# Patient Record
Sex: FEMALE | Race: WHITE | NOT HISPANIC OR LATINO | Employment: FULL TIME | URBAN - METROPOLITAN AREA
[De-identification: names, ages, dates, MRNs, and addresses within clinical notes are randomized per-mention and may not be internally consistent; named-entity substitution may affect disease eponyms.]

---

## 2017-01-23 ENCOUNTER — ALLSCRIPTS OFFICE VISIT (OUTPATIENT)
Dept: OTHER | Facility: OTHER | Age: 62
End: 2017-01-23

## 2017-02-17 ENCOUNTER — ALLSCRIPTS OFFICE VISIT (OUTPATIENT)
Dept: OTHER | Facility: OTHER | Age: 62
End: 2017-02-17

## 2017-03-15 ENCOUNTER — ANESTHESIA EVENT (OUTPATIENT)
Dept: GASTROENTEROLOGY | Facility: MEDICAL CENTER | Age: 62
End: 2017-03-15
Payer: COMMERCIAL

## 2017-03-15 RX ORDER — CHOLECALCIFEROL (VITAMIN D3) 25 MCG
CAPSULE ORAL
Status: ON HOLD | COMMUNITY
End: 2017-04-27

## 2017-03-15 RX ORDER — DOCUSATE SODIUM 100 MG/1
100 CAPSULE, LIQUID FILLED ORAL 2 TIMES DAILY
COMMUNITY
End: 2017-04-25

## 2017-03-15 RX ORDER — FLUCONAZOLE 40 MG/ML
POWDER, FOR SUSPENSION ORAL DAILY
COMMUNITY
End: 2017-04-25

## 2017-03-15 RX ORDER — PREGABALIN 75 MG/1
75 CAPSULE ORAL 2 TIMES DAILY
COMMUNITY
End: 2017-04-25

## 2017-03-15 RX ORDER — POLYETHYLENE GLYCOL 3350 17 G/17G
17 POWDER, FOR SOLUTION ORAL DAILY
COMMUNITY
End: 2018-09-21 | Stop reason: SDUPTHER

## 2017-03-15 RX ORDER — VENLAFAXINE HYDROCHLORIDE 150 MG/1
150 CAPSULE, EXTENDED RELEASE ORAL DAILY
COMMUNITY
End: 2018-03-05 | Stop reason: SDUPTHER

## 2017-03-16 ENCOUNTER — GENERIC CONVERSION - ENCOUNTER (OUTPATIENT)
Dept: GASTROENTEROLOGY | Facility: MEDICAL CENTER | Age: 62
End: 2017-03-16

## 2017-03-16 ENCOUNTER — ANESTHESIA (OUTPATIENT)
Dept: GASTROENTEROLOGY | Facility: MEDICAL CENTER | Age: 62
End: 2017-03-16
Payer: COMMERCIAL

## 2017-03-16 ENCOUNTER — HOSPITAL ENCOUNTER (OUTPATIENT)
Facility: MEDICAL CENTER | Age: 62
Setting detail: OUTPATIENT SURGERY
Discharge: HOME | End: 2017-03-16
Attending: INTERNAL MEDICINE | Admitting: INTERNAL MEDICINE
Payer: COMMERCIAL

## 2017-03-16 VITALS
OXYGEN SATURATION: 95 % | SYSTOLIC BLOOD PRESSURE: 137 MMHG | HEART RATE: 62 BPM | RESPIRATION RATE: 16 BRPM | DIASTOLIC BLOOD PRESSURE: 72 MMHG | TEMPERATURE: 97.2 F | HEIGHT: 63 IN | BODY MASS INDEX: 25.16 KG/M2 | WEIGHT: 142 LBS

## 2017-03-16 DIAGNOSIS — Z12.11 ENCOUNTER FOR SCREENING FOR MALIGNANT NEOPLASM OF COLON: ICD-10-CM

## 2017-03-16 PROCEDURE — 88305 TISSUE EXAM BY PATHOLOGIST: CPT | Performed by: INTERNAL MEDICINE

## 2017-03-16 RX ORDER — PROPOFOL 10 MG/ML
INJECTION, EMULSION INTRAVENOUS AS NEEDED
Status: DISCONTINUED | OUTPATIENT
Start: 2017-03-16 | End: 2017-03-16 | Stop reason: SURG

## 2017-03-16 RX ORDER — MULTIVIT-MIN/IRON FUM/FOLIC AC 7.5 MG-4
1 TABLET ORAL DAILY
COMMUNITY

## 2017-03-16 RX ORDER — SODIUM CHLORIDE 9 MG/ML
125 INJECTION, SOLUTION INTRAVENOUS CONTINUOUS
Status: DISCONTINUED | OUTPATIENT
Start: 2017-03-16 | End: 2017-03-16 | Stop reason: HOSPADM

## 2017-03-16 RX ADMIN — SODIUM CHLORIDE 125 ML/HR: 0.9 INJECTION, SOLUTION INTRAVENOUS at 14:26

## 2017-03-16 RX ADMIN — PROPOFOL 120 MG: 10 INJECTION, EMULSION INTRAVENOUS at 15:24

## 2017-03-16 RX ADMIN — PROPOFOL 50 MG: 10 INJECTION, EMULSION INTRAVENOUS at 15:31

## 2017-03-16 RX ADMIN — PROPOFOL 50 MG: 10 INJECTION, EMULSION INTRAVENOUS at 15:28

## 2017-03-16 RX ADMIN — PROPOFOL 30 MG: 10 INJECTION, EMULSION INTRAVENOUS at 15:27

## 2017-03-16 RX ADMIN — PROPOFOL 50 MG: 10 INJECTION, EMULSION INTRAVENOUS at 15:37

## 2017-03-22 ENCOUNTER — GENERIC CONVERSION - ENCOUNTER (OUTPATIENT)
Dept: OTHER | Facility: OTHER | Age: 62
End: 2017-03-22

## 2017-04-05 ENCOUNTER — GENERIC CONVERSION - ENCOUNTER (OUTPATIENT)
Dept: OTHER | Facility: OTHER | Age: 62
End: 2017-04-05

## 2017-04-06 ENCOUNTER — GENERIC CONVERSION - ENCOUNTER (OUTPATIENT)
Dept: OTHER | Facility: OTHER | Age: 62
End: 2017-04-06

## 2017-04-25 ENCOUNTER — APPOINTMENT (EMERGENCY)
Dept: RADIOLOGY | Facility: HOSPITAL | Age: 62
DRG: 669 | End: 2017-04-25
Payer: COMMERCIAL

## 2017-04-25 ENCOUNTER — HOSPITAL ENCOUNTER (INPATIENT)
Facility: HOSPITAL | Age: 62
LOS: 1 days | Discharge: HOME | DRG: 669 | End: 2017-04-27
Attending: EMERGENCY MEDICINE | Admitting: HOSPITALIST
Payer: COMMERCIAL

## 2017-04-25 DIAGNOSIS — R10.9 ABDOMINAL PAIN: Primary | ICD-10-CM

## 2017-04-25 DIAGNOSIS — N13.2 HYDRONEPHROSIS WITH URETERAL CALCULUS: ICD-10-CM

## 2017-04-25 DIAGNOSIS — N20.1 LEFT URETERAL CALCULUS: ICD-10-CM

## 2017-04-25 DIAGNOSIS — N17.9 AKI (ACUTE KIDNEY INJURY) (HCC): ICD-10-CM

## 2017-04-25 DIAGNOSIS — R52 INTRACTABLE PAIN: ICD-10-CM

## 2017-04-25 DIAGNOSIS — R11.2 NAUSEA AND VOMITING: ICD-10-CM

## 2017-04-25 DIAGNOSIS — N20.0 KIDNEY STONE: ICD-10-CM

## 2017-04-25 PROBLEM — I10 HYPERTENSION: Status: ACTIVE | Noted: 2017-04-25

## 2017-04-25 PROBLEM — F32.A DEPRESSION: Status: ACTIVE | Noted: 2017-04-25

## 2017-04-25 LAB
ALBUMIN SERPL BCP-MCNC: 3.6 G/DL (ref 3.5–5)
ALP SERPL-CCNC: 84 U/L (ref 46–116)
ALT SERPL W P-5'-P-CCNC: 25 U/L (ref 12–78)
ANION GAP BLD CALC-SCNC: 16 MMOL/L (ref 4–13)
ANION GAP SERPL CALCULATED.3IONS-SCNC: 9 MMOL/L (ref 4–13)
AST SERPL W P-5'-P-CCNC: 28 U/L (ref 5–45)
ATRIAL RATE: 64 BPM
BACTERIA UR QL AUTO: ABNORMAL /HPF
BASOPHILS # BLD AUTO: 0.02 THOUSANDS/ΜL (ref 0–0.1)
BASOPHILS NFR BLD AUTO: 0 % (ref 0–1)
BILIRUB SERPL-MCNC: 0.83 MG/DL (ref 0.2–1)
BILIRUB UR QL STRIP: ABNORMAL
BUN BLD-MCNC: 20 MG/DL (ref 5–25)
BUN SERPL-MCNC: 17 MG/DL (ref 5–25)
CA-I BLD-SCNC: 1.05 MMOL/L (ref 1.12–1.32)
CALCIUM SERPL-MCNC: 9.2 MG/DL (ref 8.3–10.1)
CHLORIDE BLD-SCNC: 105 MMOL/L (ref 100–108)
CHLORIDE SERPL-SCNC: 104 MMOL/L (ref 100–108)
CLARITY UR: CLEAR
CO2 SERPL-SCNC: 27 MMOL/L (ref 21–32)
COLOR UR: YELLOW
COLOR, POC: NORMAL
CREAT BLD-MCNC: 1.4 MG/DL (ref 0.6–1.3)
CREAT SERPL-MCNC: 1.44 MG/DL (ref 0.6–1.3)
EOSINOPHIL # BLD AUTO: 0.03 THOUSAND/ΜL (ref 0–0.61)
EOSINOPHIL NFR BLD AUTO: 0 % (ref 0–6)
ERYTHROCYTE [DISTWIDTH] IN BLOOD BY AUTOMATED COUNT: 13.3 % (ref 11.6–15.1)
GFR SERPL CREATININE-BSD FRML MDRD: 37 ML/MIN/1.73SQ M
GFR SERPL CREATININE-BSD FRML MDRD: 38.2 ML/MIN/1.73SQ M
GLUCOSE SERPL-MCNC: 94 MG/DL (ref 65–140)
GLUCOSE SERPL-MCNC: 99 MG/DL (ref 65–140)
GLUCOSE UR STRIP-MCNC: NEGATIVE MG/DL
HCT VFR BLD AUTO: 46.5 % (ref 34.8–46.1)
HCT VFR BLD CALC: 43 % (ref 34.8–46.1)
HGB BLD-MCNC: 15.5 G/DL (ref 11.5–15.4)
HGB BLDA-MCNC: 14.6 G/DL (ref 11.5–15.4)
HGB UR QL STRIP.AUTO: ABNORMAL
HYALINE CASTS #/AREA URNS LPF: ABNORMAL /LPF
KETONES UR STRIP-MCNC: ABNORMAL MG/DL
LEUKOCYTE ESTERASE UR QL STRIP: NEGATIVE
LIPASE SERPL-CCNC: 117 U/L (ref 73–393)
LYMPHOCYTES # BLD AUTO: 0.95 THOUSANDS/ΜL (ref 0.6–4.47)
LYMPHOCYTES NFR BLD AUTO: 10 % (ref 14–44)
MCH RBC QN AUTO: 30 PG (ref 26.8–34.3)
MCHC RBC AUTO-ENTMCNC: 33.3 G/DL (ref 31.4–37.4)
MCV RBC AUTO: 90 FL (ref 82–98)
MONOCYTES # BLD AUTO: 0.9 THOUSAND/ΜL (ref 0.17–1.22)
MONOCYTES NFR BLD AUTO: 9 % (ref 4–12)
NEUTROPHILS # BLD AUTO: 7.7 THOUSANDS/ΜL (ref 1.85–7.62)
NEUTS SEG NFR BLD AUTO: 81 % (ref 43–75)
NITRITE UR QL STRIP: NEGATIVE
NON-SQ EPI CELLS URNS QL MICRO: ABNORMAL /HPF
NRBC BLD AUTO-RTO: 0 /100 WBCS
P AXIS: 59 DEGREES
PCO2 BLD: 26 MMOL/L (ref 21–32)
PH UR STRIP.AUTO: 6.5 [PH] (ref 4.5–8)
PLATELET # BLD AUTO: 221 THOUSANDS/UL (ref 149–390)
PMV BLD AUTO: 11.5 FL (ref 8.9–12.7)
POTASSIUM BLD-SCNC: 4.2 MMOL/L (ref 3.5–5.3)
POTASSIUM SERPL-SCNC: 4.1 MMOL/L (ref 3.5–5.3)
PR INTERVAL: 116 MS
PROT SERPL-MCNC: 7.5 G/DL (ref 6.4–8.2)
PROT UR STRIP-MCNC: ABNORMAL MG/DL
QRS AXIS: 48 DEGREES
QRSD INTERVAL: 68 MS
QT INTERVAL: 396 MS
QTC INTERVAL: 408 MS
RBC # BLD AUTO: 5.16 MILLION/UL (ref 3.81–5.12)
RBC #/AREA URNS AUTO: ABNORMAL /HPF
SODIUM BLD-SCNC: 142 MMOL/L (ref 136–145)
SODIUM SERPL-SCNC: 140 MMOL/L (ref 136–145)
SP GR UR STRIP.AUTO: 1.02 (ref 1–1.03)
SPECIMEN SOURCE: ABNORMAL
T WAVE AXIS: 55 DEGREES
UROBILINOGEN UR QL STRIP.AUTO: 1 E.U./DL
VENTRICULAR RATE: 64 BPM
WBC # BLD AUTO: 9.62 THOUSAND/UL (ref 4.31–10.16)
WBC #/AREA URNS AUTO: ABNORMAL /HPF

## 2017-04-25 PROCEDURE — 96374 THER/PROPH/DIAG INJ IV PUSH: CPT

## 2017-04-25 PROCEDURE — 99285 EMERGENCY DEPT VISIT HI MDM: CPT

## 2017-04-25 PROCEDURE — 36415 COLL VENOUS BLD VENIPUNCTURE: CPT

## 2017-04-25 PROCEDURE — 81002 URINALYSIS NONAUTO W/O SCOPE: CPT | Performed by: EMERGENCY MEDICINE

## 2017-04-25 PROCEDURE — 85014 HEMATOCRIT: CPT

## 2017-04-25 PROCEDURE — 87086 URINE CULTURE/COLONY COUNT: CPT

## 2017-04-25 PROCEDURE — 80047 BASIC METABLC PNL IONIZED CA: CPT

## 2017-04-25 PROCEDURE — 93005 ELECTROCARDIOGRAM TRACING: CPT

## 2017-04-25 PROCEDURE — 96375 TX/PRO/DX INJ NEW DRUG ADDON: CPT

## 2017-04-25 PROCEDURE — 96376 TX/PRO/DX INJ SAME DRUG ADON: CPT

## 2017-04-25 PROCEDURE — 96361 HYDRATE IV INFUSION ADD-ON: CPT

## 2017-04-25 PROCEDURE — 85025 COMPLETE CBC W/AUTO DIFF WBC: CPT

## 2017-04-25 PROCEDURE — 83690 ASSAY OF LIPASE: CPT

## 2017-04-25 PROCEDURE — 80053 COMPREHEN METABOLIC PANEL: CPT

## 2017-04-25 PROCEDURE — 74177 CT ABD & PELVIS W/CONTRAST: CPT

## 2017-04-25 PROCEDURE — 81001 URINALYSIS AUTO W/SCOPE: CPT

## 2017-04-25 RX ORDER — IBUPROFEN 600 MG/1
600 TABLET ORAL EVERY 8 HOURS PRN
Qty: 21 TABLET | Refills: 0 | Status: SHIPPED | OUTPATIENT
Start: 2017-04-25 | End: 2017-04-27 | Stop reason: HOSPADM

## 2017-04-25 RX ORDER — TAMSULOSIN HYDROCHLORIDE 0.4 MG/1
0.4 CAPSULE ORAL
Status: DISCONTINUED | OUTPATIENT
Start: 2017-04-26 | End: 2017-04-27 | Stop reason: HOSPADM

## 2017-04-25 RX ORDER — TAMSULOSIN HYDROCHLORIDE 0.4 MG/1
0.4 CAPSULE ORAL
Qty: 7 CAPSULE | Refills: 0 | Status: SHIPPED | OUTPATIENT
Start: 2017-04-25 | End: 2019-01-22 | Stop reason: ALTCHOICE

## 2017-04-25 RX ORDER — ONDANSETRON 2 MG/ML
4 INJECTION INTRAMUSCULAR; INTRAVENOUS EVERY 6 HOURS PRN
Status: DISCONTINUED | OUTPATIENT
Start: 2017-04-25 | End: 2017-04-27 | Stop reason: HOSPADM

## 2017-04-25 RX ORDER — ACETAMINOPHEN 325 MG/1
975 TABLET ORAL ONCE
Status: COMPLETED | OUTPATIENT
Start: 2017-04-25 | End: 2017-04-25

## 2017-04-25 RX ORDER — HEPARIN SODIUM 5000 [USP'U]/ML
5000 INJECTION, SOLUTION INTRAVENOUS; SUBCUTANEOUS EVERY 8 HOURS SCHEDULED
Status: DISCONTINUED | OUTPATIENT
Start: 2017-04-25 | End: 2017-04-27 | Stop reason: HOSPADM

## 2017-04-25 RX ORDER — OXYCODONE HYDROCHLORIDE AND ACETAMINOPHEN 5; 325 MG/1; MG/1
1 TABLET ORAL EVERY 8 HOURS PRN
Qty: 12 TABLET | Refills: 0 | Status: SHIPPED | OUTPATIENT
Start: 2017-04-25 | End: 2017-04-27 | Stop reason: HOSPADM

## 2017-04-25 RX ORDER — ONDANSETRON 2 MG/ML
4 INJECTION INTRAMUSCULAR; INTRAVENOUS ONCE
Status: COMPLETED | OUTPATIENT
Start: 2017-04-25 | End: 2017-04-25

## 2017-04-25 RX ORDER — KETOROLAC TROMETHAMINE 30 MG/ML
15 INJECTION, SOLUTION INTRAMUSCULAR; INTRAVENOUS ONCE
Status: COMPLETED | OUTPATIENT
Start: 2017-04-25 | End: 2017-04-25

## 2017-04-25 RX ORDER — ACETAMINOPHEN 325 MG/1
650 TABLET ORAL EVERY 6 HOURS PRN
Status: DISCONTINUED | OUTPATIENT
Start: 2017-04-25 | End: 2017-04-25 | Stop reason: SDUPTHER

## 2017-04-25 RX ORDER — OXYCODONE HYDROCHLORIDE 5 MG/1
5 TABLET ORAL EVERY 4 HOURS PRN
Status: DISCONTINUED | OUTPATIENT
Start: 2017-04-25 | End: 2017-04-27 | Stop reason: HOSPADM

## 2017-04-25 RX ORDER — VENLAFAXINE HYDROCHLORIDE 150 MG/1
150 CAPSULE, EXTENDED RELEASE ORAL EVERY EVENING
Status: DISCONTINUED | OUTPATIENT
Start: 2017-04-25 | End: 2017-04-27 | Stop reason: HOSPADM

## 2017-04-25 RX ORDER — ACETAMINOPHEN 325 MG/1
650 TABLET ORAL EVERY 6 HOURS PRN
Status: DISCONTINUED | OUTPATIENT
Start: 2017-04-25 | End: 2017-04-26

## 2017-04-25 RX ORDER — TAMSULOSIN HYDROCHLORIDE 0.4 MG/1
0.4 CAPSULE ORAL ONCE
Status: COMPLETED | OUTPATIENT
Start: 2017-04-25 | End: 2017-04-25

## 2017-04-25 RX ORDER — MORPHINE SULFATE 2 MG/ML
2 INJECTION, SOLUTION INTRAMUSCULAR; INTRAVENOUS EVERY 4 HOURS PRN
Status: DISCONTINUED | OUTPATIENT
Start: 2017-04-25 | End: 2017-04-27 | Stop reason: HOSPADM

## 2017-04-25 RX ORDER — ONDANSETRON 4 MG/1
4 TABLET, FILM COATED ORAL EVERY 8 HOURS PRN
Qty: 12 TABLET | Refills: 0 | Status: SHIPPED | OUTPATIENT
Start: 2017-04-25 | End: 2017-04-27 | Stop reason: HOSPADM

## 2017-04-25 RX ORDER — IBUPROFEN 200 MG
200 TABLET ORAL EVERY 6 HOURS PRN
COMMUNITY
End: 2017-04-27 | Stop reason: HOSPADM

## 2017-04-25 RX ORDER — OXYCODONE HYDROCHLORIDE 5 MG/1
5 TABLET ORAL ONCE
Status: COMPLETED | OUTPATIENT
Start: 2017-04-25 | End: 2017-04-25

## 2017-04-25 RX ORDER — SODIUM CHLORIDE 9 MG/ML
100 INJECTION, SOLUTION INTRAVENOUS CONTINUOUS
Status: DISCONTINUED | OUTPATIENT
Start: 2017-04-25 | End: 2017-04-27 | Stop reason: HOSPADM

## 2017-04-25 RX ADMIN — SODIUM CHLORIDE 1000 ML: 0.9 INJECTION, SOLUTION INTRAVENOUS at 12:36

## 2017-04-25 RX ADMIN — IODIXANOL 75 ML: 320 INJECTION, SOLUTION INTRAVASCULAR at 15:01

## 2017-04-25 RX ADMIN — ONDANSETRON 4 MG: 2 INJECTION INTRAMUSCULAR; INTRAVENOUS at 15:27

## 2017-04-25 RX ADMIN — SODIUM CHLORIDE 100 ML/HR: 0.9 INJECTION, SOLUTION INTRAVENOUS at 21:50

## 2017-04-25 RX ADMIN — ONDANSETRON 4 MG: 2 INJECTION INTRAMUSCULAR; INTRAVENOUS at 18:32

## 2017-04-25 RX ADMIN — TAMSULOSIN HYDROCHLORIDE 0.4 MG: 0.4 CAPSULE ORAL at 19:41

## 2017-04-25 RX ADMIN — ACETAMINOPHEN 975 MG: 325 TABLET, FILM COATED ORAL at 18:25

## 2017-04-25 RX ADMIN — ONDANSETRON 4 MG: 2 INJECTION INTRAMUSCULAR; INTRAVENOUS at 12:36

## 2017-04-25 RX ADMIN — OXYCODONE HYDROCHLORIDE 5 MG: 5 TABLET ORAL at 18:25

## 2017-04-25 RX ADMIN — HYDROMORPHONE HYDROCHLORIDE 0.2 MG: 1 INJECTION, SOLUTION INTRAMUSCULAR; INTRAVENOUS; SUBCUTANEOUS at 19:26

## 2017-04-25 RX ADMIN — HYDROMORPHONE HYDROCHLORIDE 0.5 MG: 1 INJECTION, SOLUTION INTRAMUSCULAR; INTRAVENOUS; SUBCUTANEOUS at 15:27

## 2017-04-25 RX ADMIN — SODIUM CHLORIDE 1000 ML: 0.9 INJECTION, SOLUTION INTRAVENOUS at 16:41

## 2017-04-25 RX ADMIN — KETOROLAC TROMETHAMINE 15 MG: 30 INJECTION, SOLUTION INTRAMUSCULAR at 13:08

## 2017-04-25 RX ADMIN — HEPARIN SODIUM 5000 UNITS: 5000 INJECTION, SOLUTION INTRAVENOUS; SUBCUTANEOUS at 22:30

## 2017-04-25 RX ADMIN — VENLAFAXINE HYDROCHLORIDE 150 MG: 150 CAPSULE, EXTENDED RELEASE ORAL at 22:30

## 2017-04-26 ENCOUNTER — ANESTHESIA (INPATIENT)
Dept: PERIOP | Facility: HOSPITAL | Age: 62
DRG: 669 | End: 2017-04-26
Payer: COMMERCIAL

## 2017-04-26 ENCOUNTER — APPOINTMENT (INPATIENT)
Dept: RADIOLOGY | Facility: HOSPITAL | Age: 62
DRG: 669 | End: 2017-04-26
Payer: COMMERCIAL

## 2017-04-26 ENCOUNTER — ANESTHESIA EVENT (INPATIENT)
Dept: PERIOP | Facility: HOSPITAL | Age: 62
DRG: 669 | End: 2017-04-26
Payer: COMMERCIAL

## 2017-04-26 PROBLEM — K52.9 ACUTE GASTROENTERITIS: Status: ACTIVE | Noted: 2017-04-26

## 2017-04-26 LAB
ANION GAP SERPL CALCULATED.3IONS-SCNC: 7 MMOL/L (ref 4–13)
BUN SERPL-MCNC: 16 MG/DL (ref 5–25)
CALCIUM SERPL-MCNC: 8.2 MG/DL (ref 8.3–10.1)
CHLORIDE SERPL-SCNC: 110 MMOL/L (ref 100–108)
CO2 SERPL-SCNC: 26 MMOL/L (ref 21–32)
CREAT SERPL-MCNC: 1.35 MG/DL (ref 0.6–1.3)
ERYTHROCYTE [DISTWIDTH] IN BLOOD BY AUTOMATED COUNT: 13.7 % (ref 11.6–15.1)
GFR SERPL CREATININE-BSD FRML MDRD: 39.9 ML/MIN/1.73SQ M
GLUCOSE SERPL-MCNC: 76 MG/DL (ref 65–140)
HCT VFR BLD AUTO: 41.7 % (ref 34.8–46.1)
HGB BLD-MCNC: 13.2 G/DL (ref 11.5–15.4)
MCH RBC QN AUTO: 29.3 PG (ref 26.8–34.3)
MCHC RBC AUTO-ENTMCNC: 31.7 G/DL (ref 31.4–37.4)
MCV RBC AUTO: 93 FL (ref 82–98)
PLATELET # BLD AUTO: 193 THOUSANDS/UL (ref 149–390)
PMV BLD AUTO: 12.1 FL (ref 8.9–12.7)
POTASSIUM SERPL-SCNC: 3.9 MMOL/L (ref 3.5–5.3)
RBC # BLD AUTO: 4.51 MILLION/UL (ref 3.81–5.12)
SODIUM SERPL-SCNC: 143 MMOL/L (ref 136–145)
WBC # BLD AUTO: 7.9 THOUSAND/UL (ref 4.31–10.16)

## 2017-04-26 PROCEDURE — C2617 STENT, NON-COR, TEM W/O DEL: HCPCS | Performed by: UROLOGY

## 2017-04-26 PROCEDURE — BT1FYZZ FLUOROSCOPY OF LEFT KIDNEY, URETER AND BLADDER USING OTHER CONTRAST: ICD-10-PCS | Performed by: UROLOGY

## 2017-04-26 PROCEDURE — 85027 COMPLETE CBC AUTOMATED: CPT | Performed by: PHYSICIAN ASSISTANT

## 2017-04-26 PROCEDURE — 82360 CALCULUS ASSAY QUANT: CPT | Performed by: UROLOGY

## 2017-04-26 PROCEDURE — 0TC78ZZ EXTIRPATION OF MATTER FROM LEFT URETER, VIA NATURAL OR ARTIFICIAL OPENING ENDOSCOPIC: ICD-10-PCS | Performed by: UROLOGY

## 2017-04-26 PROCEDURE — 80048 BASIC METABOLIC PNL TOTAL CA: CPT | Performed by: PHYSICIAN ASSISTANT

## 2017-04-26 PROCEDURE — 0T778DZ DILATION OF LEFT URETER WITH INTRALUMINAL DEVICE, VIA NATURAL OR ARTIFICIAL OPENING ENDOSCOPIC: ICD-10-PCS | Performed by: UROLOGY

## 2017-04-26 PROCEDURE — C1769 GUIDE WIRE: HCPCS | Performed by: UROLOGY

## 2017-04-26 PROCEDURE — 74420 UROGRAPHY RTRGR +-KUB: CPT

## 2017-04-26 DEVICE — STENT URETERAL 6FR 24CML INLAY OPTIMA: Type: IMPLANTABLE DEVICE | Site: URETER | Status: FUNCTIONAL

## 2017-04-26 RX ORDER — FENTANYL CITRATE 50 UG/ML
INJECTION, SOLUTION INTRAMUSCULAR; INTRAVENOUS AS NEEDED
Status: DISCONTINUED | OUTPATIENT
Start: 2017-04-26 | End: 2017-04-26 | Stop reason: SURG

## 2017-04-26 RX ORDER — SODIUM CHLORIDE, SODIUM LACTATE, POTASSIUM CHLORIDE, CALCIUM CHLORIDE 600; 310; 30; 20 MG/100ML; MG/100ML; MG/100ML; MG/100ML
50 INJECTION, SOLUTION INTRAVENOUS CONTINUOUS
Status: DISCONTINUED | OUTPATIENT
Start: 2017-04-26 | End: 2017-04-27 | Stop reason: HOSPADM

## 2017-04-26 RX ORDER — CIPROFLOXACIN 500 MG/1
500 TABLET, FILM COATED ORAL EVERY 12 HOURS SCHEDULED
Status: DISCONTINUED | OUTPATIENT
Start: 2017-04-26 | End: 2017-04-27 | Stop reason: HOSPADM

## 2017-04-26 RX ORDER — IBUPROFEN 200 MG
200 TABLET ORAL EVERY 6 HOURS PRN
Status: DISCONTINUED | OUTPATIENT
Start: 2017-04-26 | End: 2017-04-27 | Stop reason: HOSPADM

## 2017-04-26 RX ORDER — LIDOCAINE HYDROCHLORIDE 10 MG/ML
INJECTION, SOLUTION INFILTRATION; PERINEURAL AS NEEDED
Status: DISCONTINUED | OUTPATIENT
Start: 2017-04-26 | End: 2017-04-26 | Stop reason: SURG

## 2017-04-26 RX ORDER — PROPOFOL 10 MG/ML
INJECTION, EMULSION INTRAVENOUS AS NEEDED
Status: DISCONTINUED | OUTPATIENT
Start: 2017-04-26 | End: 2017-04-26 | Stop reason: SURG

## 2017-04-26 RX ORDER — HYDROCODONE BITARTRATE AND ACETAMINOPHEN 5; 325 MG/1; MG/1
1 TABLET ORAL EVERY 6 HOURS PRN
Status: DISCONTINUED | OUTPATIENT
Start: 2017-04-26 | End: 2017-04-27 | Stop reason: HOSPADM

## 2017-04-26 RX ORDER — ACETAMINOPHEN 325 MG/1
650 TABLET ORAL EVERY 6 HOURS PRN
Status: DISCONTINUED | OUTPATIENT
Start: 2017-04-26 | End: 2017-04-27 | Stop reason: HOSPADM

## 2017-04-26 RX ORDER — MAGNESIUM HYDROXIDE 1200 MG/15ML
LIQUID ORAL AS NEEDED
Status: DISCONTINUED | OUTPATIENT
Start: 2017-04-26 | End: 2017-04-26 | Stop reason: HOSPADM

## 2017-04-26 RX ORDER — FENTANYL CITRATE/PF 50 MCG/ML
25 SYRINGE (ML) INJECTION
Status: DISCONTINUED | OUTPATIENT
Start: 2017-04-26 | End: 2017-04-26 | Stop reason: HOSPADM

## 2017-04-26 RX ORDER — ONDANSETRON 2 MG/ML
4 INJECTION INTRAMUSCULAR; INTRAVENOUS EVERY 4 HOURS PRN
Status: DISCONTINUED | OUTPATIENT
Start: 2017-04-26 | End: 2017-04-26 | Stop reason: HOSPADM

## 2017-04-26 RX ORDER — ONDANSETRON 2 MG/ML
INJECTION INTRAMUSCULAR; INTRAVENOUS AS NEEDED
Status: DISCONTINUED | OUTPATIENT
Start: 2017-04-26 | End: 2017-04-26 | Stop reason: SURG

## 2017-04-26 RX ADMIN — HEPARIN SODIUM 5000 UNITS: 5000 INJECTION, SOLUTION INTRAVENOUS; SUBCUTANEOUS at 05:35

## 2017-04-26 RX ADMIN — SODIUM CHLORIDE 100 ML/HR: 0.9 INJECTION, SOLUTION INTRAVENOUS at 08:30

## 2017-04-26 RX ADMIN — IBUPROFEN 200 MG: 200 TABLET, FILM COATED ORAL at 19:51

## 2017-04-26 RX ADMIN — TAMSULOSIN HYDROCHLORIDE 0.4 MG: 0.4 CAPSULE ORAL at 19:19

## 2017-04-26 RX ADMIN — VENLAFAXINE HYDROCHLORIDE 150 MG: 150 CAPSULE, EXTENDED RELEASE ORAL at 19:18

## 2017-04-26 RX ADMIN — CEFAZOLIN SODIUM 1000 MG: 1 SOLUTION INTRAVENOUS at 17:03

## 2017-04-26 RX ADMIN — LIDOCAINE HYDROCHLORIDE 40 MG: 10 INJECTION, SOLUTION INFILTRATION; PERINEURAL at 17:07

## 2017-04-26 RX ADMIN — DEXAMETHASONE SODIUM PHOSPHATE 10 MG: 10 INJECTION INTRAMUSCULAR; INTRAVENOUS at 17:18

## 2017-04-26 RX ADMIN — CIPROFLOXACIN 500 MG: 500 TABLET, FILM COATED ORAL at 21:15

## 2017-04-26 RX ADMIN — FENTANYL CITRATE 50 MCG: 50 INJECTION, SOLUTION INTRAMUSCULAR; INTRAVENOUS at 17:06

## 2017-04-26 RX ADMIN — FENTANYL CITRATE 50 MCG: 50 INJECTION, SOLUTION INTRAMUSCULAR; INTRAVENOUS at 17:20

## 2017-04-26 RX ADMIN — PROPOFOL 100 MG: 10 INJECTION, EMULSION INTRAVENOUS at 17:07

## 2017-04-26 RX ADMIN — ONDANSETRON 4 MG: 2 INJECTION INTRAMUSCULAR; INTRAVENOUS at 17:18

## 2017-04-26 RX ADMIN — HEPARIN SODIUM 5000 UNITS: 5000 INJECTION, SOLUTION INTRAVENOUS; SUBCUTANEOUS at 15:47

## 2017-04-26 RX ADMIN — SODIUM CHLORIDE, SODIUM LACTATE, POTASSIUM CHLORIDE, AND CALCIUM CHLORIDE 50 ML/HR: .6; .31; .03; .02 INJECTION, SOLUTION INTRAVENOUS at 19:19

## 2017-04-26 RX ADMIN — HEPARIN SODIUM 5000 UNITS: 5000 INJECTION, SOLUTION INTRAVENOUS; SUBCUTANEOUS at 21:15

## 2017-04-27 VITALS
HEIGHT: 63 IN | HEART RATE: 82 BPM | OXYGEN SATURATION: 93 % | RESPIRATION RATE: 18 BRPM | DIASTOLIC BLOOD PRESSURE: 64 MMHG | WEIGHT: 148.59 LBS | SYSTOLIC BLOOD PRESSURE: 105 MMHG | TEMPERATURE: 98.5 F | BODY MASS INDEX: 26.33 KG/M2

## 2017-04-27 LAB
ANION GAP SERPL CALCULATED.3IONS-SCNC: 5 MMOL/L (ref 4–13)
BACTERIA UR CULT: NORMAL
BUN SERPL-MCNC: 14 MG/DL (ref 5–25)
CALCIUM SERPL-MCNC: 8.4 MG/DL (ref 8.3–10.1)
CHLORIDE SERPL-SCNC: 107 MMOL/L (ref 100–108)
CO2 SERPL-SCNC: 28 MMOL/L (ref 21–32)
CREAT SERPL-MCNC: 0.76 MG/DL (ref 0.6–1.3)
GFR SERPL CREATININE-BSD FRML MDRD: >60 ML/MIN/1.73SQ M
GLUCOSE SERPL-MCNC: 124 MG/DL (ref 65–140)
PLATELET # BLD AUTO: 158 THOUSANDS/UL (ref 149–390)
PMV BLD AUTO: 11.8 FL (ref 8.9–12.7)
POTASSIUM SERPL-SCNC: 3.4 MMOL/L (ref 3.5–5.3)
SODIUM SERPL-SCNC: 140 MMOL/L (ref 136–145)

## 2017-04-27 PROCEDURE — 80048 BASIC METABOLIC PNL TOTAL CA: CPT | Performed by: INTERNAL MEDICINE

## 2017-04-27 PROCEDURE — 85049 AUTOMATED PLATELET COUNT: CPT | Performed by: PHYSICIAN ASSISTANT

## 2017-04-27 RX ORDER — CIPROFLOXACIN 500 MG/1
500 TABLET, FILM COATED ORAL EVERY 12 HOURS SCHEDULED
Qty: 10 TABLET | Refills: 0 | Status: SHIPPED | OUTPATIENT
Start: 2017-04-27 | End: 2017-05-02

## 2017-04-27 RX ORDER — CHOLECALCIFEROL (VITAMIN D3) 25 MCG
1000 CAPSULE ORAL DAILY
Qty: 30000 CAPSULE | Refills: 0
Start: 2017-04-27 | End: 2017-10-03

## 2017-04-27 RX ORDER — CIPROFLOXACIN 500 MG/1
500 TABLET, FILM COATED ORAL EVERY 12 HOURS SCHEDULED
Qty: 10 TABLET | Refills: 0 | Status: SHIPPED | OUTPATIENT
Start: 2017-04-27 | End: 2017-04-27

## 2017-04-27 RX ORDER — TAMSULOSIN HYDROCHLORIDE 0.4 MG/1
0.4 CAPSULE ORAL
Qty: 30 CAPSULE | Refills: 0 | Status: SHIPPED | OUTPATIENT
Start: 2017-04-27 | End: 2017-10-03

## 2017-04-27 RX ADMIN — HEPARIN SODIUM 5000 UNITS: 5000 INJECTION, SOLUTION INTRAVENOUS; SUBCUTANEOUS at 05:41

## 2017-04-27 RX ADMIN — CIPROFLOXACIN 500 MG: 500 TABLET, FILM COATED ORAL at 09:34

## 2017-05-01 ENCOUNTER — ALLSCRIPTS OFFICE VISIT (OUTPATIENT)
Dept: OTHER | Facility: OTHER | Age: 62
End: 2017-05-01

## 2017-05-01 DIAGNOSIS — Z13.220 ENCOUNTER FOR SCREENING FOR LIPOID DISORDERS: ICD-10-CM

## 2017-05-01 DIAGNOSIS — Z12.31 ENCOUNTER FOR SCREENING MAMMOGRAM FOR MALIGNANT NEOPLASM OF BREAST: ICD-10-CM

## 2017-05-01 DIAGNOSIS — E55.9 VITAMIN D DEFICIENCY: ICD-10-CM

## 2017-05-04 LAB
CA PHOS MFR STONE: 5 %
CALCIUM OXALATE DIHYDRATE MFR STONE IR: 20 %
COLOR STONE: NORMAL
COM MFR STONE: 75 %
COMMENT-STONE3: NORMAL
COMPOSITION: NORMAL
LABORATORY COMMENT REPORT: NORMAL
NIDUS STONE QL: NORMAL
PHOTO: NORMAL
SIZE STONE: NORMAL MM
STONE ANALYSIS-IMP: NORMAL
SURFACE CRYSTALS: NORMAL
WT STONE: 24 MG

## 2017-07-20 ENCOUNTER — GENERIC CONVERSION - ENCOUNTER (OUTPATIENT)
Dept: OTHER | Facility: OTHER | Age: 62
End: 2017-07-20

## 2017-08-04 ENCOUNTER — GENERIC CONVERSION - ENCOUNTER (OUTPATIENT)
Dept: OTHER | Facility: OTHER | Age: 62
End: 2017-08-04

## 2017-09-06 ENCOUNTER — ALLSCRIPTS OFFICE VISIT (OUTPATIENT)
Dept: OTHER | Facility: OTHER | Age: 62
End: 2017-09-06

## 2017-09-29 ENCOUNTER — APPOINTMENT (OUTPATIENT)
Dept: LAB | Age: 62
End: 2017-09-29
Payer: COMMERCIAL

## 2017-09-29 ENCOUNTER — TRANSCRIBE ORDERS (OUTPATIENT)
Dept: ADMINISTRATIVE | Age: 62
End: 2017-09-29

## 2017-09-29 ENCOUNTER — GENERIC CONVERSION - ENCOUNTER (OUTPATIENT)
Dept: OTHER | Facility: OTHER | Age: 62
End: 2017-09-29

## 2017-09-29 DIAGNOSIS — E55.9 VITAMIN D DEFICIENCY: ICD-10-CM

## 2017-09-29 DIAGNOSIS — Z13.220 ENCOUNTER FOR SCREENING FOR LIPOID DISORDERS: ICD-10-CM

## 2017-09-29 LAB
25(OH)D3 SERPL-MCNC: 36.8 NG/ML (ref 30–100)
CHOLEST SERPL-MCNC: 180 MG/DL (ref 50–200)
HDLC SERPL-MCNC: 59 MG/DL (ref 40–60)
LDLC SERPL CALC-MCNC: 103 MG/DL (ref 0–100)
TRIGL SERPL-MCNC: 92 MG/DL

## 2017-09-29 PROCEDURE — 80061 LIPID PANEL: CPT

## 2017-09-29 PROCEDURE — 82306 VITAMIN D 25 HYDROXY: CPT

## 2017-09-29 PROCEDURE — 36415 COLL VENOUS BLD VENIPUNCTURE: CPT

## 2017-10-01 DIAGNOSIS — N20.0 CALCULUS OF KIDNEY: ICD-10-CM

## 2017-10-03 ENCOUNTER — HOSPITAL ENCOUNTER (EMERGENCY)
Facility: HOSPITAL | Age: 62
Discharge: HOME | End: 2017-10-03
Attending: EMERGENCY MEDICINE | Admitting: EMERGENCY MEDICINE
Payer: COMMERCIAL

## 2017-10-03 ENCOUNTER — GENERIC CONVERSION - ENCOUNTER (OUTPATIENT)
Dept: OTHER | Facility: OTHER | Age: 62
End: 2017-10-03

## 2017-10-03 ENCOUNTER — APPOINTMENT (EMERGENCY)
Dept: RADIOLOGY | Facility: HOSPITAL | Age: 62
End: 2017-10-03
Payer: COMMERCIAL

## 2017-10-03 VITALS
BODY MASS INDEX: 26.32 KG/M2 | WEIGHT: 148.59 LBS | TEMPERATURE: 98.3 F | HEART RATE: 54 BPM | SYSTOLIC BLOOD PRESSURE: 105 MMHG | RESPIRATION RATE: 18 BRPM | OXYGEN SATURATION: 99 % | DIASTOLIC BLOOD PRESSURE: 60 MMHG

## 2017-10-03 DIAGNOSIS — R07.9 CHEST PAIN, UNSPECIFIED TYPE: Primary | ICD-10-CM

## 2017-10-03 LAB
ALBUMIN SERPL BCP-MCNC: 3.6 G/DL (ref 3.5–5)
ALP SERPL-CCNC: 82 U/L (ref 46–116)
ALT SERPL W P-5'-P-CCNC: 19 U/L (ref 12–78)
ANION GAP SERPL CALCULATED.3IONS-SCNC: 5 MMOL/L (ref 4–13)
AST SERPL W P-5'-P-CCNC: 18 U/L (ref 5–45)
ATRIAL RATE: 64 BPM
BASOPHILS # BLD AUTO: 0.04 THOUSANDS/ΜL (ref 0–0.1)
BASOPHILS NFR BLD AUTO: 1 % (ref 0–1)
BILIRUB SERPL-MCNC: 0.38 MG/DL (ref 0.2–1)
BUN SERPL-MCNC: 16 MG/DL (ref 5–25)
CALCIUM SERPL-MCNC: 8.9 MG/DL (ref 8.3–10.1)
CHLORIDE SERPL-SCNC: 105 MMOL/L (ref 100–108)
CO2 SERPL-SCNC: 29 MMOL/L (ref 21–32)
CREAT SERPL-MCNC: 0.91 MG/DL (ref 0.6–1.3)
EOSINOPHIL # BLD AUTO: 0.15 THOUSAND/ΜL (ref 0–0.61)
EOSINOPHIL NFR BLD AUTO: 3 % (ref 0–6)
ERYTHROCYTE [DISTWIDTH] IN BLOOD BY AUTOMATED COUNT: 13.7 % (ref 11.6–15.1)
GFR SERPL CREATININE-BSD FRML MDRD: 68 ML/MIN/1.73SQ M
GLUCOSE SERPL-MCNC: 107 MG/DL (ref 65–140)
HCT VFR BLD AUTO: 43 % (ref 34.8–46.1)
HGB BLD-MCNC: 14.6 G/DL (ref 11.5–15.4)
LIPASE SERPL-CCNC: 160 U/L (ref 73–393)
LYMPHOCYTES # BLD AUTO: 1.65 THOUSANDS/ΜL (ref 0.6–4.47)
LYMPHOCYTES NFR BLD AUTO: 31 % (ref 14–44)
MCH RBC QN AUTO: 30.2 PG (ref 26.8–34.3)
MCHC RBC AUTO-ENTMCNC: 34 G/DL (ref 31.4–37.4)
MCV RBC AUTO: 89 FL (ref 82–98)
MONOCYTES # BLD AUTO: 0.43 THOUSAND/ΜL (ref 0.17–1.22)
MONOCYTES NFR BLD AUTO: 8 % (ref 4–12)
NEUTROPHILS # BLD AUTO: 3.05 THOUSANDS/ΜL (ref 1.85–7.62)
NEUTS SEG NFR BLD AUTO: 57 % (ref 43–75)
NRBC BLD AUTO-RTO: 0 /100 WBCS
P AXIS: 63 DEGREES
PLATELET # BLD AUTO: 186 THOUSANDS/UL (ref 149–390)
PMV BLD AUTO: 12.3 FL (ref 8.9–12.7)
POTASSIUM SERPL-SCNC: 3.8 MMOL/L (ref 3.5–5.3)
PR INTERVAL: 116 MS
PROT SERPL-MCNC: 7.3 G/DL (ref 6.4–8.2)
QRS AXIS: 63 DEGREES
QRSD INTERVAL: 66 MS
QT INTERVAL: 394 MS
QTC INTERVAL: 406 MS
RBC # BLD AUTO: 4.84 MILLION/UL (ref 3.81–5.12)
SODIUM SERPL-SCNC: 139 MMOL/L (ref 136–145)
T WAVE AXIS: 61 DEGREES
TROPONIN I SERPL-MCNC: <0.02 NG/ML
TROPONIN I SERPL-MCNC: <0.02 NG/ML
VENTRICULAR RATE: 64 BPM
WBC # BLD AUTO: 5.32 THOUSAND/UL (ref 4.31–10.16)

## 2017-10-03 PROCEDURE — 84484 ASSAY OF TROPONIN QUANT: CPT | Performed by: EMERGENCY MEDICINE

## 2017-10-03 PROCEDURE — 99285 EMERGENCY DEPT VISIT HI MDM: CPT

## 2017-10-03 PROCEDURE — 71020 HB CHEST X-RAY 2VW FRONTAL&LATL: CPT

## 2017-10-03 PROCEDURE — 93005 ELECTROCARDIOGRAM TRACING: CPT | Performed by: EMERGENCY MEDICINE

## 2017-10-03 PROCEDURE — 36415 COLL VENOUS BLD VENIPUNCTURE: CPT | Performed by: EMERGENCY MEDICINE

## 2017-10-03 PROCEDURE — 83690 ASSAY OF LIPASE: CPT | Performed by: EMERGENCY MEDICINE

## 2017-10-03 PROCEDURE — 85025 COMPLETE CBC W/AUTO DIFF WBC: CPT | Performed by: EMERGENCY MEDICINE

## 2017-10-03 PROCEDURE — 80053 COMPREHEN METABOLIC PANEL: CPT | Performed by: EMERGENCY MEDICINE

## 2017-10-03 RX ORDER — PREGABALIN 100 MG/1
50 CAPSULE ORAL 2 TIMES DAILY
COMMUNITY
End: 2018-05-16 | Stop reason: SDUPTHER

## 2017-10-03 RX ORDER — VENLAFAXINE HYDROCHLORIDE 150 MG/1
CAPSULE, EXTENDED RELEASE ORAL
COMMUNITY
Start: 2011-03-01 | End: 2018-03-06 | Stop reason: SDUPTHER

## 2017-10-03 NOTE — ED ATTENDING ATTESTATION
Ana Paula Eli MD, saw and evaluated the patient  I have discussed the patient with the resident/non-physician practitioner and agree with the resident's/non-physician practitioner's findings, Plan of Care, and MDM as documented in the resident's/non-physician practitioner's note, except where noted  All available labs and Radiology studies were reviewed  At this point I agree with the current assessment done in the Emergency Department    I have conducted an independent evaluation of this patient a history and physical is as follows:  C/o CP  8 39 am  Got stressed after getting a parking ticket   Lasted about 10 mins and resolved completely   Not exertional  No recurrence  No sob no n/v/d/f/c   Took asa at work   No h/o known CAD     No cardiac risk factors   EXAM:   Const:   well appearing   NAD     HEENT:  NCAT    sclera anicteric conjunctiva pink   throat clear, MMM    Neck:   supple  no meningismus  no jvd   no bruits  no  midline tenderness   Lungs:   clear  CW non-tender   No creiptation  Heart:   RRR no m/g/r  Normal pulses  Abd:   soft nt nd pos bs   Ext:    normal nontender  No edema  Neruo:   CN 2 -12 intact  motor intact 5/5 sensory intact cerebellar intact       Gait normal    IMPRESSION: chest pain  PLAN: cardiac labs EKG      Critical Care Time  CritCare Time

## 2017-10-03 NOTE — ED NOTES
Called [facility] to inform them that patient was in ER and may be a little late for her appointment        Eduin Maldonado RN  10/03/17 4840

## 2017-10-03 NOTE — ED PROVIDER NOTES
History  Chief Complaint   Patient presents with    Chest Pain     Pt started with left sided chest pain around 0945 this AM that was a pressure     63 yo F presenting for evaluation of CP  Patient reports that around 8:45 a m  she had acute onset chest pain  She reports that she saw that she had a parking ticket on her car and soon after this is when she had onset of symptoms  She described having left-sided chest pain, did not radiate, lasted for 10-12 minutes and resolved spontaneously  Patient reports having similar pain in the past, however never lasted this long and is typically only for a few seconds  She reports that she normally gets the pain when feeling anxious, however it has occurred randomly as well  She reports that it occurs a few times a month, has not had this pain earlier this week  Patient reports that 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  She does report that she felt some tightness in her chest and instead of reading to the class, had been read on their own  She reports no symptoms at this time  She went to the nurse's office and got ASA and was told to come to the ED  Patient denies any recent illness, fevers, chills, URI symptoms, SOB, abdominal pain, nausea, vomiting, changes in stool, rash, leg swelling or pain, diaphoresis  Patient has no known CAD  She reports having previous cardiac workups with 2 stress tests, last was 10+ years ago  No history of DVT/PE or risk factors for such  History of anxiety and depression  Denies tobacco or drug use  Social EtOH  A/P:  59-year-old female with CP, will get EKG to rule out STEMI/arrhythmia/ischemic changes, troponin to evaluate for ischemia, CBC to rule out anemia, BMP to assess renal function/lytes, CXR to rule out PTX/effusion/CHF, delta troponin          Prior to Admission Medications   Prescriptions Last Dose Informant Patient Reported? Taking?    Multiple Vitamins-Minerals (MULTIVITAMIN WITH MINERALS) tablet   Yes Yes   Sig: Take 1 tablet by mouth daily   polyethylene glycol (MIRALAX) 17 g packet   Yes No   Sig: Take 17 g by mouth daily   pregabalin (LYRICA) 100 mg capsule   Yes Yes   Sig: Take 50 mg by mouth 2 (two) times a day   tamsulosin (FLOMAX) 0 4 mg   No Yes   Sig: Take 1 capsule by mouth daily with dinner   Patient taking differently: Take 0 4 mg by mouth as needed     venlafaxine (EFFEXOR-XR) 150 mg 24 hr capsule   Yes No   Sig: Take 150 mg by mouth daily   venlafaxine (EFFEXOR-XR) 150 mg 24 hr capsule   Yes Yes   Sig: Take by mouth      Facility-Administered Medications: None       Past Medical History:   Diagnosis Date    Arthralgia     Atypical chest pain     Fatigue     Fibromyalgia     Fibromyalgia     Restless leg syndrome     Vitamin D deficiency        Past Surgical History:   Procedure Laterality Date    KNEE SURGERY      UT COLONOSCOPY FLX DX W/COLLJ SPEC WHEN PFRMD N/A 3/16/2017    Procedure: COLONOSCOPY;  Surgeon: Davy Bey MD;  Location: [facility]; Service: Gastroenterology    UT CYSTO/URETERO W/LITHOTRIPSY &INDWELL STENT INSRT Left 4/26/2017    Procedure: CYSTOSCOPY URETEROSCOPY; RETROGRADE PYELOGRAM; STONE EXTRACTION; INSERTION STENT URETERAL;  Surgeon: Viola Scott MD;  Location: [facility];  Service: Urology       History reviewed  No pertinent family history  I have reviewed and agree with the history as documented  Social History   Substance Use Topics    Smoking status: Never Smoker    Smokeless tobacco: Never Used    Alcohol use No        Review of Systems   Constitutional: Negative for chills, fever and unexpected weight change  HENT: Negative for ear pain, rhinorrhea and sore throat  Respiratory: Negative for cough and shortness of breath  Cardiovascular: Positive for chest pain  Negative for leg swelling     Gastrointestinal: Negative for abdominal pain, constipation, diarrhea, nausea and vomiting  Genitourinary: Negative for dysuria, frequency, hematuria and urgency  Musculoskeletal: Negative for back pain, myalgias and neck pain  Skin: Negative for color change and rash  Allergic/Immunologic: Negative for environmental allergies and immunocompromised state  Neurological: Negative for dizziness, weakness, light-headedness, numbness and headaches  All other systems reviewed and are negative  Physical Exam  ED Triage Vitals   Temperature Pulse Respirations Blood Pressure SpO2   10/03/17 1239 10/03/17 1235 10/03/17 1235 10/03/17 1235 10/03/17 1235   98 3 °F (36 8 °C) 78 18 132/66 97 %      Temp Source Heart Rate Source Patient Position - Orthostatic VS BP Location FiO2 (%)   10/03/17 1239 10/03/17 1235 10/03/17 1235 10/03/17 1235 --   Oral Monitor Lying Right arm       Pain Score       10/03/17 1235       No Pain           Physical Exam   Constitutional: She is oriented to person, place, and time  She appears well-developed and well-nourished  HENT:   Head: Normocephalic and atraumatic  Mouth/Throat: Oropharynx is clear and moist    Eyes: Conjunctivae and EOM are normal    Neck: Normal range of motion  Neck supple  Cardiovascular: Normal rate, regular rhythm, normal heart sounds and intact distal pulses  Pulmonary/Chest: Effort normal and breath sounds normal  No respiratory distress  She exhibits no tenderness  Abdominal: Soft  She exhibits no distension  There is no tenderness  Musculoskeletal: She exhibits no edema or deformity  No calf swelling/ttp  No peripheral edema   Neurological: She is alert and oriented to person, place, and time  She exhibits normal muscle tone  Coordination normal    Skin: Skin is warm and dry  No rash noted  Psychiatric: She has a normal mood and affect  Her behavior is normal    Nursing note and vitals reviewed        ED Medications  Medications - No data to display    Diagnostic Studies  Labs Reviewed   CBC AND DIFFERENTIAL - Normal Result Value Ref Range Status    WBC 5 32  4 31 - 10 16 Thousand/uL Final    RBC 4 84  3 81 - 5 12 Million/uL Final    Hemoglobin 14 6  11 5 - 15 4 g/dL Final    Hematocrit 43 0  34 8 - 46 1 % Final    MCV 89  82 - 98 fL Final    MCH 30 2  26 8 - 34 3 pg Final    MCHC 34 0  31 4 - 37 4 g/dL Final    RDW 13 7  11 6 - 15 1 % Final    MPV 12 3  8 9 - 12 7 fL Final    Platelets 909  593 - 390 Thousands/uL Final    nRBC 0  /100 WBCs Final    Neutrophils Relative 57  43 - 75 % Final    Lymphocytes Relative 31  14 - 44 % Final    Monocytes Relative 8  4 - 12 % Final    Eosinophils Relative 3  0 - 6 % Final    Basophils Relative 1  0 - 1 % Final    Neutrophils Absolute 3 05  1 85 - 7 62 Thousands/µL Final    Lymphocytes Absolute 1 65  0 60 - 4 47 Thousands/µL Final    Monocytes Absolute 0 43  0 17 - 1 22 Thousand/µL Final    Eosinophils Absolute 0 15  0 00 - 0 61 Thousand/µL Final    Basophils Absolute 0 04  0 00 - 0 10 Thousands/µL Final   LIPASE - Normal    Lipase 160  73 - 393 u/L Final   TROPONIN I - Normal    Troponin I <0 02  <=0 04 ng/mL Final    Narrative:     [employer] Chemistry analyzer 99% cutoff is > 0 04 ng/mL in network labs    o cTnI 99% cutoff is useful only when applied to patients in the clinical setting of myocardial ischemia  o cTnI 99% cutoff should be interpreted in the context of clinical history, ECG findings and possibly cardiac imaging to establish correct diagnosis  o cTnI 99% cutoff may be suggestive but clearly not indicative of a coronary event without the clinical setting of myocardial ischemia     TROPONIN I - Normal    Troponin I <0 02  <=0 04 ng/mL Final    Narrative:     [employer] Chemistry analyzer 99% cutoff is > 0 04 ng/mL in network labs    o cTnI 99% cutoff is useful only when applied to patients in the clinical setting of myocardial ischemia  o cTnI 99% cutoff should be interpreted in the context of clinical history, ECG findings and possibly cardiac imaging to establish correct diagnosis  o cTnI 99% cutoff may be suggestive but clearly not indicative of a coronary event without the clinical setting of myocardial ischemia  COMPREHENSIVE METABOLIC PANEL    Sodium 880  136 - 145 mmol/L Final    Potassium 3 8  3 5 - 5 3 mmol/L Final    Chloride 105  100 - 108 mmol/L Final    CO2 29  21 - 32 mmol/L Final    Anion Gap 5  4 - 13 mmol/L Final    BUN 16  5 - 25 mg/dL Final    Creatinine 0 91  0 60 - 1 30 mg/dL Final    Comment: Standardized to IDMS reference method    Glucose 107  65 - 140 mg/dL Final    Comment:   If the patient is fasting, the ADA then defines impaired fasting glucose as > 100 mg/dL and diabetes as > or equal to 123 mg/dL  Specimen collection should occur prior to Sulfasalazine administration due to the potential for falsely depressed results  Specimen collection should occur prior to Sulfapyridine administration due to the potential for falsely elevated results  Calcium 8 9  8 3 - 10 1 mg/dL Final    AST 18  5 - 45 U/L Final    Comment:   Specimen collection should occur prior to Sulfasalazine administration due to the potential for falsely depressed results  ALT 19  12 - 78 U/L Final    Comment:   Specimen collection should occur prior to Sulfasalazine and/or Sulfapyridine administration due to the potential for falsely depressed results  Alkaline Phosphatase 82  46 - 116 U/L Final    Total Protein 7 3  6 4 - 8 2 g/dL Final    Albumin 3 6  3 5 - 5 0 g/dL Final    Total Bilirubin 0 38  0 20 - 1 00 mg/dL Final    eGFR 68  ml/min/1 73sq m Final    Narrative:     National Kidney Disease Education Program recommendations are as follows:  GFR calculation is accurate only with a steady state creatinine  Chronic Kidney disease less than 60 ml/min/1 73 sq  meters  Kidney failure less than 15 ml/min/1 73 sq  meters  XR chest 2 views   ED Interpretation   Interpreted by myself: no acute abn      Final Result      No acute cardiopulmonary findings            Workstation performed: [id number]             Procedures  Procedures      Phone Consults  ED Phone Contact    ED Course  ED Course as of Oct 03 1644   Tue Oct 03, 2017   [address] @ 3:45 Troponin I: <0 02   1424 Discussed results with pt, she is willing to stay for delta troponin          HEART Risk Score    Flowsheet Row Most Recent Value   History  0 Filed at: 10/03/2017 1254   ECG  0 Filed at: 10/03/2017 1254   Age  1 Filed at: 10/03/2017 1254   Risk Factors  0 Filed at: 10/03/2017 1254   Troponin  0 Filed at: 10/03/2017 1254   Heart Score Risk Calculator   History  0 Filed at: 10/03/2017 1254   ECG  0 Filed at: 10/03/2017 1254   Age  1 Filed at: 10/03/2017 1254   Risk Factors  0 Filed at: 10/03/2017 1254   Troponin  0 Filed at: 10/03/2017 1254   HEART Score  1 Filed at: 10/03/2017 1254   HEART Score  1 Filed at: 10/03/2017 1254                            MDM  Number of Diagnoses or Management Options  Chest pain, unspecified type:   Diagnosis management comments: 63 yo F with transient CP, pain was not present in ED and did not recur  Unremarkable ED workup including delta troponin  Pt was given rx to get outpt stress test performed  Instructed to f/u with PCP and strict return precautions discussed       Amount and/or Complexity of Data Reviewed  Clinical lab tests: ordered and reviewed  Tests in the radiology section of CPT®: ordered and reviewed  Tests in the medicine section of CPT®: ordered and reviewed      CritCare Time    Disposition  Final diagnoses:   Chest pain, unspecified type     ED Disposition     ED Disposition Condition Comment    Discharge  Ruben Ruiz discharge to home/self care      Condition at discharge: Stable        Follow-up Information     Follow up With Specialties Details Why Lilia Reyes MD Internal Medicine   [address] [place] [address],[address]  [place] [address]  [phone]          Return to ED if you have any new or worsening symptoms        Patient's Medications Discharge Prescriptions    No medications on file       Outpatient Discharge Orders  Stress test only, exercise   Standing Status: Future  Standing Exp  Date: 10/03/21         ED Provider  Attending physically available and evaluated Aminta Saunders I managed the patient along with the ED Attending      Electronically Signed by       Natividad Muñiz DO  Resident  10/03/17 9667

## 2017-10-03 NOTE — DISCHARGE INSTRUCTIONS
Chest Pain   WHAT YOU NEED TO KNOW:   Chest pain can be caused by a range of conditions, from not serious to life-threatening  Chest pain can be a symptom of a digestive problem, such as acid reflux or a stomach ulcer  An anxiety attack or a strong emotion, such as anger, can also cause chest pain  Infection, inflammation, or a fracture in the bones or cartilage in your chest can cause pain or discomfort  Sometimes chest pain or pressure is caused by poor blood flow to your heart (angina)  Chest pain may also be caused by life-threatening conditions such as a heart attack or blood clot in your lungs  DISCHARGE INSTRUCTIONS:   Call 911 if:   · You have any of the following signs of a heart attack:      ¨ Squeezing, pressure, or pain in your chest that lasts longer than 5 minutes or returns    ¨ Discomfort or pain in your back, neck, jaw, stomach, or arm     ¨ Trouble breathing    ¨ Nausea or vomiting    ¨ Lightheadedness or a sudden cold sweat, especially with chest pain or trouble breathing    Return to the emergency department if:   · You have chest discomfort that gets worse, even with medicine  · You cough or vomit blood  · Your bowel movements are black or bloody  · You cannot stop vomiting, or it hurts to swallow  Contact your healthcare provider if:   · You have questions or concerns about your condition or care  Medicines:   · Medicines  may be given to treat the cause of your chest pain  Examples include pain medicine, anxiety medicine, or medicines to increase blood flow to your heart  · Do not take certain medicines without asking your healthcare provider first   These include NSAIDs, herbal or vitamin supplements, or hormones (estrogen or progestin)  · Take your medicine as directed  Contact your healthcare provider if you think your medicine is not helping or if you have side effects  Tell him or her if you are allergic to any medicine   Keep a list of the medicines, vitamins, and herbs you take  Include the amounts, and when and why you take them  Bring the list or the pill bottles to follow-up visits  Carry your medicine list with you in case of an emergency  Follow up with your healthcare provider within 72 hours, or as directed: You may need to return for more tests to find the cause of your chest pain  You may be referred to a specialist, such as a cardiologist or gastroenterologist  Write down your questions so you remember to ask them during your visits  Healthy living tips: The following are general healthy guidelines  If your chest pain is caused by a heart problem, your healthcare provider will give you specific guidelines to follow  · Do not smoke  Nicotine and other chemicals in cigarettes and cigars can cause lung and heart damage  Ask your healthcare provider for information if you currently smoke and need help to quit  E-cigarettes or smokeless tobacco still contain nicotine  Talk to your healthcare provider before you use these products  · Eat a variety of healthy, low-fat foods  Healthy foods include fruits, vegetables, whole-grain breads, low-fat dairy products, beans, lean meats, and fish  Ask for more information about a heart healthy diet  · Ask about activity  Your healthcare provider will tell you which activities to limit or avoid  Ask when you can drive, return to work, and have sex  Ask about the best exercise plan for you  · Maintain a healthy weight  Ask your healthcare provider how much you should weigh  Ask him or her to help you create a weight loss plan if you are overweight  · Get the flu and pneumonia vaccines  All adults should get the influenza (flu) vaccine  Get it every year as soon as it becomes available  The pneumococcal vaccine is given to adults aged 72 years or older  The vaccine is given every 5 years to prevent pneumococcal disease, such as pneumonia    © 2017 [employer] Information is for End User's use only and may not be sold, redistributed or otherwise used for commercial purposes  All illustrations and images included in CareNotes® are the copyrighted property of [employer]  or [employer]  The above information is an  only  It is not intended as medical advice for individual conditions or treatments  Talk to your doctor, nurse or pharmacist before following any medical regimen to see if it is safe and effective for you

## 2017-10-11 ENCOUNTER — TRANSCRIBE ORDERS (OUTPATIENT)
Dept: ADMINISTRATIVE | Facility: HOSPITAL | Age: 62
End: 2017-10-11

## 2017-10-11 DIAGNOSIS — K59.9 FUNCTIONAL DISORDER OF INTESTINE: ICD-10-CM

## 2017-10-11 DIAGNOSIS — K59.00 CONSTIPATION, UNSPECIFIED CONSTIPATION TYPE: Primary | ICD-10-CM

## 2017-10-20 ENCOUNTER — HOSPITAL ENCOUNTER (OUTPATIENT)
Dept: GASTROENTEROLOGY | Facility: HOSPITAL | Age: 62
Discharge: HOME | End: 2017-10-20
Attending: INTERNAL MEDICINE
Payer: COMMERCIAL

## 2017-10-20 VITALS
WEIGHT: 155 LBS | BODY MASS INDEX: 27.46 KG/M2 | DIASTOLIC BLOOD PRESSURE: 67 MMHG | SYSTOLIC BLOOD PRESSURE: 115 MMHG | HEART RATE: 64 BPM | RESPIRATION RATE: 18 BRPM | TEMPERATURE: 98.3 F | OXYGEN SATURATION: 96 % | HEIGHT: 63 IN

## 2017-10-20 PROCEDURE — 91122 HB ANAL PRESSURE RECORD: CPT

## 2017-10-31 ENCOUNTER — ALLSCRIPTS OFFICE VISIT (OUTPATIENT)
Dept: OTHER | Facility: OTHER | Age: 62
End: 2017-10-31

## 2017-11-01 NOTE — PROGRESS NOTES
Assessment  1  Acute sinusitis (461 9) (J01 90)   2  Fibromyalgia (729 1) (M79 7)    Plan  Acute sinusitis    · Benzonatate 100 MG Oral Capsule; TAKE 1 CAPSULE EVERY 8 HOURS AS  NEEDED   · Fluticasone Propionate 50 MCG/ACT Nasal Suspension; USE 2 SPRAYS IN  EACH NOSTRIL EVERY DAY   · Follow Up if Not Better Evaluation and Treatment  Follow-up  Status: Complete  Done:  31Oct2017   · Apply warm moist compresses to the affected area 3 times a day for 5 minutes ;  Status:Complete;   Done: 35DMI0326   · Drink at least 6 glasses of water or juice a day ; Status:Complete;   Done: 52Aoq4052   · How to use a nasal spray ; Status:Complete;   Done: 96KYK7209   · Irrigate your nose twice a day ; Status:Complete;   Done: 02IGG0703   · Taking a hot steamy shower may help your condition ; Status:Complete;   Done:  47BSN7588  Acute upper respiratory infection    · (1) RAPID INFLUENZA SCREEN with reflex to PCR; Status:Canceled; Discussion/Summary    1  medication for coughsee handoutif symptoms not continuing to improve by Friday, call office  The patient was counseled regarding instructions for management,-- patient and family education,-- impressions,-- risks and benefits of treatment options  Possible side effects of new medications were reviewed with the patient/guardian today  The treatment plan was reviewed with the patient/guardian   The patient/guardian understands and agrees with the treatment plan      Provider Comments  Provider Comments:   suspect sinusitis, sx more than 50% improved  symptoms and continue to monitor for now, if sx not improving further by Friday, advised to call my office for abx rx such as augmentin or doxycycline x10d  as scheduled      Chief Complaint  pt complains of cough, sinus congestion, post nasal drip, low energy, fatigue, sore throat, No fevers, No body aches  x 1 5 weeks      History of Present Illness  HPI: 57 y/o F here with c/o as above  started more than 1 5 weeks ago with sore throat followed by feeling unwell, body aches, fever up to 99  6Fdeveloped productive cough of yellow/green mucus and postnasal drip  SOB, taking mucinex with temporary relief and missed work 1 day last weekshe has been feeling more than 50% improved since last week  discussed risk/benefit of abx therapy  taking lyrica for fibromyalgia with adequate relief, feels mild tiredness with morning dose but this wears off in couple of hours  other complaints   Hospital Based Practices Required Assessment:    Readiness To Learn: Receptive  Barriers To Learning: none  Education Completed: disease/condition,-- medications,-- further treatment/follow-up-- and-- treatment/procedure   Teaching Method: verbal-- and-- written   Person Taught: patient   Evaluation Of Learning: verbalized/demonstrated understanding      Review of Systems    Constitutional: fever-- and-- chills--    The patient presents with complaints of feeling tired  Symptoms are improving  ENT: sore throat-- and-- nasal discharge  Cardiovascular: no chest pain  The patient presents with complaints of cough, described as productive  Gastrointestinal: no diarrhea  Musculoskeletal: arthralgias-- and-- myalgias  Integumentary: no rashes  Neurological: no confusion  ROS reviewed  Active Problems  1  Abnormal blood chemistry (790 6) (R79 9)   2  Acute upper respiratory infection (465 9) (J06 9)   3  Allergic rhinitis (477 9) (J30 9)   4  Arthralgia (719 40) (M25 50)   5  BMI 27 0-27 9,adult (V85 23) (Z68 27)   6  Cervical cancer screening (V76 2) (Z12 4)   7  Chronic pain of right knee (589 72,320 96) (M25 561,G89 29)   8  Colonic inertia (564 89) (K59 9)   9  Constipation (564 00) (K59 00)   10  Depression with anxiety (300 4) (F41 8)   11  Encounter for screening mammogram for malignant neoplasm of breast (V76 12)    (Z12 31)   12  Fatigue (780 79) (R53 83)   13  Fibromyalgia (729 1) (M79 7)   14  Hallux valgus (735 0) (M20 10)   15   History of ureter stent (V45 89) (Z96 0)   16  Hospital discharge follow-up (V67 59) (Z09)   17  Knee pain, bilateral (719 46) (M25 561,M25 562)   18  Need for prophylactic vaccination and inoculation against influenza (V04 81) (Z23)   19  Need for vaccination for DTP (V06 1) (Z23)   20  Neoplasm of uncertain behavior of skin (238 2) (D48 5)   21  Nephrolithiasis (592 0) (N20 0)   22  Osteoporosis screening (V82 81) (Z13 820)   23  Other screening mammogram (V76 12) (Z12 31)   24  Primary osteoarthritis of right knee (715 16) (M17 11)   25  Restless legs syndrome (333 94) (G25 81)   26  S/P right knee arthroscopy (V45 89) (Z98 890)   27  Screen for colon cancer (V76 51) (Z12 11)   28  Screening for hyperlipidemia (V77 91) (Z13 220)   29  Special screening for malignant neoplasm of colon (V76 51) (Z12 11)   30  Tear of meniscus of right knee, subsequent encounter (V58 89,836 2) (S83 206D)   31  Temporomandibular dysfunction syndrome (524 60) (M26 609)   32  Urinary frequency (788 41) (R35 0)   33  Vitamin D deficiency (268 9) (E55 9)   34  Well woman exam with routine gynecological exam (V72 31) (Z01 419)    Past Medical History  Active Problems And Past Medical History Reviewed: The active problems and past medical history were reviewed and updated today  Social History   · Denied: History of Drug use   · Has no children   · Marital History - Single   · Never A Smoker   · Not currently sexually active   · Occupation:   ·  at [employer]   · Rarely consumes alcohol (V49 89) (Z78 9)  The social history was reviewed and updated today  Current Meds   1  Lactulose 10 GM/15ML Oral Solution; TAKE 15 ML ONCE DAILY AS DIRECTED FOR   CONSTIPATION; Therapy: 20GAO8714 to (Isa Amaya)  Requested for: 08HUC2689; Last   Rx:79Moz1268 Ordered   2  Lyrica 100 MG Oral Capsule; TAKE 1 CAPSULE TWICE DAILY;    Therapy: 97PAA7210 to (Evaluate:04Jan2018)  Requested for: 02JEL4100; Last WT:19DZJ7547 Ordered   3  Venlafaxine HCl  MG Oral Capsule Extended Release 24 Hour; take 1 capsule by   mouth daily; Therapy: 73ZOR6522 to (Evaluate:03Mar2018)  Requested for: 26MAT6684; Last   Rx:04Oct2017 Ordered   4  Vitamin D3 1000 UNIT Oral Capsule; take 1 capsule daily; Therapy: 10SAX7968 to (Last Rx:52Efl5618) Ordered    The medication list was reviewed and updated today  Allergies  1  Ciprofloxacin HCl TABS    Vitals   Recorded: 58VLF8869 05:28PM   Temperature 97 3 F   Heart Rate 82   Respiration 18   Systolic 718   Diastolic 78   Height 5 ft 2 5 in   Weight 160 lb    BMI Calculated 28 8   BSA Calculated 1 75   O2 Saturation 98     Physical Exam    Constitutional   General appearance: Abnormal  -- WD/WN with congestion, runny nose during appt but in no acute distress  Head and Face   Palpation of the face and sinuses: Abnormal   Examination of the Sinuses: right maxillary tenderness,-- left maxillary tenderness,-- right frontal tenderness-- and-- left frontal tenderness  Eyes   Pupils and irises: Equal, round, reactive to light  Ears, Nose, Mouth, and Throat   Otoscopic examination: Tympanic membranes translucent with normal light reflex  Canals patent without erythema  Nasal mucosa, septum, and turbinates: Abnormal   The bilateral nasal mucosa was boggy  Oropharynx: Abnormal   The posterior pharynx was erythematous, but-- did not have an exudate  Pulmonary   Respiratory effort: No increased work of breathing or signs of respiratory distress  Auscultation of lungs: Clear to auscultation  Cardiovascular   Auscultation of heart: Normal rate and rhythm, normal S1 and S2, no murmurs  Examination of extremities for edema and/or varicosities: Normal     Abdomen   Abdomen: Non-tender, no masses  Lymphatic   Palpation of lymph nodes in neck: No lymphadenopathy      Psychiatric   Judgment and insight: Normal     Mood and affect: Normal        Future Appointments    Date/Time Provider Specialty Site   11/14/2017 04:30 PM KRISTINA Ernst   Orthopedic Surgery [facility]   03/09/2018 10:00 AM Donita Reyes DO Internal Medicine [facility] INTERNAL MED     Signatures   Electronically signed by : Chitra Jacobson DO; Oct 31 2017  5:56PM EST                       (Author)

## 2017-11-14 ENCOUNTER — ALLSCRIPTS OFFICE VISIT (OUTPATIENT)
Dept: OTHER | Facility: OTHER | Age: 62
End: 2017-11-14

## 2017-11-15 NOTE — PROGRESS NOTES
Assessment    1  Chronic pain of right knee (946 68,631 01) (M25 561,G89 29)   2  Pes anserinus bursitis of right knee (726 61) (M70 51)   3  Primary osteoarthritis of right knee (715 16) ()  59-year-old female who was 2 pain generators within her knee  She has a varus knee and medial compartment arthritis, and addition she has past bursitis  Is the role of knee replacement surgery with this patient, and I have come to the realization that I don't think she offers enough time during her mid semester break to allow for adequate recovery  Instead, I have recommended this patient postpone elective right total knee replacement until the end of the spring semester in May 2018  Promote reduce pain and improve function during this timeframe, I have recommended injection  I recommend an injection within the pes bursa, as well as in the right knee joint  With her permission, follow sterile prep and drape, both of these injections are introduced  I would welcome the opportunity see this patient back in [facility] 3 months time, I would consider a viable candidate for repeat injections until elective right total knee replacement can be performed   Plan  Pes anserinus bursitis of right knee    · Betamethasone Sod Phos & Acet 6 (3-3) MG/ML Injection Suspension  Primary osteoarthritis of right knee    · Betamethasone Sod Phos & Acet 6 (3-3) MG/ML Injection Suspension    History of Present Illness  HPI: 7 weeks following injection of an arthritic right knee with corticosteroid, this patient describes only 6 weeks of moderate relief  She has a number of questions about scheduling elective right total knee replacement including how long it will take for her to recover her she reports rather predictable pain in the right knee joint, is made worse weightbearing, worsens with activities  In addition she describes ill-defined pain distal to the medial aspect of her right knee        Review of Systems   Constitutional: No fever, no chills, feels well, no tiredness, no recent weight gain or loss  Eyes: No complaints of eyesight problems, no red eyes  ENT: no loss of hearing, no nosebleeds, no sore throat  Cardiovascular: No complaints of chest pain, no palpitations, no leg claudication or lower extremity edema  Respiratory: no compliants of shortness of breath, no wheezing, no cough  Gastrointestinal: no complaints of abdominal pain, no constipation, no nausea or diarrhea, no vomiting, no bloody stools  Genitourinary: no complaints of dysuria, no incontinence  Musculoskeletal: as noted in HPI  Integumentary: no complaints of skin rash or lesion, no itching or dry skin, no skin wounds  Neurological: no complaints of headache, no confusion, no numbness or tingling, no dizziness  Endocrine: No complaints of muscle weakness, no feelings of weakness, no frequent urination, no excessive thirst   Psychiatric: No suicidal thoughts, no anxiety, no feelings of depression  Active Problems    1  Abnormal blood chemistry (790 6) (R79 9)   2  Acute sinusitis (461 9) (J01 90)   3  Acute upper respiratory infection (465 9) (J06 9)   4  Allergic rhinitis (477 9) (J30 9)   5  Arthralgia (719 40) (M25 50)   6  BMI 27 0-27 9,adult (V85 23) (Z68 27)   7  Cervical cancer screening (V76 2) (Z12 4)   8  Chronic pain of right knee (268 05,703 96) (M25 561,G89 29)   9  Colonic inertia (564 89) (K59 9)   10  Constipation (564 00) (K59 00)   11  Depression with anxiety (300 4) (F41 8)   12  Encounter for screening mammogram for malignant neoplasm of breast (V76 12)  (Z12 31)   13  Fatigue (780 79) (R53 83)   14  Fibromyalgia (729 1) (M79 7)   15  Hallux valgus (735 0) (M20 10)   16  History of ureter stent (V45 89) (Z96 0)   17  Hospital discharge follow-up (V67 59) (Z09)   18  Knee pain, bilateral (719 46) (M25 561,M25 562)   19  Need for prophylactic vaccination and inoculation against influenza (V04 81) (Z23)   20   Need for vaccination for DTP (V06 1) (Z23)   21  Neoplasm of uncertain behavior of skin (238 2) (D48 5)   22  Nephrolithiasis (592 0) (N20 0)   23  Osteoporosis screening (V82 81) (Z13 820)   24  Other screening mammogram (V76 12) (Z12 31)   25  Pes anserinus bursitis of right knee (726 61) (M70 51)   26  Primary osteoarthritis of right knee (715 16) (M17 11)   27  Restless legs syndrome (333 94) (G25 81)   28  S/P right knee arthroscopy (V45 89) (Z98 890)   29  Screen for colon cancer (V76 51) (Z12 11)   30  Screening for hyperlipidemia (V77 91) (Z13 220)   31  Special screening for malignant neoplasm of colon (V76 51) (Z12 11)   32  Tear of meniscus of right knee, subsequent encounter (V58 89,836 2) (S83 206D)   33  Temporomandibular dysfunction syndrome (524 60) (M26 609)   34  Urinary frequency (788 41) (R35 0)   35  Vitamin D deficiency (268 9) (E55 9)   36  Well woman exam with routine gynecological exam (V72 31) (Z01 419)    Past Medical History   · History of Atypical chest pain (786 59) (R07 89)   · History of Cervical rib (756 2) (Q76 5)   · History of Colonoscopy (Fiberoptic)   · History of Difficulty With Balance   · History of Finger pain (729 5) (M79 646)   · History of abdominal pain (V13 89) (C26 415)   · History of constipation (V12 79) (Z87 19)   · History of fatigue (V13 89) (Z87 898)   · History of sebaceous cyst (V13 3) (Z87 2)   · History of Hydronephrosis with renal and ureteral calculus obstruction (591) (N13 2)   · History of Joint Pain In Both Knees   · History of Lacunar Stroke (433 30)   · History of Leukopenia (288 50) (D72 819)   · History of Memory Lapses Or Loss (780 93)   · Need for prophylactic vaccination and inoculation against influenza (V04 81) (Z23)   · Need for vaccination for DTP (V06 1) (Z23)   · Restless legs syndrome (333 94) (G25 81)   · History of Sebaceous cyst (706 2) (L72 3)   · History of Skin tag (701 9) (L91 8)    The active problems and past medical history were reviewed and updated today  Surgical History   · History of Knee Surgery    The surgical history was reviewed and updated today  Family History  Mother    · Family history of Osteoporosis (V17 81)  Father    · Family history of Congestive Heart Failure   · Family history of Coronary Artery Disease (V17 49)  Sister    · Family history of fibromyalgia (V17 89) (Z82 69)  Family History    · Family history of Congestive Heart Failure   · Family history of Coronary Artery Disease (V17 49)   · Family history of Osteoporosis (V17 81)    Social History     · Denied: History of Drug use   · Has no children   · Marital History - Single   · Never A Smoker   · Not currently sexually active   · Occupation:   ·  at [employer]   · Rarely consumes alcohol (V49 89) (Z78 9)    Current Meds   1  Benzonatate 100 MG Oral Capsule; TAKE 1 CAPSULE EVERY 8 HOURS AS NEEDED; Therapy: 78HVY4232 to (Evaluate:10Nov2017)  Requested for: 31Oct2017; Last Rx:31Oct2017 Ordered   2  Fluticasone Propionate 50 MCG/ACT Nasal Suspension; USE 2 SPRAYS IN EACH NOSTRIL EVERY DAY; Therapy: 90HOQ9338 to (Evaluate:29Jan2018)  Requested for: 31Oct2017; Last Rx:31Oct2017 Ordered   3  Lactulose 10 GM/15ML Oral Solution; TAKE 15 ML ONCE DAILY AS DIRECTED FOR CONSTIPATION; Therapy: 64OCS3138 to (Alondra Dick)  Requested for: 49KTI8433; Last Rx:06Sep2017 Ordered   4  Lyrica 100 MG Oral Capsule; TAKE 1 CAPSULE TWICE DAILY; Therapy: 53Irn3168 to (Evaluate:04Jan2018)  Requested for: 22FOP2986; Last Rx:77Lnb2097 Ordered   5  Venlafaxine HCl  MG Oral Capsule Extended Release 24 Hour; take 1 capsule by mouth daily; Therapy: 62ZTB2600 to (Evaluate:03Mar2018)  Requested for: 53CYX3536; Last Rx:04Oct2017 Ordered   6  Vitamin D3 1000 UNIT Oral Capsule; take 1 capsule daily; Therapy: 42Gpq2236 to (Last Rx:31Low3980) Ordered    Allergies  1   Ciprofloxacin HCl TABS    Vitals  Signs     Heart Rate: 60  Systolic: 436  Diastolic: 99  Height: 5 ft 2 5 in  Weight: 156 lb   BMI Calculated: 28 08  BSA Calculated: 1 73    Physical Exam  Gait pattern is with modern antalgia  Breathing is not labored  Her thigh is devoid of atrophy  Right knee is in varus  There healed arthroscopic portals  She has bony enlargement tenderness medially at the level joint  She is also tenderness palpation of the proximal medial tibia  This occurs in a knee that stable to valgus stress, and has no ecchymotic standing or erythema along its medial aspect area Compartments are soft and supple  Her toes are warm, sensate, and mobile  Procedure  With her permission, follow sterile prep and drape, the right knee joint injection into medial portal with 2 cc of Marcaine, 2 cc lidocaine, 2 cc of Celestone  There were no complications  With her permission, follow sterile prep and drape, right pes bursa was injected with 2 cc of Marcaine, 2 cc lidocaine, 2 cc of Celestone  There were no complications and a bandage was applied      Future Appointments    Date/Time Provider Specialty Site   02/13/2018 04:30 PM KRISTINA Cheek   Orthopedic Surgery [facility] [place]   03/09/2018 10:00 AM Nirali Oliveira DO Internal Medicine [facility] INTERNAL MED       Signatures   Electronically signed by : KRISTINA Deleon ; Nov 14 2017  5:23PM EST                       (Author)

## 2017-12-18 ENCOUNTER — GENERIC CONVERSION - ENCOUNTER (OUTPATIENT)
Dept: OTHER | Facility: OTHER | Age: 62
End: 2017-12-18

## 2018-01-11 NOTE — MISCELLANEOUS
Assessment    1  Hospital discharge follow-up (V67 59) (Z09)   2  History of Hydronephrosis with renal and ureteral calculus obstruction (591) (N13 2)   3  History of ureter stent (V45 89) (Z96 0)   4  Vitamin D deficiency (268 9) (E55 9)   5  Nephrolithiasis (592 0) (N20 0)   6  Screening for hyperlipidemia (V77 91) (Z13 220)   7  BMI 27 0-27 9,adult (V85 23) (Z68 27)    Plan  BMI 27 0-27 9,adult, Screening for hyperlipidemia    · (1) LIPID PANEL FASTING W DIRECT LDL REFLEX; Status:Active; Requested  for:01May2017;    Perform:[facility]; WNM:[id number]; Ordered; For:BMI 27 0-27 9,adult, Screening for hyperlipidemia; Ordered By:Ap Pérez; Constipation    · Polyethylene Glycol 3350 Oral Packet; MIX 1 PACKET IN 8 OUNCES OF LIQUID  AND DRINK TWICE DAILY   Rx By: Frieda De La Cruz; Dispense: 1 Days ; #:1 Packet; Refill: 0; For: Constipation; ESE = N; Record  Encounter for screening mammogram for malignant neoplasm of breast    · * MAMMO SCREENING BILATERAL W CAD; Status:Hold For - Scheduling; Requested  for:01May2017;    Perform:[facility]; KJZ:[id number]; Ordered;  For:Encounter for screening mammogram for malignant neoplasm of breast; Ordered By:Ap Pérez; History of ureter stent, PMH: Hydronephrosis with renal and ureteral calculus obstruction    · Cephalexin 500 MG Oral Capsule; TAKE 1 CAPSULE EVERY 8 HOURS FOR 5  DAYS   Rx By: Frieda De La Cruz; Dispense: 5 Days ; #:15 Capsule; Refill: 0; For: History of ureter stent, PMH: Hydronephrosis with renal and ureteral calculus obstruction; ESE = N; Verified Transmission to [address] [place] [address]; Last Updated By: System, Cubiclebret; 5/1/2017 4:43:48 PM  Urinary frequency    · Ciprofloxacin HCl - 500 MG Oral Tablet   Dispense: 0 Days ; #: Sufficient Tablet; Refill: 0; For: Urinary frequency; ESE = N; Record; Last Updated By: Frieda De La Cruz; 5/1/2017 4:39:42 PM  Vitamin D deficiency    · (1) VITAMIN D 25-HYDROXY; Status:Active;  Requested for:69Qqy8100; Perform:[facility]; JDY:[id number]; Ordered; For:Vitamin D deficiency; Ordered By:Ap Pérez;    Discussion/Summary  Discussion Summary:   1  drink plenty of fluids  2  see handout  3  return in 3 months or sooner if questions  4  stop cipro  5  take cephalexin every 8 hours for 5 more days  Counseling Documentation With Imm: The patient was counseled regarding instructions for management, patient and family education, impressions, risks and benefits of treatment options  Medication SE Review and Pt Understands Tx: Possible side effects of new medications were reviewed with the patient/guardian today  The treatment plan was reviewed with the patient/guardian  The patient/guardian understands and agrees with the treatment plan      History of Present Illness  TCM Communication [facility]: The patient is being contacted for follow-up after hospitalization and 5/1/17 @4pm  She was hospitalized at [place]  The date of admission: 4/25/17, date of discharge: 4/27/17  Diagnosis: Hydronephrosis with Urethral calculus  She was discharged to home  Medications reviewed and updated today  She scheduled a follow up appointment  Follow-up appointments with other specialists: Urology  Urinary frequency and urgency Counseling was provided to the patient     Communication performed and completed by AE 4/28/17   HPI: 63 y/o F here for hospital discharge f/u/JEANA appt    as above - reviewed meds with aptient  admitted to hospital for obstructive kdiney stone with associated pain and dehdydration  treated with stone removal with stent placement by urologist    given cipro abx to take post stone extraction proceudre & developed hallucinations while taking this  also feeling occ dizzy but not orthostatic and no vertigo or associated sx  stopped rx for 1 day then told to re-start today when had ureteral stent removed at urology appt today    drinking plenty of fluids  mood doing okay on venlaxaine, no SE or complaints  taking lyrica with some relief of fibromyalgia    due for mammogram and BW(lipids,vit D)    denies any other complaints      Review of Systems  Complete-Female:   Constitutional: no fever  Eyes: no eyesight problems  ENT: no sore throat  Cardiovascular: no chest pain  Respiratory: no shortness of breath  Gastrointestinal: no abdominal pain  Genitourinary: flank pain after stent removal today but improved now  Musculoskeletal: no arthralgias  Integumentary: no rashes  The patient presents with complaints of intermittent episodes of dizziness  Psychiatric: no depression  no feelings of weakness   Hematologic/Lymphatic: no tendency for easy bleeding and no tendency for easy bruising  ROS Reviewed:   ROS reviewed  Active Problems    1  Abnormal blood chemistry (790 6) (R79 9)   2  Acute upper respiratory infection (465 9) (J06 9)   3  Allergic rhinitis (477 9) (J30 9)   4  Arthralgia (719 40) (M25 50)   5  Cervical cancer screening (V76 2) (Z12 4)   6  Chronic pain of right knee (490 05,285 72) (M25 561,G89 29)   7  Colonic inertia (564 89) (K59 9)   8  Constipation (564 00) (K59 00)   9  Depression with anxiety (300 4) (F41 8)   10  Encounter for screening mammogram for malignant neoplasm of breast (V76 12)    (Z12 31)   11  Fatigue (780 79) (R53 83)   12  Fibromyalgia (729 1) (M79 7)   13  Hallux valgus (735 0) (M20 10)   14  Knee pain, bilateral (719 46) (M25 561,M25 562)   15  Need for prophylactic vaccination and inoculation against influenza (V04 81) (Z23)   16  Need for vaccination for DTP (V06 1) (Z23)   17  Neoplasm of uncertain behavior of skin (238 2) (D48 5)   18  Osteoporosis screening (V82 81) (Z13 820)   19  Other screening mammogram (V76 12) (Z12 31)   20  Restless legs syndrome (333 94) (G25 81)   21  S/P right knee arthroscopy (V45 89) (Z98 890)   22  Screen for colon cancer (V76 51) (Z12 11)   23  Screening for hyperlipidemia (V77 91) (Z13 220)   24   Special screening for malignant neoplasm of colon (V76 51) (Z12 11)   25  Tear of meniscus of right knee, unspecified meniscus, unspecified tear type, unspecified    whether old or current tear, initial encounter (836 2) (S83 206A)   26  Temporomandibular dysfunction syndrome (524 60) (M26 609)   27  Vitamin D deficiency (268 9) (E55 9)   28  Well woman exam with routine gynecological exam (V72 31) (Z01 419)    Past Medical History    1  History of Atypical chest pain (786 59) (R07 89)   2  History of Cervical rib (756 2) (Q76 5)   3  History of Colonoscopy (Fiberoptic)   4  History of Difficulty With Balance   5  History of Finger pain (729 5) (M79 646)   6  History of abdominal pain (V13 89) (Z87 898)   7  History of acute sinusitis (V12 69) (Z87 09)   8  History of constipation (V12 79) (Z87 19)   9  History of fatigue (V13 89) (Z87 898)   10  History of sebaceous cyst (V13 3) (Z87 2)   11  History of Hydronephrosis with renal and ureteral calculus obstruction (591) (N13 2)   12  History of Joint Pain In Both Knees   13  History of Lacunar Stroke (433 30)   14  History of Leukopenia (288 50) (D72 819)   15  History of Memory Lapses Or Loss (780 93)   16  Need for prophylactic vaccination and inoculation against influenza (V04 81) (Z23)   17  Need for vaccination for DTP (V06 1) (Z23)   18  Restless legs syndrome (333 94) (G25 81)   19  History of Sebaceous cyst (706 2) (L72 3)   20  History of Skin tag (701 9) (L91 8)    Surgical History    1  History of Knee Surgery    Family History  Mother    1  Family history of Osteoporosis (V17 81)  Father    2  Family history of Congestive Heart Failure   3  Family history of Coronary Artery Disease (V17 49)  Sister    4  Family history of fibromyalgia (V17 89) (Z82 69)  Family History    5  Family history of Congestive Heart Failure   6  Family history of Coronary Artery Disease (V17 49)   7   Family history of Osteoporosis (V17 81)    Social History    · Alcohol Use (History)   · Marital History - Single   · Never A Smoker   · Occupation:   · Rarely consumes alcohol (V49 89) (Z78 9)  Social History Reviewed: The social history was reviewed and updated today  Current Meds   1  Ciprofloxacin HCl - 500 MG Oral Tablet; TAKE 1 TABLET EVERY 12 HOURS DAILY; Therapy: (Recorded:28Apr2017) to Recorded   2  Fluticasone Propionate 50 MCG/ACT Nasal Suspension; USE 1 TO 2 SPRAYS IN EACH   NOSTRIL ONCE DAILY; Therapy: 81UQO6470 to (Last Rx:09Nov2015)  Requested for: 97FNE1986 Ordered   3  Lyrica 75 MG Oral Capsule; TAKE 1 CAPSULE TWICE DAILY; Therapy: 65Xvo1945 to (Evaluate:27Jun2017); Last Rx:28Apr2017 Ordered   4  Venlafaxine HCl  MG Oral Capsule Extended Release 24 Hour; take 1 capsule by   mouth daily; Therapy: 09BXV7220 to (Nidhi Bhandari)  Requested for: 05Apr2017; Last   Rx:05Apr2017 Ordered   5  Vitamin D3 1000 UNIT Oral Capsule; take 1 capsule daily; Therapy: 85BLE7597 to (Last Rx:45Lfp9727) Ordered  Medication List Reviewed: The medication list was reviewed and updated today  Allergies    1  Ciprofloxacin HCl TABS    Vitals  Signs   Recorded: 95QVE7451 04:42PM   Heart Rate: 64  Systolic: 471  Diastolic: 78  Heart Rate: 66  Systolic: 579  Diastolic: 78  Heart Rate: 66  Systolic: 647  Diastolic: 78  Recorded: 61NKS7585 04:07PM   Temperature: 97 9 F  Heart Rate: 74  Respiration: 18  Systolic: 540  Diastolic: 80  Height: 5 ft 2 5 in  Weight: 150 lb   BMI Calculated: 27  BSA Calculated: 1 7  O2 Saturation: 97    Physical Exam    Constitutional   General appearance: No acute distress, well appearing and well nourished  Eyes   Pupils and irises: Equal, round, reactive to light  Ears, Nose, Mouth, and Throat   Oropharynx: Normal with no erythema, edema, exudate or lesions  Pulmonary   Respiratory effort: No increased work of breathing or signs of respiratory distress  Auscultation of lungs: Clear to auscultation      Cardiovascular   Auscultation of heart: Normal rate and rhythm, normal S1 and S2, no murmurs  Examination of extremities for edema and/or varicosities: Normal     Abdomen   Abdomen: Non-tender, no masses  Psychiatric   Judgment and insight: Normal     Mood and affect: Normal        Results/Data  COLONOSCOPY [id number] 05:31PM Raymondo Line     Test Name Result Flag Reference   Colonoscopy [id number]       (1) TISSUE EXAM [id number] 03:30PM Raymondo Line     Test Name Result Flag Reference   LAB AP CASE REPORT (Report)     Surgical Pathology Report             Case: [id number]                   Authorizing Provider: Linda Fields MD      Collected:      03/16/2017 1530        Ordering Location:   [address] [place] [address]    Received:      03/17/2017 [facility]                            Pathologist:      Robert Thomas MD                                Specimens:  A) - Polyp, Colorectal, Transverse colon polyp                             B) - Polyp, Colorectal, Sigmoid   LAB AP FINAL DIAGNOSIS (Report)     A  Polyp, Colorectal, Transverse colon polyp:  -Hyperplastic polyp   -No evidence of adenomatous change or malignancy  B  Polyp, Colorectal, Sigmoid:  -Hyperplastic polyp   -No evidence of adenomatous change or malignancy  Electronically signed by Robert Thomas MD on 3/20/2017 at 10:59 AM   LAB AP NOTE      Interpretation performed at [address] [place] [address]    [facility] (Report)     These tests were developed and their performance characteristics   determined by [facility] or [facility]  They may not be cleared or approved by the [place]  Food and   Drug Administration  The FDA has determined that such clearance or   approval is not necessary  These tests are used for clinical purposes  They should not be regarded as investigational or for research   This   laboratory has been approved by CLIA 88, designated as a high-complexity   laboratory and is qualified to perform these tests  LAB AP GROSS DESCRIPTION (Report)     A  The specimen is received in formalin, labeled with the patient's name   and hospital number, and is designated transverse colon polyp  The   specimen consists of one tan-pink soft tissue fragment measuring 0 5 cm in   greatest dimension  Entirely submitted  One cassette  B  The specimen is received in formalin, labeled with the patient's name   and hospital number, and is designated sigmoid polyp  The specimen   consists of one tan-pink soft tissue fragment measuring 0 4 cm in greatest   dimension  Entirely submitted  One cassette  Note: The estimated total formalin fixation time based upon information   provided by the submitting clinician and the standard processing schedule   is 22 25 hours  MAS     Provider Comments  Provider Comments:   pt prefers not to take cipro and with ongoing sx - i called and spoke to on-call urology/Bebe Love who advised bactrim or keflex are 2 alternate options for post stent removal abx tx    f/u 3 mos with me or prn      Future Appointments    Date/Time Provider Specialty Site   08/01/2017 10:00 AM Genny Mcneill DO Internal Medicine [address] [place] [address]   11/15/2017 02:00 PM KRISTINA Horan   Urology [facility]     Signatures   Electronically signed by : Beatriz Coleman DO; May  1 2017  6:09PM EST                       (Author)

## 2018-01-11 NOTE — RESULT NOTES
Message   Quality of the preparation was good and 2 hyperplastic polyps  Based on this repeat colonoscopy should be performed in 10 years  Verified Results  (1) TISSUE EXAM [id number] 03:30PM Rebeca Villafuerte     Test Name Result Flag Reference   LAB AP CASE REPORT (Report)     Surgical Pathology Report             Case: [id number]                   Authorizing Provider: Trever Ron MD      Collected:      03/16/2017 1530        Ordering Location:   [facility]    Received:      03/17/2017 [facility]                            Pathologist:      Valeria Greer MD                                Specimens:  A) - Polyp, Colorectal, Transverse colon polyp                             B) - Polyp, Colorectal, Sigmoid   LAB AP FINAL DIAGNOSIS (Report)     A  Polyp, Colorectal, Transverse colon polyp:  -Hyperplastic polyp   -No evidence of adenomatous change or malignancy  B  Polyp, Colorectal, Sigmoid:  -Hyperplastic polyp   -No evidence of adenomatous change or malignancy  Electronically signed by Valeria Greer MD on 3/20/2017 at 10:59 AM   LAB AP NOTE      Interpretation performed at [address] [place] [address]   LAB [address] [place] [address] (Report)     These tests were developed and their performance characteristics   determined by [facility] or [facility]  They may not be cleared or approved by the [place] S  Food and   Drug Administration  The FDA has determined that such clearance or   approval is not necessary  These tests are used for clinical purposes  They should not be regarded as investigational or for research  This   laboratory has been approved by [place] 88, designated as a high-complexity   laboratory and is qualified to perform these tests  LAB AP GROSS DESCRIPTION (Report)     A   The specimen is received in formalin, labeled with the patient's name   and hospital number, and is designated transverse colon polyp  The   specimen consists of one tan-pink soft tissue fragment measuring 0 5 cm in   greatest dimension  Entirely submitted  One cassette  B  The specimen is received in formalin, labeled with the patient's name   and hospital number, and is designated sigmoid polyp  The specimen   consists of one tan-pink soft tissue fragment measuring 0 4 cm in greatest   dimension  Entirely submitted  One cassette  Note: The estimated total formalin fixation time based upon information   provided by the submitting clinician and the standard processing schedule   is 22 25 hours    MAS

## 2018-01-12 VITALS
HEIGHT: 63 IN | HEART RATE: 60 BPM | DIASTOLIC BLOOD PRESSURE: 99 MMHG | SYSTOLIC BLOOD PRESSURE: 149 MMHG | WEIGHT: 156 LBS | BODY MASS INDEX: 27.64 KG/M2

## 2018-01-12 NOTE — MISCELLANEOUS
Ms Jae Palumbo  YOB: 1955    Date: 8/4/2017    Dear Ms  Kathy España missed another appointment on 8/1/2017 with Dr Kelby Salvador  We understand that many situations arise that occasionally prevent patients from keeping scheduled appointments  It is the policy of [facility] that  patients notify us 24 hours in advance if unable to keep a scheduled appointment  Missed appointments jeopardize strong physician-patient relationships  The appointment you missed could have easily been made available to another patient if you had  contacted us to cancel  We like to accommodate all of our patients, but when patients miss an appointment it prevents us from being able to help everyone  In the future, we request at least 24 hours notice of cancellation so we can make  your appointment available to someone else in need    Sincerely,  The Physicians and Staff of [facility]            Electronically signed by:Vesna Webb   Aug  4 2017 10:38AM EST Author

## 2018-01-12 NOTE — MISCELLANEOUS
Provider Comments  Provider Comments:   1/23/17-PT  MISSED APPT  TODAY/CW      Signatures   Electronically signed by : Mata Rodriguez DO; Jan 23 2017  4:16PM EST                       (Author)

## 2018-01-13 VITALS
DIASTOLIC BLOOD PRESSURE: 78 MMHG | SYSTOLIC BLOOD PRESSURE: 110 MMHG | TEMPERATURE: 97.3 F | WEIGHT: 160 LBS | HEART RATE: 82 BPM | BODY MASS INDEX: 28.35 KG/M2 | RESPIRATION RATE: 18 BRPM | HEIGHT: 63 IN | OXYGEN SATURATION: 98 %

## 2018-01-13 VITALS
SYSTOLIC BLOOD PRESSURE: 126 MMHG | HEART RATE: 80 BPM | TEMPERATURE: 99 F | DIASTOLIC BLOOD PRESSURE: 86 MMHG | HEIGHT: 63 IN | RESPIRATION RATE: 16 BRPM | BODY MASS INDEX: 27.64 KG/M2 | WEIGHT: 156 LBS | OXYGEN SATURATION: 97 %

## 2018-01-13 VITALS
SYSTOLIC BLOOD PRESSURE: 139 MMHG | HEIGHT: 63 IN | TEMPERATURE: 97.8 F | OXYGEN SATURATION: 98 % | HEART RATE: 83 BPM | RESPIRATION RATE: 18 BRPM | WEIGHT: 149 LBS | BODY MASS INDEX: 26.4 KG/M2 | DIASTOLIC BLOOD PRESSURE: 86 MMHG

## 2018-01-13 NOTE — MISCELLANEOUS
Provider Comments  Provider Comments:   9/12/16-PT  MISSED APPT  TODAY  CW      Signatures   Electronically signed by : Princess Kumar MD; Sep 12 2016  3:39PM EST                       (Author)

## 2018-01-14 VITALS
TEMPERATURE: 97.9 F | HEIGHT: 63 IN | SYSTOLIC BLOOD PRESSURE: 116 MMHG | BODY MASS INDEX: 26.58 KG/M2 | DIASTOLIC BLOOD PRESSURE: 78 MMHG | RESPIRATION RATE: 18 BRPM | HEART RATE: 64 BPM | OXYGEN SATURATION: 97 % | WEIGHT: 150 LBS

## 2018-01-14 NOTE — MISCELLANEOUS
Provider Comments  Provider Comments:   12/21/16-PT  MISSED APPT  TODAY  CW      Signatures   Electronically signed by : Coco Nam DO; Dec 22 2016  4:12PM EST                       (Author)

## 2018-01-15 NOTE — RESULT NOTES
Verified Results  (1) LIPID PANEL FASTING W DIRECT LDL REFLEX [id number] 09:17AM Sohan Dense Order Number: [id number]     Test Name Result Flag Reference   CHOLESTEROL 180 mg/dL     LDL CHOLESTEROL CALCULATED 103 mg/dL H 0-100   Triglyceride:        Normal <150 mg/dl   Borderline High 150-199 mg/dl   High 200-499 mg/dl   Very High >499 mg/dl      Cholesterol:       Desirable <200 mg/dl    Borderline High 200-239 mg/dl    High >239 mg/dl      HDL Cholesterol:       High>59 mg/dL    Low <41 mg/dL      HDL Cholesterol:       High>59 mg/dL    Low <41 mg/dL      This screening LDL is a calculated result  It does not have the accuracy of the Direct Measured LDL in the monitoring of patients with hyperlipidemia and/or statin therapy  Direct Measure LDL (JBD139) must be ordered separately in these patients  TRIGLYCERIDES 92 mg/dL  <=150   Specimen collection should occur prior to N-Acetylcysteine or Metamizole administration due to the potential for falsely depressed results  HDL,DIRECT 59 mg/dL  40-60   Specimen collection should occur prior to Metamizole administration due to the potential for falsley depressed results  (1) VITAMIN D 25-HYDROXY 31Hzq9242 09:17AM Keila Reddy    Order Number: [id number]     Test Name Result Flag Reference   VIT D 25-HYDROX 36 8 ng/mL  30 0-100 0   This assay is a certified procedure of the CDC Vitamin D Standardization Certification Program (VDSCP)     Deficiency <20ng/ml   Insufficiency 20-30ng/ml   Sufficient  ng/ml     *Patients undergoing fluorescein dye angiography may retain small amounts of fluorescein in the body for 48-72 hours post procedure  Samples containing fluorescein can produce falsely elevated Vitamin D values  If the patient had this procedure, a specimen should be resubmitted post fluorescein clearance

## 2018-01-15 NOTE — MISCELLANEOUS
Message   Recorded as Task   Date: 04/04/2017 01:47 PM, Created By: Maddison Wilson   Task Name: Follow Up   Assigned To: Ap Pérez   Regarding Patient: Bismark Patrick, Status: In Progress   Comment:    Sharona Pinto - 04 Apr 2017 1:47 PM     TASK CREATED  Caller: Self; Other; [phone] (Home); [phone] (Work)  PT  DOESN'T WANT TO SEE DR OWEN MONIQUE ANYMORE  SHE WANTS US TO REFILL VENLAFAXINE FOR HER  Bobbi Kelly - 04 Apr 2017 2:49 PM     TASK REASSIGNED: Previously Assigned To Gabe Garcia - 04 Apr 2017 2:49 PM     TASK REASSIGNED: Previously Assigned To Jesse Frazier - 04 Apr 2017 3:04 PM     TASK REPLIED TO: Previously Assigned To Kanchan [facility] Rick will need to be seen/have an appt before we can start prescribing this medication for her    we may refer her back to Dr Nolan Jim if there is a need to see [address] [place] [address] in the future    thanks   Lydia Lobo - 04 Apr 2017 4:57 PM     TASK IN PROGRESS   Lydia Lobo - 04 Apr 2017 4:58 PM     TASK EDITED  lmom to cb   Sharona Pinto - 05 Apr 2017 11:53 AM     TASK EDITED  SPOKE W/ PT  SHE SAID SHE IS GOING TO GO TO THE ER FOR AN EMERGENCY SUPPLY OF MED  SAID SHE IS GOING TO START HAVING SYMPTOMS SOON AND WILL NEED MED  REFUSED TO SCHEDULE AN APPT  SAID SHE HAS BEEN TAKING THIS MED FOR YEARS  Ap Pérez - 05 Apr 2017 1:12 PM     TASK REASSIGNED: Previously Assigned To Vesna Yeh - 05 Apr 2017 1:16 PM     TASK EDITED  pt notified  would like rx for next 30 days sent to [facility]  Will call back later today or tomorrow to schedule appt  Plan  Depression with anxiety    · Venlafaxine HCl  MG Oral Capsule Extended Release 24 Hour; take 1  capsule by mouth daily    Signatures   Electronically signed by : Lizabeth Najjar, DO;  Apr 5 2017  2:13PM EST                       (Author)

## 2018-01-15 NOTE — MISCELLANEOUS
Message  GI Reminder Recall [facility]:   Date: 07/20/2017   Dear Noam Cummins:     Review of our records shows you are due for the following: Follow Up Visit  Please call the following office to schedule your appointment:   [facility], [address] [place] [address], [address] [place] [address] [phone]  We look forward to hearing from you!      Sincerely,     [facility]      Signatures   Electronically signed by : Cassandra Alcaraz, ; Jul 20 2017  3:08PM EST                       (Author)

## 2018-01-15 NOTE — PROGRESS NOTES
Chief Complaint  Stent with string removal S/P L URS/ stone extraction 4/26/17      Active Problems    1  Abnormal blood chemistry (790 6) (R79 9)   2  Acute upper respiratory infection (465 9) (J06 9)   3  Allergic rhinitis (477 9) (J30 9)   4  Arthralgia (719 40) (M25 50)   5  Cervical cancer screening (V76 2) (Z12 4)   6  Chronic pain of right knee (249 60,082 48) (M25 561,G89 29)   7  Colonic inertia (564 89) (K59 9)   8  Constipation (564 00) (K59 00)   9  Depression with anxiety (300 4) (F41 8)   10  Encounter for screening mammogram for malignant neoplasm of breast (V76 12)    (Z12 31)   11  Fatigue (780 79) (R53 83)   12  Fibromyalgia (729 1) (M79 7)   13  Hallux valgus (735 0) (M20 10)   14  Knee pain, bilateral (719 46) (M25 561,M25 562)   15  Need for prophylactic vaccination and inoculation against influenza (V04 81) (Z23)   16  Need for vaccination for DTP (V06 1) (Z23)   17  Neoplasm of uncertain behavior of skin (238 2) (D48 5)   18  Nephrolithiasis (592 0) (N20 0)   19  Osteoporosis screening (V82 81) (Z13 820)   20  Other screening mammogram (V76 12) (Z12 31)   21  Restless legs syndrome (333 94) (G25 81)   22  S/P right knee arthroscopy (V45 89) (Z98 890)   23  Screen for colon cancer (V76 51) (Z12 11)   24  Screening for hyperlipidemia (V77 91) (Z13 220)   25  Special screening for malignant neoplasm of colon (V76 51) (Z12 11)   26  Tear of meniscus of right knee, unspecified meniscus, unspecified tear type, unspecified    whether old or current tear, initial encounter (836 2) (S83 206A)   27  Temporomandibular dysfunction syndrome (524 60) (M26 609)   28  Urinary frequency (788 41) (R35 0)   29  Vitamin D deficiency (268 9) (E55 9)   30  Well woman exam with routine gynecological exam (V72 31) (Z01 419)    Current Meds   1  Ciprofloxacin HCl - 500 MG Oral Tablet; TAKE 1 TABLET EVERY 12 HOURS DAILY; Therapy: (Recorded:28Apr2017) to Recorded   2   Dulcolax Stool Softener 100 MG Oral Capsule; TAKE 1 CAPSULE every other day as   needed; Therapy: 73JGN5919 to (Evaluate:19Mar2017); Last Rx:[id number] Ordered   3  Fluticasone Propionate 50 MCG/ACT Nasal Suspension; USE 1 TO 2 SPRAYS IN EACH   NOSTRIL ONCE DAILY; Therapy: 69ITE7365 to (Last Rx:09Nov2015)  Requested for: 79VNY1543 Ordered   4  Lyrica 75 MG Oral Capsule; TAKE 1 CAPSULE TWICE DAILY; Therapy: 76Cwa5093 to (Evaluate:27Jun2017); Last Rx:28Apr2017 Ordered   5  Polyethylene Glycol 3350 Oral Packet (MiraLax); MIX 1 PACKET IN 8 OUNCES OF   LIQUID AND DRINK TWICE DAILY; Therapy: 48LMS2250 to (Evaluate:19Mar2017); Last Rx:17Feb2017 Ordered   6  Suprep Bowel Prep Oral Solution; USE AS DIRECTED; Therapy: 81DJC7620 to (Last Rx:17Feb2017)  Requested for: 14BEN1507 Ordered   7  Tamsulosin HCl - 0 4 MG Oral Capsule; take 1 capsule daily; Therapy: (Recorded:28Apr2017) to Recorded   8  Venlafaxine HCl  MG Oral Capsule Extended Release 24 Hour; take 1 capsule by   mouth daily; Therapy: 95KOH1727 to (Zygmunt Parlor)  Requested for: 05Apr2017; Last   Rx:05Apr2017 Ordered   9  Vitamin D3 1000 UNIT Oral Capsule; take 1 capsule daily; Therapy: 84Quu4070 to (Last Rx:63Jyr3509) Ordered    Allergies    1  No Known Drug Allergies    Vitals  Signs    Heart Rate: 72  Systolic: 211  Diastolic: 80  Height: 5 ft 2 5 in  Weight: 150 lb   BMI Calculated: 27 00  BSA Calculated: 1 70    Procedure    Procedure: Stent Removal   Stent with string removed intact and without difficulty  Post stent instructions reviewed with patient  Assessment    1  Nephrolithiasis (592 0) (N20 0)    Plan  Nephrolithiasis    · * XR ABDOMEN 1 VIEW KUB; Status:Active - Retrospective By Protocol Authorization; Requested for:01Oct2017;    Perform:[facility]; [phone]; Last Updated Terry Case; 5/1/2017 2:30:46 PM;Ordered;  For:Nephrolithiasis;  Ordered By:Stalin Cole;   · Follow-up visit in 6 months Evaluation and Treatment  Follow-up  Status: Hold For -  Scheduling,Retrospective By Protocol Authorization  Requested for: [id number]   Ordered; For: Nephrolithiasis;  Ordered Marya Lopez Performed:  Due: 64UWL5443    Future Appointments    Date/Time Provider Specialty Site   05/01/2017 04:00 PM Berry Cota DO Internal Medicine [facility]   05/10/2017 02:00 PM Berry Cota DO Internal Medicine [facility]     Signatures   Electronically signed by : Kavin Call, ; May  1 2017  2:54PM EST                       (Author)    Electronically signed by : KRISTINA Cao ; May  1 2017  8:11PM EST

## 2018-01-22 VITALS
HEART RATE: 73 BPM | DIASTOLIC BLOOD PRESSURE: 83 MMHG | WEIGHT: 154 LBS | BODY MASS INDEX: 27.29 KG/M2 | SYSTOLIC BLOOD PRESSURE: 121 MMHG | HEIGHT: 63 IN

## 2018-01-22 VITALS
SYSTOLIC BLOOD PRESSURE: 140 MMHG | WEIGHT: 150 LBS | DIASTOLIC BLOOD PRESSURE: 80 MMHG | HEART RATE: 72 BPM | BODY MASS INDEX: 26.58 KG/M2 | HEIGHT: 63 IN

## 2018-01-23 NOTE — MISCELLANEOUS
Message   Recorded as Task   Date: 12/18/2017 11:46 AM, Created By: Austin Schuler   Task Name: Med Renewal Request   Assigned To: Ap Pérez   Regarding Patient: Danyell Gregory, Status: Active   Comment:    Austin Schuler - 18 Dec 2017 11:46 AM     TASK CREATED  Caller: Self; Renew Medication; [phone] (Home); [phone] (Work)  PT  STILL HAS A COUGH-HAS HAD AROUND 2 MONTHS  CAN YOU REFILL BENZONATATE   SEND TO [facility]  Acute sinusitis    · Benzonatate 100 MG Oral Capsule; TAKE 1 CAPSULE EVERY 8 HOURS AS  NEEDED    Signatures   Electronically signed by Marilia Schuler DO; Dec 18 2017 12:28PM EST                       (Author)

## 2018-02-12 RX ORDER — LACTULOSE 10 G/15ML
SOLUTION ORAL
COMMUNITY
Start: 2017-09-06 | End: 2018-11-01 | Stop reason: SDUPTHER

## 2018-02-12 RX ORDER — GABAPENTIN 100 MG/1
100 CAPSULE ORAL
COMMUNITY
End: 2018-10-11

## 2018-02-12 RX ORDER — LACTULOSE 10 G/15ML
SOLUTION ORAL; RECTAL
COMMUNITY
Start: 2017-12-17 | End: 2018-03-19 | Stop reason: SDUPTHER

## 2018-02-12 RX ORDER — BENZONATATE 100 MG/1
CAPSULE ORAL
Refills: 1 | COMMUNITY
Start: 2017-12-18 | End: 2018-04-24 | Stop reason: SDUPTHER

## 2018-02-12 RX ORDER — FLUTICASONE PROPIONATE 50 MCG
2 SPRAY, SUSPENSION (ML) NASAL DAILY
COMMUNITY
Start: 2012-02-01 | End: 2018-03-21 | Stop reason: SDUPTHER

## 2018-02-12 RX ORDER — BUPROPION HYDROCHLORIDE 150 MG/1
150 TABLET ORAL
COMMUNITY
End: 2019-01-22 | Stop reason: ALTCHOICE

## 2018-02-12 RX ORDER — BIOTIN 1 MG
1 TABLET ORAL DAILY
COMMUNITY
Start: 2015-08-03 | End: 2021-07-14

## 2018-02-13 ENCOUNTER — OFFICE VISIT (OUTPATIENT)
Dept: OBGYN CLINIC | Facility: HOSPITAL | Age: 63
End: 2018-02-13
Payer: COMMERCIAL

## 2018-02-13 VITALS
HEIGHT: 63 IN | SYSTOLIC BLOOD PRESSURE: 124 MMHG | HEART RATE: 96 BPM | BODY MASS INDEX: 27.82 KG/M2 | WEIGHT: 157 LBS | DIASTOLIC BLOOD PRESSURE: 83 MMHG

## 2018-02-13 DIAGNOSIS — M17.11 PRIMARY OSTEOARTHRITIS OF RIGHT KNEE: ICD-10-CM

## 2018-02-13 DIAGNOSIS — M70.50 PES ANSERINE BURSITIS: ICD-10-CM

## 2018-02-13 DIAGNOSIS — M25.561 RIGHT KNEE PAIN, UNSPECIFIED CHRONICITY: Primary | ICD-10-CM

## 2018-02-13 PROCEDURE — 99213 OFFICE O/P EST LOW 20 MIN: CPT | Performed by: ORTHOPAEDIC SURGERY

## 2018-02-13 PROCEDURE — 20610 DRAIN/INJ JOINT/BURSA W/O US: CPT | Performed by: ORTHOPAEDIC SURGERY

## 2018-02-13 RX ORDER — BUPIVACAINE HYDROCHLORIDE 2.5 MG/ML
2 INJECTION, SOLUTION INFILTRATION; PERINEURAL
Status: COMPLETED | OUTPATIENT
Start: 2018-02-13 | End: 2018-02-13

## 2018-02-13 RX ORDER — LIDOCAINE HYDROCHLORIDE 10 MG/ML
2 INJECTION, SOLUTION INFILTRATION; PERINEURAL
Status: COMPLETED | OUTPATIENT
Start: 2018-02-13 | End: 2018-02-13

## 2018-02-13 RX ORDER — BETAMETHASONE SODIUM PHOSPHATE AND BETAMETHASONE ACETATE 3; 3 MG/ML; MG/ML
12 INJECTION, SUSPENSION INTRA-ARTICULAR; INTRALESIONAL; INTRAMUSCULAR; SOFT TISSUE
Status: COMPLETED | OUTPATIENT
Start: 2018-02-13 | End: 2018-02-13

## 2018-02-13 RX ADMIN — BUPIVACAINE HYDROCHLORIDE 2 ML: 2.5 INJECTION, SOLUTION INFILTRATION; PERINEURAL at 17:51

## 2018-02-13 RX ADMIN — BETAMETHASONE SODIUM PHOSPHATE AND BETAMETHASONE ACETATE 12 MG: 3; 3 INJECTION, SUSPENSION INTRA-ARTICULAR; INTRALESIONAL; INTRAMUSCULAR; SOFT TISSUE at 17:52

## 2018-02-13 RX ADMIN — BUPIVACAINE HYDROCHLORIDE 2 ML: 2.5 INJECTION, SOLUTION INFILTRATION; PERINEURAL at 17:52

## 2018-02-13 RX ADMIN — BETAMETHASONE SODIUM PHOSPHATE AND BETAMETHASONE ACETATE 12 MG: 3; 3 INJECTION, SUSPENSION INTRA-ARTICULAR; INTRALESIONAL; INTRAMUSCULAR; SOFT TISSUE at 17:51

## 2018-02-13 RX ADMIN — LIDOCAINE HYDROCHLORIDE 2 ML: 10 INJECTION, SOLUTION INFILTRATION; PERINEURAL at 17:51

## 2018-02-13 RX ADMIN — LIDOCAINE HYDROCHLORIDE 2 ML: 10 INJECTION, SOLUTION INFILTRATION; PERINEURAL at 17:52

## 2018-02-13 NOTE — PROGRESS NOTES
62 y o female   Right knee osteoarthritis and pes  Anserine bursitis of right knee  She reports  She received sustained relief from her previous injections into her right knee as well as the pes anserine bursa  She states that her symptoms of pain,  worst in the right anteromedial,  Returned this past weekend as she was walking greater distances and she normally would  She denies any new injury, numbness or tingling  To her right lower extremity  Review of Systems  Review of systems negative unless otherwise specified in HPI    Past Medical History  Past Medical History:   Diagnosis Date    Arthralgia     Atypical chest pain     Fatigue     Fibromyalgia     Fibromyalgia     Restless leg syndrome     Vitamin D deficiency        Past Surgical History  Past Surgical History:   Procedure Laterality Date    KNEE SURGERY      OK COLONOSCOPY FLX DX W/COLLJ SPEC WHEN PFRMD N/A 3/16/2017    Procedure: COLONOSCOPY;  Surgeon: Leopold Ewings, MD;  Location: [facility];   Service: Gastroenterology    OK CYSTO/URETERO W/LITHOTRIPSY &INDWELL STENT INSRT Left 4/26/2017    Procedure: CYSTOSCOPY URETEROSCOPY; RETROGRADE PYELOGRAM; STONE EXTRACTION; INSERTION STENT URETERAL;  Surgeon: Shazia Johns MD;  Location: [facility];  Service: Urology       Current Medications  Current Outpatient Prescriptions on File Prior to Visit   Medication Sig Dispense Refill    Multiple Vitamins-Minerals (MULTIVITAMIN WITH MINERALS) tablet Take 1 tablet by mouth daily      pregabalin (LYRICA) 100 mg capsule Take 50 mg by mouth 2 (two) times a day      venlafaxine (EFFEXOR-XR) 150 mg 24 hr capsule Take 150 mg by mouth daily      polyethylene glycol (MIRALAX) 17 g packet Take 17 g by mouth daily      tamsulosin (FLOMAX) 0 4 mg Take 1 capsule by mouth daily with dinner (Patient taking differently: Take 0 4 mg by mouth as needed  ) 7 capsule 0    venlafaxine (EFFEXOR-XR) 150 mg 24 hr capsule Take by mouth       No current facility-administered medications on file prior to visit  Recent Labs [facility])    0  Lab Value Date/Time   HCT 43 0 10/03/2017 1245   HCT 44 0 06/29/2015 0917   HGB 14 6 10/03/2017 1245   HGB 14 0 06/29/2015 0917   WBC 5 32 10/03/2017 1245   WBC 4 08 (L) 06/29/2015 0917   ESR 6 06/29/2015 0917   CRP 0 2 06/29/2015 0917   GLUCOSE 107 10/03/2017 1245   GLUCOSE 94 04/25/2017 1832   GLUCOSE 87 06/29/2015 0917         Physical exam  · General: Awake, Alert, Oriented  · Eyes: Pupils equal, round and reactive to light  · Lungs: No audible wheezing  · Abdomen: no visible guarding  right Knee exam  ·  right knee:  Full range of motion  Flexion-extension  No instability or laxity present  No appreciable effusion present  Tenderness to palpation over medial and lateral joint lines as well as over the region of the pes anserine bursa        Imaging    None today    Procedure  Large joint arthrocentesis  Date/Time: 2/13/2018 5:51 PM  Consent given by: patient  Timeout: Immediately prior to procedure a time out was called to verify the correct patient, procedure, equipment, support staff and site/side marked as required   Supporting Documentation  Indications: pain   Procedure Details  Location: knee - R knee  Needle size: 22 G  Ultrasound guidance: no  Approach: anterolateral  Medications administered: 2 mL lidocaine 1 %; 2 mL bupivacaine 0 25 %; 12 mg betamethasone acetate-betamethasone sodium phosphate 6 (3-3) mg/mL    Patient tolerance: patient tolerated the procedure well with no immediate complications  Dressing:  Sterile dressing applied  Large joint arthrocentesis  Date/Time: 2/13/2018 5:52 PM  Consent given by: patient  Timeout: Immediately prior to procedure a time out was called to verify the correct patient, procedure, equipment, support staff and site/side marked as required   Supporting Documentation  Indications: pain   Procedure Details  Location: knee - R knee  Needle size: 22 G  Ultrasound guidance: no  Approach: anteromedial ( pes anserine bursa)  Medications administered: 2 mL lidocaine 1 %; 2 mL bupivacaine 0 25 %; 12 mg betamethasone acetate-betamethasone sodium phosphate 6 (3-3) mg/mL    Patient tolerance: patient tolerated the procedure well with no immediate complications  Dressing:  Sterile dressing applied        Assessment/Plan:   58 y  o female  With right knee osteoarthritis and right pes anserine bursitis  She received received the injections today in her right knee and over her pes anserine bursa, which she tolerated well  She expressed her concern that  With walking distances her knee pain will become significant, so she   Will be provided temporary permit to park in handicapped spaces  She still intends on having right total knee arthroplasty at the end of the spring semester, likely in May  She will follow up in 6 weeks to discuss this and for re-evaluation  The assessment plan was formulated by the attending physician

## 2018-02-19 DIAGNOSIS — K59.00 CONSTIPATION, UNSPECIFIED CONSTIPATION TYPE: Primary | ICD-10-CM

## 2018-02-19 RX ORDER — LACTULOSE 10 G/15ML
10 SOLUTION ORAL DAILY PRN
Qty: 473 ML | Refills: 2 | Status: SHIPPED | OUTPATIENT
Start: 2018-02-19 | End: 2018-03-19 | Stop reason: SDUPTHER

## 2018-02-19 RX ORDER — LACTULOSE 10 G/15ML
SOLUTION ORAL; RECTAL
Refills: 0 | Status: CANCELLED | OUTPATIENT
Start: 2018-02-19

## 2018-03-05 DIAGNOSIS — F33.0 MILD EPISODE OF RECURRENT MAJOR DEPRESSIVE DISORDER (HCC): Primary | ICD-10-CM

## 2018-03-06 RX ORDER — VENLAFAXINE HYDROCHLORIDE 150 MG/1
150 CAPSULE, EXTENDED RELEASE ORAL DAILY
Qty: 30 CAPSULE | Refills: 0 | Status: SHIPPED | OUTPATIENT
Start: 2018-03-06 | End: 2018-03-19

## 2018-03-19 ENCOUNTER — OFFICE VISIT (OUTPATIENT)
Dept: INTERNAL MEDICINE CLINIC | Facility: CLINIC | Age: 63
End: 2018-03-19
Payer: COMMERCIAL

## 2018-03-19 ENCOUNTER — HOSPITAL ENCOUNTER (OUTPATIENT)
Dept: RADIOLOGY | Facility: HOSPITAL | Age: 63
Discharge: HOME | End: 2018-03-19
Payer: COMMERCIAL

## 2018-03-19 VITALS
HEIGHT: 63 IN | BODY MASS INDEX: 28.14 KG/M2 | DIASTOLIC BLOOD PRESSURE: 80 MMHG | OXYGEN SATURATION: 98 % | RESPIRATION RATE: 16 BRPM | WEIGHT: 158.8 LBS | SYSTOLIC BLOOD PRESSURE: 132 MMHG | TEMPERATURE: 98.1 F | HEART RATE: 80 BPM

## 2018-03-19 DIAGNOSIS — R05.3 COUGH, PERSISTENT: ICD-10-CM

## 2018-03-19 DIAGNOSIS — M79.7 FIBROMYALGIA: ICD-10-CM

## 2018-03-19 DIAGNOSIS — K21.9 GASTROESOPHAGEAL REFLUX DISEASE, ESOPHAGITIS PRESENCE NOT SPECIFIED: ICD-10-CM

## 2018-03-19 DIAGNOSIS — R07.89 INTERMITTENT LEFT-SIDED CHEST PAIN: Primary | ICD-10-CM

## 2018-03-19 DIAGNOSIS — F41.8 DEPRESSION WITH ANXIETY: ICD-10-CM

## 2018-03-19 DIAGNOSIS — R07.89 INTERMITTENT LEFT-SIDED CHEST PAIN: ICD-10-CM

## 2018-03-19 PROBLEM — N20.0 NEPHROLITHIASIS: Status: ACTIVE | Noted: 2017-05-01

## 2018-03-19 PROBLEM — K59.9 COLONIC INERTIA: Status: ACTIVE | Noted: 2017-02-17

## 2018-03-19 PROBLEM — M17.11 PRIMARY OSTEOARTHRITIS OF RIGHT KNEE: Status: ACTIVE | Noted: 2017-10-03

## 2018-03-19 PROBLEM — M70.51 PES ANSERINUS BURSITIS OF RIGHT KNEE: Status: ACTIVE | Noted: 2017-11-14

## 2018-03-19 PROCEDURE — 71046 X-RAY EXAM CHEST 2 VIEWS: CPT

## 2018-03-19 PROCEDURE — 93010 ELECTROCARDIOGRAM REPORT: CPT | Performed by: INTERNAL MEDICINE

## 2018-03-19 PROCEDURE — 99214 OFFICE O/P EST MOD 30 MIN: CPT | Performed by: INTERNAL MEDICINE

## 2018-03-19 RX ORDER — PANTOPRAZOLE SODIUM 40 MG/1
40 TABLET, DELAYED RELEASE ORAL DAILY
Qty: 30 TABLET | Refills: 1 | Status: SHIPPED | OUTPATIENT
Start: 2018-03-19 | End: 2018-05-18 | Stop reason: SDUPTHER

## 2018-03-19 RX ORDER — VENLAFAXINE HYDROCHLORIDE 37.5 MG/1
112.5 CAPSULE, EXTENDED RELEASE ORAL
Qty: 90 CAPSULE | Refills: 2 | Status: SHIPPED | OUTPATIENT
Start: 2018-03-19 | End: 2018-04-09 | Stop reason: SDUPTHER

## 2018-03-19 NOTE — PROGRESS NOTES
Assessment/Plan:     Diagnoses and all orders for this visit:    Intermittent left-sided chest pain  Comments:  EKG NSR with mild changes in I/AvL, advised on importance of completing EKG stress test asap & strict precuations given  Orders:  -     POCT ECG  -     XR chest pa & lateral; Future    Cough, persistent  Comments:  check CXR, use flonase every day and add daily PPI  if not improved, t/c Pulm eval +/- PFTs  Orders:  -     pantoprazole (PROTONIX) 40 mg tablet; Take 1 tablet (40 mg total) by mouth daily  -     XR chest pa & lateral; Future    Depression with anxiety  Comments:  discussed tx options for drowsiness (from SNRI vs lyrica?), pt declines to lower lyrica dose  advised to lower SNRI and take at bedtime  Orders:  -     venlafaxine (EFFEXOR-XR) 37 5 mg 24 hr capsule; Take 3 capsules (112 5 mg total) by mouth daily at bedtime    Fibromyalgia  Comments:  continue lyrica as above, pt declines to lower dose for now  d/w pt side effects of med  Orders:  -     venlafaxine (EFFEXOR-XR) 37 5 mg 24 hr capsule; Take 3 capsules (112 5 mg total) by mouth daily at bedtime    Gastroesophageal reflux disease, esophagitis presence not specified  Comments:  ongoing sx, start PPI & GI re-eval & f/u in 4 weeks  Orders:  -     pantoprazole (PROTONIX) 40 mg tablet; Take 1 tablet (40 mg total) by mouth daily      if sx not improved, will need CT chest    Subjective:      Patient ID: Ketty Stewart is a 58 y o  female  HPI    Here with multiple concerns/complaints  Taking lyrica BID and effexor & has been feeling better from mood and fibromyalgia type sx, but has been feeling daytime fatigue and weight gain since lyrica dose increase  Feels drowsy in morning and after taking lyrica, but has been albe to work  Takes effexor in AM as well  Karime Justine did not complete stress test as advised from ER visit 10/2017   has been having intermittent left sided chest pain without associated sx    Symptoms not associated with exertion either  Requests EKG and CXR today  We discussed importance of completing recommended test as already ordered  No SOB/MOREIRA/fever but feels SOB when leaning forward with relief when standing again  Also having cont'd cough for months despite using flonase intermittently  Cough is 'wet' but no phelgm production, sore throat, wheezing  Having postnasal drip and acid reflux recently, not taking anti-reflux medication  No abd pain or other complaints      Past Medical History:   Diagnosis Date    Arthralgia     Atypical chest pain     Fatigue     Fibromyalgia     Fibromyalgia     Restless leg syndrome     Vitamin D deficiency      Vitals:    03/19/18 1453   BP: 132/80   Pulse: 80   Resp: 16   Temp: 98 1 °F (36 7 °C)   SpO2: 98%   Weight: 72 kg (158 lb 12 8 oz)   Height: 5' 3" (1 6 m)       Current Outpatient Prescriptions:     Cholecalciferol (VITAMIN D3) 1000 units CAPS, Take 1 capsule by mouth daily, Disp: , Rfl:     fluticasone (FLONASE) 50 mcg/act nasal spray, 2 sprays into each nostril daily, Disp: , Rfl:     lactulose (CHRONULAC) 10 g/15 mL solution, Take by mouth, Disp: , Rfl:     Multiple Vitamins-Minerals (MULTIVITAMIN WITH MINERALS) tablet, Take 1 tablet by mouth daily, Disp: , Rfl:     pregabalin (LYRICA) 100 mg capsule, Take 50 mg by mouth 2 (two) times a day, Disp: , Rfl:     benzonatate (TESSALON PERLES) 100 mg capsule, TK 1 C PO Q 8 H PRN, Disp: , Rfl: 1    buPROPion (WELLBUTRIN XL) 150 mg 24 hr tablet, Take 150 mg by mouth, Disp: , Rfl:     gabapentin (NEURONTIN) 100 mg capsule, Take 100 mg by mouth, Disp: , Rfl:     pantoprazole (PROTONIX) 40 mg tablet, Take 1 tablet (40 mg total) by mouth daily, Disp: 30 tablet, Rfl: 1    polyethylene glycol (MIRALAX) 17 g packet, Take 17 g by mouth daily, Disp: , Rfl:     tamsulosin (FLOMAX) 0 4 mg, Take 1 capsule by mouth daily with dinner (Patient taking differently: Take 0 4 mg by mouth as needed  ), Disp: 7 capsule, Rfl: 0   venlafaxine (EFFEXOR-XR) 37 5 mg 24 hr capsule, Take 3 capsules (112 5 mg total) by mouth daily at bedtime, Disp: 90 capsule, Rfl: 2  Allergies   Allergen Reactions    Chocolate GI Intolerance    Ciprofloxacin Hallucinations    Dramamine [Dimenhydrinate]     Nuts GI Intolerance    Oat GI Intolerance         Review of Systems   Constitutional: Positive for unexpected weight change (since starting lyrica)  Negative for fever  HENT: Positive for postnasal drip  Negative for congestion  Eyes: Negative for visual disturbance  Respiratory: Positive for cough  Negative for shortness of breath  Cardiovascular: Positive for chest pain  Gastrointestinal: Negative for abdominal pain  +heartburn   Genitourinary: Negative for difficulty urinating  Musculoskeletal: Positive for arthralgias (but improved with lyrica) and myalgias (but improved with lyrica)  Neurological: Negative for headaches  Psychiatric/Behavioral: Negative for dysphoric mood  The patient is nervous/anxious  Objective:      /80   Pulse 80   Temp 98 1 °F (36 7 °C)   Resp 16   Ht 5' 3" (1 6 m)   Wt 72 kg (158 lb 12 8 oz)   SpO2 98%   BMI 28 13 kg/m²          Physical Exam   Constitutional: She is oriented to person, place, and time  She appears well-developed and well-nourished  HENT:   Head: Normocephalic and atraumatic  Eyes: Conjunctivae are normal  Pupils are equal, round, and reactive to light  Cardiovascular: Normal rate, regular rhythm and normal heart sounds  No murmur heard  Pulmonary/Chest: Effort normal and breath sounds normal  She has no wheezes  She has no rales  Abdominal: Soft  Bowel sounds are normal  There is no tenderness  Musculoskeletal: She exhibits no edema  Neurological: She is alert and oriented to person, place, and time  Skin: Skin is warm and dry  Psychiatric: Her behavior is normal  Her mood appears anxious  Vitals reviewed          EKG: sinus bradycardia, Q waves in I/AvL(?) which is change from prior EKG dated 10/3/2017, but otherwise no acute ST-T segment changes

## 2018-03-19 NOTE — PATIENT INSTRUCTIONS
1  Heart stress test, please complete ASAP  2  Reduce venlafaxine to 112 5mg & take at bedtime  3  If stress test results look fine, will need CAT scan of chest  4  If symptoms worsen, go to emergency room  5  Start pantoprazole once a day  6  GI evaluation, Dr Gely Philip  7  See information below  8  flonase every day(discussed at time of appt)    Gastroesophageal Reflux Disease   AMBULATORY CARE:   Gastroesophageal reflux  reflux occurs when acid and food in the stomach back up into the esophagus  Gastroesophageal reflux disease (GERD) is reflux that occurs more than twice a week for a few weeks  It usually causes heartburn and other symptoms  GERD can cause other health problems over time if it is not treated  Common symptoms include:  Heartburn is the most common symptom of GERD  You may feel burning pain in your chest or below the breast bone  This usually occurs after meals and spreads to your neck, jaw, or shoulder  The pain gets better when you change positions  You may also have any of the following:  · Bitter or acid taste in your mouth    · Dry cough    · Trouble swallowing or pain with swallowing    · Hoarseness or sore throat    · Frequent burping or hiccups    · Feeling of fullness soon after you start eating  Seek care immediately if:  · You feel full and cannot burp or vomit  · You have severe chest pain and sudden trouble breathing  · Your bowel movements are black, bloody, or tarry-looking  · Your vomit looks like coffee grounds or has blood in it  Contact your healthcare provider if:   · You vomit large amounts, or you vomit often  · You have trouble breathing after you vomit  · You have trouble swallowing, or pain with swallowing  · You are losing weight without trying  · Your symptoms get worse or do not improve with treatment  · You have questions or concerns about your condition or care    Treatment for GERD:  Your healthcare provider may prescribe medicine to decrease stomach acid  He may also prescribe medicine that help your esophagus and stomach move food and liquid to your intestines  Surgery may be done if other treatments do not work  You may need surgery to wrap the upper part of the stomach around the esophageal sphincter  This will strengthen the sphincter and prevent reflux  Manage GERD:   · Do not have foods or drinks that may increase heartburn  These include chocolate, peppermint, fried or fatty foods, drinks that contain caffeine, or carbonated drinks (soda)  Other foods include spicy foods, onions, tomatoes, and tomato-based foods  Do not have foods or drinks that can irritate your esophagus, such as citrus fruits, juices, and alcohol  · Do not eat large meals  When you eat a lot of food at one time, your stomach needs more acid to digest it  Eat 6 small meals each day instead of 3 large ones, and eat slowly  Do not eat meals 2 to 3 hours before bedtime  · Elevate the head of your bed  Place 6-inch blocks under the head of your bed frame  You may also use more than one pillow under your head and shoulders while you sleep  · Maintain a healthy weight  If you are overweight, weight loss may help relieve symptoms of GERD  · Do not smoke  Smoking weakens the lower esophageal sphincter and increases the risk of GERD  Ask your healthcare provider for information if you currently smoke and need help to quit  E-cigarettes or smokeless tobacco still contain nicotine  Talk to your healthcare provider before you use these products  · Do not wear clothing that is tight around your waist   Tight clothing can put pressure on your stomach and cause or worsen GERD symptoms  Follow up with your healthcare provider as directed:  Write down your questions so you remember to ask them during your visits    © 2017 [employer] Information is for End User's use only and may not be sold, redistributed or otherwise used for commercial purposes  All illustrations and images included in CareNotes® are the copyrighted property of [employer]  or [address]  The above information is an  only  It is not intended as medical advice for individual conditions or treatments  Talk to your doctor, nurse or pharmacist before following any medical regimen to see if it is safe and effective for you

## 2018-03-20 ENCOUNTER — TELEPHONE (OUTPATIENT)
Dept: INTERNAL MEDICINE CLINIC | Facility: CLINIC | Age: 63
End: 2018-03-20

## 2018-03-20 DIAGNOSIS — J30.9 ALLERGIC RHINITIS, UNSPECIFIED CHRONICITY, UNSPECIFIED SEASONALITY, UNSPECIFIED TRIGGER: Primary | ICD-10-CM

## 2018-03-20 NOTE — TELEPHONE ENCOUNTER
----- Message from June DO Rossi sent at 3/19/2018  6:19 PM EDT -----  CXR results are back  There is mild curvature to spine in upper back but lungs are clear    pls continue with current tx plan

## 2018-03-21 RX ORDER — FLUTICASONE PROPIONATE 50 MCG
2 SPRAY, SUSPENSION (ML) NASAL DAILY
Qty: 16 G | Refills: 3 | Status: SHIPPED | OUTPATIENT
Start: 2018-03-21 | End: 2018-04-24 | Stop reason: SDUPTHER

## 2018-03-26 ENCOUNTER — HOSPITAL ENCOUNTER (OUTPATIENT)
Dept: NON INVASIVE DIAGNOSTICS | Facility: CLINIC | Age: 63
Discharge: HOME | End: 2018-03-26
Payer: COMMERCIAL

## 2018-03-26 DIAGNOSIS — R07.9 CHEST PAIN, UNSPECIFIED TYPE: ICD-10-CM

## 2018-03-26 LAB
MAX DIASTOLIC BP: 90 MMHG
MAX HEART RATE: 141 BPM
MAX PREDICTED HEART RATE: 158 BPM
MAX. SYSTOLIC BP: 150 MMHG
PROTOCOL NAME: NORMAL
REASON FOR TERMINATION: NORMAL
TARGET HR FORMULA: NORMAL
TEST INDICATION: NORMAL
TIME IN EXERCISE PHASE: NORMAL

## 2018-03-26 PROCEDURE — 93017 CV STRESS TEST TRACING ONLY: CPT

## 2018-03-26 PROCEDURE — 93016 CV STRESS TEST SUPVJ ONLY: CPT | Performed by: INTERNAL MEDICINE

## 2018-03-26 PROCEDURE — 93018 CV STRESS TEST I&R ONLY: CPT | Performed by: INTERNAL MEDICINE

## 2018-03-27 ENCOUNTER — OFFICE VISIT (OUTPATIENT)
Dept: OBGYN CLINIC | Facility: HOSPITAL | Age: 63
End: 2018-03-27
Payer: COMMERCIAL

## 2018-03-27 VITALS
SYSTOLIC BLOOD PRESSURE: 98 MMHG | HEIGHT: 63 IN | WEIGHT: 158.73 LBS | DIASTOLIC BLOOD PRESSURE: 64 MMHG | HEART RATE: 69 BPM | BODY MASS INDEX: 28.12 KG/M2

## 2018-03-27 DIAGNOSIS — M17.10 ARTHRITIS OF KNEE: Primary | ICD-10-CM

## 2018-03-27 DIAGNOSIS — M25.561 CHRONIC PAIN OF RIGHT KNEE: ICD-10-CM

## 2018-03-27 DIAGNOSIS — G89.29 CHRONIC PAIN OF RIGHT KNEE: ICD-10-CM

## 2018-03-27 PROCEDURE — 99214 OFFICE O/P EST MOD 30 MIN: CPT | Performed by: ORTHOPAEDIC SURGERY

## 2018-03-27 RX ORDER — FOLIC ACID 1 MG/1
1 TABLET ORAL DAILY
Status: CANCELLED | OUTPATIENT
Start: 2018-03-28 | End: 2018-04-27

## 2018-03-27 RX ORDER — FERROUS SULFATE TAB EC 324 MG (65 MG FE EQUIVALENT) 324 (65 FE) MG
324 TABLET DELAYED RESPONSE ORAL
Qty: 60 TABLET | Refills: 0 | Status: SHIPPED | OUTPATIENT
Start: 2018-03-27 | End: 2019-04-24 | Stop reason: ALTCHOICE

## 2018-03-27 RX ORDER — GABAPENTIN 300 MG/1
300 CAPSULE ORAL ONCE
Status: CANCELLED | OUTPATIENT
Start: 2018-03-27 | End: 2018-03-27

## 2018-03-27 RX ORDER — ACETAMINOPHEN 325 MG/1
975 TABLET ORAL ONCE
Status: CANCELLED | OUTPATIENT
Start: 2018-03-27 | End: 2018-03-27

## 2018-03-27 RX ORDER — SODIUM CHLORIDE, SODIUM LACTATE, POTASSIUM CHLORIDE, CALCIUM CHLORIDE 600; 310; 30; 20 MG/100ML; MG/100ML; MG/100ML; MG/100ML
125 INJECTION, SOLUTION INTRAVENOUS CONTINUOUS
Status: CANCELLED | OUTPATIENT
Start: 2018-03-27

## 2018-03-27 NOTE — PROGRESS NOTES
62 y o female care of right knee pain right knee arthritic changes  This is a follow-up visit for this pleasant adult female  At a previous visit she underwent injections to both knees  Her discomfort and limitations from the right knee are such that she wishes to schedule elective right total knee arthroplasty  Surgery was discussed previously with her but she is motivated to consent and schedule surgery for May of this year  Review of Systems  Review of systems negative unless otherwise specified in HPI    Past Medical History  Past Medical History:   Diagnosis Date    Arthralgia     Atypical chest pain     Fatigue     Fibromyalgia     Fibromyalgia     Restless leg syndrome     Vitamin D deficiency        Past Surgical History  Past Surgical History:   Procedure Laterality Date    KNEE SURGERY      NH COLONOSCOPY FLX DX W/COLLJ SPEC WHEN PFRMD N/A 3/16/2017    Procedure: COLONOSCOPY;  Surgeon: Ruben Leigh MD;  Location: [facility];   Service: Gastroenterology    NH CYSTO/URETERO W/LITHOTRIPSY &INDWELL STENT INSRT Left 4/26/2017    Procedure: CYSTOSCOPY URETEROSCOPY; RETROGRADE PYELOGRAM; STONE EXTRACTION; INSERTION STENT URETERAL;  Surgeon: Mina Lock MD;  Location: [facility];  Service: Urology       Current Medications  Current Outpatient Prescriptions on File Prior to Visit   Medication Sig Dispense Refill    benzonatate (TESSALON PERLES) 100 mg capsule TK 1 C PO Q 8 H PRN  1    buPROPion (WELLBUTRIN XL) 150 mg 24 hr tablet Take 150 mg by mouth      Cholecalciferol (VITAMIN D3) 1000 units CAPS Take 1 capsule by mouth daily      fluticasone (FLONASE) 50 mcg/act nasal spray 2 sprays into each nostril daily 16 g 3    gabapentin (NEURONTIN) 100 mg capsule Take 100 mg by mouth      lactulose (CHRONULAC) 10 g/15 mL solution Take by mouth      Multiple Vitamins-Minerals (MULTIVITAMIN WITH MINERALS) tablet Take 1 tablet by mouth daily      pantoprazole (PROTONIX) 40 mg tablet Take 1 tablet (40 mg total) by mouth daily 30 tablet 1    polyethylene glycol (MIRALAX) 17 g packet Take 17 g by mouth daily      pregabalin (LYRICA) 100 mg capsule Take 50 mg by mouth 2 (two) times a day      tamsulosin (FLOMAX) 0 4 mg Take 1 capsule by mouth daily with dinner (Patient taking differently: Take 0 4 mg by mouth as needed  ) 7 capsule 0    venlafaxine (EFFEXOR-XR) 37 5 mg 24 hr capsule Take 3 capsules (112 5 mg total) by mouth daily at bedtime 90 capsule 2     No current facility-administered medications on file prior to visit  Recent Labs [facility])    0  Lab Value Date/Time   HCT 43 0 10/03/2017 1245   HCT 44 0 06/29/2015 0917   HGB 14 6 10/03/2017 1245   HGB 14 0 06/29/2015 0917   WBC 5 32 10/03/2017 1245   WBC 4 08 (L) 06/29/2015 0917   ESR 6 06/29/2015 0917   CRP 0 2 06/29/2015 0917   GLUCOSE 107 10/03/2017 1245   GLUCOSE 94 04/25/2017 1832   GLUCOSE 87 06/29/2015 0917         Physical exam  · General: Awake, Alert, Oriented  · Eyes: Pupils equal, round and reactive to light  · Heart: regular rate and rhythm  · Lungs: No audible wheezing  · Abdomen: soft  Musculoskeletal    The right knee exhibits anatomic deformity, and pain  Her alignment offers suspicion of varus angulation  She has predominantly medial greater than lateral knee pain  She has the absence of calf pain  Imaging  Outside images of her knees dated September 2016, show evidence of medial compartment narrowing subchondral changes of the proximal tibia tibial plateau of both knees  Procedure  N/A    Assessment/Plan:   58 y  o female with chronic right knee pain and arthritis right knee  She is offered an accepts operative intervention in the form of right total knee replacement arthroplasty  The entire perioperative experience was discussed in detail by the attending surgeon    She will next be seen in the office as a postoperative patient

## 2018-04-09 DIAGNOSIS — M79.7 FIBROMYALGIA: ICD-10-CM

## 2018-04-09 DIAGNOSIS — F41.8 DEPRESSION WITH ANXIETY: ICD-10-CM

## 2018-04-10 RX ORDER — VENLAFAXINE HYDROCHLORIDE 37.5 MG/1
112.5 CAPSULE, EXTENDED RELEASE ORAL
Qty: 90 CAPSULE | Refills: 2 | Status: SHIPPED | OUTPATIENT
Start: 2018-04-10 | End: 2018-04-12 | Stop reason: SDUPTHER

## 2018-04-12 DIAGNOSIS — M79.7 FIBROMYALGIA: ICD-10-CM

## 2018-04-12 DIAGNOSIS — F41.8 DEPRESSION WITH ANXIETY: ICD-10-CM

## 2018-04-12 RX ORDER — VENLAFAXINE HYDROCHLORIDE 37.5 MG/1
112.5 CAPSULE, EXTENDED RELEASE ORAL
Qty: 90 CAPSULE | Refills: 3 | Status: SHIPPED | OUTPATIENT
Start: 2018-04-12 | End: 2018-04-19 | Stop reason: SDUPTHER

## 2018-04-17 ENCOUNTER — OFFICE VISIT (OUTPATIENT)
Dept: INTERNAL MEDICINE CLINIC | Facility: CLINIC | Age: 63
End: 2018-04-17
Payer: COMMERCIAL

## 2018-04-17 VITALS
DIASTOLIC BLOOD PRESSURE: 78 MMHG | TEMPERATURE: 98.4 F | SYSTOLIC BLOOD PRESSURE: 130 MMHG | RESPIRATION RATE: 18 BRPM | OXYGEN SATURATION: 96 % | HEART RATE: 65 BPM | WEIGHT: 158.8 LBS | HEIGHT: 63 IN | BODY MASS INDEX: 28.14 KG/M2

## 2018-04-17 DIAGNOSIS — K59.00 CONSTIPATION, UNSPECIFIED CONSTIPATION TYPE: ICD-10-CM

## 2018-04-17 DIAGNOSIS — Z23 NEED FOR SHINGLES VACCINE: Primary | ICD-10-CM

## 2018-04-17 DIAGNOSIS — F41.8 DEPRESSION WITH ANXIETY: ICD-10-CM

## 2018-04-17 PROCEDURE — 99213 OFFICE O/P EST LOW 20 MIN: CPT | Performed by: INTERNAL MEDICINE

## 2018-04-17 NOTE — PROGRESS NOTES
Assessment/Plan:       Diagnoses and all orders for this visit:    Need for shingles vaccine  -     Zoster Vac Recomb Adjuvanted 50 MCG SUSR; Inject 1 Dose into the shoulder, thigh, or buttocks once for 1 dose    Constipation, unspecified constipation type  Comments:  taking lactulose daily, with 2-3 sometimes loose BMs per day, reduce lactulose to once every 2 days or prn   last colonoscopy in last 1-2 yrs    Depression with anxiety  Comments:  taking effexor with some relief, no side effects  Continue rx for now          Subjective:      Patient ID: Arlette He is a 58 y o  female  HPI    Here for follow up  Saw ortho and scheduled for knee surgery in 6-7 weeks  Taking effexor at lower dose and continues with lyrica BID which helps fibromyalgia sx but causes mild drowsiness & Malen Gerry does not want to lower dose  Taking PPI and nasal spray and still having occ congestion  CXR completed showed clear lungs  C/o occ unsteady gait, mostly in morning when getting ready for the day   feels like she is walking into things but no injuries or falls  Had a recent fall at home when her chair slipped on floor beneath her and she hit her left side on ground  No injuries and no significant pain now from this  We reviewed heart stress test results during appt today(I never rec'd results from test after it was completed )  Never had shingles vaccine in past, we discussed shingrix and pt would like to have  No other complaints      Past Medical History:   Diagnosis Date    Arthralgia     Atypical chest pain     Balance disorder     Cervical rib     last assessed 9/13/12    Fatigue     Fibromyalgia     Fibromyalgia     Hydronephrosis with renal and ureteral calculus obstruction     last assessed 5/1/17    [facility]unar stroke ([facility] [address] )     Leukopenia     last assessed 5/19/16    Memory loss     Restless leg syndrome     last assessed 11/8/13    Sebaceous cyst     last assessed 2/13/13    Skin tag     last assessed 2/13/13    Vitamin D deficiency      Vitals:    04/17/18 1606   BP: 130/78   Pulse: 65   Resp: 18   Temp: 98 4 °F (36 9 °C)   SpO2: 96%   Weight: 72 kg (158 lb 12 8 oz)   Height: 5' 3" (1 6 m)     Body mass index is 28 13 kg/m²      Current Outpatient Prescriptions:     ascorbic acid (VITAMIN C) 500 MG TBCR, Take 1 tablet (500 mg total) by mouth 2 (two) times a day for 60 doses, Disp: 60 each, Rfl: 0    benzonatate (TESSALON PERLES) 100 mg capsule, TK 1 C PO Q 8 H PRN, Disp: , Rfl: 1    buPROPion (WELLBUTRIN XL) 150 mg 24 hr tablet, Take 150 mg by mouth, Disp: , Rfl:     Cholecalciferol (VITAMIN D3) 1000 units CAPS, Take 1 capsule by mouth daily, Disp: , Rfl:     ferrous sulfate 324 (65 Fe) mg, Take 1 tablet (324 mg total) by mouth 2 (two) times a day before meals Take 1 pill BID, PRE-OP with Vit C, may be constipatory, Disp: 60 tablet, Rfl: 0    fluticasone (FLONASE) 50 mcg/act nasal spray, 2 sprays into each nostril daily, Disp: 16 g, Rfl: 3    gabapentin (NEURONTIN) 100 mg capsule, Take 100 mg by mouth, Disp: , Rfl:     lactulose (CHRONULAC) 10 g/15 mL solution, Take by mouth, Disp: , Rfl:     Multiple Vitamins-Minerals (MULTIVITAMIN WITH MINERALS) tablet, Take 1 tablet by mouth daily, Disp: , Rfl:     pantoprazole (PROTONIX) 40 mg tablet, Take 1 tablet (40 mg total) by mouth daily, Disp: 30 tablet, Rfl: 1    polyethylene glycol (MIRALAX) 17 g packet, Take 17 g by mouth daily, Disp: , Rfl:     pregabalin (LYRICA) 100 mg capsule, Take 50 mg by mouth 2 (two) times a day, Disp: , Rfl:     tamsulosin (FLOMAX) 0 4 mg, Take 1 capsule by mouth daily with dinner (Patient taking differently: Take 0 4 mg by mouth as needed  ), Disp: 7 capsule, Rfl: 0    venlafaxine (EFFEXOR-XR) 37 5 mg 24 hr capsule, Take 3 capsules (112 5 mg total) by mouth daily at bedtime, Disp: 90 capsule, Rfl: 3    Zoster Vac Recomb Adjuvanted 50 MCG SUSR, Inject 1 Dose into the shoulder, thigh, or buttocks once for 1 dose, Disp: 1 each, Rfl: 1  Allergies   Allergen Reactions    Chocolate GI Intolerance    Ciprofloxacin Hallucinations    Dramamine [Dimenhydrinate]     Nuts GI Intolerance    Oat GI Intolerance         Review of Systems   Constitutional: Negative for fever  HENT: Negative for congestion  Eyes: Negative for visual disturbance  Respiratory: Negative for shortness of breath  Cardiovascular: Negative for chest pain  Gastrointestinal: Negative for abdominal pain  Genitourinary: Negative for difficulty urinating  Musculoskeletal: Positive for myalgias (improved with lyrica)  Neurological: Positive for dizziness  Psychiatric/Behavioral: Negative for dysphoric mood  Objective:      /78   Pulse 65   Temp 98 4 °F (36 9 °C)   Resp 18   Ht 5' 3" (1 6 m)   Wt 72 kg (158 lb 12 8 oz)   SpO2 96%   BMI 28 13 kg/m²          Physical Exam   Constitutional: She is oriented to person, place, and time  She appears well-developed and well-nourished  HENT:   Head: Normocephalic and atraumatic  Eyes: Conjunctivae are normal  Pupils are equal, round, and reactive to light  Cardiovascular: Normal rate, regular rhythm and normal heart sounds  No murmur heard  Pulmonary/Chest: Effort normal and breath sounds normal  She has no wheezes  She has no rales  Abdominal: Soft  Bowel sounds are normal  There is no tenderness  Musculoskeletal: She exhibits no edema  Neurological: She is alert and oriented to person, place, and time  Psychiatric: She has a normal mood and affect  Her behavior is normal    Vitals reviewed

## 2018-04-19 DIAGNOSIS — M79.7 FIBROMYALGIA: ICD-10-CM

## 2018-04-19 DIAGNOSIS — F41.8 DEPRESSION WITH ANXIETY: ICD-10-CM

## 2018-04-19 RX ORDER — VENLAFAXINE HYDROCHLORIDE 37.5 MG/1
112.5 CAPSULE, EXTENDED RELEASE ORAL
Qty: 90 CAPSULE | Refills: 3 | Status: SHIPPED | OUTPATIENT
Start: 2018-04-19 | End: 2018-09-11 | Stop reason: SDUPTHER

## 2018-04-20 ENCOUNTER — TELEPHONE (OUTPATIENT)
Dept: INTERNAL MEDICINE CLINIC | Facility: CLINIC | Age: 63
End: 2018-04-20

## 2018-04-20 NOTE — TELEPHONE ENCOUNTER
Can continue flonase    Add throat lozenges and nasal saline 3-4x per day & take allegra for allergy sx every day    If not better by next week, can come see me

## 2018-04-23 NOTE — TELEPHONE ENCOUNTER
If she is feeling better, continue with current tx plan    It may take some more days for symptoms to resolve, if symptoms worsen or do not continue to improve, can come back and see me

## 2018-04-23 NOTE — TELEPHONE ENCOUNTER
Pt states she is feeling better from last week and more energy  Pt states she still has a small cough and thick mucus (light green)  Would like to know if she will need an abx for the mucus  Please advise

## 2018-04-24 ENCOUNTER — OFFICE VISIT (OUTPATIENT)
Dept: INTERNAL MEDICINE CLINIC | Facility: CLINIC | Age: 63
End: 2018-04-24
Payer: COMMERCIAL

## 2018-04-24 ENCOUNTER — TELEPHONE (OUTPATIENT)
Dept: INTERNAL MEDICINE CLINIC | Facility: CLINIC | Age: 63
End: 2018-04-24

## 2018-04-24 VITALS
HEIGHT: 63 IN | DIASTOLIC BLOOD PRESSURE: 72 MMHG | WEIGHT: 159.4 LBS | HEART RATE: 91 BPM | TEMPERATURE: 99.6 F | OXYGEN SATURATION: 96 % | SYSTOLIC BLOOD PRESSURE: 132 MMHG | RESPIRATION RATE: 18 BRPM | BODY MASS INDEX: 28.24 KG/M2

## 2018-04-24 DIAGNOSIS — R50.9 FEVER AND CHILLS: Primary | ICD-10-CM

## 2018-04-24 DIAGNOSIS — J00 ACUTE RHINITIS: ICD-10-CM

## 2018-04-24 PROCEDURE — 99213 OFFICE O/P EST LOW 20 MIN: CPT | Performed by: INTERNAL MEDICINE

## 2018-04-24 PROCEDURE — 87631 RESP VIRUS 3-5 TARGETS: CPT | Performed by: INTERNAL MEDICINE

## 2018-04-24 RX ORDER — BENZONATATE 100 MG/1
100 CAPSULE ORAL 3 TIMES DAILY PRN
Qty: 20 CAPSULE | Refills: 0 | Status: SHIPPED | OUTPATIENT
Start: 2018-04-24 | End: 2019-01-11 | Stop reason: SDUPTHER

## 2018-04-24 RX ORDER — FLUTICASONE PROPIONATE 50 MCG
SPRAY, SUSPENSION (ML) NASAL
Qty: 16 G | Refills: 1 | Status: SHIPPED | OUTPATIENT
Start: 2018-04-24 | End: 2018-04-24 | Stop reason: SDUPTHER

## 2018-04-24 RX ORDER — FLUTICASONE PROPIONATE 50 MCG
SPRAY, SUSPENSION (ML) NASAL
Qty: 48 G | Refills: 0 | Status: SHIPPED | OUTPATIENT
Start: 2018-04-24 | End: 2019-08-12 | Stop reason: SDUPTHER

## 2018-04-24 NOTE — LETTER
April 24, 2018     Patient: Alondra Irizarry   YOB: 1955   Date of Visit: 4/24/2018       To Whom it May Concern: Maritza Freeman is under my professional care  She was seen in my office on 4/24/2018  Kindly excuse her from work on 4/25/18  She may return to work on 4/26/18  If you have any questions or concerns, please don't hesitate to call           Sincerely,          Josafat Neumann MD        CC: No Recipients

## 2018-04-24 NOTE — PROGRESS NOTES
Assessment/Plan:    No problem-specific Assessment & Plan notes found for this encounter  Diagnoses and all orders for this visit:    Fever and chills  Comments:  Monitor fevers, may take Tylenol as needed  Keep hydrated  Orders:  -     Influenza A/B and RSV by PCR (Indicated for patients > 2 mo of age)    Acute rhinitis  Comments:  Start steroid nasal spray, may continue with saline spray as needed  Given cough medication to use as needed  Orders:  -     benzonatate (TESSALON PERLES) 100 mg capsule; Take 1 capsule (100 mg total) by mouth 3 (three) times a day as needed for cough  -     fluticasone (FLONASE) 50 mcg/act nasal spray; Use 1-2 sprays each nostril once a day          Subjective:      Patient ID: Sloane Cifuentes is a 58 y o  female  Ms Jessica Mills complains of cold symptoms since 5 days ago  She reports started with a sore throat, and sinus congestion  She Has low-grade fevers and frequent chills  She slept over the weekend, felt better 2 days ago  She has went back to work this morning but started feeling very achy, especially in her arms and legs  She continues to have a postnasal drip, frequent coughing episodes that is nonproductive  She denies any nausea or vomiting, no diarrhea  She reports sore throat has resolved  She has been using a saline spray which helps, also started taking Allegra daily  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  URI    This is a new problem  The current episode started in the past 7 days  The problem has been unchanged  There has been no fever  The fever has been present for 3 to 4 days  Associated symptoms include congestion, coughing and joint pain  Pertinent negatives include no abdominal pain, diarrhea, dysuria, ear pain, headaches, nausea, sinus pain, sore throat, vomiting or wheezing  She has tried antihistamine and increased fluids for the symptoms  The treatment provided mild relief         The following portions of the patient's history were reviewed and updated as appropriate: allergies, current medications, past medical history, past social history and problem list     Review of Systems   Constitutional: Positive for chills, fatigue and fever  HENT: Positive for congestion  Negative for ear pain, sinus pain and sore throat  Respiratory: Positive for cough  Negative for wheezing  Gastrointestinal: Negative for abdominal pain, diarrhea, nausea and vomiting  Genitourinary: Negative for dysuria  Musculoskeletal: Positive for arthralgias, joint pain and myalgias  Neurological: Negative for dizziness and headaches  Objective:      /72   Pulse 91   Temp 99 6 °F (37 6 °C)   Resp 18   Ht 5' 3" (1 6 m)   Wt 72 3 kg (159 lb 6 4 oz)   SpO2 96%   BMI 28 24 kg/m²          Physical Exam   Constitutional: She appears well-developed and well-nourished  HENT:   Head: Normocephalic and atraumatic  Right Ear: Tympanic membrane, external ear and ear canal normal    Left Ear: Tympanic membrane, external ear and ear canal normal    Nose: Mucosal edema and rhinorrhea present  Mouth/Throat: Mucous membranes are normal  No posterior oropharyngeal erythema  Eyes: Conjunctivae are normal  Pupils are equal, round, and reactive to light  Neck: Neck supple  Cardiovascular: Normal rate, regular rhythm and normal heart sounds  Pulmonary/Chest: Effort normal and breath sounds normal  She has no wheezes  She has no rales  Abdominal: Normal appearance and bowel sounds are normal    Lymphadenopathy:     She has no cervical adenopathy  Neurological: She is alert  Skin: Skin is warm  No rash noted  Nursing note and vitals reviewed

## 2018-04-24 NOTE — TELEPHONE ENCOUNTER
Pt feeling worse than yesterday  Now has body aches  Congestion, headache/neck ache, cough  Both doctor schedules are full  Pt wanted to come in today  Please advise  Call pt     Please disregard above, Omer Quintero will see her

## 2018-04-25 ENCOUNTER — TELEPHONE (OUTPATIENT)
Dept: INTERNAL MEDICINE CLINIC | Facility: CLINIC | Age: 63
End: 2018-04-25

## 2018-04-25 LAB
FLUAV AG SPEC QL: NORMAL
FLUBV AG SPEC QL: NORMAL
RSV B RNA SPEC QL NAA+PROBE: NORMAL

## 2018-04-25 NOTE — TELEPHONE ENCOUNTER
----- Message from Mary Jane Mcnair MD sent at 4/25/2018  7:48 AM EDT -----  Flu test was negative  Please use steroid nasal spray daily

## 2018-04-30 ENCOUNTER — OFFICE VISIT (OUTPATIENT)
Dept: INTERNAL MEDICINE CLINIC | Facility: CLINIC | Age: 63
End: 2018-04-30
Payer: COMMERCIAL

## 2018-04-30 ENCOUNTER — TELEPHONE (OUTPATIENT)
Dept: INTERNAL MEDICINE CLINIC | Facility: CLINIC | Age: 63
End: 2018-04-30

## 2018-04-30 VITALS
SYSTOLIC BLOOD PRESSURE: 118 MMHG | HEIGHT: 63 IN | OXYGEN SATURATION: 97 % | BODY MASS INDEX: 27.29 KG/M2 | DIASTOLIC BLOOD PRESSURE: 70 MMHG | WEIGHT: 154 LBS | RESPIRATION RATE: 18 BRPM | HEART RATE: 72 BPM | TEMPERATURE: 98.6 F

## 2018-04-30 DIAGNOSIS — J06.9 UPPER RESPIRATORY TRACT INFECTION, UNSPECIFIED TYPE: Primary | ICD-10-CM

## 2018-04-30 PROBLEM — K52.9 ACUTE GASTROENTERITIS: Status: RESOLVED | Noted: 2017-04-26 | Resolved: 2018-04-30

## 2018-04-30 PROCEDURE — 99213 OFFICE O/P EST LOW 20 MIN: CPT | Performed by: INTERNAL MEDICINE

## 2018-04-30 RX ORDER — GUAIFENESIN AND CODEINE PHOSPHATE 100; 10 MG/5ML; MG/5ML
5 SOLUTION ORAL 2 TIMES DAILY PRN
Qty: 120 ML | Refills: 0 | Status: SHIPPED | OUTPATIENT
Start: 2018-04-30 | End: 2019-01-22 | Stop reason: ALTCHOICE

## 2018-04-30 RX ORDER — DOXYCYCLINE HYCLATE 100 MG/1
100 CAPSULE ORAL EVERY 12 HOURS SCHEDULED
Qty: 14 CAPSULE | Refills: 0 | Status: SHIPPED | OUTPATIENT
Start: 2018-04-30 | End: 2018-05-07

## 2018-04-30 NOTE — TELEPHONE ENCOUNTER
Any wheezing or chest tightness?     Can give antibiotics for the cough    Can still be seen later this afternoon if she wants to

## 2018-04-30 NOTE — PROGRESS NOTES
Assessment/Plan:    No problem-specific Assessment & Plan notes found for this encounter  Diagnoses and all orders for this visit:    Upper respiratory tract infection, unspecified type  Comments:  Start antibiotic, given cough syrup to take as needed  PDMP reviewed  Continue steroid nasal spray and daily antihistamine  Orders:  -     guaifenesin-codeine (GUAIFENESIN AC) 100-10 MG/5ML liquid; Take 5 mL by mouth 2 (two) times a day as needed for cough  -     doxycycline hyclate (VIBRAMYCIN) 100 mg capsule; Take 1 capsule (100 mg total) by mouth every 12 (twelve) hours for 7 days      If no improvement of symptoms after antibiotic course, consider asthmatic bronchitis and start steroid inhaler  She has knee surgery scheduled next month  Subjective:      Patient ID: Myron Gallo is a 58 y o  female  Ms Blanca Blankenship continues to complains of coughing episodes  Cough is usually nonproductive, would feel tired after episodes of coughing during the day  Denies any symptoms at night, cough does not disrupt sleep  Denies any shortness of breath, wheezing or chest pain  She is using her steroid nasal inhaler and started on a daily antihistamine  She reports sinus congestion is better, less postnasal drip  She tried the [employer] which did not significantly help with the cough  Denies any GI symptoms, fever or chills  The following portions of the patient's history were reviewed and updated as appropriate: allergies, current medications, past medical history and problem list     Review of Systems   Constitutional: Negative for chills, fatigue and fever  HENT: Positive for congestion  Negative for nosebleeds, postnasal drip, sinus pain and sinus pressure  Respiratory: Positive for cough  Negative for chest tightness, shortness of breath and wheezing  Cardiovascular: Negative for chest pain  Neurological: Negative for dizziness and headaches           Objective:      /70   Pulse 72 Temp 98 6 °F (37 °C)   Resp 18   Ht 5' 3" (1 6 m)   Wt 69 9 kg (154 lb)   SpO2 97%   BMI 27 28 kg/m²          Physical Exam   Constitutional: She appears well-developed and well-nourished  HENT:   Head: Normocephalic and atraumatic  Left Ear: External ear normal    Nose: Rhinorrhea present  Mouth/Throat: Mucous membranes are normal    Eyes: Conjunctivae are normal  Pupils are equal, round, and reactive to light  Neck: Neck supple  Cardiovascular: Normal rate, regular rhythm and normal heart sounds  Pulmonary/Chest: Effort normal and breath sounds normal  She has no wheezes  She has no rales  Abdominal: Normal appearance and bowel sounds are normal    Lymphadenopathy:     She has no cervical adenopathy  Neurological: She is alert  Skin: Skin is warm  No rash noted  Nursing note and vitals reviewed  Medications reviewed with patient

## 2018-04-30 NOTE — TELEPHONE ENCOUNTER
Pt states she is still having a bad cough since last visit  Pt states it is a sporadic cough w/yellowish-greenish mucus  Slight sinus pressure  Pt states she has been using nasal spray  No fevers  Pt would like to know if she should be seen or if she should be prescribed something  Please advise

## 2018-05-14 ENCOUNTER — TELEPHONE (OUTPATIENT)
Dept: PREADMISSION TESTING | Facility: HOSPITAL | Age: 63
End: 2018-05-14

## 2018-05-16 DIAGNOSIS — M79.7 FIBROMYALGIA: Primary | ICD-10-CM

## 2018-05-16 RX ORDER — PREGABALIN 100 MG/1
100 CAPSULE ORAL 2 TIMES DAILY
Qty: 60 CAPSULE | Refills: 3 | Status: SHIPPED | OUTPATIENT
Start: 2018-05-16 | End: 2018-09-10 | Stop reason: SDUPTHER

## 2018-05-17 ENCOUNTER — TELEPHONE (OUTPATIENT)
Dept: OBGYN CLINIC | Facility: HOSPITAL | Age: 63
End: 2018-05-17

## 2018-05-17 NOTE — TELEPHONE ENCOUNTER
Caller: patient  Callback# [phone]  Dr Patrick Regalado        Received voice message 05/17 patient is requesting to reschedule surgery due to her caregiver not going to be available as of  05/31 please advise thanks

## 2018-05-18 DIAGNOSIS — R05.3 COUGH, PERSISTENT: ICD-10-CM

## 2018-05-18 DIAGNOSIS — K21.9 GASTROESOPHAGEAL REFLUX DISEASE, ESOPHAGITIS PRESENCE NOT SPECIFIED: ICD-10-CM

## 2018-05-18 RX ORDER — PANTOPRAZOLE SODIUM 40 MG/1
40 TABLET, DELAYED RELEASE ORAL DAILY
Qty: 30 TABLET | Refills: 5 | Status: SHIPPED | OUTPATIENT
Start: 2018-05-18 | End: 2018-12-12 | Stop reason: SDUPTHER

## 2018-05-23 NOTE — TELEPHONE ENCOUNTER
Tried multiple times to contact the patient but the patient's voicemail box is full & and I am unable to leave a message   I will keep attempting to contact the patient or wait for a callback

## 2018-09-10 DIAGNOSIS — M79.7 FIBROMYALGIA: ICD-10-CM

## 2018-09-11 DIAGNOSIS — M79.7 FIBROMYALGIA: ICD-10-CM

## 2018-09-11 DIAGNOSIS — F41.8 DEPRESSION WITH ANXIETY: ICD-10-CM

## 2018-09-11 RX ORDER — VENLAFAXINE HYDROCHLORIDE 37.5 MG/1
112.5 CAPSULE, EXTENDED RELEASE ORAL
Qty: 90 CAPSULE | Refills: 0 | Status: SHIPPED | OUTPATIENT
Start: 2018-09-11 | End: 2018-10-11 | Stop reason: SDUPTHER

## 2018-09-21 ENCOUNTER — OFFICE VISIT (OUTPATIENT)
Dept: INTERNAL MEDICINE CLINIC | Facility: CLINIC | Age: 63
End: 2018-09-21
Payer: COMMERCIAL

## 2018-09-21 VITALS
OXYGEN SATURATION: 98 % | BODY MASS INDEX: 27.64 KG/M2 | TEMPERATURE: 98.3 F | WEIGHT: 156 LBS | DIASTOLIC BLOOD PRESSURE: 80 MMHG | SYSTOLIC BLOOD PRESSURE: 124 MMHG | HEIGHT: 63 IN | RESPIRATION RATE: 18 BRPM | HEART RATE: 60 BPM

## 2018-09-21 DIAGNOSIS — M17.11 OSTEOARTHRITIS OF RIGHT KNEE, UNSPECIFIED OSTEOARTHRITIS TYPE: ICD-10-CM

## 2018-09-21 DIAGNOSIS — Z23 FLU VACCINE NEED: ICD-10-CM

## 2018-09-21 DIAGNOSIS — K59.00 CONSTIPATION, UNSPECIFIED CONSTIPATION TYPE: Primary | ICD-10-CM

## 2018-09-21 PROCEDURE — 99213 OFFICE O/P EST LOW 20 MIN: CPT | Performed by: INTERNAL MEDICINE

## 2018-09-21 PROCEDURE — 90682 RIV4 VACC RECOMBINANT DNA IM: CPT

## 2018-09-21 PROCEDURE — 1036F TOBACCO NON-USER: CPT | Performed by: INTERNAL MEDICINE

## 2018-09-21 PROCEDURE — 3008F BODY MASS INDEX DOCD: CPT | Performed by: INTERNAL MEDICINE

## 2018-09-21 PROCEDURE — 90471 IMMUNIZATION ADMIN: CPT

## 2018-09-21 RX ORDER — LACTULOSE 10 G/15ML
SOLUTION ORAL; RECTAL
Refills: 2 | COMMUNITY
Start: 2018-09-10 | End: 2019-01-22 | Stop reason: SDUPTHER

## 2018-09-21 RX ORDER — POLYETHYLENE GLYCOL 3350 17 G/17G
17 POWDER, FOR SOLUTION ORAL DAILY PRN
Qty: 72 EACH | Refills: 1 | Status: SHIPPED | OUTPATIENT
Start: 2018-09-21 | End: 2019-04-24 | Stop reason: ALTCHOICE

## 2018-09-21 NOTE — PROGRESS NOTES
Assessment/Plan:     Diagnoses and all orders for this visit:    Constipation, unspecified constipation type  Comments:  ongoing, chronic, managed with bowel regimen  advised to see GI again for add'l therapy/recommendations, ref provided  Orders:  -     polyethylene glycol (MIRALAX) 17 g packet; Take 17 g by mouth daily as needed (constipation)  -     Ambulatory referral to Gastroenterology; Future    Flu vaccine need  Comments:  risk/benefit of vaccine discussed and pt elevated to recieve -> administered  Orders:  -     influenza vaccine, [id number], quadrivalent, recombinant, PF, 0 5 mL, for patients 18 yr+ (FLUBLOK)    Osteoarthritis of right knee, unspecified osteoarthritis type  Comments:  handicapped parking placard form completed  f/u orthopedics    Other orders  -     GENERLAC 10 GM/15ML; TK 15ML PO D PRF CONSTIPATION          Subjective:      Patient ID: Ally Falcon is a 58 y o  female  HPI    Here for follow up, reviewed meds with patient  Having persistent constipation despite using generlac, docusate  Took dulcolax which helped but caused urgency at work and a large BM   miralax worked better in past, had colonoscopy last year with [facility]  Has had constipation for years, believes it gets worse with stress  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, 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  Requests flu vaccine today  Hx of right knee OA, was planning on TKR this past summer but didn't get it done  Needs handicapped parking placard renewed for knee OA as walking aggravates pain  Denies any other complaints      Past Medical History:   Diagnosis Date    Arthralgia     Atypical chest pain     Balance disorder     Cervical rib     last assessed 9/13/12    Fatigue     Fibromyalgia     Fibromyalgia     Hydronephrosis with renal and ureteral calculus obstruction     last assessed 5/1/17    Lacunar stroke ([facility] [address] )     Leukopenia     last assessed 5/19/16    Memory loss     Restless leg syndrome last assessed 11/8/13    Sebaceous cyst     last assessed 2/13/13    Skin tag     last assessed 2/13/13    Vitamin D deficiency      Vitals:    09/21/18 1554   BP: 124/80   Pulse: 60   Resp: 18   Temp: 98 3 °F (36 8 °C)   TempSrc: Oral   SpO2: 98%   Weight: 70 8 kg (156 lb)   Height: 5' 3" (1 6 m)     Body mass index is 27 63 kg/m²      Current Outpatient Prescriptions:     benzonatate (TESSALON PERLES) 100 mg capsule, Take 1 capsule (100 mg total) by mouth 3 (three) times a day as needed for cough, Disp: 20 capsule, Rfl: 0    buPROPion (WELLBUTRIN XL) 150 mg 24 hr tablet, Take 150 mg by mouth, Disp: , Rfl:     Cholecalciferol (VITAMIN D3) 1000 units CAPS, Take 1 capsule by mouth daily, Disp: , Rfl:     ferrous sulfate 324 (65 Fe) mg, Take 1 tablet (324 mg total) by mouth 2 (two) times a day before meals Take 1 pill BID, PRE-OP with Vit C, may be constipatory, Disp: 60 tablet, Rfl: 0    fluticasone (FLONASE) 50 mcg/act nasal spray, SHAKE LIQUID AND USE 1 TO 2 SPRAYS IN EACH NOSTRIL EVERY DAY, Disp: 48 g, Rfl: 0    gabapentin (NEURONTIN) 100 mg capsule, Take 100 mg by mouth, Disp: , Rfl:     guaifenesin-codeine (GUAIFENESIN AC) 100-10 MG/5ML liquid, Take 5 mL by mouth 2 (two) times a day as needed for cough, Disp: 120 mL, Rfl: 0    lactulose (CHRONULAC) 10 g/15 mL solution, Take by mouth, Disp: , Rfl:     LYRICA 100 MG capsule, TAKE 1 CAPSULE(100 MG) BY MOUTH TWICE DAILY, Disp: 60 capsule, Rfl: 0    Multiple Vitamins-Minerals (MULTIVITAMIN WITH MINERALS) tablet, Take 1 tablet by mouth daily, Disp: , Rfl:     pantoprazole (PROTONIX) 40 mg tablet, Take 1 tablet (40 mg total) by mouth daily, Disp: 30 tablet, Rfl: 5    polyethylene glycol (MIRALAX) 17 g packet, Take 17 g by mouth daily as needed (constipation), Disp: 72 each, Rfl: 1    tamsulosin (FLOMAX) 0 4 mg, Take 1 capsule by mouth daily with dinner (Patient taking differently: Take 0 4 mg by mouth as needed  ), Disp: 7 capsule, Rfl: 0   venlafaxine (EFFEXOR-XR) 37 5 mg 24 hr capsule, Take 3 capsules (112 5 mg total) by mouth daily at bedtime, Disp: 90 capsule, Rfl: 0    ascorbic acid (VITAMIN C) 500 MG TBCR, Take 1 tablet (500 mg total) by mouth 2 (two) times a day for 60 doses, Disp: 60 each, Rfl: 0    GENERLAC 10 GM/15ML, TK 15ML PO D PRF CONSTIPATION, Disp: , Rfl: 2  Allergies   Allergen Reactions    Chocolate GI Intolerance    Ciprofloxacin Hallucinations    Dramamine [Dimenhydrinate]     Nuts GI Intolerance    Oat GI Intolerance         Review of Systems   Constitutional: Negative for fever  Eyes: Negative for visual disturbance  Respiratory: Negative for shortness of breath  Cardiovascular: Negative for chest pain  Gastrointestinal: Positive for constipation  Genitourinary: Negative for dysuria  Musculoskeletal: Positive for arthralgias  Skin: Negative for rash  Neurological: Negative for headaches  Psychiatric/Behavioral: Negative for dysphoric mood  Objective:      /80   Pulse 60   Temp 98 3 °F (36 8 °C) (Oral)   Resp 18   Ht 5' 3" (1 6 m)   Wt 70 8 kg (156 lb)   SpO2 98%   BMI 27 63 kg/m²          Physical Exam   Constitutional: She appears well-developed and well-nourished  HENT:   Head: Normocephalic and atraumatic  Mouth/Throat: Oropharynx is clear and moist    Eyes: Conjunctivae are normal  Pupils are equal, round, and reactive to light  Cardiovascular: Normal rate, regular rhythm and normal heart sounds  No murmur heard  Pulmonary/Chest: Effort normal and breath sounds normal  She has no wheezes  She has no rales  Abdominal: Soft  Bowel sounds are normal    Musculoskeletal: She exhibits no edema  Neurological: She is alert  Psychiatric: She has a normal mood and affect  Her behavior is normal    Vitals reviewed

## 2018-09-21 NOTE — PATIENT INSTRUCTIONS
1  Flu vaccine  2  Try miralax instead of generlac for bowels  3  Consider rectal suppository if needed  4   Docusate sodium is generic stool softener  5  GI specialist appointment for constipation

## 2018-10-11 DIAGNOSIS — F41.8 DEPRESSION WITH ANXIETY: ICD-10-CM

## 2018-10-11 DIAGNOSIS — M79.7 FIBROMYALGIA: ICD-10-CM

## 2018-10-11 RX ORDER — VENLAFAXINE HYDROCHLORIDE 37.5 MG/1
CAPSULE, EXTENDED RELEASE ORAL
Qty: 90 CAPSULE | Refills: 2 | Status: SHIPPED | OUTPATIENT
Start: 2018-10-11 | End: 2019-01-08 | Stop reason: SDUPTHER

## 2018-11-01 DIAGNOSIS — K59.00 CONSTIPATION, UNSPECIFIED CONSTIPATION TYPE: Primary | ICD-10-CM

## 2018-11-01 RX ORDER — LACTULOSE 10 G/15ML
SOLUTION ORAL; RECTAL
Refills: 0 | Status: CANCELLED | OUTPATIENT
Start: 2018-11-01

## 2018-11-01 RX ORDER — LACTULOSE 10 G/15ML
10 SOLUTION ORAL DAILY PRN
Qty: 240 ML | Refills: 2 | Status: SHIPPED | OUTPATIENT
Start: 2018-11-01 | End: 2019-01-22 | Stop reason: SDUPTHER

## 2018-12-12 DIAGNOSIS — R05.3 COUGH, PERSISTENT: ICD-10-CM

## 2018-12-12 DIAGNOSIS — K21.9 GASTROESOPHAGEAL REFLUX DISEASE, ESOPHAGITIS PRESENCE NOT SPECIFIED: ICD-10-CM

## 2018-12-12 RX ORDER — PANTOPRAZOLE SODIUM 40 MG/1
TABLET, DELAYED RELEASE ORAL
Qty: 30 TABLET | Refills: 1 | Status: SHIPPED | OUTPATIENT
Start: 2018-12-12 | End: 2019-02-21 | Stop reason: SDUPTHER

## 2018-12-13 DIAGNOSIS — M79.7 FIBROMYALGIA: ICD-10-CM

## 2018-12-13 RX ORDER — PREGABALIN 100 MG/1
100 CAPSULE ORAL 2 TIMES DAILY
Qty: 60 CAPSULE | Refills: 1 | Status: SHIPPED | OUTPATIENT
Start: 2018-12-13 | End: 2019-02-11 | Stop reason: SDUPTHER

## 2019-01-02 DIAGNOSIS — J30.89 ALLERGIC RHINITIS DUE TO OTHER ALLERGIC TRIGGER, UNSPECIFIED SEASONALITY: Primary | ICD-10-CM

## 2019-01-02 DIAGNOSIS — J00 ACUTE RHINITIS: ICD-10-CM

## 2019-01-02 DIAGNOSIS — J06.9 UPPER RESPIRATORY TRACT INFECTION, UNSPECIFIED TYPE: ICD-10-CM

## 2019-01-02 NOTE — TELEPHONE ENCOUNTER
Called for a refill on cough syrup  Ask since when did she have the cough  Any phlegm? Fever? Shortness of breath or wheezing?

## 2019-01-03 RX ORDER — GUAIFENESIN AND CODEINE PHOSPHATE 100; 10 MG/5ML; MG/5ML
5 SOLUTION ORAL 2 TIMES DAILY PRN
Qty: 120 ML | Refills: 0 | OUTPATIENT
Start: 2019-01-03

## 2019-01-08 DIAGNOSIS — F41.8 DEPRESSION WITH ANXIETY: ICD-10-CM

## 2019-01-08 DIAGNOSIS — M79.7 FIBROMYALGIA: ICD-10-CM

## 2019-01-09 RX ORDER — VENLAFAXINE HYDROCHLORIDE 37.5 MG/1
37.5 CAPSULE, EXTENDED RELEASE ORAL DAILY
Qty: 90 CAPSULE | Refills: 0 | Status: SHIPPED | OUTPATIENT
Start: 2019-01-09 | End: 2019-02-11 | Stop reason: SDUPTHER

## 2019-01-11 ENCOUNTER — TELEPHONE (OUTPATIENT)
Dept: INTERNAL MEDICINE CLINIC | Facility: CLINIC | Age: 64
End: 2019-01-11

## 2019-01-11 DIAGNOSIS — J06.9 UPPER RESPIRATORY TRACT INFECTION, UNSPECIFIED TYPE: ICD-10-CM

## 2019-01-11 RX ORDER — GUAIFENESIN AND CODEINE PHOSPHATE 100; 10 MG/5ML; MG/5ML
5 SOLUTION ORAL 2 TIMES DAILY PRN
Qty: 120 ML | Refills: 0 | Status: CANCELLED | OUTPATIENT
Start: 2019-01-11

## 2019-01-11 RX ORDER — BENZONATATE 100 MG/1
100 CAPSULE ORAL 3 TIMES DAILY PRN
Qty: 20 CAPSULE | Refills: 0 | Status: SHIPPED | OUTPATIENT
Start: 2019-01-11 | End: 2019-01-22 | Stop reason: ALTCHOICE

## 2019-01-11 NOTE — TELEPHONE ENCOUNTER
Use saline nasal spray or Flonase once a day    Can give cough medication instead of cough syrup with codeine

## 2019-01-11 NOTE — TELEPHONE ENCOUNTER
Patient called back and requesting the refill on the cough syrup, states she still has a lingering cold from December, with a sporadic cough, No SOB, No fevers , no wheezing and  She states no phlegm as well  Had cold on 12/28 for 3 days more so just nasal congestion and sneezing and now the cough started  around a week ago

## 2019-01-22 ENCOUNTER — OFFICE VISIT (OUTPATIENT)
Dept: INTERNAL MEDICINE CLINIC | Facility: CLINIC | Age: 64
End: 2019-01-22
Payer: COMMERCIAL

## 2019-01-22 VITALS
RESPIRATION RATE: 18 BRPM | HEART RATE: 58 BPM | SYSTOLIC BLOOD PRESSURE: 122 MMHG | WEIGHT: 161 LBS | OXYGEN SATURATION: 98 % | DIASTOLIC BLOOD PRESSURE: 76 MMHG | BODY MASS INDEX: 28.53 KG/M2 | HEIGHT: 63 IN | TEMPERATURE: 97.9 F

## 2019-01-22 DIAGNOSIS — Z78.0 POSTMENOPAUSAL: ICD-10-CM

## 2019-01-22 DIAGNOSIS — K21.9 GASTROESOPHAGEAL REFLUX DISEASE, ESOPHAGITIS PRESENCE NOT SPECIFIED: ICD-10-CM

## 2019-01-22 DIAGNOSIS — D64.89 ANEMIA DUE TO OTHER CAUSE, NOT CLASSIFIED: ICD-10-CM

## 2019-01-22 DIAGNOSIS — M25.50 ARTHRALGIA, UNSPECIFIED JOINT: ICD-10-CM

## 2019-01-22 DIAGNOSIS — Z12.31 ENCOUNTER FOR SCREENING MAMMOGRAM FOR BREAST CANCER: ICD-10-CM

## 2019-01-22 DIAGNOSIS — M79.7 FIBROMYALGIA: ICD-10-CM

## 2019-01-22 DIAGNOSIS — E78.49 OTHER HYPERLIPIDEMIA: ICD-10-CM

## 2019-01-22 DIAGNOSIS — G89.29 CHRONIC PAIN OF RIGHT KNEE: ICD-10-CM

## 2019-01-22 DIAGNOSIS — E55.9 VITAMIN D DEFICIENCY: ICD-10-CM

## 2019-01-22 DIAGNOSIS — R53.83 OTHER FATIGUE: Primary | ICD-10-CM

## 2019-01-22 DIAGNOSIS — F41.8 DEPRESSION WITH ANXIETY: ICD-10-CM

## 2019-01-22 DIAGNOSIS — Z11.59 NEED FOR HEPATITIS C SCREENING TEST: ICD-10-CM

## 2019-01-22 DIAGNOSIS — Z71.9 HEALTH COUNSELING: ICD-10-CM

## 2019-01-22 DIAGNOSIS — K59.00 CONSTIPATION, UNSPECIFIED CONSTIPATION TYPE: ICD-10-CM

## 2019-01-22 DIAGNOSIS — I10 ESSENTIAL HYPERTENSION: ICD-10-CM

## 2019-01-22 DIAGNOSIS — M25.561 CHRONIC PAIN OF RIGHT KNEE: ICD-10-CM

## 2019-01-22 PROBLEM — N17.9 AKI (ACUTE KIDNEY INJURY) (HCC): Status: RESOLVED | Noted: 2017-04-25 | Resolved: 2019-01-22

## 2019-01-22 PROBLEM — D64.9 ANEMIA: Status: ACTIVE | Noted: 2019-01-22

## 2019-01-22 PROCEDURE — 3074F SYST BP LT 130 MM HG: CPT | Performed by: INTERNAL MEDICINE

## 2019-01-22 PROCEDURE — 1036F TOBACCO NON-USER: CPT | Performed by: INTERNAL MEDICINE

## 2019-01-22 PROCEDURE — 99214 OFFICE O/P EST MOD 30 MIN: CPT | Performed by: INTERNAL MEDICINE

## 2019-01-22 PROCEDURE — 3078F DIAST BP <80 MM HG: CPT | Performed by: INTERNAL MEDICINE

## 2019-01-22 PROCEDURE — 3008F BODY MASS INDEX DOCD: CPT | Performed by: INTERNAL MEDICINE

## 2019-01-22 RX ORDER — LACTULOSE 10 G/15ML
SOLUTION ORAL; RECTAL
Qty: 946 ML | Refills: 0 | Status: SHIPPED | OUTPATIENT
Start: 2019-01-22 | End: 2019-05-14 | Stop reason: SDUPTHER

## 2019-01-22 NOTE — PATIENT INSTRUCTIONS
Core Strengthening Exercises   AMBULATORY CARE:   What you need to know about core strengthening exercises: Your core includes the muscles of your lower back, hip, pelvis, and abdomen  Core strengthening exercises help heal and strengthen these muscles  This helps prevent another injury, and keeps your pelvis, spine, and hips in the correct position  Contact your healthcare provider if:   · You have sharp or worsening pain during exercise or at rest     · You have questions or concerns about your shoulder exercises  Safety tips:  Talk to your healthcare provider before you start an exercise program  A physical therapist can teach you how to do core strengthening exercises safely  · Do the exercises on a mat or firm surface  A firm surface will support your spine and avoid low back pain  Do not do these exercises on a bed  · Move slowly and smoothly  Avoid fast or jerky motions  · Stop if you feel pain  Core exercises should not feel painful  Stop if you feel pain  · Breathe normally during core exercises  Do not hold your breath  This may cause an increase in blood pressure and prevent muscle strengthening  Your healthcare provider will tell you when to inhale and exhale during the exercise  · Begin all of your exercises with abdominal bracing  Abdominal bracing helps warm up your core muscles  You can also practice abdominal bracing throughout the day while you are sitting or standing  Lie on your back with your knees bent and feet flat on the floor  Place your arms in a relaxed position beside your body  Tighten your abdominal muscles  Pull your belly button in and up toward your spine  Hold for 5 seconds  Relax your muscles  Repeat 10 times  Core strengthening exercises: Your healthcare provider will tell you how often to do these exercises  The provider will also tell you how many repetitions of each exercise you should do  Hold each exercise for 5 seconds or as directed   As you get stronger, increase your hold to 10 to 15 seconds  You can do some of these exercises on a stability ball, or with a weight  Ask your healthcare provider how to use a stability ball or weight for these exercises:  · Bent leg side bridge:  Lie on one side with your legs, hips, and shoulders in a straight line  Prop yourself up onto your forearm so your elbow is directly below your shoulder  Bend your knees to 90°  Lift your hips off the floor  Balance yourself on your forearm and the side of your knee  Hold this position  Repeat on the other side  · Straight leg side bridge:  Lie on one side with your legs, hips, and shoulders in a straight line  Prop yourself up onto your forearm so your elbow is directly below your shoulder  Your top leg can rest in front of your bottom leg for support  Lift your hips off the floor  Balance yourself on your forearm and the outside of your flexed foot  Do not let your ankle bend sideways  Hold this position  Repeat on the other side  · Superman:  Lie on your stomach  Extend your arms forward on the floor  Tighten your abdominal muscles and lift your right hand and left leg off the floor  Hold this position  Slowly return to the starting position  Tighten your abdominal muscles and lift your left hand and right leg off the floor  Hold this position  Slowly return to the starting position  · Curl up:  Lie on your back with your knees bent and feet flat on the floor  Place your hands, palms down, underneath your lower back  Next, with your elbows on the floor, lift your shoulders and chest 2 to 3 inches off the floor  Keep your head in line with your shoulders  Hold this position  Slowly return to the starting position  · Straight leg raises:  Lie on your back with one leg straight  Bend the other knee and place your foot flat on the floor  Tighten your abdominal muscles   Keep your leg straight and slowly lift it straight up 6 to 12 inches off the floor  Hold this position  Lower your leg slowly  Do as many repetitions as directed on this side  Repeat with the other leg  · Plank:  Lie on your stomach  Bend your elbows and place your forearms flat on the floor  Lift your chest, stomach, and knees off the floor  Make sure your elbows are below your shoulders  Your body should be in a straight line  Do not let your hips or lower back sink to the ground  Squeeze your abdominal muscles together and hold for 15 seconds  To make this exercise harder, hold for 30 seconds or lift 1 leg at a time  · Bicycles:  Lie on your back  Bend both knees and bring them toward your chest  Your calves should be parallel to the floor  Place the palms of your hands on the back of your head  Straighten your right leg and keep it lifted 2 inches off the floor  Raise your head and shoulders off the floor and twist towards your left  Keep your head and shoulders lifted  Bend your right knee while you straighten your left leg  Keep your left leg 2 inches off the floor  Twist your head and chest towards the left leg  Continue to straighten 1 leg at a time and twist        Follow up with your healthcare provider as directed:  Write down your questions so you remember to ask them during your visits  © 2017 [employer] Information is for End User's use only and may not be sold, redistributed or otherwise used for commercial purposes  All illustrations and images included in CareNotes® are the copyrighted property of [employer]  or [employer]  The above information is an  only  It is not intended as medical advice for individual conditions or treatments  Talk to your doctor, nurse or pharmacist before following any medical regimen to see if it is safe and effective for you

## 2019-01-22 NOTE — PROGRESS NOTES
Assessment/Plan:    Fatigue  Suspect viral infection, screen for EBV, CMV  Keep hydrated, symptomatic treatment  Depression with anxiety  On venlafaxine and Lyrica  Stable  Fibromyalgia  Symptoms slightly worse recently, on Lyrica  Primary osteoarthritis of right knee  She is planning for surgery later this year  Constipation  Stable, takes lactulose daily  Other hyperlipidemia  Lipids due  GERD (gastroesophageal reflux disease)  On daily PPI  Anemia  Recheck iron studies, stopped taking supplements  Diagnoses and all orders for this visit:    Other fatigue  -     CMV IgG/IgM Antibodies  -     EBV acute panel  -     Mononucleosis screen  -     CBC and differential  -     TSH, 3rd generation with Free T4 reflex    Arthralgia, unspecified joint    Essential hypertension  -     CBC and differential  -     Comprehensive metabolic panel  -     Lipid panel    Chronic pain of right knee    Fibromyalgia    Depression with anxiety    Vitamin D deficiency  -     Vitamin D 25 hydroxy    Anemia due to other cause, not classified  -     CBC and differential  -     Ferritin  -     Folate  -     Iron  -     TIBC  -     Vitamin B12    Other hyperlipidemia  -     CBC and differential  -     Comprehensive metabolic panel  -     Lipid panel    Need for hepatitis C screening test  -     Hepatitis C antibody; Future    Constipation, unspecified constipation type  -     GENERLAC 10 GM/15ML; Take 1 tbsp PO daily    Encounter for screening mammogram for breast cancer  -     Mammo screening bilateral w cad; Future    Postmenopausal  -     DXA bone density spine hip and pelvis; Future    Gastroesophageal reflux disease, esophagitis presence not specified    Health counseling  Comments:  Mammogram and bone density due  Colonoscopy updated  Follow up in 3 months or as needed  Subjective:      Patient ID: Magno Narvaez is a 61 y o  female  Ms Barcenas Vaniay complains of fatigue and joint pains    She reports that she started to get sick after Danielito  She had some sinus congestion, occasional cough, with muscle aches and pains  She did not have any fevers  She took over-the-counter medication and felt better  However, symptoms recurred a few days later  Since then, she reports symptoms have been intermittent, mostly complaining of fatigue and diffuse joint pains  She denies any nausea, vomiting or abdominal discomfort  She has been taking over-the-counter medication for her symptoms  She was given cough medication which seemed to help temporarily  She is unsure if her symptoms are related to her fibromyalgia  She reports feeling more tired recently, feels she could take a nap by mid afternoon  She is back to work in the college  She had planned to undergo right knee surgery, this was postponed since she moved  She is planning to do this later this year  She has been taking lactulose daily, this helps with the constipation  The following portions of the patient's history were reviewed and updated as appropriate: allergies, current medications, past medical history, past social history and problem list     Review of Systems   Constitutional: Positive for fatigue  Negative for activity change, appetite change, fever and unexpected weight change  HENT: Positive for congestion and rhinorrhea  Respiratory: Positive for cough  Negative for shortness of breath and wheezing  Cardiovascular: Negative for chest pain and palpitations  Gastrointestinal: Negative for abdominal pain, constipation and nausea  Musculoskeletal: Positive for arthralgias and gait problem  Psychiatric/Behavioral: Positive for sleep disturbance  Negative for confusion and dysphoric mood  The patient is not hyperactive            Objective:      /76   Pulse 58   Temp 97 9 °F (36 6 °C)   Resp 18   Ht 5' 3" (1 6 m)   Wt 73 kg (161 lb)   SpO2 98%   BMI 28 52 kg/m²          Physical Exam   Constitutional: She is oriented to person, place, and time  She appears well-developed and well-nourished  HENT:   Head: Normocephalic and atraumatic  Nose: Nose normal  No mucosal edema or rhinorrhea  Mouth/Throat: Oropharynx is clear and moist and mucous membranes are normal    Eyes: Pupils are equal, round, and reactive to light  Conjunctivae are normal    Neck: Neck supple  Cardiovascular: Normal rate, regular rhythm and normal heart sounds  No edema  Pulmonary/Chest: Effort normal and breath sounds normal  She has no wheezes  She has no rales  Abdominal: Soft  Bowel sounds are normal    Neurological: She is alert and oriented to person, place, and time  Skin: Skin is warm  No rash noted  Psychiatric: She has a normal mood and affect  Her behavior is normal  Her mood appears not anxious  She does not exhibit a depressed mood  Nursing note and vitals reviewed  Lab &/or imaging results reviewed with patient

## 2019-01-23 ENCOUNTER — TRANSCRIBE ORDERS (OUTPATIENT)
Dept: LAB | Facility: CLINIC | Age: 64
End: 2019-01-23

## 2019-01-23 ENCOUNTER — APPOINTMENT (OUTPATIENT)
Dept: LAB | Facility: CLINIC | Age: 64
End: 2019-01-23
Payer: COMMERCIAL

## 2019-01-23 DIAGNOSIS — Z11.59 NEED FOR HEPATITIS C SCREENING TEST: ICD-10-CM

## 2019-01-23 LAB
25(OH)D3 SERPL-MCNC: 41.3 NG/ML (ref 30–100)
ALBUMIN SERPL BCP-MCNC: 3.7 G/DL (ref 3.5–5)
ALP SERPL-CCNC: 68 U/L (ref 46–116)
ALT SERPL W P-5'-P-CCNC: 33 U/L (ref 12–78)
ANION GAP SERPL CALCULATED.3IONS-SCNC: 8 MMOL/L (ref 4–13)
AST SERPL W P-5'-P-CCNC: 23 U/L (ref 5–45)
BASOPHILS # BLD AUTO: 0.04 THOUSANDS/ΜL (ref 0–0.1)
BASOPHILS NFR BLD AUTO: 1 % (ref 0–1)
BILIRUB SERPL-MCNC: 0.5 MG/DL (ref 0.2–1)
BUN SERPL-MCNC: 17 MG/DL (ref 5–25)
CALCIUM SERPL-MCNC: 9 MG/DL (ref 8.3–10.1)
CHLORIDE SERPL-SCNC: 106 MMOL/L (ref 100–108)
CHOLEST SERPL-MCNC: 181 MG/DL (ref 50–200)
CO2 SERPL-SCNC: 28 MMOL/L (ref 21–32)
CREAT SERPL-MCNC: 0.85 MG/DL (ref 0.6–1.3)
EOSINOPHIL # BLD AUTO: 0.15 THOUSAND/ΜL (ref 0–0.61)
EOSINOPHIL NFR BLD AUTO: 4 % (ref 0–6)
ERYTHROCYTE [DISTWIDTH] IN BLOOD BY AUTOMATED COUNT: 13.7 % (ref 11.6–15.1)
FERRITIN SERPL-MCNC: 22 NG/ML (ref 8–388)
FOLATE SERPL-MCNC: >20 NG/ML (ref 3.1–17.5)
GFR SERPL CREATININE-BSD FRML MDRD: 73 ML/MIN/1.73SQ M
GLUCOSE P FAST SERPL-MCNC: 76 MG/DL (ref 65–99)
HCT VFR BLD AUTO: 44.2 % (ref 34.8–46.1)
HCV AB SER QL: NORMAL
HDLC SERPL-MCNC: 52 MG/DL (ref 40–60)
HGB BLD-MCNC: 14.3 G/DL (ref 11.5–15.4)
IMM GRANULOCYTES # BLD AUTO: 0 THOUSAND/UL (ref 0–0.2)
IMM GRANULOCYTES NFR BLD AUTO: 0 % (ref 0–2)
IRON SERPL-MCNC: 102 UG/DL (ref 50–170)
LDLC SERPL CALC-MCNC: 110 MG/DL (ref 0–100)
LYMPHOCYTES # BLD AUTO: 1.16 THOUSANDS/ΜL (ref 0.6–4.47)
LYMPHOCYTES NFR BLD AUTO: 33 % (ref 14–44)
MCH RBC QN AUTO: 29.1 PG (ref 26.8–34.3)
MCHC RBC AUTO-ENTMCNC: 32.4 G/DL (ref 31.4–37.4)
MCV RBC AUTO: 90 FL (ref 82–98)
MONOCYTES # BLD AUTO: 0.29 THOUSAND/ΜL (ref 0.17–1.22)
MONOCYTES NFR BLD AUTO: 8 % (ref 4–12)
NEUTROPHILS # BLD AUTO: 1.88 THOUSANDS/ΜL (ref 1.85–7.62)
NEUTS SEG NFR BLD AUTO: 54 % (ref 43–75)
NONHDLC SERPL-MCNC: 129 MG/DL
NRBC BLD AUTO-RTO: 0 /100 WBCS
PLATELET # BLD AUTO: 183 THOUSANDS/UL (ref 149–390)
PMV BLD AUTO: 12.1 FL (ref 8.9–12.7)
POTASSIUM SERPL-SCNC: 4 MMOL/L (ref 3.5–5.3)
PROT SERPL-MCNC: 7.4 G/DL (ref 6.4–8.2)
RBC # BLD AUTO: 4.92 MILLION/UL (ref 3.81–5.12)
SODIUM SERPL-SCNC: 142 MMOL/L (ref 136–145)
TIBC SERPL-MCNC: 312 UG/DL (ref 250–450)
TRIGL SERPL-MCNC: 96 MG/DL
TSH SERPL DL<=0.05 MIU/L-ACNC: 1.65 UIU/ML (ref 0.36–3.74)
VIT B12 SERPL-MCNC: 791 PG/ML (ref 100–900)
WBC # BLD AUTO: 3.52 THOUSAND/UL (ref 4.31–10.16)

## 2019-01-23 PROCEDURE — 86803 HEPATITIS C AB TEST: CPT

## 2019-01-23 PROCEDURE — 84443 ASSAY THYROID STIM HORMONE: CPT | Performed by: INTERNAL MEDICINE

## 2019-01-23 PROCEDURE — 80053 COMPREHEN METABOLIC PANEL: CPT | Performed by: INTERNAL MEDICINE

## 2019-01-23 PROCEDURE — 80061 LIPID PANEL: CPT | Performed by: INTERNAL MEDICINE

## 2019-01-23 PROCEDURE — 83550 IRON BINDING TEST: CPT | Performed by: INTERNAL MEDICINE

## 2019-01-23 PROCEDURE — 86665 EPSTEIN-BARR CAPSID VCA: CPT | Performed by: INTERNAL MEDICINE

## 2019-01-23 PROCEDURE — 85025 COMPLETE CBC W/AUTO DIFF WBC: CPT | Performed by: INTERNAL MEDICINE

## 2019-01-23 PROCEDURE — 82306 VITAMIN D 25 HYDROXY: CPT | Performed by: INTERNAL MEDICINE

## 2019-01-23 PROCEDURE — 86645 CMV ANTIBODY IGM: CPT | Performed by: INTERNAL MEDICINE

## 2019-01-23 PROCEDURE — 83540 ASSAY OF IRON: CPT | Performed by: INTERNAL MEDICINE

## 2019-01-23 PROCEDURE — 82607 VITAMIN B-12: CPT | Performed by: INTERNAL MEDICINE

## 2019-01-23 PROCEDURE — 36415 COLL VENOUS BLD VENIPUNCTURE: CPT | Performed by: INTERNAL MEDICINE

## 2019-01-23 PROCEDURE — 82728 ASSAY OF FERRITIN: CPT | Performed by: INTERNAL MEDICINE

## 2019-01-23 PROCEDURE — 86644 CMV ANTIBODY: CPT | Performed by: INTERNAL MEDICINE

## 2019-01-23 PROCEDURE — 86308 HETEROPHILE ANTIBODY SCREEN: CPT | Performed by: INTERNAL MEDICINE

## 2019-01-23 PROCEDURE — 82746 ASSAY OF FOLIC ACID SERUM: CPT | Performed by: INTERNAL MEDICINE

## 2019-01-23 PROCEDURE — 86664 EPSTEIN-BARR NUCLEAR ANTIGEN: CPT | Performed by: INTERNAL MEDICINE

## 2019-01-23 PROCEDURE — 86663 EPSTEIN-BARR ANTIBODY: CPT | Performed by: INTERNAL MEDICINE

## 2019-01-24 ENCOUNTER — TELEPHONE (OUTPATIENT)
Dept: INTERNAL MEDICINE CLINIC | Facility: CLINIC | Age: 64
End: 2019-01-24

## 2019-01-24 LAB
CMV IGG SERPL IA-ACNC: <0.6 U/ML (ref 0–0.59)
CMV IGM SERPL IA-ACNC: <30 AU/ML (ref 0–29.9)
EBV EA IGG SER-ACNC: 103 U/ML (ref 0–8.9)
EBV NA IGG SER IA-ACNC: <18 U/ML (ref 0–17.9)
EBV PATRN SPEC IB-IMP: ABNORMAL
EBV VCA IGG SER IA-ACNC: >600 U/ML (ref 0–17.9)
EBV VCA IGM SER IA-ACNC: <36 U/ML (ref 0–35.9)
HETEROPH AB SER QL: NEGATIVE

## 2019-01-24 NOTE — TELEPHONE ENCOUNTER
Lab results:  Monotest and CMV is negative but EBV (Emiel barr virus) is positive, it showed that you were exposed to it, currently not active  This may explain your fatigue and other symptoms  No treatment, just lots of rest and fluids  You may take Tylenol as needed      Iron, vitamin levels all normal   Cholesterol a bit high, rest of labs normal

## 2019-02-11 DIAGNOSIS — M79.7 FIBROMYALGIA: ICD-10-CM

## 2019-02-11 DIAGNOSIS — F41.8 DEPRESSION WITH ANXIETY: ICD-10-CM

## 2019-02-11 RX ORDER — VENLAFAXINE HYDROCHLORIDE 37.5 MG/1
CAPSULE, EXTENDED RELEASE ORAL
Qty: 90 CAPSULE | Refills: 1 | Status: SHIPPED | OUTPATIENT
Start: 2019-02-11 | End: 2019-02-13 | Stop reason: SDUPTHER

## 2019-02-13 ENCOUNTER — TELEPHONE (OUTPATIENT)
Dept: INTERNAL MEDICINE CLINIC | Facility: CLINIC | Age: 64
End: 2019-02-13

## 2019-02-13 DIAGNOSIS — M79.7 FIBROMYALGIA: ICD-10-CM

## 2019-02-13 DIAGNOSIS — F41.8 DEPRESSION WITH ANXIETY: ICD-10-CM

## 2019-02-13 DIAGNOSIS — M79.643 PAIN OF HAND, UNSPECIFIED LATERALITY: Primary | ICD-10-CM

## 2019-02-13 RX ORDER — VENLAFAXINE HYDROCHLORIDE 37.5 MG/1
112 CAPSULE, EXTENDED RELEASE ORAL DAILY
Qty: 270 CAPSULE | Refills: 1 | Status: SHIPPED | OUTPATIENT
Start: 2019-02-13 | End: 2019-05-14 | Stop reason: SDUPTHER

## 2019-02-13 NOTE — TELEPHONE ENCOUNTER
Patient also mentioned she would like a referral for a hand specialist here at [address] [place] [address] the one she use to go to is out of area

## 2019-02-13 NOTE — TELEPHONE ENCOUNTER
Patient states she needs a refill on her EFFEXOR 37 5 MG and is suppose to take it 3 pills daily at bedtime  RX was sent on the 11th for one 37 5 mg daily  Called pharmacy they told me it was too early  Just spoke with patient who said she is OUT  of medication and pharmacy gave her a few emergency pills on Monday and Tuesday to get her through   ONLY 6     Patient would like this filled and for it to be 37 5 MG to take 3 a day daily at bedtime

## 2019-02-21 DIAGNOSIS — K21.9 GASTROESOPHAGEAL REFLUX DISEASE, ESOPHAGITIS PRESENCE NOT SPECIFIED: ICD-10-CM

## 2019-02-21 DIAGNOSIS — R05.3 COUGH, PERSISTENT: ICD-10-CM

## 2019-02-21 RX ORDER — PANTOPRAZOLE SODIUM 40 MG/1
TABLET, DELAYED RELEASE ORAL
Qty: 90 TABLET | Refills: 1 | Status: SHIPPED | OUTPATIENT
Start: 2019-02-21 | End: 2019-10-02

## 2019-03-21 ENCOUNTER — OFFICE VISIT (OUTPATIENT)
Dept: OBGYN CLINIC | Facility: CLINIC | Age: 64
End: 2019-03-21
Payer: COMMERCIAL

## 2019-03-21 ENCOUNTER — APPOINTMENT (OUTPATIENT)
Dept: RADIOLOGY | Facility: CLINIC | Age: 64
End: 2019-03-21
Payer: COMMERCIAL

## 2019-03-21 VITALS
BODY MASS INDEX: 29.23 KG/M2 | WEIGHT: 165 LBS | DIASTOLIC BLOOD PRESSURE: 84 MMHG | HEART RATE: 83 BPM | SYSTOLIC BLOOD PRESSURE: 125 MMHG | HEIGHT: 63 IN

## 2019-03-21 DIAGNOSIS — M79.643 PAIN OF HAND, UNSPECIFIED LATERALITY: ICD-10-CM

## 2019-03-21 DIAGNOSIS — M19.041 ARTHRITIS OF FINGER OF RIGHT HAND: Primary | ICD-10-CM

## 2019-03-21 PROCEDURE — 73130 X-RAY EXAM OF HAND: CPT

## 2019-03-21 PROCEDURE — 99243 OFF/OP CNSLTJ NEW/EST LOW 30: CPT | Performed by: SURGERY

## 2019-03-21 NOTE — PROGRESS NOTES
Andree TONG  Attending, Orthopaedic Surgery  Hand, Wrist, and Elbow Surgery  [facility]      ORTHOPAEDIC HAND, WRIST, AND ELBOW OFFICE  VISIT       ASSESSMENT/PLAN:      62 yo female with severe right long finger DIP arthritis  Current plan includes Voltarin anti-inflammatory gel to be applied QID as needed for pain and swelling since the patient cannot take NSAIDs  If this does not help her symptoms we discussed the possibility of a DIP joint fusion of that finger  We will see her back in 3 weeks to discuss her progress  The patient verbalized understanding of exam findings and treatment plan  We engaged in the shared decision-making process and treatment options were discussed at length with the patient  Surgical and conservative management discussed today along with risks and benefits  Diagnoses and all orders for this visit:    Arthritis of finger of right hand  -     diclofenac sodium (VOLTAREN) 1 %; Apply 2 g topically 4 (four) times a day    Pain of hand, unspecified laterality  -     Ambulatory referral to Orthopedic Surgery  -     XR hand 3+ vw right; Future      Follow Up:  No follow-ups on file  To Do Next Visit:  Re-evaluation of current issue      General Discussions:  Osteoarthritis:  The anatomy and physiology of osteoarthritis was discussed with the patient today in the office  Deterioration of the articular cartilage eventually leads to altered mobility at the joint, resulting in joint subluxation, osteophyte formation, cystic changes, as well as subchondral sclerosis  Eventually, pain, limited mobility, and compensatory hypermobility at surrounding joints may develop  While normal activity and usage of the joint may provide a painful experience to the patient, this typically does not result in damage to the limb  Treatment options include splints to decreased joint edema, pain, and inflammation    Therapy exercises to strengthen the surrounding musculature may relieve pain, but do not alter the overall continued development of osteoarthritis  Oral medications, topical medications, corticosteroid injections may decrease pain and increase overall function  Eventually, some patients may require surgical intervention  ____________________________________________________________________________________________________________________________________________      CHIEF COMPLAINT:  Chief Complaint   Patient presents with    Right Middle Finger - Pain       SUBJECTIVE:  Erin oPp is a 61y o  year old RHD female who presents with right long finger DIP joint pain  She states she had the pain and swelling for years and it was "drained" over 10 years ago by her previous hand surgeon  A few months ago the joint started to flair up again and she also notes the beginning of the same symptoms on the left long finger  Denies fever/chills  Denies numbness/tingling  Patient does a lot of typing and work with her hands which worsens her symptoms        Pain/symptom timing:  Worse during the day when active  Pain/symptom context:  Worse with activites and work  Pain/symptom modifying factors:  Rest makes better, activities make worse  Pain/symptom associated signs/symptoms: none    Prior treatment   · NSAIDsNo   · Injections No   · Bracing/Orthotics No    Physical Therapy No     I have personally reviewed all the relevant PMH, PSH, SH, FH, Medications and allergies      PAST MEDICAL HISTORY:  Past Medical History:   Diagnosis Date    Arthralgia     Atypical chest pain     Balance disorder     Cervical rib     last assessed 9/13/12    Depression     Fatigue     Fibromyalgia     Fibromyalgia     GERD (gastroesophageal reflux disease)     Hydronephrosis with renal and ureteral calculus obstruction     last assessed 5/1/17    Lacunar stroke     Leukopenia     last assessed 5/19/16    Memory loss     Restless leg syndrome     last assessed 11/8/13    Sebaceous cyst     last assessed 2/13/13    Skin tag     last assessed 2/13/13    Vitamin D deficiency        PAST SURGICAL HISTORY:  Past Surgical History:   Procedure Laterality Date    COLONOSCOPY  2011    fiberoptic    KNEE SURGERY      right knee 2006 meniscal tear stage 04    [place] COLONOSCOPY FLX DX W/COLLJ SPEC WHEN PFRMD N/A 3/16/2017    Procedure: COLONOSCOPY;  Surgeon: Halima Goins MD;  Location: [facility];   Service: Gastroenterology    [place] CYSTO/URETERO W/LITHOTRIPSY &INDWELL STENT INSRT Left 4/26/2017    Procedure: CYSTOSCOPY URETEROSCOPY; RETROGRADE PYELOGRAM; STONE EXTRACTION; INSERTION STENT URETERAL;  Surgeon: Liliana Downs MD;  Location:  MAIN OR;  Service: Urology       FAMILY HISTORY:  Family History   Problem Relation Age of Onset    Osteoporosis Mother     Heart failure Father     Coronary artery disease Father     Fibromyalgia Sister     Heart failure Family     Coronary artery disease Family     Osteoporosis Family        SOCIAL HISTORY:  Social History     Tobacco Use    Smoking status: Former Smoker    Smokeless tobacco: Never Used   Substance Use Topics    Alcohol use: No     Comment: rarely    Drug use: No       MEDICATIONS:    Current Outpatient Medications:     Cholecalciferol (VITAMIN D3) 1000 units CAPS, Take 1 capsule by mouth daily, Disp: , Rfl:     ferrous sulfate 324 (65 Fe) mg, Take 1 tablet (324 mg total) by mouth 2 (two) times a day before meals Take 1 pill BID, PRE-OP with Vit C, may be constipatory, Disp: 60 tablet, Rfl: 0    fluticasone (FLONASE) 50 mcg/act nasal spray, SHAKE LIQUID AND USE 1 TO 2 SPRAYS IN EACH NOSTRIL EVERY DAY, Disp: 48 g, Rfl: 0    GENERLAC 10 GM/15ML, Take 1 tbsp PO daily, Disp: 946 mL, Rfl: 0    LYRICA 100 MG capsule, TAKE 1 CAPSULE(100 MG) BY MOUTH TWICE DAILY, Disp: 60 capsule, Rfl: 5    Multiple Vitamins-Minerals (MULTIVITAMIN WITH MINERALS) tablet, Take 1 tablet by mouth daily, Disp: , Rfl:     pantoprazole (PROTONIX) 40 mg tablet, TAKE 1 TABLET(40 MG) BY MOUTH DAILY, Disp: 90 tablet, Rfl: 1    polyethylene glycol (MIRALAX) 17 g packet, Take 17 g by mouth daily as needed (constipation), Disp: 72 each, Rfl: 1    venlafaxine (EFFEXOR-XR) 37 5 mg 24 hr capsule, Take 3 capsules (112 mg total) by mouth daily, Disp: 270 capsule, Rfl: 1    ascorbic acid (VITAMIN C) 500 MG TBCR, Take 1 tablet (500 mg total) by mouth 2 (two) times a day for 60 doses, Disp: 60 each, Rfl: 0    diclofenac sodium (VOLTAREN) 1 %, Apply 2 g topically 4 (four) times a day, Disp: 1 Tube, Rfl: 2    ALLERGIES:  Allergies   Allergen Reactions    Chocolate GI Intolerance    Ciprofloxacin Hallucinations    Dramamine [Dimenhydrinate]     Nuts GI Intolerance    Oat GI Intolerance           REVIEW OF SYSTEMS:  Review of Systems   Constitutional: Negative for chills, diaphoresis, fatigue and fever  HENT: Negative for congestion, facial swelling, hearing loss, sore throat, trouble swallowing and voice change  Respiratory: Negative for apnea, cough, shortness of breath, wheezing and stridor  Cardiovascular: Negative for chest pain, palpitations and leg swelling  Gastrointestinal: Negative  Endocrine: Negative  Genitourinary: Negative  Musculoskeletal: Positive for arthralgias and joint swelling  Negative for gait problem, myalgias and neck pain  Skin: Negative for color change, pallor, rash and wound  Neurological: Negative for tremors, facial asymmetry, speech difficulty, weakness and numbness  Psychiatric/Behavioral: Negative          VITALS:  Vitals:    03/21/19 0844   BP: 125/84   Pulse: 83       LABS:  HgA1c: No results found for: HGBA1C  BMP:   Lab Results   Component Value Date    GLUCOSE 94 04/25/2017    CALCIUM 9 0 01/23/2019     06/29/2015    K 4 0 01/23/2019    CO2 28 01/23/2019     01/23/2019    BUN 17 01/23/2019    CREATININE 0 85 01/23/2019 _____________________________________________________  PHYSICAL EXAMINATION:  General: well developed and well nourished, alert, oriented times 3 and appears comfortable  Psychiatric: Normal  HEENT: Normocephalic, Atraumatic Trachea Midline, No torticollis  Pulmonary: No audible wheezing or respiratory distress   Cardiovascular: No pitting edema, 2+ radial pulse   Skin: No masses, erythema, lacerations, fluctation, ulcerations  Neurovascular: Sensation Intact to the Median, Ulnar, Radial Nerve, Motor Intact to the Median, Ulnar, Radial Nerve and Pulses Intact  Musculoskeletal: Normal, except as noted in detailed exam and in HPI  MUSCULOSKELETAL EXAMINATION:  RIGHT LONG FINGER:  Palpable osteophyte formation of the right long dip joint with some associated arthritic swelling   Minimal motion at the DIP joint due to osteophyte formation   Tender to palpation at DIP joint    ___________________________________________________  STUDIES REVIEWED:  I have personally reviewed AP lateral and oblique radiographs of right hand which demonstrate severe degenerative arthritis of the right long DIP joint with significant osteophyte formation   Beginnings of swan neck deformity of the right long finger due to osteophyte formation           PROCEDURES PERFORMED:  Procedures  No Procedures performed today    _____________________________________________________    Nava Vasquez PA-C

## 2019-03-21 NOTE — LETTER
March 21, 2019     Anne Ochoa MD  [address]  [address] [place] [address]    Patient: Moriah Jc   YOB: 1955   Date of Visit: 3/21/2019       Dear Dr Shanna Putnam: Thank you for referring Dorota Weinstein to me for evaluation  Below are my notes for this consultation  If you have questions, please do not hesitate to call me  I look forward to following your patient along with you  Sincerely,        Polina Mccarthy MD        CC: No Recipients  Shannon Mejiaziyad, [place]  3/21/2019  9:49 AM  Attested  Polina TONG  Attending, Orthopaedic Surgery  Hand, Wrist, and Elbow Surgery  [facility]      ORTHOPAEDIC HAND, WRIST, AND ELBOW OFFICE  VISIT       ASSESSMENT/PLAN:      60 yo female with severe right long finger DIP arthritis  Current plan includes Voltarin anti-inflammatory gel to be applied QID as needed for pain and swelling since the patient cannot take NSAIDs  If this does not help her symptoms we discussed the possibility of a DIP joint fusion of that finger  We will see her back in 3 weeks to discuss her progress  The patient verbalized understanding of exam findings and treatment plan  We engaged in the shared decision-making process and treatment options were discussed at length with the patient  Surgical and conservative management discussed today along with risks and benefits  Diagnoses and all orders for this visit:    Arthritis of finger of right hand  -     diclofenac sodium (VOLTAREN) 1 %; Apply 2 g topically 4 (four) times a day    Pain of hand, unspecified laterality  -     Ambulatory referral to Orthopedic Surgery  -     XR hand 3+ vw right; Future      Follow Up:  No follow-ups on file  To Do Next Visit:  Re-evaluation of current issue      General Discussions:  Osteoarthritis:  The anatomy and physiology of osteoarthritis was discussed with the patient today in the office    Deterioration of the articular cartilage eventually leads to altered mobility at the joint, resulting in joint subluxation, osteophyte formation, cystic changes, as well as subchondral sclerosis  Eventually, pain, limited mobility, and compensatory hypermobility at surrounding joints may develop  While normal activity and usage of the joint may provide a painful experience to the patient, this typically does not result in damage to the limb  Treatment options include splints to decreased joint edema, pain, and inflammation  Therapy exercises to strengthen the surrounding musculature may relieve pain, but do not alter the overall continued development of osteoarthritis  Oral medications, topical medications, corticosteroid injections may decrease pain and increase overall function  Eventually, some patients may require surgical intervention  ____________________________________________________________________________________________________________________________________________      CHIEF COMPLAINT:  Chief Complaint   Patient presents with    Right Middle Finger - Pain       SUBJECTIVE:  Major Arceo is a 61y o  year old RHD female who presents with right long finger DIP joint pain  She states she had the pain and swelling for years and it was "drained" over 10 years ago by her previous hand surgeon  A few months ago the joint started to flair up again and she also notes the beginning of the same symptoms on the left long finger  Denies fever/chills  Denies numbness/tingling  Patient does a lot of typing and work with her hands which worsens her symptoms        Pain/symptom timing:  Worse during the day when active  Pain/symptom context:  Worse with activites and work  Pain/symptom modifying factors:  Rest makes better, activities make worse  Pain/symptom associated signs/symptoms: none    Prior treatment   · NSAIDsNo   · Injections No   · Bracing/Orthotics No    Physical Therapy No     I have personally reviewed all the relevant PMH, PSH, SH, FH, Medications and allergies      PAST MEDICAL HISTORY:  Past Medical History:   Diagnosis Date    Arthralgia     Atypical chest pain     Balance disorder     Cervical rib     last assessed 9/13/12    Depression     Fatigue     Fibromyalgia     Fibromyalgia     GERD (gastroesophageal reflux disease)     Hydronephrosis with renal and ureteral calculus obstruction     last assessed 5/1/17    Lacunar stroke     Leukopenia     last assessed 5/19/16    Memory loss     Restless leg syndrome     last assessed 11/8/13    Sebaceous cyst     last assessed 2/13/13    Skin tag     last assessed 2/13/13    Vitamin D deficiency        PAST SURGICAL HISTORY:  Past Surgical History:   Procedure Laterality Date    COLONOSCOPY  2011    fiberoptic    KNEE SURGERY      right knee 2006 meniscal tear stage 04    [place] COLONOSCOPY FLX DX W/COLLJ SPEC WHEN PFRMD N/A 3/16/2017    Procedure: COLONOSCOPY;  Surgeon: Kathy Tucker MD;  Location: [facility];   Service: Gastroenterology    [place] CYSTO/URETERO W/LITHOTRIPSY &INDWELL STENT INSRT Left 4/26/2017    Procedure: CYSTOSCOPY URETEROSCOPY; RETROGRADE PYELOGRAM; STONE EXTRACTION; INSERTION STENT URETERAL;  Surgeon: Erik Link MD;  Location: [facility] OR;  Service: Urology       FAMILY HISTORY:  Family History   Problem Relation Age of Onset    Osteoporosis Mother     Heart failure Father     Coronary artery disease Father     Fibromyalgia Sister     Heart failure Family     Coronary artery disease Family     Osteoporosis Family        SOCIAL HISTORY:  Social History     Tobacco Use    Smoking status: Former Smoker    Smokeless tobacco: Never Used   Substance Use Topics    Alcohol use: No     Comment: rarely    Drug use: No       MEDICATIONS:    Current Outpatient Medications:     Cholecalciferol (VITAMIN D3) 1000 units CAPS, Take 1 capsule by mouth daily, Disp: , Rfl:     ferrous sulfate 324 (65 Fe) mg, Take 1 tablet (324 mg total) by mouth 2 (two) times a day before meals Take 1 pill BID, PRE-OP with Vit C, may be constipatory, Disp: 60 tablet, Rfl: 0    fluticasone (FLONASE) 50 mcg/act nasal spray, SHAKE LIQUID AND USE 1 TO 2 SPRAYS IN EACH NOSTRIL EVERY DAY, Disp: 48 g, Rfl: 0    GENERLAC 10 GM/15ML, Take 1 tbsp PO daily, Disp: 946 mL, Rfl: 0    LYRICA 100 MG capsule, TAKE 1 CAPSULE(100 MG) BY MOUTH TWICE DAILY, Disp: 60 capsule, Rfl: 5    Multiple Vitamins-Minerals (MULTIVITAMIN WITH MINERALS) tablet, Take 1 tablet by mouth daily, Disp: , Rfl:     pantoprazole (PROTONIX) 40 mg tablet, TAKE 1 TABLET(40 MG) BY MOUTH DAILY, Disp: 90 tablet, Rfl: 1    polyethylene glycol (MIRALAX) 17 g packet, Take 17 g by mouth daily as needed (constipation), Disp: 72 each, Rfl: 1    venlafaxine (EFFEXOR-XR) 37 5 mg 24 hr capsule, Take 3 capsules (112 mg total) by mouth daily, Disp: 270 capsule, Rfl: 1    ascorbic acid (VITAMIN C) 500 MG TBCR, Take 1 tablet (500 mg total) by mouth 2 (two) times a day for 60 doses, Disp: 60 each, Rfl: 0    diclofenac sodium (VOLTAREN) 1 %, Apply 2 g topically 4 (four) times a day, Disp: 1 Tube, Rfl: 2    ALLERGIES:  Allergies   Allergen Reactions    Chocolate GI Intolerance    Ciprofloxacin Hallucinations    Dramamine [Dimenhydrinate]     Nuts GI Intolerance    Oat GI Intolerance           REVIEW OF SYSTEMS:  Review of Systems   Constitutional: Negative for chills, diaphoresis, fatigue and fever  HENT: Negative for congestion, facial swelling, hearing loss, sore throat, trouble swallowing and voice change  Respiratory: Negative for apnea, cough, shortness of breath, wheezing and stridor  Cardiovascular: Negative for chest pain, palpitations and leg swelling  Gastrointestinal: Negative  Endocrine: Negative  Genitourinary: Negative  Musculoskeletal: Positive for arthralgias and joint swelling  Negative for gait problem, myalgias and neck pain  Skin: Negative for color change, pallor, rash and wound  Neurological: Negative for tremors, facial asymmetry, speech difficulty, weakness and numbness  Psychiatric/Behavioral: Negative  VITALS:  Vitals:    03/21/19 0844   BP: 125/84   Pulse: 83       LABS:  HgA1c: No results found for: HGBA1C  BMP:   Lab Results   Component Value Date    GLUCOSE 94 04/25/2017    CALCIUM 9 0 01/23/2019     06/29/2015    K 4 0 01/23/2019    CO2 28 01/23/2019     01/23/2019    BUN 17 01/23/2019    CREATININE 0 85 01/23/2019       _____________________________________________________  PHYSICAL EXAMINATION:  General: well developed and well nourished, alert, oriented times 3 and appears comfortable  Psychiatric: Normal  HEENT: Normocephalic, Atraumatic Trachea Midline, No torticollis  Pulmonary: No audible wheezing or respiratory distress   Cardiovascular: No pitting edema, 2+ radial pulse   Skin: No masses, erythema, lacerations, fluctation, ulcerations  Neurovascular: Sensation Intact to the Median, Ulnar, Radial Nerve, Motor Intact to the Median, Ulnar, Radial Nerve and Pulses Intact  Musculoskeletal: Normal, except as noted in detailed exam and in HPI  MUSCULOSKELETAL EXAMINATION:  RIGHT LONG FINGER:  Palpable osteophyte formation of the right long dip joint with some associated arthritic swelling   Minimal motion at the DIP joint due to osteophyte formation   Tender to palpation at DIP joint    ___________________________________________________  STUDIES REVIEWED:  I have personally reviewed AP lateral and oblique radiographs of right hand which demonstrate severe degenerative arthritis of the right long DIP joint with significant osteophyte formation   Beginnings of swan neck deformity of the right long finger due to osteophyte formation           PROCEDURES PERFORMED:  Procedures  No Procedures performed today    _____________________________________________________    Yunior Anderson PA-C            Attestation signed by Jeremie Mcneill MD at 3/21/2019 9:49 AM:  I saw and examined the patient personally and agree with my physician assistant's note and plan   I formulated  the plan and gave  explicit instructions to the patient

## 2019-03-26 ENCOUNTER — TELEPHONE (OUTPATIENT)
Dept: INTERNAL MEDICINE CLINIC | Facility: CLINIC | Age: 64
End: 2019-03-26

## 2019-03-26 NOTE — TELEPHONE ENCOUNTER
1st attempt  Mailbox is full  Will cb     Mammo  Patient is due for mammo  This was order in 01/2019- Has patient had this done? Does patient need us to mail the order home?

## 2019-04-09 ENCOUNTER — TELEPHONE (OUTPATIENT)
Dept: INTERNAL MEDICINE CLINIC | Facility: CLINIC | Age: 64
End: 2019-04-09

## 2019-04-24 ENCOUNTER — OFFICE VISIT (OUTPATIENT)
Dept: INTERNAL MEDICINE CLINIC | Facility: CLINIC | Age: 64
End: 2019-04-24
Payer: COMMERCIAL

## 2019-04-24 VITALS
BODY MASS INDEX: 27.64 KG/M2 | OXYGEN SATURATION: 98 % | DIASTOLIC BLOOD PRESSURE: 78 MMHG | WEIGHT: 156 LBS | SYSTOLIC BLOOD PRESSURE: 120 MMHG | HEART RATE: 64 BPM | TEMPERATURE: 98 F | HEIGHT: 63 IN | RESPIRATION RATE: 18 BRPM

## 2019-04-24 DIAGNOSIS — K59.04 CHRONIC IDIOPATHIC CONSTIPATION: Primary | ICD-10-CM

## 2019-04-24 DIAGNOSIS — Z71.9 HEALTH COUNSELING: ICD-10-CM

## 2019-04-24 DIAGNOSIS — K21.9 GASTROESOPHAGEAL REFLUX DISEASE, ESOPHAGITIS PRESENCE NOT SPECIFIED: ICD-10-CM

## 2019-04-24 DIAGNOSIS — R53.83 OTHER FATIGUE: ICD-10-CM

## 2019-04-24 DIAGNOSIS — E78.49 OTHER HYPERLIPIDEMIA: ICD-10-CM

## 2019-04-24 DIAGNOSIS — D64.89 ANEMIA DUE TO OTHER CAUSE, NOT CLASSIFIED: ICD-10-CM

## 2019-04-24 DIAGNOSIS — M17.11 PRIMARY OSTEOARTHRITIS OF RIGHT KNEE: ICD-10-CM

## 2019-04-24 DIAGNOSIS — F41.8 DEPRESSION WITH ANXIETY: ICD-10-CM

## 2019-04-24 PROCEDURE — 99214 OFFICE O/P EST MOD 30 MIN: CPT | Performed by: INTERNAL MEDICINE

## 2019-04-24 RX ORDER — BISACODYL 10 MG
10 SUPPOSITORY, RECTAL RECTAL DAILY PRN
Qty: 30 SUPPOSITORY | Refills: 0 | Status: SHIPPED | OUTPATIENT
Start: 2019-04-24 | End: 2019-09-26 | Stop reason: HOSPADM

## 2019-05-14 DIAGNOSIS — M79.7 FIBROMYALGIA: ICD-10-CM

## 2019-05-14 DIAGNOSIS — K59.00 CONSTIPATION, UNSPECIFIED CONSTIPATION TYPE: ICD-10-CM

## 2019-05-14 DIAGNOSIS — F41.8 DEPRESSION WITH ANXIETY: ICD-10-CM

## 2019-05-14 RX ORDER — VENLAFAXINE HYDROCHLORIDE 37.5 MG/1
112 CAPSULE, EXTENDED RELEASE ORAL DAILY
Qty: 270 CAPSULE | Refills: 1 | Status: SHIPPED | OUTPATIENT
Start: 2019-05-14 | End: 2019-11-08 | Stop reason: SDUPTHER

## 2019-05-14 RX ORDER — LACTULOSE 10 G/15ML
SOLUTION ORAL; RECTAL
Qty: 946 ML | Refills: 1 | Status: ON HOLD | OUTPATIENT
Start: 2019-05-14 | End: 2019-09-25

## 2019-05-20 ENCOUNTER — TELEPHONE (OUTPATIENT)
Dept: GASTROENTEROLOGY | Facility: AMBULARY SURGERY CENTER | Age: 64
End: 2019-05-20

## 2019-05-30 ENCOUNTER — OFFICE VISIT (OUTPATIENT)
Dept: OBGYN CLINIC | Facility: CLINIC | Age: 64
End: 2019-05-30
Payer: COMMERCIAL

## 2019-05-30 VITALS
DIASTOLIC BLOOD PRESSURE: 69 MMHG | HEART RATE: 65 BPM | BODY MASS INDEX: 26.93 KG/M2 | SYSTOLIC BLOOD PRESSURE: 109 MMHG | HEIGHT: 63 IN | WEIGHT: 152 LBS

## 2019-05-30 DIAGNOSIS — M19.041 ARTHRITIS OF FINGER OF RIGHT HAND: Primary | ICD-10-CM

## 2019-05-30 PROCEDURE — 99213 OFFICE O/P EST LOW 20 MIN: CPT | Performed by: SURGERY

## 2019-06-18 ENCOUNTER — APPOINTMENT (OUTPATIENT)
Dept: RADIOLOGY | Facility: AMBULARY SURGERY CENTER | Age: 64
End: 2019-06-18
Attending: ORTHOPAEDIC SURGERY
Payer: COMMERCIAL

## 2019-06-18 ENCOUNTER — OFFICE VISIT (OUTPATIENT)
Dept: OBGYN CLINIC | Facility: CLINIC | Age: 64
End: 2019-06-18
Payer: COMMERCIAL

## 2019-06-18 VITALS — HEIGHT: 63 IN | WEIGHT: 155 LBS | BODY MASS INDEX: 27.46 KG/M2

## 2019-06-18 DIAGNOSIS — M20.11 ACQUIRED HALLUX INTERPHALANGEUS OF RIGHT FOOT: ICD-10-CM

## 2019-06-18 DIAGNOSIS — M20.11 HALLUX VALGUS OF RIGHT FOOT: Primary | ICD-10-CM

## 2019-06-18 DIAGNOSIS — M79.671 PAIN IN RIGHT FOOT: ICD-10-CM

## 2019-06-18 PROCEDURE — 99213 OFFICE O/P EST LOW 20 MIN: CPT | Performed by: ORTHOPAEDIC SURGERY

## 2019-06-18 PROCEDURE — 73630 X-RAY EXAM OF FOOT: CPT

## 2019-06-27 ENCOUNTER — OFFICE VISIT (OUTPATIENT)
Dept: OBGYN CLINIC | Facility: HOSPITAL | Age: 64
End: 2019-06-27
Payer: COMMERCIAL

## 2019-06-27 VITALS
BODY MASS INDEX: 27.46 KG/M2 | WEIGHT: 155 LBS | HEIGHT: 63 IN | DIASTOLIC BLOOD PRESSURE: 69 MMHG | SYSTOLIC BLOOD PRESSURE: 100 MMHG | HEART RATE: 86 BPM

## 2019-06-27 DIAGNOSIS — M17.11 PRIMARY OSTEOARTHRITIS OF RIGHT KNEE: Primary | ICD-10-CM

## 2019-06-27 PROCEDURE — 99213 OFFICE O/P EST LOW 20 MIN: CPT | Performed by: ORTHOPAEDIC SURGERY

## 2019-08-08 DIAGNOSIS — M79.7 FIBROMYALGIA: ICD-10-CM

## 2019-08-12 ENCOUNTER — TELEPHONE (OUTPATIENT)
Dept: INTERNAL MEDICINE CLINIC | Facility: CLINIC | Age: 64
End: 2019-08-12

## 2019-08-12 DIAGNOSIS — F41.8 DEPRESSION WITH ANXIETY: ICD-10-CM

## 2019-08-12 DIAGNOSIS — J00 ACUTE RHINITIS: ICD-10-CM

## 2019-08-12 DIAGNOSIS — M79.7 FIBROMYALGIA: ICD-10-CM

## 2019-08-12 DIAGNOSIS — J30.89 ALLERGIC RHINITIS DUE TO OTHER ALLERGIC TRIGGER, UNSPECIFIED SEASONALITY: Primary | ICD-10-CM

## 2019-08-12 RX ORDER — FLUTICASONE PROPIONATE 50 MCG
1-2 SPRAY, SUSPENSION (ML) NASAL DAILY
Qty: 48 G | Refills: 0 | Status: SHIPPED | OUTPATIENT
Start: 2019-08-12 | End: 2019-08-12 | Stop reason: SDUPTHER

## 2019-08-12 RX ORDER — FLUTICASONE PROPIONATE 50 MCG
1-2 SPRAY, SUSPENSION (ML) NASAL DAILY
Qty: 48 G | Refills: 0 | Status: SHIPPED | OUTPATIENT
Start: 2019-08-12 | End: 2019-08-12 | Stop reason: ALTCHOICE

## 2019-08-12 RX ORDER — MOMETASONE FUROATE 50 UG/1
2 SPRAY, METERED NASAL DAILY
Qty: 17 G | Refills: 0 | Status: SHIPPED | OUTPATIENT
Start: 2019-08-12 | End: 2019-12-31 | Stop reason: SDUPTHER

## 2019-08-12 RX ORDER — VENLAFAXINE HYDROCHLORIDE 37.5 MG/1
CAPSULE, EXTENDED RELEASE ORAL
Qty: 270 CAPSULE | Refills: 0 | Status: SHIPPED | OUTPATIENT
Start: 2019-08-12 | End: 2019-09-26 | Stop reason: HOSPADM

## 2019-08-12 NOTE — TELEPHONE ENCOUNTER
Pharmacy states medication Flonase is not covered by insurance      [facility]

## 2019-09-25 ENCOUNTER — APPOINTMENT (INPATIENT)
Dept: CT IMAGING | Facility: HOSPITAL | Age: 64
DRG: 101 | End: 2019-09-25
Payer: COMMERCIAL

## 2019-09-25 ENCOUNTER — HOSPITAL ENCOUNTER (INPATIENT)
Facility: HOSPITAL | Age: 64
LOS: 1 days | Discharge: HOME | DRG: 101 | End: 2019-09-26
Attending: EMERGENCY MEDICINE | Admitting: INTERNAL MEDICINE
Payer: COMMERCIAL

## 2019-09-25 ENCOUNTER — TELEPHONE (OUTPATIENT)
Dept: INTERNAL MEDICINE CLINIC | Facility: CLINIC | Age: 64
End: 2019-09-25

## 2019-09-25 ENCOUNTER — APPOINTMENT (EMERGENCY)
Dept: CT IMAGING | Facility: HOSPITAL | Age: 64
DRG: 101 | End: 2019-09-25
Payer: COMMERCIAL

## 2019-09-25 ENCOUNTER — APPOINTMENT (INPATIENT)
Dept: MRI IMAGING | Facility: HOSPITAL | Age: 64
DRG: 101 | End: 2019-09-25
Payer: COMMERCIAL

## 2019-09-25 ENCOUNTER — APPOINTMENT (EMERGENCY)
Dept: RADIOLOGY | Facility: HOSPITAL | Age: 64
DRG: 101 | End: 2019-09-25
Payer: COMMERCIAL

## 2019-09-25 DIAGNOSIS — G45.9 TIA (TRANSIENT ISCHEMIC ATTACK): Primary | ICD-10-CM

## 2019-09-25 DIAGNOSIS — R29.90 STROKE-LIKE SYMPTOMS: ICD-10-CM

## 2019-09-25 PROBLEM — R07.89 CHEST TIGHTNESS: Status: ACTIVE | Noted: 2019-09-25

## 2019-09-25 LAB
ANION GAP SERPL CALCULATED.3IONS-SCNC: 5 MMOL/L (ref 4–13)
APTT PPP: 29 SECONDS (ref 23–37)
ATRIAL RATE: 70 BPM
BASOPHILS # BLD AUTO: 0.03 THOUSANDS/ΜL (ref 0–0.1)
BASOPHILS NFR BLD AUTO: 1 % (ref 0–1)
BUN SERPL-MCNC: 15 MG/DL (ref 5–25)
CALCIUM SERPL-MCNC: 8.7 MG/DL (ref 8.3–10.1)
CHLORIDE SERPL-SCNC: 109 MMOL/L (ref 100–108)
CO2 SERPL-SCNC: 28 MMOL/L (ref 21–32)
CREAT SERPL-MCNC: 0.98 MG/DL (ref 0.6–1.3)
EOSINOPHIL # BLD AUTO: 0.12 THOUSAND/ΜL (ref 0–0.61)
EOSINOPHIL NFR BLD AUTO: 2 % (ref 0–6)
ERYTHROCYTE [DISTWIDTH] IN BLOOD BY AUTOMATED COUNT: 13.4 % (ref 11.6–15.1)
GFR SERPL CREATININE-BSD FRML MDRD: 62 ML/MIN/1.73SQ M
GLUCOSE SERPL-MCNC: 105 MG/DL (ref 65–140)
GLUCOSE SERPL-MCNC: 118 MG/DL (ref 65–140)
HCT VFR BLD AUTO: 45.4 % (ref 34.8–46.1)
HGB BLD-MCNC: 14.5 G/DL (ref 11.5–15.4)
IMM GRANULOCYTES # BLD AUTO: 0.01 THOUSAND/UL (ref 0–0.2)
IMM GRANULOCYTES NFR BLD AUTO: 0 % (ref 0–2)
INR PPP: 1 (ref 0.84–1.19)
LYMPHOCYTES # BLD AUTO: 1.23 THOUSANDS/ΜL (ref 0.6–4.47)
LYMPHOCYTES NFR BLD AUTO: 21 % (ref 14–44)
MCH RBC QN AUTO: 29.4 PG (ref 26.8–34.3)
MCHC RBC AUTO-ENTMCNC: 31.9 G/DL (ref 31.4–37.4)
MCV RBC AUTO: 92 FL (ref 82–98)
MONOCYTES # BLD AUTO: 0.49 THOUSAND/ΜL (ref 0.17–1.22)
MONOCYTES NFR BLD AUTO: 8 % (ref 4–12)
NEUTROPHILS # BLD AUTO: 4.03 THOUSANDS/ΜL (ref 1.85–7.62)
NEUTS SEG NFR BLD AUTO: 68 % (ref 43–75)
NRBC BLD AUTO-RTO: 0 /100 WBCS
P AXIS: -43 DEGREES
PLATELET # BLD AUTO: 168 THOUSANDS/UL (ref 149–390)
PLATELET # BLD AUTO: 168 THOUSANDS/UL (ref 149–390)
PMV BLD AUTO: 11.4 FL (ref 8.9–12.7)
PMV BLD AUTO: 11.7 FL (ref 8.9–12.7)
POTASSIUM SERPL-SCNC: 3.8 MMOL/L (ref 3.5–5.3)
PR INTERVAL: 116 MS
PROTHROMBIN TIME: 12.6 SECONDS (ref 11.6–14.5)
QRS AXIS: 9 DEGREES
QRSD INTERVAL: 68 MS
QT INTERVAL: 380 MS
QTC INTERVAL: 410 MS
RBC # BLD AUTO: 4.94 MILLION/UL (ref 3.81–5.12)
SODIUM SERPL-SCNC: 142 MMOL/L (ref 136–145)
T WAVE AXIS: -6 DEGREES
TROPONIN I SERPL-MCNC: <0.02 NG/ML
VENTRICULAR RATE: 70 BPM
WBC # BLD AUTO: 5.91 THOUSAND/UL (ref 4.31–10.16)

## 2019-09-25 PROCEDURE — 70496 CT ANGIOGRAPHY HEAD: CPT

## 2019-09-25 PROCEDURE — 93005 ELECTROCARDIOGRAM TRACING: CPT

## 2019-09-25 PROCEDURE — 85610 PROTHROMBIN TIME: CPT | Performed by: EMERGENCY MEDICINE

## 2019-09-25 PROCEDURE — 36415 COLL VENOUS BLD VENIPUNCTURE: CPT | Performed by: EMERGENCY MEDICINE

## 2019-09-25 PROCEDURE — 99285 EMERGENCY DEPT VISIT HI MDM: CPT | Performed by: EMERGENCY MEDICINE

## 2019-09-25 PROCEDURE — 99222 1ST HOSP IP/OBS MODERATE 55: CPT | Performed by: INTERNAL MEDICINE

## 2019-09-25 PROCEDURE — 85049 AUTOMATED PLATELET COUNT: CPT | Performed by: PHYSICIAN ASSISTANT

## 2019-09-25 PROCEDURE — 70551 MRI BRAIN STEM W/O DYE: CPT

## 2019-09-25 PROCEDURE — 84484 ASSAY OF TROPONIN QUANT: CPT | Performed by: EMERGENCY MEDICINE

## 2019-09-25 PROCEDURE — 84484 ASSAY OF TROPONIN QUANT: CPT | Performed by: INTERNAL MEDICINE

## 2019-09-25 PROCEDURE — 70498 CT ANGIOGRAPHY NECK: CPT

## 2019-09-25 PROCEDURE — 80048 BASIC METABOLIC PNL TOTAL CA: CPT | Performed by: EMERGENCY MEDICINE

## 2019-09-25 PROCEDURE — 85730 THROMBOPLASTIN TIME PARTIAL: CPT | Performed by: EMERGENCY MEDICINE

## 2019-09-25 PROCEDURE — 71046 X-RAY EXAM CHEST 2 VIEWS: CPT

## 2019-09-25 PROCEDURE — 70450 CT HEAD/BRAIN W/O DYE: CPT

## 2019-09-25 PROCEDURE — 99285 EMERGENCY DEPT VISIT HI MDM: CPT

## 2019-09-25 PROCEDURE — 85025 COMPLETE CBC W/AUTO DIFF WBC: CPT | Performed by: EMERGENCY MEDICINE

## 2019-09-25 PROCEDURE — 93010 ELECTROCARDIOGRAM REPORT: CPT | Performed by: INTERNAL MEDICINE

## 2019-09-25 PROCEDURE — 82948 REAGENT STRIP/BLOOD GLUCOSE: CPT

## 2019-09-25 PROCEDURE — 84484 ASSAY OF TROPONIN QUANT: CPT | Performed by: PHYSICIAN ASSISTANT

## 2019-09-25 RX ORDER — ASPIRIN 81 MG/1
81 TABLET, CHEWABLE ORAL DAILY
Status: DISCONTINUED | OUTPATIENT
Start: 2019-09-25 | End: 2019-09-25

## 2019-09-25 RX ORDER — PANTOPRAZOLE SODIUM 40 MG/1
40 TABLET, DELAYED RELEASE ORAL DAILY
Status: DISCONTINUED | OUTPATIENT
Start: 2019-09-25 | End: 2019-09-26 | Stop reason: HOSPADM

## 2019-09-25 RX ORDER — PREGABALIN 100 MG/1
100 CAPSULE ORAL 2 TIMES DAILY
Status: DISCONTINUED | OUTPATIENT
Start: 2019-09-25 | End: 2019-09-26 | Stop reason: HOSPADM

## 2019-09-25 RX ORDER — MELATONIN
1000 DAILY
Status: DISCONTINUED | OUTPATIENT
Start: 2019-09-25 | End: 2019-09-26 | Stop reason: HOSPADM

## 2019-09-25 RX ORDER — ATORVASTATIN CALCIUM 40 MG/1
40 TABLET, FILM COATED ORAL EVERY EVENING
Status: DISCONTINUED | OUTPATIENT
Start: 2019-09-25 | End: 2019-09-26 | Stop reason: HOSPADM

## 2019-09-25 RX ORDER — FLUTICASONE PROPIONATE 50 MCG
1 SPRAY, SUSPENSION (ML) NASAL 2 TIMES DAILY
Status: DISCONTINUED | OUTPATIENT
Start: 2019-09-25 | End: 2019-09-26 | Stop reason: HOSPADM

## 2019-09-25 RX ORDER — ASPIRIN 81 MG/1
81 TABLET, CHEWABLE ORAL DAILY
Status: DISCONTINUED | OUTPATIENT
Start: 2019-09-26 | End: 2019-09-26 | Stop reason: HOSPADM

## 2019-09-25 RX ORDER — ASPIRIN 81 MG/1
324 TABLET, CHEWABLE ORAL ONCE
Status: COMPLETED | OUTPATIENT
Start: 2019-09-25 | End: 2019-09-25

## 2019-09-25 RX ADMIN — MELATONIN 1000 UNITS: at 18:14

## 2019-09-25 RX ADMIN — ASPIRIN 81 MG 324 MG: 81 TABLET ORAL at 12:48

## 2019-09-25 RX ADMIN — IOHEXOL 85 ML: 350 INJECTION, SOLUTION INTRAVENOUS at 20:57

## 2019-09-25 RX ADMIN — ENOXAPARIN SODIUM 40 MG: 40 INJECTION SUBCUTANEOUS at 18:14

## 2019-09-25 RX ADMIN — Medication 1 TABLET: at 18:14

## 2019-09-25 RX ADMIN — PREGABALIN 100 MG: 100 CAPSULE ORAL at 18:14

## 2019-09-25 RX ADMIN — VENLAFAXINE HYDROCHLORIDE 112.5 MG: 37.5 CAPSULE, EXTENDED RELEASE ORAL at 18:14

## 2019-09-25 RX ADMIN — ATORVASTATIN CALCIUM 40 MG: 40 TABLET, FILM COATED ORAL at 18:15

## 2019-09-25 RX ADMIN — PANTOPRAZOLE SODIUM 40 MG: 40 TABLET, DELAYED RELEASE ORAL at 18:13

## 2019-09-25 NOTE — ED NOTES
Pt placed on tele monitor and transmitting  Ardean Holstein RN made aware       Ceilne Cox RN  09/25/19 0232

## 2019-09-25 NOTE — TELEPHONE ENCOUNTER
Pt  Called  Said on her way to work this morning she was disoriented and missed a turn  Didn't feel well so she went to the nurse at her work her who checked her and thinks she might have had a TIA  I advised pt  that she needs to go to the ER ASAP  She expressed reluctance and I adv'd her that they need to do testing on her that we cannot do here, and again stressed the importance of going to the ER  Pt  Said "okay"

## 2019-09-25 NOTE — ED PROVIDER NOTES
History  Chief Complaint   Patient presents with    CVA/TIA-like Symptoms     pt states she was driving around [address] today, had a sudden onset of blurry vision/confusion  symptoms have now resolved       History provided by:  Patient   used: No    CVA/TIA-like Symptoms   Presenting symptoms: no headaches and no weakness    Associated symptoms: nausea    Associated symptoms: no chest pain, no dizziness, no fever, no neck pain and no vomiting      Patient is a 25-year-old female presenting to emergency department after sudden onset of blurred vision, confusion while driving  States she was not sure where she was located  Called her co-worker who said she was slurring  This lasted for approximately 10 minutes  Self resolved  No weakness  No numbness or tingling  No headache  No chest pain  No neck pain  No shortness of breath  No vomiting  Had some nausea  No diarrhea  No fevers or chills  Urinating normally  No history of TIA or stroke  MDM eval for TIA, CT head, re-evaluate      Prior to Admission Medications   Prescriptions Last Dose Informant Patient Reported? Taking?    Cholecalciferol (VITAMIN D3) 1000 units CAPS  Self Yes No   Sig: Take 1 capsule by mouth daily   LYRICA 100 MG capsule 2019 at 0800  No Yes   Sig: TAKE 1 CAPSULE(100 MG) BY MOUTH TWICE DAILY   Multiple Vitamins-Minerals (MULTIVITAMIN WITH MINERALS) tablet 2019 at 0800 Self Yes Yes   Sig: Take 1 tablet by mouth daily   bisacodyl (DULCOLAX) 10 mg suppository   No No   Sig: Insert 1 suppository (10 mg total) into the rectum daily as needed for constipation   diclofenac sodium (VOLTAREN) 1 %  Self No No   Sig: Apply 2 g topically 4 (four) times a day   mometasone (NASONEX) 50 mcg/act nasal spray   No No   Si sprays into each nostril daily   pantoprazole (PROTONIX) 40 mg tablet 2019 at 0800 Self No Yes   Sig: TAKE 1 TABLET(40 MG) BY MOUTH DAILY   venlafaxine (EFFEXOR-XR) 37 5 mg 24 hr capsule   No No   Sig: Take 3 capsules (112 mg total) by mouth daily   venlafaxine (EFFEXOR-XR) 37 5 mg 24 hr capsule 9/24/2019 at 2100  No Yes   Sig: TAKE 3 CAPSULES(112 MG TOTAL) BY MOUTH DAILY      Facility-Administered Medications: None       Past Medical History:   Diagnosis Date    Arthralgia     Atypical chest pain     Balance disorder     Cervical rib     last assessed 9/13/12    Depression     Fatigue     Fibromyalgia     Fibromyalgia     GERD (gastroesophageal reflux disease)     Hydronephrosis with renal and ureteral calculus obstruction     last assessed 5/1/17    [facility] ([facility] [address] [facility]     Leukopenia     last assessed 5/19/16    Memory loss     Restless leg syndrome     last assessed 11/8/13    Sebaceous cyst     last assessed 2/13/13    Skin tag     last assessed 2/13/13    Vitamin D deficiency        Past Surgical History:   Procedure Laterality Date    COLONOSCOPY  2011    fiberoptic    KNEE SURGERY      right knee 2006 meniscal tear stage 04    IA COLONOSCOPY FLX DX W/COLLJ SPEC WHEN PFRMD N/A 3/16/2017    Procedure: COLONOSCOPY;  Surgeon: Ana Kennedy MD;  Location: [facility]; Service: Gastroenterology    IA CYSTO/URETERO W/LITHOTRIPSY &INDWELL STENT INSRT Left 4/26/2017    Procedure: CYSTOSCOPY URETEROSCOPY; RETROGRADE PYELOGRAM; STONE EXTRACTION; INSERTION STENT URETERAL;  Surgeon: Pratima Mckinley MD;  Location:  MAIN OR;  Service: Urology       Family History   Problem Relation Age of Onset    Osteoporosis Mother     Heart failure Father     Coronary artery disease Father     Fibromyalgia Sister     Heart failure Family     Coronary artery disease Family     Osteoporosis Family     Alcohol abuse Neg Hx     Substance Abuse Neg Hx     Mental illness Neg Hx     Depression Neg Hx      I have reviewed and agree with the history as documented      Social History     Tobacco Use    Smoking status: Former Smoker    Smokeless tobacco: Never Used   Substance Use Topics    Alcohol use: No     Comment: rarely    Drug use: No        Review of Systems   Constitutional: Negative for chills, diaphoresis and fever  HENT: Negative for congestion and sore throat  Eyes: Positive for visual disturbance  Respiratory: Negative for cough, shortness of breath, wheezing and stridor  Cardiovascular: Negative for chest pain, palpitations and leg swelling  Gastrointestinal: Positive for nausea  Negative for abdominal pain, blood in stool, diarrhea and vomiting  Genitourinary: Negative for dysuria, frequency and urgency  Musculoskeletal: Negative for neck pain and neck stiffness  Skin: Negative for pallor and rash  Neurological: Positive for speech difficulty and light-headedness  Negative for dizziness, syncope, weakness and headaches  All other systems reviewed and are negative  Physical Exam  Physical Exam   Constitutional: She is oriented to person, place, and time  She appears well-developed and well-nourished  HENT:   Head: Normocephalic and atraumatic  Eyes: Pupils are equal, round, and reactive to light  Neck: Neck supple  Cardiovascular: Normal rate, regular rhythm, normal heart sounds and intact distal pulses  Pulmonary/Chest: Effort normal and breath sounds normal  No respiratory distress  Abdominal: Soft  Bowel sounds are normal  There is no tenderness  Musculoskeletal: Normal range of motion  She exhibits no edema, tenderness or deformity  Neurological: She is alert and oriented to person, place, and time  Skin: Skin is warm and dry  No rash noted  No erythema  No pallor  Vitals reviewed        Vital Signs  ED Triage Vitals [09/25/19 1036]   Temperature Pulse Respirations Blood Pressure SpO2   98 7 °F (37 1 °C) 81 18 144/85 96 %      Temp Source Heart Rate Source Patient Position - Orthostatic VS BP Location FiO2 (%)   Oral Monitor Sitting Right arm --      Pain Score       No Pain           Vitals:    09/25/19 1330 09/25/19 1430 09/25/19 1530 09/25/19 1630   BP: 136/78 140/88 135/74 114/74   Pulse: 58 61 73 80   Patient Position - Orthostatic VS: Lying Lying Lying Sitting         Visual Acuity  Visual Acuity      Most Recent Value   L Pupil Size (mm)  3   R Pupil Size (mm)  3   L Pupil Shape  Round   R Pupil Shape  Round          ED Medications  Medications   multivitamin-minerals (CENTRUM) tablet 1 tablet (1 tablet Oral Given 9/25/19 1814)   cholecalciferol (VITAMIN D3) tablet 1,000 Units (1,000 Units Oral Given 9/25/19 1814)   pantoprazole (PROTONIX) EC tablet 40 mg (40 mg Oral Given 9/25/19 1813)   diclofenac sodium (VOLTAREN) 1 % topical gel 2 g (2 g Topical Refused 9/25/19 1815)   venlafaxine (EFFEXOR-XR) 24 hr capsule 112 5 mg (112 5 mg Oral Given 9/25/19 1814)   pregabalin (LYRICA) capsule 100 mg (100 mg Oral Given 9/25/19 1814)   fluticasone (FLONASE) 50 mcg/act nasal spray 1 spray (1 spray Each Nare Refused 9/25/19 1815)   atorvastatin (LIPITOR) tablet 40 mg (40 mg Oral Given 9/25/19 1815)   enoxaparin (LOVENOX) subcutaneous injection 40 mg (40 mg Subcutaneous Given 9/25/19 1814)   aspirin chewable tablet 81 mg (has no administration in time range)   aspirin chewable tablet 324 mg (324 mg Oral Given 9/25/19 1248)       Diagnostic Studies  Results Reviewed     Procedure Component Value Units Date/Time    Troponin I [[id number]]  (Normal) Collected:  09/25/19 1103    Lab Status:  Final result Specimen:  Blood from Arm, Left Updated:  09/25/19 1128     Troponin I <0 02 ng/mL     Basic metabolic panel [[id number]]  (Abnormal) Collected:  09/25/19 1103    Lab Status:  Final result Specimen:  Blood from Arm, Left Updated:  09/25/19 1118     Sodium 142 mmol/L      Potassium 3 8 mmol/L      Chloride 109 mmol/L      CO2 28 mmol/L      ANION GAP 5 mmol/L      BUN 15 mg/dL      Creatinine 0 98 mg/dL      Glucose 105 mg/dL      Calcium 8 7 mg/dL      eGFR 62 ml/min/1 73sq m     Narrative:       [place] guidelines for Chronic Kidney Disease (CKD):     Stage 1 with normal or high GFR (GFR > 90 mL/min/1 73 square meters)    Stage 2 Mild CKD (GFR = 60-89 mL/min/1 73 square meters)    Stage 3A Moderate CKD (GFR = 45-59 mL/min/1 73 square meters)    Stage 3B Moderate CKD (GFR = 30-44 mL/min/1 73 square meters)    Stage 4 Severe CKD (GFR = 15-29 mL/min/1 73 square meters)    Stage 5 End Stage CKD (GFR <15 mL/min/1 73 square meters)  Note: GFR calculation is accurate only with a steady state creatinine    Protime-INR [[id number]]  (Normal) Collected:  09/25/19 1103    Lab Status:  Final result Specimen:  Blood from Arm, Left Updated:  09/25/19 1116     Protime 12 6 seconds      INR 1 00    APTT [[id number]]  (Normal) Collected:  09/25/19 1103    Lab Status:  Final result Specimen:  Blood from Arm, Left Updated:  09/25/19 1116     PTT 29 seconds     CBC and differential [[id number]] Collected:  09/25/19 1103    Lab Status:  Final result Specimen:  Blood from Arm, Left Updated:  09/25/19 1109     WBC 5 91 Thousand/uL      RBC 4 94 Million/uL      Hemoglobin 14 5 g/dL      Hematocrit 45 4 %      MCV 92 fL      MCH 29 4 pg      MCHC 31 9 g/dL      RDW 13 4 %      MPV 11 7 fL      Platelets 487 Thousands/uL      nRBC 0 /100 WBCs      Neutrophils Relative 68 %      Immat GRANS % 0 %      Lymphocytes Relative 21 %      Monocytes Relative 8 %      Eosinophils Relative 2 %      Basophils Relative 1 %      Neutrophils Absolute 4 03 Thousands/µL      Immature Grans Absolute 0 01 Thousand/uL      Lymphocytes Absolute 1 23 Thousands/µL      Monocytes Absolute 0 49 Thousand/µL      Eosinophils Absolute 0 12 Thousand/µL      Basophils Absolute 0 03 Thousands/µL     Fingerstick Glucose (POCT) [[id number]]  (Normal) Collected:  09/25/19 1040    Lab Status:  Final result Updated:  09/25/19 1044     POC Glucose 118 mg/dl                  CT head without contrast   Final Result by Brigitte Leigh MD (09/25 1154)      No acute intracranial abnormality  Workstation performed: [id number]         XR chest 2 views   Final Result by Francia Myrick MD (09/25 1151)      No acute cardiopulmonary disease  Workstation performed: [id number]         MRI Inpatient Order    (Results Pending)   CTA head and neck w wo contrast    (Results Pending)              Procedures  Procedures       ED Course  ED Course as of Sep 25 1855   Wed Sep 25, 2019   1047 ECG shows rate of 70, sinus, normal axis, normal QRS, flat T-waves inferiorly 3 in AVF, anteriorly V3 V4, these are new, no ST elevations depressions, independently interpreted by me                                  MDM    Disposition  Final diagnoses:   TIA (transient ischemic attack)     Time reflects when diagnosis was documented in both MDM as applicable and the Disposition within this note     Time User Action Codes Description Comment    9/25/2019 12:12 PM Wendy Sands Add [G45 9] TIA (transient ischemic attack)     9/25/2019  2:45 PM Jeb Marie Add [R29 90] Stroke-like symptoms       ED Disposition     ED Disposition Condition Date/Time Comment    Admit Stable Wed Sep 25, 2019 12:12 PM Case was discussed with medicine and the patient's admission status was agreed to be Admission Status: inpatient status to the service of Dr Thu Garcia           Follow-up Information    None         Current Discharge Medication List      CONTINUE these medications which have NOT CHANGED    Details   LYRICA 100 MG capsule TAKE 1 CAPSULE(100 MG) BY MOUTH TWICE DAILY  Qty: 60 capsule, Refills: 1    Associated Diagnoses: Fibromyalgia      Multiple Vitamins-Minerals (MULTIVITAMIN WITH MINERALS) tablet Take 1 tablet by mouth daily      pantoprazole (PROTONIX) 40 mg tablet TAKE 1 TABLET(40 MG) BY MOUTH DAILY  Qty: 90 tablet, Refills: 1    Associated Diagnoses: Cough, persistent; Gastroesophageal reflux disease, esophagitis presence not specified      !! venlafaxine (EFFEXOR-XR) 37 5 mg 24 hr capsule TAKE 3 CAPSULES(112 MG TOTAL) BY MOUTH DAILY  Qty: 270 capsule, Refills: 0    Associated Diagnoses: Depression with anxiety; Fibromyalgia      bisacodyl (DULCOLAX) 10 mg suppository Insert 1 suppository (10 mg total) into the rectum daily as needed for constipation  Qty: 30 suppository, Refills: 0    Associated Diagnoses: Chronic idiopathic constipation      Cholecalciferol (VITAMIN D3) 1000 units CAPS Take 1 capsule by mouth daily      diclofenac sodium (VOLTAREN) 1 % Apply 2 g topically 4 (four) times a day  Qty: 1 Tube, Refills: 2    Associated Diagnoses: Arthritis of finger of right hand      mometasone (NASONEX) 50 mcg/act nasal spray 2 sprays into each nostril daily  Qty: 17 g, Refills: 0    Comments: Needs appt with PCP  Associated Diagnoses: Allergic rhinitis due to other allergic trigger, unspecified seasonality      !! venlafaxine (EFFEXOR-XR) 37 5 mg 24 hr capsule Take 3 capsules (112 mg total) by mouth daily  Qty: 270 capsule, Refills: 1    Associated Diagnoses: Depression with anxiety; Fibromyalgia       !! - Potential duplicate medications found  Please discuss with provider  No discharge procedures on file      ED Provider  Electronically Signed by           Maribel Irby MD  09/25/19 0111

## 2019-09-25 NOTE — H&P
H&P- Johanny Negron 1955, 61 y o  female MRN: [id number]    Unit/Bed#: -01 Encounter: [id number]    Primary Care Provider: Cindy Self MD   Date and time admitted to hospital: 9/25/2019 10:33 AM  * Stroke-like symptoms  Assessment & Plan  · Patient presented with approximately 10 minute episode of visual distortion, slurred speech, lightheadedness and sense of being disoriented  The symptoms have now resolved  Patient reports she had a similar but less severe event approximately 1 year ago  · Differential diagnosis to include TIA verses seizure  · Will place patient on Stroke pathway; check CTA head and neck, MRI brain, echo, monitor on tele   · Requested neuro eval, will discuss with their team if there are any concerns regarding ongoing driving  · Will initiate ASA/statin regimen  · Check fasting lipid profile, A1c in a m  · Will get EEG    Chest tightness  Assessment & Plan  · Non specific chest tightness noted this morning  Patient reports she has this frequently and has already had cardiac workup  Stress test on file noted to be normal  · Would monitor on telemetry, check troponin x3 and do echocardiogram; obtain EKG    Fibromyalgia  Assessment & Plan  · Patient with underlying fibromyalgia and chronic arthritic pain  She has been stable on Lyrica and Effexor at the same doses for many years    VTE Prophylaxis: Enoxaparin (Lovenox)  / sequential compression device   Code Status:  Full code per discussion  POLST: POLST is not applicable to this patient  Discussion with family:     Anticipated Length of Stay:  Patient will be admitted on an Inpatient basis with an anticipated length of stay of  greater than 2 midnights  Justification for Hospital Stay:  Workup of possible stroke    Total Time for Visit, including Counseling / Coordination of Care: 1 hour  Greater than 50% of this total time spent on direct patient counseling and coordination of care    Case was discussed with my attending and with Neurology attending    Chief Complaint:   Episode of disorientation    History of Present Illness: Hussein Medina is a 61 y o  female who presents with episode of disorientation that occurred this morning  Patient states that she went to drive to work at about 9:05 a m  and states that she felt odd" and her "stomach was off " She felt a slight sense of nausea and was lightheaded  She also noted a slight tightness in chest but did not find this to be of any significant concern, as she has had this in the past   While driving the 2 1 mile commute to work she started noting a disturbance in her vision stating that it seemed her sense of distance was off and things appeared to close and then subsequently too far away and she could not  how far away the other cars were  She felt as if she might pass out and noted that she had a short period of lost time where she missed the turn she was supposed to take  She felt very disoriented and was not sure if she had even briefly passed out  She called her life partner who encouraged her to pull the car over and eventually she was able to get to a place where she could recover for a few minutes and subsequently pulled up a map on her phone to find her way to work  Her partner later told her that during that time frame her speech seemed a bit slurred  The whole episode seemed to last less than 10 minutes and she felt completely back to normal when she arrived at work  She went to the nurse to get checked and was reported to have normal vital signs at that time but was encouraged to call her family doctor who then told her she should come to the ER for evaluation  Again at this time she reports that she feels completely back to normal   When asked, she does admit she had a similar spell approximately 1 year ago of this visual disturbance but it was not as severe and did not last as long      Review of Systems:    Review of Systems   Constitutional: Positive for unexpected weight change (Reports weight gain due to decreased activity from joint pain)  Negative for activity change, appetite change, chills, diaphoresis, fatigue and fever  No recent illness   HENT: Negative for trouble swallowing  Eyes: Positive for visual disturbance  Negative for pain  Respiratory: Positive for chest tightness (Reports frequent sense of mild chest tightness)  Negative for apnea, cough, choking, shortness of breath, wheezing and stridor  Cardiovascular: Positive for palpitations (Occasional gets palpitations)  Negative for chest pain and leg swelling  Gastrointestinal: Positive for nausea (Sense of nausea this morning)  Negative for abdominal pain, blood in stool, constipation, diarrhea and vomiting  Genitourinary: Negative for difficulty urinating and dysuria  Musculoskeletal: Positive for arthralgias, gait problem and myalgias  Patient with fibromyalgia and underlying arthritis  When asked about her balance she tells me that it is always off" and is worse in the morning  She states that she stumbled and nearly falls quite frequently  Neurological: Positive for speech difficulty and light-headedness  Negative for dizziness, tremors, seizures, syncope, facial asymmetry, weakness, numbness and headaches  Denies any numbness or tingling in her feet  Denies any focal weakness in any extremities today   Psychiatric/Behavioral: Positive for confusion  Reports concerned that her memory has become worse and she has difficulty finding words at times       Past Medical and Surgical History:    No known history of stroke or heart attack    Patient denies any history of hypertension, dyslipidemia or diabetes  Past Medical History:   Diagnosis Date    Arthralgia     Atypical chest pain     Balance disorder     Cervical rib     last assessed 9/13/12    Depression     Fatigue     Fibromyalgia     Fibromyalgia     GERD (gastroesophageal reflux disease)     Hydronephrosis with renal and ureteral calculus obstruction     last assessed 5/1/17    Lacunar stroke ([facility] [address] )     Leukopenia     last assessed 5/19/16    Memory loss     Restless leg syndrome     last assessed 11/8/13    Sebaceous cyst     last assessed 2/13/13    Skin tag     last assessed 2/13/13    Vitamin D deficiency        Past Surgical History:   Procedure Laterality Date    COLONOSCOPY  2011    fiberoptic    KNEE SURGERY      right knee 2006 meniscal tear stage 04    DC COLONOSCOPY FLX DX W/COLLJ SPEC WHEN PFRMD N/A 3/16/2017    Procedure: COLONOSCOPY;  Surgeon: Rochelle Rosen MD;  Location[facility]; Service: Gastroenterology    DC CYSTO/URETERO W/LITHOTRIPSY &INDWELL STENT INSRT Left 4/26/2017    Procedure: CYSTOSCOPY URETEROSCOPY; RETROGRADE PYELOGRAM; STONE EXTRACTION; INSERTION STENT URETERAL;  Surgeon: Alfie Price MD;  Location: [facility];  Service: Urology       Meds/Allergies:    Prior to Admission medications    Medication Sig Start Date End Date Taking?  Authorizing Provider   LYRICA 100 MG capsule TAKE 1 CAPSULE(100 MG) BY MOUTH TWICE DAILY 8/8/19  Yes Leyla Aponte MD   Multiple Vitamins-Minerals (MULTIVITAMIN WITH MINERALS) tablet Take 1 tablet by mouth daily   Yes Historical Provider, MD   pantoprazole (PROTONIX) 40 mg tablet TAKE 1 TABLET(40 MG) BY MOUTH DAILY 2/21/19  Yes Leyla Aponte MD   venlafaxine (EFFEXOR-XR) 37 5 mg 24 hr capsule TAKE 3 CAPSULES(112 MG TOTAL) BY MOUTH DAILY 8/12/19  Yes AIDAN Barnes   [facility] 10 GM/15ML Take 1 tbsp PO bid 5/14/19 9/25/19 Yes Leyla Aponte MD   bisacodyl (DULCOLAX) 10 mg suppository Insert 1 suppository (10 mg total) into the rectum daily as needed for constipation 4/24/19   Leyla Aponte MD   Cholecalciferol (VITAMIN D3) 1000 units CAPS Take 1 capsule by mouth daily 8/3/15   Historical Provider, MD   diclofenac sodium (VOLTAREN) 1 % Apply 2 g topically 4 (four) times a day 3/21/19   Gerardo Mcghee PA-C   mometasone (NASONEX) 50 mcg/act nasal spray 2 sprays into each nostril daily 8/12/19   Rinku Anderson MD   venlafaxine [facility]  ) 37 5 mg 24 hr capsule Take 3 capsules (112 mg total) by mouth daily 5/14/19   Rinku Anderson MD     I have reviewed home medications with patient personally  Allergies: Allergies   Allergen Reactions    Chocolate GI Intolerance    Ciprofloxacin Hallucinations    Dramamine [Dimenhydrinate]     Nuts GI Intolerance    Oat GI Intolerance       Social History:     Marital Status:  Has a partner Melisa  Occupation:   at [employer]   Patient Pre-hospital Living Situation:   Patient Pre-hospital Level of Mobility:  No restrictions  Patient Pre-hospital Diet Restrictions:  No restrictions  Substance Use History:   Social History     Substance and Sexual Activity   Alcohol Use No    Comment: rarely    Patient drinks less than once a month and has not been drinking recently  Patient is a lifelong nonsmoker  Does not use drugs  Social History     Substance and Sexual Activity   Drug Use No       Family History:  Strong family history of heart disease and strokes  Reports that her sister had mini-stroke in her 46s    Physical Exam:     Vitals:   Blood Pressure: 140/88 (09/25/19 1430)  Pulse: 61 (09/25/19 1430)  Temperature: 98 °F (36 7 °C) (09/25/19 1330)  Temp Source: Oral (09/25/19 1330)  Respirations: 16 (09/25/19 1430)  Height: 5' 3" (160 cm) (09/25/19 1300)  Weight - Scale: 68 kg (150 lb) (09/25/19 1300)  SpO2: 98 % (09/25/19 1430)    Physical Exam   Constitutional: She is oriented to person, place, and time  She appears well-developed and well-nourished  No distress  HENT:   Head: Normocephalic  Mouth/Throat: Oropharynx is clear and moist  No oropharyngeal exudate  Eyes: Pupils are equal, round, and reactive to light  Conjunctivae are normal  Right eye exhibits no discharge   Left eye exhibits no discharge  No scleral icterus  Neck:   No bruits   Cardiovascular: Normal rate, regular rhythm and normal heart sounds  Exam reveals no gallop and no friction rub  No murmur heard  Pulmonary/Chest: Effort normal and breath sounds normal  No stridor  No respiratory distress  She has no wheezes  She has no rales  She exhibits no tenderness  Abdominal: Soft  Bowel sounds are normal  She exhibits no distension  There is no tenderness  There is no guarding  Musculoskeletal: She exhibits no edema, tenderness or deformity  Neurological: She is alert and oriented to person, place, and time  Patient is awake alert and interactive  She is oriented x 3 and appropriate  She has no facial asymmetry  Tongue is midline  She names 3/3 objects appropriately and has no difficulty providing history  She has no evidence of dysarthria or aphasia  No word-finding difficulty or substitutions noted  She follows all commands easily  Strength is full in bilateral upper and lower extremities proximally and distally  She has no pronator drift or tremor   Skin: Skin is warm and dry  No rash noted  She is not diaphoretic  No erythema  No pallor  Psychiatric: She has a normal mood and affect  Her behavior is normal  Thought content normal      Additional Data:     Lab Results: I have personally reviewed pertinent reports        Results from last 7 days   Lab Units 09/25/19  1103   WBC Thousand/uL 5 91   HEMOGLOBIN g/dL 14 5   HEMATOCRIT % 45 4   PLATELETS Thousands/uL 168   NEUTROS PCT % 68   LYMPHS PCT % 21   MONOS PCT % 8   EOS PCT % 2     Results from last 7 days   Lab Units 09/25/19  1103   SODIUM mmol/L 142   POTASSIUM mmol/L 3 8   CHLORIDE mmol/L 109*   CO2 mmol/L 28   BUN mg/dL 15   CREATININE mg/dL 0 98   ANION GAP mmol/L 5   CALCIUM mg/dL 8 7   GLUCOSE RANDOM mg/dL 105     Results from last 7 days   Lab Units 09/25/19  1103   INR  1 00     Results from last 7 days   Lab Units 09/25/19  1040   POC GLUCOSE mg/dl 118               Imaging: I have personally reviewed pertinent reports  CT head without contrast   Final Result by Lucas Nelson MD (09/25 1159)      No acute intracranial abnormality  Workstation performed: [id number]         XR chest 2 views   Final Result by Jw Edge MD (09/25 1151)      No acute cardiopulmonary disease  Workstation performed: [id number]         MRI Inpatient Order    (Results Pending)   CTA head and neck w wo contrast    (Results Pending)       EKG, Pathology, and Other Studies Reviewed on Admission:   ·     Allscripts / Epic Records Reviewed: Yes     ** Please Note: This note has been constructed using a voice recognition system   **

## 2019-09-25 NOTE — PLAN OF CARE
Problem: Neurological Deficit  Goal: Neurological status is stable or improving  Description  Interventions:  - Monitor and assess patient's level of consciousness, motor function, sensory function, and level of assistance needed for ADLs  - Monitor and report changes from baseline  Collaborate with interdisciplinary team to initiate plan and implement interventions as ordered  - Provide and maintain a safe environment  - Consider seizure precautions  - Consider fall precautions  - Consider aspiration precautions  - Consider bleeding precautions  Outcome: Progressing     Problem: Nutrition  Goal: Nutrition/Hydration status is improving  Description  Monitor and assess patient's nutrition/hydration status for malnutrition (ex- brittle hair, bruises, dry skin, pale skin and conjunctiva, muscle wasting, smooth red tongue, and disorientation)  Collaborate with interdisciplinary team and initiate plan and interventions as ordered  Monitor patient's weight and dietary intake as ordered or per policy  Utilize nutrition screening tool and intervene per policy  Determine patient's food preferences and provide high-protein, high-caloric foods as appropriate  - Assist patient with eating   - Allow adequate time for meals   - Encourage patient to take dietary supplement as ordered  - Collaborate with clinical nutritionist   - Include patient/family/caregiver in decisions related to nutrition  Outcome: Progressing     Problem: SAFETY ADULT  Goal: Patient will remain free of falls  Description  INTERVENTIONS:  - Assess patient frequently for physical needs  -  Identify cognitive and physical deficits and behaviors that affect risk of falls    -  [place] fall precautions as indicated by assessment   - Educate patient/family on patient safety including physical limitations  - Instruct patient to call for assistance with activity based on assessment  - Modify environment to reduce risk of injury  - Consider OT/PT consult to assist with strengthening/mobility  Outcome: Progressing     Problem: NEUROSENSORY - ADULT  Goal: Achieves stable or improved neurological status  Description  INTERVENTIONS  - Monitor and report changes in neurological status  - Monitor vital signs such as temperature, blood pressure, glucose, and any other labs ordered   - Initiate measures to prevent increased intracranial pressure  - Monitor for seizure activity and implement precautions if appropriate      Outcome: Progressing     Problem: CARDIOVASCULAR - ADULT  Goal: Absence of cardiac dysrhythmias or at baseline rhythm  Description  INTERVENTIONS:  - Continuous cardiac monitoring, vital signs, obtain 12 lead EKG if ordered  - Administer antiarrhythmic and heart rate control medications as ordered  - Monitor electrolytes and administer replacement therapy as ordered  Outcome: Progressing

## 2019-09-26 ENCOUNTER — APPOINTMENT (INPATIENT)
Dept: NON INVASIVE DIAGNOSTICS | Facility: HOSPITAL | Age: 64
DRG: 101 | End: 2019-09-26
Payer: COMMERCIAL

## 2019-09-26 ENCOUNTER — APPOINTMENT (INPATIENT)
Dept: NEUROLOGY | Facility: HOSPITAL | Age: 64
DRG: 101 | End: 2019-09-26
Payer: COMMERCIAL

## 2019-09-26 VITALS
RESPIRATION RATE: 18 BRPM | TEMPERATURE: 98.9 F | WEIGHT: 150 LBS | SYSTOLIC BLOOD PRESSURE: 140 MMHG | HEART RATE: 73 BPM | DIASTOLIC BLOOD PRESSURE: 82 MMHG | OXYGEN SATURATION: 96 % | HEIGHT: 63 IN | BODY MASS INDEX: 26.58 KG/M2

## 2019-09-26 LAB
CHOLEST SERPL-MCNC: 175 MG/DL (ref 50–200)
EST. AVERAGE GLUCOSE BLD GHB EST-MCNC: 108 MG/DL
HBA1C MFR BLD: 5.4 % (ref 4.2–6.3)
HDLC SERPL-MCNC: 45 MG/DL (ref 40–60)
LDLC SERPL CALC-MCNC: 101 MG/DL (ref 0–100)
TRIGL SERPL-MCNC: 147 MG/DL

## 2019-09-26 PROCEDURE — 95819 EEG AWAKE AND ASLEEP: CPT | Performed by: PSYCHIATRY & NEUROLOGY

## 2019-09-26 PROCEDURE — 95816 EEG AWAKE AND DROWSY: CPT

## 2019-09-26 PROCEDURE — 80061 LIPID PANEL: CPT | Performed by: PHYSICIAN ASSISTANT

## 2019-09-26 PROCEDURE — 93308 TTE F-UP OR LMTD: CPT | Performed by: INTERNAL MEDICINE

## 2019-09-26 PROCEDURE — 99254 IP/OBS CNSLTJ NEW/EST MOD 60: CPT | Performed by: PSYCHIATRY & NEUROLOGY

## 2019-09-26 PROCEDURE — 99239 HOSP IP/OBS DSCHRG MGMT >30: CPT | Performed by: PHYSICIAN ASSISTANT

## 2019-09-26 PROCEDURE — 93306 TTE W/DOPPLER COMPLETE: CPT

## 2019-09-26 PROCEDURE — 83036 HEMOGLOBIN GLYCOSYLATED A1C: CPT | Performed by: PHYSICIAN ASSISTANT

## 2019-09-26 PROCEDURE — 93321 DOPPLER ECHO F-UP/LMTD STD: CPT | Performed by: INTERNAL MEDICINE

## 2019-09-26 PROCEDURE — 93325 DOPPLER ECHO COLOR FLOW MAPG: CPT | Performed by: INTERNAL MEDICINE

## 2019-09-26 RX ORDER — DIVALPROEX SODIUM 500 MG/1
500 TABLET, DELAYED RELEASE ORAL EVERY 12 HOURS SCHEDULED
Status: DISCONTINUED | OUTPATIENT
Start: 2019-09-26 | End: 2019-09-26 | Stop reason: HOSPADM

## 2019-09-26 RX ORDER — ATORVASTATIN CALCIUM 40 MG/1
40 TABLET, FILM COATED ORAL EVERY EVENING
Qty: 30 TABLET | Refills: 0 | Status: SHIPPED | OUTPATIENT
Start: 2019-09-26 | End: 2019-10-27 | Stop reason: SDUPTHER

## 2019-09-26 RX ORDER — DIVALPROEX SODIUM 500 MG/1
500 TABLET, DELAYED RELEASE ORAL EVERY 12 HOURS SCHEDULED
Qty: 60 TABLET | Refills: 0 | Status: SHIPPED | OUTPATIENT
Start: 2019-09-26 | End: 2019-10-27 | Stop reason: SDUPTHER

## 2019-09-26 RX ORDER — ASPIRIN 81 MG/1
81 TABLET, CHEWABLE ORAL DAILY
Qty: 30 TABLET | Refills: 0 | Status: SHIPPED | OUTPATIENT
Start: 2019-09-27

## 2019-09-26 RX ADMIN — DIVALPROEX SODIUM 500 MG: 500 TABLET, DELAYED RELEASE ORAL at 16:10

## 2019-09-26 RX ADMIN — PANTOPRAZOLE SODIUM 40 MG: 40 TABLET, DELAYED RELEASE ORAL at 09:44

## 2019-09-26 RX ADMIN — MELATONIN 1000 UNITS: at 09:44

## 2019-09-26 RX ADMIN — PREGABALIN 100 MG: 100 CAPSULE ORAL at 09:44

## 2019-09-26 RX ADMIN — Medication 1 TABLET: at 09:44

## 2019-09-26 RX ADMIN — ASPIRIN 81 MG 81 MG: 81 TABLET ORAL at 09:44

## 2019-09-26 RX ADMIN — VENLAFAXINE HYDROCHLORIDE 112.5 MG: 37.5 CAPSULE, EXTENDED RELEASE ORAL at 09:44

## 2019-09-26 NOTE — DISCHARGE INSTR - AVS FIRST PAGE
[place] dot form will be completed by the Neurology team but you are advised not to drive until you are 6 months seizure free

## 2019-09-26 NOTE — CONSULTS
Consultation - Neurology   Omkar Ratliff 61 y o  female MRN: [id number]  Unit/Bed#: -01 Encounter: [id number]      Assessment/Plan   Assessment:  61year old female with fibromyalgia, chronic constipation admitted with vision disturbance, slurred speech and disorientation  Plan:  1  Episode of vision disturbance, slurred speech and disorientation    - Etiology unclear  MRI brain without contrast negative for acute ischemia  CTA head and neck with and without contrast demonstrates focal moderate to severe stenosis in the right posterior cerebral artery likely secondary to atherosclerotic disease  Differential includes TIA vs possible seizure vs occular migraine but feel this is less likely as patient has only had one other event and this occurred within the last year  - Will plan to await EEG     - Hold off on AEDs for now  - Maintain seizure precautions  - Continue on ASA 81 mg QD and atorvastatin 40 mg QPM     - Check echocardiogram     - Hemoglobin A1c 5 4     - Fasting lipid panel total cholesterol 175, triglycerides 147, HDL 45,      - Goal normothermia, euglycemia, normotension     - Stroke education     - Monitor exam and notify with changes  History of Present Illness     Reason for Consult / Principal Problem: TIA  HPI: Omkar Ratliff is a 61 y o   female with fibromyalgia, chronic constipation who presents with complaint of an episode of disorientation  She notes she was driving to work yesterday and while on the way to work she said that she developed a sensation of having an upset stomach as well as lightheadedness  She notes that she then began having visual disturbance in which she describes alternating of things appearing close and then further away  She notes that looks as though she was looking at an added movie    She said that she did not have the mind to pull over at that time but she did call her significant other and told her that she was feeling on well and the significant other told her to pull over but she told the significant other that there was no shoulder in order to pull over  She states that she felt like she was going to pass out  She notes that she somehow missed a turn and became lost   She was very confused at that time and per his significant other while she was on the phone with her her speech was slurred  She notes that the symptoms lasted between seven and 8 minutes and then resolved on their own  She notes that she was then able to use her GPS alex on her phone and get to work  When she arrived at work she went to see the nurse and also called her PCP and they recommended that she come to the emergency department for evaluation  She notes that she had similar vision symptoms one time about a year ago but in pursue any further workup at that time  She denies personal history of seizures, febrile seizures, meningitis, encephalitis, significant head injury  She notes that she was born at full term  She denies family history of any seizures  She denies personal history of migraine headaches  She does note that she has a history of dementia on both sides of her family and she herself notes that she occasionally will have difficulty coming up with a word in conversation and this has been concerning for her  She has not had any difficulty performing her own ADLs and up until yesterday she had not gotten lost previously  She notes she currently feels well and has not had any recurrence of symptoms  Inpatient consult to Neurology  Consult performed by: Juan Antonio Marcum PA-C  Consult ordered by: Martha Gordon PA-C          Review of Systems   Constitutional: Negative for chills, fatigue and fever  HENT: Negative for trouble swallowing  Eyes: Negative for visual disturbance  Respiratory: Negative for shortness of breath  Cardiovascular: Negative for chest pain  Gastrointestinal: Negative for abdominal pain, nausea and vomiting  Genitourinary: Negative for difficulty urinating and dysuria  Musculoskeletal: Negative for back pain and neck pain  Neurological: Negative for dizziness, speech difficulty, weakness, light-headedness, numbness and headaches  Psychiatric/Behavioral: Negative for confusion  Historical Information   Past Medical History:   Diagnosis Date    Arthralgia     Atypical chest pain     Balance disorder     Cervical rib     last assessed 9/13/12    Depression     Fatigue     Fibromyalgia     Fibromyalgia     GERD (gastroesophageal reflux disease)     Hydronephrosis with renal and ureteral calculus obstruction     last assessed 5/1/17    Lacunar stroke ([facility] [address]     Leukopenia     last assessed 5/19/16    Memory loss     Restless leg syndrome     last assessed 11/8/13    Sebaceous cyst     last assessed 2/13/13    Skin tag     last assessed 2/13/13    Vitamin D deficiency      Past Surgical History:   Procedure Laterality Date    COLONOSCOPY  2011    fiberoptic    KNEE SURGERY      right knee 2006 meniscal tear stage 04    AK COLONOSCOPY FLX DX W/COLLJ SPEC WHEN PFRMD N/A 3/16/2017    Procedure: COLONOSCOPY;  Surgeon: Hakan Mendez MD;  Location: [facility] GI LAB;   Service: Gastroenterology    AK CYSTO/URETERO W/LITHOTRIPSY &INDWELL STENT INSRT Left 4/26/2017    Procedure: CYSTOSCOPY URETEROSCOPY; RETROGRADE PYELOGRAM; STONE EXTRACTION; INSERTION STENT URETERAL;  Surgeon: Guillermo Hopkins MD;  Location:  MAIN OR;  Service: Urology     Social History   Social History     Substance and Sexual Activity   Alcohol Use No    Comment: rarely     Social History     Substance and Sexual Activity   Drug Use No     Social History     Tobacco Use   Smoking Status Former Smoker   Smokeless Tobacco Never Used     Family History:   Family History   Problem Relation Age of Onset    Osteoporosis Mother     Heart failure Father     Coronary artery disease Father     Fibromyalgia Sister     Heart failure Family     Coronary artery disease Family     Osteoporosis Family     Alcohol abuse Neg Hx     Substance Abuse Neg Hx     Mental illness Neg Hx     Depression Neg Hx        Review of previous medical records was completed  Patient follows with orthopedics for joint pain  She also sees PCP for constipation, fibromyalgia, anemia, depression and GERD  Meds/Allergies   Scheduled Meds:  Current Facility-Administered Medications:  aspirin 81 mg Oral Daily Bc Gomez MD   atorvastatin 40 mg Oral QPM Gemini Marie, CAN   cholecalciferol 1,000 Units Oral Daily Gemini Marie, PA-C   diclofenac sodium 2 g Topical 4x Daily Gemini Marie, PA-C   enoxaparin 40 mg Subcutaneous Daily Gemini Marie, PA-C   fluticasone 1 spray Each Nare BID Gemini Marie, PA-C   multivitamin-minerals 1 tablet Oral Daily Gemini Marie, PA-C   pantoprazole 40 mg Oral Daily Gemini Stogurjit, PA-C   pregabalin 100 mg Oral BID Gemini Marie, PA-C   venlafaxine 112 5 mg Oral Daily Gemini Marie, PA-C     Continuous Infusions:   PRN Meds:  Allergies   Allergen Reactions    Chocolate GI Intolerance    Ciprofloxacin Hallucinations    Dramamine [Dimenhydrinate]     Nuts GI Intolerance    Oat GI Intolerance       Objective   Vitals:Blood pressure 117/72, pulse 74, temperature 98 2 °F (36 8 °C), temperature source Oral, resp  rate 18, height 5' 3" (1 6 m), weight 68 kg (150 lb), SpO2 98 %  ,Body mass index is 26 57 kg/m²  No intake or output data in the 24 hours ending 09/26/19 0744    Invasive Devices: Invasive Devices     Peripheral Intravenous Line            Peripheral IV 09/25/19 Left Antecubital less than 1 day                Physical Exam   Constitutional: She is oriented to person, place, and time  She appears well-developed and well-nourished  No distress  HENT:   Head: Normocephalic and atraumatic  Eyes: Pupils are equal, round, and reactive to light  Conjunctivae and EOM are normal  Right eye exhibits no discharge   Left eye exhibits no discharge  No scleral icterus  Neck: Normal range of motion  Neck supple  Cardiovascular: Normal rate and regular rhythm  Pulmonary/Chest: Effort normal and breath sounds normal  No respiratory distress  Abdominal: Soft  Bowel sounds are normal  She exhibits no distension  Musculoskeletal: Normal range of motion  She exhibits no edema  Neurological: She is alert and oriented to person, place, and time  She has a normal Finger-Nose-Finger Test and a normal Heel to [employer]  Gait normal    Reflex Scores:       Bicep reflexes are 2+ on the right side and 2+ on the left side  Brachioradialis reflexes are 2+ on the right side and 2+ on the left side  Patellar reflexes are 2+ on the right side and 2+ on the left side  Achilles reflexes are 2+ on the right side and 2+ on the left side  Skin: Skin is warm and dry  She is not diaphoretic  No erythema  No pallor  Psychiatric: She has a normal mood and affect  Her speech is normal and behavior is normal      Neurologic Exam     Mental Status   Oriented to person, place, and time  Oriented to person  Oriented to place  Oriented to time  Registration: recalls 3 of 3 objects  Recall at 5 minutes: recalls 3 of 3 objects  Attention: normal  Concentration: normal    Speech: speech is normal   Level of consciousness: alert  Knowledge: good  Normal comprehension  Able to spell word "world" forward and backward  Cranial Nerves     CN II   Right visual field deficit: none  Left visual field deficit: none     CN III, IV, VI   Pupils are equal, round, and reactive to light  Extraocular motions are normal    Right pupil: Size: 4 mm  Shape: regular  Reactivity: brisk  Consensual response: intact  Left pupil: Size: 4 mm  Shape: regular  Reactivity: brisk  Consensual response: intact     Nystagmus: none   Diplopia: none  Ophthalmoparesis: none  Upgaze: normal  Downgaze: normal  Conjugate gaze: present    CN V   Right facial sensation deficit: none  Left facial sensation deficit: none    CN VII   Right facial weakness: none  Left facial weakness: none    CN VIII   Hearing: intact    CN IX, X   Palate: symmetric    CN XI   Right sternocleidomastoid strength: normal  Left sternocleidomastoid strength: normal    CN XII   Tongue: not atrophic  Fasciculations: absent  Tongue deviation: none    Motor Exam   Muscle bulk: normal  Overall muscle tone: normal  Right arm tone: normal  Left arm tone: normal  Right arm pronator drift: absent  Left arm pronator drift: absent  Right leg tone: normal  Left leg tone: normalMotor strength 5/5 B/L UE and LE     Sensory Exam   Right arm light touch: normal  Left arm light touch: normal  Right leg light touch: normal  Left leg light touch: normal  Right arm vibration: normal  Left arm vibration: normal  Right leg vibration: normal  Left leg vibration: normal  Right arm pinprick: normal  Left arm pinprick: normal  Right leg pinprick: normal  Left leg pinprick: normal    Gait, Coordination, and Reflexes     Gait  Gait: normal    Coordination   Finger to nose coordination: normal  Heel to shin coordination: normal    Tremor   Resting tremor: absent  Intention tremor: absent  Action tremor: absent    Reflexes   Right brachioradialis: 2+  Left brachioradialis: 2+  Right biceps: 2+  Left biceps: 2+  Right patellar: 2+  Left patellar: 2+  Right achilles: 2+  Left achilles: 2+  Right plantar: equivocal  Left plantar: equivocal      Lab Results:   Recent Results (from the past 24 hour(s))   ECG 12 lead    Collection Time: 09/25/19 10:39 AM   Result Value Ref Range    Ventricular Rate 70 BPM    Atrial Rate 70 BPM    RI Interval 116 ms    QRSD Interval 68 ms    QT Interval 380 ms    QTC Interval 410 ms    P Axis -43 degrees    QRS Axis 9 degrees    T Wave Axis -6 degrees   Fingerstick Glucose (POCT)    Collection Time: 09/25/19 10:40 AM   Result Value Ref Range    POC Glucose 118 65 - 140 mg/dl   CBC and differential Collection Time: 09/25/19 11:03 AM   Result Value Ref Range    WBC 5 91 4 31 - 10 16 Thousand/uL    RBC 4 94 3 81 - 5 12 Million/uL    Hemoglobin 14 5 11 5 - 15 4 g/dL    Hematocrit 45 4 34 8 - 46 1 %    MCV 92 82 - 98 fL    MCH 29 4 26 8 - 34 3 pg    MCHC 31 9 31 4 - 37 4 g/dL    RDW 13 4 11 6 - 15 1 %    MPV 11 7 8 9 - 12 7 fL    Platelets 558 402 - 411 Thousands/uL    nRBC 0 /100 WBCs    Neutrophils Relative 68 43 - 75 %    Immat GRANS % 0 0 - 2 %    Lymphocytes Relative 21 14 - 44 %    Monocytes Relative 8 4 - 12 %    Eosinophils Relative 2 0 - 6 %    Basophils Relative 1 0 - 1 %    Neutrophils Absolute 4 03 1 85 - 7 62 Thousands/µL    Immature Grans Absolute 0 01 0 00 - 0 20 Thousand/uL    Lymphocytes Absolute 1 23 0 60 - 4 47 Thousands/µL    Monocytes Absolute 0 49 0 17 - 1 22 Thousand/µL    Eosinophils Absolute 0 12 0 00 - 0 61 Thousand/µL    Basophils Absolute 0 03 0 00 - 0 10 Thousands/µL   Basic metabolic panel    Collection Time: 09/25/19 11:03 AM   Result Value Ref Range    Sodium 142 136 - 145 mmol/L    Potassium 3 8 3 5 - 5 3 mmol/L    Chloride 109 (H) 100 - 108 mmol/L    CO2 28 21 - 32 mmol/L    ANION GAP 5 4 - 13 mmol/L    BUN 15 5 - 25 mg/dL    Creatinine 0 98 0 60 - 1 30 mg/dL    Glucose 105 65 - 140 mg/dL    Calcium 8 7 8 3 - 10 1 mg/dL    eGFR 62 ml/min/1 73sq m   Protime-INR    Collection Time: 09/25/19 11:03 AM   Result Value Ref Range    Protime 12 6 11 6 - 14 5 seconds    INR 1 00 0 84 - 1 19   APTT    Collection Time: 09/25/19 11:03 AM   Result Value Ref Range    PTT 29 23 - 37 seconds   Troponin I    Collection Time: 09/25/19 11:03 AM   Result Value Ref Range    Troponin I <0 02 <=0 04 ng/mL   Troponin I    Collection Time: 09/25/19  3:05 PM   Result Value Ref Range    Troponin I <0 02 <=0 04 ng/mL   Platelet count    Collection Time: 09/25/19  3:05 PM   Result Value Ref Range    Platelets 065 989 - 527 Thousands/uL    MPV 11 4 8 9 - 12 7 fL   Troponin I    Collection Time: 09/25/19 6:16 PM   Result Value Ref Range    Troponin I <0 02 <=0 04 ng/mL   Lipid Panel with Direct LDL reflex    Collection Time: 09/26/19  4:55 AM   Result Value Ref Range    Cholesterol 175 50 - 200 mg/dL    Triglycerides 147 <=150 mg/dL    HDL, Direct 45 40 - 60 mg/dL    LDL Calculated 101 (H) 0 - 100 mg/dL       Imaging Studies: I have personally reviewed pertinent reports  and I have personally reviewed pertinent films in PACS  CTH- No acute intracranial abnormality  CTA head and neck with and without contrast- No evidence of acute vascular territorial infarction, intracranial hemorrhage, or mass effect  No stenosis, dissection, or occlusion of the carotid or vertebral arteries  Focal moderate to severe (60-80%) stenosis of the right posterior cerebral artery, likely secondary to atherosclerotic disease  MRI brain without contrast- Slight interval progression of the nonspecific cerebral white matter disease when comparing to 2011 examination  Findings are likely secondary to chronic microangiopathy       VTE Prophylaxis: Enoxaparin (Lovenox)

## 2019-09-26 NOTE — SPEECH THERAPY NOTE
Speech Language/Pathology  Speech/Language Pathology  Assessment    Patient Name: Angus Healy  [id number] Date: 9/26/2019     Problem List  Principal Problem:    Stroke-like symptoms  Active Problems:    Fibromyalgia    Chest tightness    TIA (transient ischemic attack)    Past Medical History  Past Medical History:   Diagnosis Date    Arthralgia     Atypical chest pain     Balance disorder     Cervical rib     last assessed 9/13/12    Depression     Fatigue     Fibromyalgia     Fibromyalgia     GERD (gastroesophageal reflux disease)     Hydronephrosis with renal and ureteral calculus obstruction     last assessed 5/1/17    Lac[facility]ar stroke ([facility] [address] )     Leukopenia     last assessed 5/19/16    Memory loss     Restless leg syndrome     last assessed 11/8/13    Sebaceous cyst     last assessed 2/13/13    Skin tag     last assessed 2/13/13    Vitamin D deficiency      Past Surgical History  Past Surgical History:   Procedure Laterality Date    COLONOSCOPY  2011    fiberoptic    KNEE SURGERY      right knee 2006 meniscal tear stage 04    AK COLONOSCOPY FLX DX W/COLLJ SPEC WHEN PFRMD N/A 3/16/2017    Procedure: COLONOSCOPY;  Surgeon: Randi Zapata MD;  Location: [facility] GI [facility]; Service: Gastroenterology    AK CYSTO/URETERO W/LITHOTRIPSY &INDWELL STENT INSRT Left 4/26/2017    Procedure: CYSTOSCOPY URETEROSCOPY; RETROGRADE PYELOGRAM; STONE EXTRACTION; INSERTION STENT URETERAL;  Surgeon: Verónica Elise MD;  Location:  MAIN OR;  Service: Urology       68-year-old female with past medical history of fibromyalgia presented with episode of disorientation and slurred speech while driving to work this morning  She felt nausea and upset stomach prior to leaving her house  While driving she noted visual disturbances with difficulty in distance/depth perception, followed by disorientation and hence missed her turn  Her partner noted that her speech was slurred while talking to her over the phone    Similar event recent with no evaluation done at the time  Reports poor sleep in the past  week  No history of migraine or seizure  Consult received for speech/swallow eval on stroke pathway  Pt passed nsg swallow screen; tolerating regular diet w/o s/s dysphagia or aspiration  No speech/language deficits reported  NIH score is 0  MRI: 9/25/19 No acute infarction  No need for formal speech/swallow eval at this time  Reconsult if needed    Kerry Rai, SLP

## 2019-09-26 NOTE — PHYSICAL THERAPY NOTE
Physical Therapy Screen Note      Referral received for PT eval and tx  Chart check performed  Mary Carmen Been reports pt is ambulating independently  Pt states walking w/o difficulty and has no concerns regarding discharge from hospital from mobility perspective  Inpatient PT is not indicated due to pt's independent mobility status  Discontinue PT      Se Garcia, PT

## 2019-09-26 NOTE — DISCHARGE SUMMARY
Please see progress note from earlier:    Discharging Physician / Practitioner: Hero Luna PA-C  PCP: Radha Brown MD  Admission Date:   Admission Orders (From admission, onward)     Ordered        09/25/19 1213  Inpatient Admission  Once                   Discharge Date: 09/26/19    Resolved Problems  Date Reviewed: 9/25/2019    None        Consultations During Hospital Stay:  · neurology    Procedures Performed:   · EEG normal      Significant Findings / Test Results:   · CTA head/neck= No evidence of acute vascular territorial infarction, intracranial hemorrhage or mass effect  No stenosis, dissection or occlusion of the carotid or vertebral arteries  Focal moderate to severe (60-80%) stenosis in the right posterior cerebral artery, likely secondary atherosclerotic disease      · MRI no acute changes No acute infarction  Slight interval progression of the nonspecific cerebral white matter disease when comparing to 2011 examination  Findings are likely secondary to chronic microangiopathy    · Head CT no acute changes  Left basal ganglia hypodensity on 2/15 consistent with chronic lacunar infarction, stable  · CXR no acute cardiopulmonary disease    Incidental Findings:   · none    Test Results Pending at Discharge (will require follow up):   · 2d echo     Outpatient Tests Requested:  · none    Complications:  none    Reason for Admission: disorientation    Hospital Course: Leroy De La Paz is a 61 y o  female patient who originally presented to the hospital on 9/25/2019 due to a 10 minutes episode of disorientation/lost time, slurred speech, and visual perception distortion  Upon admission to the hospital she was placed on stroke pathway and seen by Neurology  She had workup including CTA of the head and neck, MRI of the brain, and echocardiogram   She had moderate microangiopathic disease and 60-80% stenosis in the right posterior cerebral artery  She was not found to have an acute stroke  Neurology felt that her story was quite concerning for occipital seizures though she was noted to have a normal routine EEG  She was placed on Depakote and recommended to have follow-up in the office  She is not to drive for 6 months  Due to risk of worsening cerebrovascular disease and in light of her stenosis she was placed on aspirin and statin  She had also reported some chest tightness and had serial troponins and telemetry monitoring which were normal   Outpatient stress test had already been done in the past and was noted to be normal     Patient was admitted as an inpatient and was anticipated to require greater than 2 midnights in the hospital for stroke workup  Her workup was completed and she was cleared by Neurology for discharge today  Please see above list of diagnoses and related plan for additional information  Condition at Discharge: stable     Discharge Day Visit / Exam:     * Please refer to separate progress note for these details *    Discussion with Family:     Discharge instructions/Information to patient and family:   See after visit summary for information provided to patient and family  Provisions for Follow-Up Care:  See after visit summary for information related to follow-up care and any pertinent home health orders  Disposition:   Home    Planned Readmission: none     Discharge Statement:  I spent 55 minutes discharging the patient  This time was spent on the day of discharge  I had direct contact with the patient on the day of discharge  Greater than 50% of the total time was spent examining patient, answering all patient questions, arranging and discussing plan of care with patient as well as directly providing post-discharge instructions  Additional time then spent on discharge activities      Patient is not to drive for 6 months, [place] dot form performed    Case d/w neurology    Discharge Medications:  See after visit summary for reconciled discharge medications provided to patient and family        ** Please Note: This note has been constructed using a voice recognition system **

## 2019-09-26 NOTE — UTILIZATION REVIEW
Initial Clinical Review    Admission: Date/Time/Statement: Inpatient Admission Orders (From admission, onward)     Ordered        09/25/19 1213  Inpatient Admission  Once                   Orders Placed This Encounter   Procedures    Inpatient Admission     Standing Status:   Standing     Number of Occurrences:   1     Order Specific Question:   Admitting Physician     Answer:   Ana Wise [[id number]]     Order Specific Question:   Level of Care     Answer:   Med Surg [[id number]]     Order Specific Question:   Estimated length of stay     Answer:   More than 2 Midnights     Order Specific Question:   Certification     Answer:   I certify that inpatient services are medically necessary for this patient for a duration of greater than two midnights  See H&P and MD Progress Notes for additional information about the patient's course of treatment  ED Arrival Information     Expected Arrival Acuity Means of Arrival Escorted By Service Admission Type    - 9/25/2019 10:27 Emergent Walk-In Family Member General Medicine Emergency    Arrival Complaint    poss tia        Chief Complaint   Patient presents with    CVA/TIA-like Symptoms     pt states she was driving around [address] today, had a sudden onset of blurry vision/confusion  symptoms have now resolved     Assessment/Plan:  62 YO FEMALE PRESENTED TO ER FROM HOME AS INPATIENT ADMISSION DUE TO SUDDEN ONSET OF BLURRY VISION, CONFUSION WHILE DRIVING    (+) VISUAL DISTURBANCE,NAUSEA,SPEECH DIFFICULTY AND LIGHTHEADNESS EXAM BENIGN PMH FIBROMYALGIA AND CHRONIC CONSTIPATION  PLAN  Stroke-like symptoms  Assessment & Plan  · Patient presented with approximately 10 minute episode of visual distortion, slurred speech, lightheadedness and sense of being disoriented  The symptoms have now resolved    Patient reports she had a similar but less severe event approximately 1 year ago  · Differential diagnosis to include TIA verses seizure  · Will place patient on Stroke pathway; check CTA head and neck, MRI brain, echo, monitor on tele   · Requested neuro eval, will discuss with their team if there are any concerns regarding ongoing driving  · Will initiate ASA/statin regimen  · Check fasting lipid profile, A1c in a m    · Will get EEG       ED Triage Vitals [09/25/19 1036]   Temperature Pulse Respirations Blood Pressure SpO2   98 7 °F (37 1 °C) 81 18 144/85 96 %      Temp Source Heart Rate Source Patient Position - Orthostatic VS BP Location FiO2 (%)   Oral Monitor Sitting Right arm --      Pain Score       No Pain        Wt Readings from Last 1 Encounters:   09/25/19 68 kg (150 lb)     Additional Vital Signs:   09/25/19 1630  98 °F (36 7 °C)  80  18  114/74    98 %  None (Room air)  Sitting   09/25/19 1530  97 6 °F (36 4 °C)  73  18  135/74    100 %  None (Room air)  Lying   09/25/19 1430    61  16  140/88    98 %  None (Room air)  Lying   09/25/19 1330  98 °F (36 7 °C)  58  16  136/78    98 %  None (Room air)         Pertinent Labs/Diagnostic Test Results:   Results from last 7 days   Lab Units 09/25/19  1505 09/25/19  1103   WBC Thousand/uL  --  5 91   HEMOGLOBIN g/dL  --  14 5   HEMATOCRIT %  --  45 4   PLATELETS Thousands/uL 168 168   NEUTROS ABS Thousands/µL  --  4 03     Results from last 7 days   Lab Units 09/25/19  1103   SODIUM mmol/L 142   POTASSIUM mmol/L 3 8   CHLORIDE mmol/L 109*   CO2 mmol/L 28   ANION GAP mmol/L 5   BUN mg/dL 15   CREATININE mg/dL 0 98   EGFR ml/min/1 73sq m 62   CALCIUM mg/dL 8 7     Results from last 7 days   Lab Units 09/25/19  1040   POC GLUCOSE mg/dl 118     Results from last 7 days   Lab Units 09/25/19  1103   GLUCOSE RANDOM mg/dL 105     Results from last 7 days   Lab Units 09/26/19  0455   HEMOGLOBIN A1C % 5 4   EAG mg/dl 108     Results from last 7 days   Lab Units 09/25/19  1816 09/25/19  1505 09/25/19  1103   TROPONIN I ng/mL <0 02 <0 02 <0 02     Results from last 7 days   Lab Units 09/25/19  1103   PROTIME seconds 12 6   INR  1 00   PTT seconds 29 EKG 09-25-19  Normal sinus rhythm  Minimal voltage criteria for LVH, may be normal variant  Nonspecific ST and T wave abnormality  Abnormal ECG       CXR 09-25-19  Cardiomediastinal silhouette appears unremarkable  The lungs are clear  No pneumothorax or pleural effusion  Osseous structures appear within normal limits for patient   CT HEAD AND NECK 09-25-19  1   No evidence of acute vascular territorial infarction, intracranial hemorrhage or mass effect  2   No stenosis, dissection or occlusion of the carotid or vertebral arteries  3   Focal moderate to severe (60-80%) stenosis in the right posterior cerebral artery, likely secondary to atherosclerotic disease  MRI BRAIN 09-25-19  No acute infarction    CT HEAD W/O CONTRAST 09-25-19  No acute intracranial abnormality    ED Treatment:   Medication Administration from 09/25/2019 1027 to 09/25/2019 1333       Date/Time Order Dose Route Action Action by Comments     09/25/2019 1248 aspirin chewable tablet 324 mg 324 mg Oral Given Saundra High RN         Past Medical History:   Diagnosis Date    Arthralgia     Atypical chest pain     Balance disorder     Cervical rib     last assessed 9/13/12    Depression     Fatigue     Fibromyalgia     Fibromyalgia     GERD (gastroesophageal reflux disease)     Hydronephrosis with renal and ureteral calculus obstruction     last assessed 5/1/17    Lac[facility]ar stroke ([facility] [address] )     Leukopenia     last assessed 5/19/16    Memory loss     Restless leg syndrome     last assessed 11/8/13    Sebaceous cyst     last assessed 2/13/13    Skin tag     last assessed 2/13/13    Vitamin D deficiency      Present on Admission:   Fibromyalgia      Admitting Diagnosis: TIA (transient ischemic attack) [G45 9]  Weakness [R53 1]  Age/Sex: 61 y o  female  Admission Orders:    Current Facility-Administered Medications:  aspirin 81 mg Oral Daily   atorvastatin 40 mg Oral QPM   cholecalciferol 1,000 Units Oral Daily   diclofenac sodium 2 g Topical 4x Daily   enoxaparin 40 mg Subcutaneous Daily   fluticasone 1 spray Each Nare BID   multivitamin-minerals 1 tablet Oral Daily   pantoprazole 40 mg Oral Daily   pregabalin 100 mg Oral BID   venlafaxine 112 5 mg Oral QPM       IP CONSULT TO NEUROLOGY  IP CONSULT TO CASE MANAGEMENT  IP CONSULT TO NUTRITION SERVICES   NEURO AND VS Q 1 HR X 4  Q 2 HR X 4 THAN Q 4 HRS X 72  ECHO  EEG AWAKE OR DROWSY  PT/OT/SPEECH  TELEMETRY  SCD      Network Utilization Review Department  Phone: [phone]; Fax [phone]  [email or web address]  ATTENTION: Please call with any questions or concerns to [phone]  and carefully listen to the prompts so that you are directed to the right person  Send all requests for admission clinical reviews, approved or denied determinations and any other requests to fax [phone]   All voicemails are confidential

## 2019-09-26 NOTE — SOCIAL WORK
Consult received for Ischemic Stroke  Pt is reportedly independent in her room  PT screened pt and is not recommending any services    Pt does not have concerns regarding discharge from the hospital

## 2019-09-27 ENCOUNTER — TRANSITIONAL CARE MANAGEMENT (OUTPATIENT)
Dept: INTERNAL MEDICINE CLINIC | Facility: CLINIC | Age: 64
End: 2019-09-27

## 2019-10-01 ENCOUNTER — TELEPHONE (OUTPATIENT)
Dept: INTERNAL MEDICINE CLINIC | Facility: CLINIC | Age: 64
End: 2019-10-01

## 2019-10-01 NOTE — TELEPHONE ENCOUNTER
Left patient detailed message letting her know Transportation Program we have is not free  It is $22 per roundtrip  If patient is interested will need to contact , Chasity Jc  Also provided patient with Fabienne Schroeder phone # to call

## 2019-10-02 ENCOUNTER — OFFICE VISIT (OUTPATIENT)
Dept: INTERNAL MEDICINE CLINIC | Facility: CLINIC | Age: 64
End: 2019-10-02
Payer: COMMERCIAL

## 2019-10-02 ENCOUNTER — TELEPHONE (OUTPATIENT)
Dept: INTERNAL MEDICINE CLINIC | Facility: CLINIC | Age: 64
End: 2019-10-02

## 2019-10-02 VITALS
HEIGHT: 63 IN | BODY MASS INDEX: 27.68 KG/M2 | WEIGHT: 156.2 LBS | SYSTOLIC BLOOD PRESSURE: 104 MMHG | HEART RATE: 81 BPM | DIASTOLIC BLOOD PRESSURE: 74 MMHG | TEMPERATURE: 99.1 F | OXYGEN SATURATION: 98 % | RESPIRATION RATE: 18 BRPM

## 2019-10-02 DIAGNOSIS — K21.9 GASTROESOPHAGEAL REFLUX DISEASE, ESOPHAGITIS PRESENCE NOT SPECIFIED: ICD-10-CM

## 2019-10-02 DIAGNOSIS — R53.83 OTHER FATIGUE: ICD-10-CM

## 2019-10-02 DIAGNOSIS — F41.8 DEPRESSION WITH ANXIETY: ICD-10-CM

## 2019-10-02 DIAGNOSIS — E78.49 OTHER HYPERLIPIDEMIA: ICD-10-CM

## 2019-10-02 DIAGNOSIS — K59.04 CHRONIC IDIOPATHIC CONSTIPATION: ICD-10-CM

## 2019-10-02 DIAGNOSIS — G45.9 TIA (TRANSIENT ISCHEMIC ATTACK): Primary | ICD-10-CM

## 2019-10-02 DIAGNOSIS — R29.90 STROKE-LIKE SYMPTOMS: ICD-10-CM

## 2019-10-02 PROBLEM — Z23 NEED FOR SHINGLES VACCINE: Status: RESOLVED | Noted: 2018-04-17 | Resolved: 2019-10-02

## 2019-10-02 PROCEDURE — 99495 TRANSJ CARE MGMT MOD F2F 14D: CPT | Performed by: INTERNAL MEDICINE

## 2019-10-02 RX ORDER — PANTOPRAZOLE SODIUM 40 MG/1
40 TABLET, DELAYED RELEASE ORAL 2 TIMES DAILY
Qty: 90 TABLET | Refills: 1
Start: 2019-10-02 | End: 2019-10-15 | Stop reason: SDUPTHER

## 2019-10-02 NOTE — PROGRESS NOTES
Assessment/Plan:    Stroke-like symptoms  Working Dx of occipital / visual seizures  Normal EEG  Tolerating Depakote, isntructed to take with food  On ASA and statin  No driving as instructed  Chest tightness  Resolving  Normal workup  Suspect costochondritis  Reviewed normal echo  GERD (gastroesophageal reflux disease)  Increase PPI to bid  Other hyperlipidemia  Repeat lipids next visit, on statin  Depression with anxiety  Stable, on venlafaxine and Lyrica  Fibromyalgia  On Lyrica  Constipation  Instructed to take extra dose of PEG today, continue daily Colace  Diagnoses and all orders for this visit:    TIA (transient ischemic attack)    Stroke-like symptoms    Gastroesophageal reflux disease, esophagitis presence not specified  -     pantoprazole (PROTONIX) 40 mg tablet; Take 1 tablet (40 mg total) by mouth 2 (two) times a day    Other hyperlipidemia  -     Comprehensive metabolic panel; Future  -     Lipid panel; Future  -     TSH, 3rd generation with Free T4 reflex; Future    Other fatigue  -     CBC and differential; Future  -     TSH, 3rd generation with Free T4 reflex; Future    Gastroesophageal reflux disease, esophagitis presence not specified  Comments:  ongoing sx, start PPI & GI re-eval & f/u in 4 weeks  Orders:  -     pantoprazole (PROTONIX) 40 mg tablet; Take 1 tablet (40 mg total) by mouth 2 (two) times a day    Chronic idiopathic constipation    Depression with anxiety      Follow up in 4 months or as needed  Subjective:      Patient ID: Moses Liao is a 61 y o  female  Ms Velasquez Even is feeling tired  She reports feeling very tired yesterday, went home from work and took a long nap  Today, she forgot to take her medication with food, felt very nauseous and had 2 episodes of vomiting  Denies any shortness of breath, abdominal pain  She has not moved her bowels for the past 2 or 3 days  She also feels very weak, slightly dizzy    She does not feel like passing out, no extremity weakness or numbness  She has not been drinking water the past few days since she lost her water bottle  She continues to experience intermittent left upper chest pain  It feels sore will when she pushes on it  She also complains of frequent burping, feels her reflux is worse  She has been taking pantoprazole every morning  The following portions of the patient's history were reviewed and updated as appropriate: allergies, current medications, past medical history, past social history and problem list     Review of Systems   Constitutional: Positive for fatigue  HENT: Negative for congestion  Respiratory: Negative for cough, shortness of breath and wheezing  Cardiovascular: Positive for chest pain  Negative for palpitations and leg swelling  Gastrointestinal: Positive for constipation, nausea and vomiting  Genitourinary: Negative for dysuria  Musculoskeletal: Negative for arthralgias  Neurological: Negative for syncope and weakness  Psychiatric/Behavioral: Negative for dysphoric mood  The patient is not nervous/anxious  Objective:      /74 (BP Location: Left arm, Patient Position: Standing, Cuff Size: Standard) Comment: lightheaded upon standing  Pulse 81   Temp 99 1 °F (37 3 °C)   Resp 18   Ht 5' 3" (1 6 m)   Wt 70 9 kg (156 lb 3 2 oz)   SpO2 98%   BMI 27 67 kg/m²          Physical Exam   Constitutional: She is oriented to person, place, and time  She appears well-developed and well-nourished  HENT:   Head: Normocephalic and atraumatic  Nose: Nose normal    Eyes: Pupils are equal, round, and reactive to light  Conjunctivae are normal    Neck: Neck supple  Cardiovascular: Normal rate, regular rhythm and normal heart sounds  (+) tenderness left 2nd-3rd ICS   Pulmonary/Chest: Effort normal and breath sounds normal  She has no wheezes  She has no rales  Abdominal: Soft  Bowel sounds are normal    Musculoskeletal: She exhibits no edema  Neurological: She is alert and oriented to person, place, and time  Skin: Skin is warm  No rash noted  Psychiatric: She has a normal mood and affect  Her behavior is normal    Nursing note and vitals reviewed  Lab &/or imaging results reviewed with patient

## 2019-10-02 NOTE — TELEPHONE ENCOUNTER
Pt  Vomited when she got to work today  Feels lightheaded  Yesterday she felt very tired after work  Took a nap after work and woke up during the night feeling nauseous  She is supposed to take one of her new meds with food and forgot to take it with food this morning  She has an appt  Today but wants to know if she should do something before then?

## 2019-10-02 NOTE — TELEPHONE ENCOUNTER
2nd message left  Confirming if patient needs transportation  Patient coming in today at 11:15am for TCM appt

## 2019-10-02 NOTE — PATIENT INSTRUCTIONS
Take your medication (Depakote) with food  Take pantoprazole on an empty stomach in the morning, then at bedtime  Increase daily fluid intake  Call neurology for an appointment

## 2019-10-02 NOTE — TELEPHONE ENCOUNTER
Are you still vomiting? Are you able to drink fluids?     You can move up your appt to 11 or 11 30 today

## 2019-10-02 NOTE — ASSESSMENT & PLAN NOTE
Working Dx of occipital / visual seizures  Normal EEG  Tolerating Depakote, isntructed to take with food  On ASA and statin  No driving as instructed

## 2019-10-15 DIAGNOSIS — R05.3 COUGH, PERSISTENT: ICD-10-CM

## 2019-10-15 DIAGNOSIS — M79.7 FIBROMYALGIA: ICD-10-CM

## 2019-10-15 DIAGNOSIS — K21.9 GASTROESOPHAGEAL REFLUX DISEASE, ESOPHAGITIS PRESENCE NOT SPECIFIED: ICD-10-CM

## 2019-10-15 RX ORDER — PREGABALIN 100 MG/1
CAPSULE ORAL
Qty: 60 CAPSULE | Refills: 3 | Status: SHIPPED | OUTPATIENT
Start: 2019-10-15 | End: 2020-02-10 | Stop reason: SDUPTHER

## 2019-10-15 RX ORDER — PANTOPRAZOLE SODIUM 40 MG/1
TABLET, DELAYED RELEASE ORAL
Qty: 90 TABLET | Refills: 1 | Status: SHIPPED | OUTPATIENT
Start: 2019-10-15 | End: 2020-03-04 | Stop reason: ALTCHOICE

## 2019-10-27 DIAGNOSIS — G45.9 TIA (TRANSIENT ISCHEMIC ATTACK): ICD-10-CM

## 2019-10-27 RX ORDER — DIVALPROEX SODIUM 500 MG/1
TABLET, DELAYED RELEASE ORAL
Qty: 60 TABLET | Refills: 1 | Status: SHIPPED | OUTPATIENT
Start: 2019-10-27 | End: 2019-10-30 | Stop reason: SINTOL

## 2019-10-27 RX ORDER — ATORVASTATIN CALCIUM 40 MG/1
TABLET, FILM COATED ORAL
Qty: 90 TABLET | Refills: 1 | Status: SHIPPED | OUTPATIENT
Start: 2019-10-27 | End: 2020-05-01

## 2019-10-28 ENCOUNTER — TELEPHONE (OUTPATIENT)
Dept: INTERNAL MEDICINE CLINIC | Facility: CLINIC | Age: 64
End: 2019-10-28

## 2019-10-28 ENCOUNTER — TELEPHONE (OUTPATIENT)
Dept: NEUROLOGY | Facility: CLINIC | Age: 64
End: 2019-10-28

## 2019-10-28 NOTE — TELEPHONE ENCOUNTER
Pt  Has been taking it for a month - it is making her very exhausted  Barely able to work  She is losing her balance  She is getting worse instead of better  Can she decrease her dose or be put on something else?

## 2019-10-28 NOTE — TELEPHONE ENCOUNTER
Sorry, her symptoms are getting worse  The symptoms as stated have been there, but they are increasing in severity according to the patient

## 2019-10-28 NOTE — TELEPHONE ENCOUNTER
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

## 2019-10-28 NOTE — TELEPHONE ENCOUNTER
Patient calling regarding her prescription for depakote 500mg  She states that they are getting worse  She states she is exhausted all the time, barely functioning at work, weak, poor balance, gained weight, nausea, and vivid dreams  Patient currently taking 500mg BID  Patient also takinmg effexor HS  Lyrica 100mg BID      Patient scheduled to see you 19  She is also requesting sooner appointment  Call back number [phone] Okay to leave a detailed message

## 2019-10-28 NOTE — TELEPHONE ENCOUNTER
I am covering [facility] [place] [facility] [place] [facility], she is back tomorrow  However, just to clarify, patient will be a hospital follow up, not seen by our practice in the outpatient setting to date  Your message says "they are getting worse"  What is getting worse? What is "they"? ?

## 2019-10-28 NOTE — TELEPHONE ENCOUNTER
Can look for sooner appt for patient, but we have not seen her in the outpatient setting yet    Can also see PCP to discuss as well

## 2019-10-29 ENCOUNTER — TELEPHONE (OUTPATIENT)
Dept: NEUROLOGY | Facility: CLINIC | Age: 64
End: 2019-10-29

## 2019-10-29 NOTE — TELEPHONE ENCOUNTER
Patient added to your schedule at 815am from Masood TAI schedule in [facility]- due to Deb being out of office 10/30  Inpatient NeuroTeam recommending Epilepsy eval HFU  [facility] so that he can be available if you need him for consultation  Your MA was made aware  Thank you!

## 2019-10-30 ENCOUNTER — OFFICE VISIT (OUTPATIENT)
Dept: NEUROLOGY | Facility: CLINIC | Age: 64
End: 2019-10-30
Payer: COMMERCIAL

## 2019-10-30 VITALS
HEART RATE: 68 BPM | DIASTOLIC BLOOD PRESSURE: 78 MMHG | HEIGHT: 63 IN | BODY MASS INDEX: 27.46 KG/M2 | WEIGHT: 155 LBS | SYSTOLIC BLOOD PRESSURE: 112 MMHG

## 2019-10-30 DIAGNOSIS — R40.4 ALTERATION OF CONSCIOUSNESS: Primary | ICD-10-CM

## 2019-10-30 DIAGNOSIS — I67.9 INTRACRANIAL VASCULAR STENOSIS: ICD-10-CM

## 2019-10-30 PROCEDURE — 99215 OFFICE O/P EST HI 40 MIN: CPT | Performed by: PHYSICIAN ASSISTANT

## 2019-10-30 NOTE — PROGRESS NOTES
Patient ID: Gissel Ryan is a 59 y o  female  Assessment/Plan:    Alteration of consciousness  · Event unclear etiology  · Pt will wean off Depakote  She will take 500mg at bedtime for the next 2 nights and then stop the medication  · A sleep deprived EEG will be obtained  · Pt will call the office with any further events  · Forty five minutes were spent with the patient gathering history, examining patient and formulating a treatment plan  Pt/family understood and were in agreement with the treatment plan  · She will follow-up with Dr Johanna Cooper in 2 months  Intracranial vascular stenosis  · Continue Aspirin  · Continue statin  Problem List Items Addressed This Visit        Cardiovascular and Mediastinum    Intracranial vascular stenosis     · Continue Aspirin  · Continue statin  Other    Alteration of consciousness - Primary     · Event unclear etiology  · Pt will wean off Depakote  She will take 500mg at bedtime for the next 2 nights and then stop the medication  · A sleep deprived EEG will be obtained  · Forty five minutes were spent with the patient gathering history, examining patient and formulating a treatment plan  Pt/family understood and were in agreement with the treatment plan  · She will follow-up with Dr Johanna Cooper in 2 months  Relevant Orders    EEG Sleep deprived             Subjective:    HPI    Gissel Ryan is a 61 y o   female with fibromyalgia, chronic constipation who presents with complaint of an episode of disorientation  She notes she was driving to work yesterday and while on the way to work she said that she developed a sensation of having an upset stomach as well as lightheadedness  She notes that she then began having visual disturbance in which she describes alternating of things appearing close and then further away  She notes that looked like she was looking at a funhouse mirror   She called a friend while driving and her friend told her to pull over  It was noted that her speech was slurred  She did not as she kept saying there was no shoulder  She states that she felt like she was going to pass out  She notes that she somehow missed a turn and became lost  She was confused  She notes that the symptoms lasted between seven and 8 minutes and then resolved on their own  She notes that she was then able to use her GPS alex on her phone and get to work  When she arrived at work she went to see the nurse and also called her PCP and they recommended that she come to the emergency department for evaluation  She notes that she had similar vision symptoms one time about a year ago but did not pursue any further workup at that time  She denies personal history of seizures, febrile seizures, meningitis, encephalitis, significant head injury  She notes that she was born at full term  She denies family history of any seizures  She denies personal history of migraine headaches  She does note that she has a history of dementia on both sides of her family and she herself notes that she occasionally will have difficulty coming up with a word in conversation and this has been concerning for her  She has not had any difficulty performing her own ADLs and up until hospiral admission, she had not gotten lost previously  She notes she currently feels well and has not had any recurrence of symptoms  Brain MRI was negative  EEG was unrevealing  CTA of the head and neck revealed moderate stenosis of R PCA  She was seen by Neurology and believed her symptoms were consistent with occipital seizure  She was started on Depakote  She is currently taking Depakote 500mg 2x daily although she has skipped the morning dose for the past few days  She states that it is causing extreme fatigue  She is a  at [employer]  She reports that the fatigue is impacting her job  She will spend the weekends sleeping   She has not had any further events since being discharged from the hospital  She believes that the event was due to low blood sugar as she did not have the protein she typically has in the morning  She has not been driving since the event  The following portions of the patient's history were reviewed and updated as appropriate: allergies, current medications, past family history, past medical history, past social history, past surgical history and problem list          Objective:    Blood pressure 112/78, pulse 68, height 5' 3" (1 6 m), weight 70 3 kg (155 lb)  Physical Exam   Eyes: Pupils are equal, round, and reactive to light  Lids are normal    Neurological: She has normal strength and normal reflexes  Coordination normal    Psychiatric: Her speech is normal    Vitals reviewed  Neurological Exam  Mental Status   Oriented to person, place, time and situation  Recent and remote memory are intact  Speech is normal  Language is fluent with no aphasia  Attention and concentration are normal  Fund of knowledge is appropriate for level of education  Apraxia absent  Cranial Nerves  CN II: Visual acuity is normal  Visual fields full to confrontation  CN III, IV, VI: Extraocular movements intact bilaterally  Normal lids and orbits bilaterally  Pupils equal round and reactive to light bilaterally  CN V: Facial sensation is normal   CN VII: Full and symmetric facial movement  CN VIII: Hearing is normal   CN IX, X: Palate elevates symmetrically  Normal gag reflex  CN XI: Shoulder shrug strength is normal   CN XII: Tongue midline without atrophy or fasciculations  Motor   Strength is 5/5 throughout all four extremities  Sensory  Sensation is intact to light touch, pinprick, vibration and proprioception in all four extremities  Reflexes  Deep tendon reflexes are 2+ and symmetric in all four extremities with downgoing toes bilaterally      Coordination  Finger-to-nose, rapid alternating movements and heel-to-shin normal bilaterally without dysmetria  Gait  Normal casual, toe, heel and tandem gait  ROS:    Review of Systems   Constitutional: Negative  Negative for appetite change and fever  HENT: Negative  Negative for hearing loss, tinnitus, trouble swallowing and voice change  Eyes: Negative  Negative for photophobia and pain  Respiratory: Negative  Negative for shortness of breath  Cardiovascular: Negative  Negative for palpitations  Gastrointestinal: Negative  Negative for nausea and vomiting  Endocrine: Negative  Negative for cold intolerance and heat intolerance  Genitourinary: Negative  Negative for dysuria, frequency and urgency  Musculoskeletal: Negative  Negative for myalgias and neck pain  Skin: Negative  Negative for rash  Neurological: Negative  Negative for dizziness, tremors, seizures, syncope, facial asymmetry, speech difficulty, weakness, light-headedness, numbness and headaches  Hematological: Negative  Does not bruise/bleed easily  Psychiatric/Behavioral: Positive for sleep disturbance (sleep more)  Negative for confusion and hallucinations  Review of systems personally reviewed

## 2019-10-30 NOTE — ASSESSMENT & PLAN NOTE
· Event unclear etiology  · Pt will wean off Depakote  She will take 500mg at bedtime for the next 2 nights and then stop the medication  · A sleep deprived EEG will be obtained  · Pt will call the office with any further events  · Forty five minutes were spent with the patient gathering history, examining patient and formulating a treatment plan  Pt/family understood and were in agreement with the treatment plan  · She will follow-up with Dr Allison Roberson in 2 months

## 2019-10-30 NOTE — PATIENT INSTRUCTIONS
Alteration of consciousness  · Event unclear etiology  · Pt will wean off Depakote  She will take 500mg at bedtime for the next 2 nights and then stop the medication  · A sleep deprived EEG will be obtained  · Pt will call the office with any further events  · Forty five minutes were spent with the patient gathering history, examining patient and formulating a treatment plan  Pt/family understood and were in agreement with the treatment plan  · She will follow-up with Dr Jennifer Orosco in 2 months  Intracranial vascular stenosis  · Continue Aspirin  · Continue statin

## 2019-11-01 ENCOUNTER — HOSPITAL ENCOUNTER (OUTPATIENT)
Dept: NEUROLOGY | Facility: CLINIC | Age: 64
Discharge: HOME | End: 2019-11-01
Payer: COMMERCIAL

## 2019-11-01 DIAGNOSIS — R40.4 ALTERATION OF CONSCIOUSNESS: ICD-10-CM

## 2019-11-01 PROCEDURE — 95819 EEG AWAKE AND ASLEEP: CPT

## 2019-11-01 PROCEDURE — 95812 EEG 41-60 MINUTES: CPT | Performed by: PSYCHIATRY & NEUROLOGY

## 2019-11-06 DIAGNOSIS — F41.8 DEPRESSION WITH ANXIETY: ICD-10-CM

## 2019-11-06 DIAGNOSIS — M79.7 FIBROMYALGIA: ICD-10-CM

## 2019-11-08 RX ORDER — VENLAFAXINE HYDROCHLORIDE 37.5 MG/1
CAPSULE, EXTENDED RELEASE ORAL
Qty: 270 CAPSULE | Refills: 0 | Status: SHIPPED | OUTPATIENT
Start: 2019-11-08 | End: 2020-02-10

## 2019-11-25 DIAGNOSIS — K59.00 CONSTIPATION, UNSPECIFIED CONSTIPATION TYPE: ICD-10-CM

## 2019-11-25 RX ORDER — LACTULOSE 10 G/15ML
SOLUTION ORAL; RECTAL
Qty: 946 ML | Refills: 0 | Status: SHIPPED | OUTPATIENT
Start: 2019-11-25 | End: 2020-03-19

## 2019-12-03 ENCOUNTER — TELEPHONE (OUTPATIENT)
Dept: NEUROLOGY | Facility: CLINIC | Age: 64
End: 2019-12-03

## 2019-12-03 NOTE — TELEPHONE ENCOUNTER
Patient called to review EEG - sleep deprived results  Reviewed with patient study is listed as normal  Patient is asking if driving restrictions can be lifted or these results can be reported to state as so far there is not findings of seizure  Please advise

## 2019-12-05 ENCOUNTER — TELEPHONE (OUTPATIENT)
Dept: OTHER | Facility: HOSPITAL | Age: 64
End: 2019-12-05

## 2019-12-05 NOTE — TELEPHONE ENCOUNTER
Fredi form completed and sent to Sandra Hunter via email  Please fax and import into epic  The form indicates my support for her return to driving, but she must know that Fredi may or may not agree with my assessment  They make the final decision regarding return to driving

## 2019-12-06 NOTE — TELEPHONE ENCOUNTER
Completed seizure reporting form received from Dr Galvan  Form faxed to EM and left in bin at  to be scanned into chart  Message left for patient to inform her of this as well as Dr Galvan message below

## 2019-12-31 DIAGNOSIS — J30.89 ALLERGIC RHINITIS DUE TO OTHER ALLERGIC TRIGGER, UNSPECIFIED SEASONALITY: ICD-10-CM

## 2019-12-31 RX ORDER — MOMETASONE FUROATE 50 UG/1
2 SPRAY, METERED NASAL DAILY
Qty: 17 G | Refills: 0 | Status: SHIPPED | OUTPATIENT
Start: 2019-12-31 | End: 2021-07-14

## 2020-02-10 DIAGNOSIS — M79.7 FIBROMYALGIA: ICD-10-CM

## 2020-02-10 DIAGNOSIS — F41.8 DEPRESSION WITH ANXIETY: ICD-10-CM

## 2020-02-10 RX ORDER — VENLAFAXINE HYDROCHLORIDE 37.5 MG/1
CAPSULE, EXTENDED RELEASE ORAL
Qty: 90 CAPSULE | Refills: 0 | Status: SHIPPED | OUTPATIENT
Start: 2020-02-10 | End: 2020-03-09

## 2020-02-10 RX ORDER — PREGABALIN 100 MG/1
100 CAPSULE ORAL 2 TIMES DAILY
Qty: 60 CAPSULE | Refills: 0 | Status: SHIPPED | OUTPATIENT
Start: 2020-02-10 | End: 2020-03-16 | Stop reason: SDUPTHER

## 2020-02-17 ENCOUNTER — TELEPHONE (OUTPATIENT)
Dept: NEUROLOGY | Facility: CLINIC | Age: 65
End: 2020-02-17

## 2020-02-18 ENCOUNTER — OFFICE VISIT (OUTPATIENT)
Dept: NEUROLOGY | Facility: CLINIC | Age: 65
End: 2020-02-18
Payer: COMMERCIAL

## 2020-02-18 VITALS
HEART RATE: 62 BPM | BODY MASS INDEX: 28.17 KG/M2 | DIASTOLIC BLOOD PRESSURE: 80 MMHG | WEIGHT: 159 LBS | HEIGHT: 63 IN | SYSTOLIC BLOOD PRESSURE: 120 MMHG

## 2020-02-18 DIAGNOSIS — R40.4 ALTERATION OF CONSCIOUSNESS: Primary | ICD-10-CM

## 2020-02-18 DIAGNOSIS — R47.89 WORD FINDING PROBLEM: ICD-10-CM

## 2020-02-18 DIAGNOSIS — R26.89 BALANCE PROBLEM: ICD-10-CM

## 2020-02-18 PROCEDURE — 3074F SYST BP LT 130 MM HG: CPT | Performed by: PSYCHIATRY & NEUROLOGY

## 2020-02-18 PROCEDURE — 3079F DIAST BP 80-89 MM HG: CPT | Performed by: PSYCHIATRY & NEUROLOGY

## 2020-02-18 PROCEDURE — 1036F TOBACCO NON-USER: CPT | Performed by: PSYCHIATRY & NEUROLOGY

## 2020-02-18 PROCEDURE — 99213 OFFICE O/P EST LOW 20 MIN: CPT | Performed by: PSYCHIATRY & NEUROLOGY

## 2020-02-18 PROCEDURE — 3008F BODY MASS INDEX DOCD: CPT | Performed by: PSYCHIATRY & NEUROLOGY

## 2020-02-18 NOTE — PROGRESS NOTES
[place] [address] [facility] [address] [facility] [facility] [address]  Follow Up Visit    Impression/Plan    Ms Armando Gordon is a 59 y o  female with event involving alteration of consciousness in 9/2019 of unclear etiology  No additional events  Weaned off divalproex after last visit and subsequent SD EEG was normal  She will let us know right away if there are additional concerning events or if memory/language concerns or balance problems worsen  She can return to driving once she is 6 months event free  She has moderate/severe left PCA stenosis  Continue asa and statin  Patient Instructions   1  Return as needed  2  Contact us for [place] DOT forms when your have been event free for 6 months  Diagnoses and all orders for this visit:    Alteration of consciousness    Balance problem    Word finding problem        Subjective    Rebecca Guerra is returning to the [place] [address] [facility] for follow up  Interval Events:   No additional events  No staring spells  No evidence of nocturnal seizure  No concerning myoclonus  Divalproex stopped at last visit  Still not driving  Has chronic problem with balance where she may bump into things, stable for a few years, no falls  Current AEDs:  None    ROS:  Constitutional: Positive for fatigue  Negative for appetite change and fever  HENT: Negative  Negative for hearing loss, tinnitus, trouble swallowing and voice change  Eyes: Negative  Negative for photophobia and pain  Respiratory: Negative  Negative for shortness of breath  Cardiovascular: Negative  Negative for palpitations  Gastrointestinal: Negative  Negative for nausea and vomiting  Endocrine: Negative  Negative for cold intolerance and heat intolerance  Genitourinary: Negative  Negative for dysuria, frequency and urgency  Musculoskeletal: Negative  Negative for myalgias and neck pain  Skin: Negative  Negative for rash  Neurological: Negative    Negative for dizziness, tremors, seizures, syncope, facial asymmetry, speech difficulty, weakness, light-headedness, numbness and headaches  Hematological: Negative  Does not bruise/bleed easily  Psychiatric/Behavioral: Negative  Negative for confusion, hallucinations and sleep disturbance  ROS reviewed and updated as appropriate  Objective    /80 (BP Location: Left arm, Patient Position: Sitting, Cuff Size: Standard)   Pulse 62   Ht 5' 3" (1 6 m)   Wt 72 1 kg (159 lb)   BMI 28 17 kg/m²      General Exam  No acute distress  Neurologic Exam  Mental Status:  Alert and oriented  Language: normal fluency and comprehension  Rare word finding problem during interview  Cranial Nerves:  VFFTC  EOMI, no nystagums  Face symmetric  No dysarthria  Motor:  No drift  Strength 5/5 throughout  Coordination: Finger to nose intact  Gait: Normal casual gait

## 2020-02-18 NOTE — PROGRESS NOTES
Review of Systems   Constitutional: Positive for fatigue  Negative for appetite change and fever  HENT: Negative  Negative for hearing loss, tinnitus, trouble swallowing and voice change  Eyes: Negative  Negative for photophobia and pain  Respiratory: Negative  Negative for shortness of breath  Cardiovascular: Negative  Negative for palpitations  Gastrointestinal: Negative  Negative for nausea and vomiting  Endocrine: Negative  Negative for cold intolerance and heat intolerance  Genitourinary: Negative  Negative for dysuria, frequency and urgency  Musculoskeletal: Negative  Negative for myalgias and neck pain  Skin: Negative  Negative for rash  Neurological: Negative  Negative for dizziness, tremors, seizures, syncope, facial asymmetry, speech difficulty, weakness, light-headedness, numbness and headaches  Hematological: Negative  Does not bruise/bleed easily  Psychiatric/Behavioral: Negative  Negative for confusion, hallucinations and sleep disturbance

## 2020-02-18 NOTE — PATIENT INSTRUCTIONS
1  Return as needed  2  Contact us for [place] DOT forms when your have been event free for 6 months

## 2020-03-04 ENCOUNTER — OFFICE VISIT (OUTPATIENT)
Dept: INTERNAL MEDICINE CLINIC | Facility: CLINIC | Age: 65
End: 2020-03-04
Payer: COMMERCIAL

## 2020-03-04 ENCOUNTER — APPOINTMENT (OUTPATIENT)
Dept: LAB | Facility: CLINIC | Age: 65
End: 2020-03-04
Payer: COMMERCIAL

## 2020-03-04 VITALS
OXYGEN SATURATION: 98 % | BODY MASS INDEX: 27.89 KG/M2 | HEIGHT: 63 IN | SYSTOLIC BLOOD PRESSURE: 112 MMHG | DIASTOLIC BLOOD PRESSURE: 76 MMHG | TEMPERATURE: 97.3 F | RESPIRATION RATE: 18 BRPM | WEIGHT: 157.4 LBS | HEART RATE: 60 BPM

## 2020-03-04 DIAGNOSIS — R29.90 STROKE-LIKE SYMPTOMS: ICD-10-CM

## 2020-03-04 DIAGNOSIS — M79.7 FIBROMYALGIA: Primary | ICD-10-CM

## 2020-03-04 DIAGNOSIS — J30.89 ALLERGIC RHINITIS DUE TO OTHER ALLERGIC TRIGGER, UNSPECIFIED SEASONALITY: ICD-10-CM

## 2020-03-04 DIAGNOSIS — R26.89 BALANCE DISORDER: ICD-10-CM

## 2020-03-04 DIAGNOSIS — F41.8 DEPRESSION WITH ANXIETY: ICD-10-CM

## 2020-03-04 DIAGNOSIS — E78.49 OTHER HYPERLIPIDEMIA: ICD-10-CM

## 2020-03-04 DIAGNOSIS — Z23 NEED FOR INFLUENZA VACCINATION: ICD-10-CM

## 2020-03-04 DIAGNOSIS — Z79.899 ENCOUNTER FOR LONG-TERM CURRENT USE OF MEDICATION: ICD-10-CM

## 2020-03-04 DIAGNOSIS — J00 ACUTE RHINITIS: ICD-10-CM

## 2020-03-04 DIAGNOSIS — R53.83 OTHER FATIGUE: ICD-10-CM

## 2020-03-04 DIAGNOSIS — R40.4 ALTERATION OF CONSCIOUSNESS: ICD-10-CM

## 2020-03-04 DIAGNOSIS — G25.81 RESTLESS LEGS SYNDROME: ICD-10-CM

## 2020-03-04 DIAGNOSIS — G89.29 CHRONIC PAIN OF BOTH KNEES: ICD-10-CM

## 2020-03-04 DIAGNOSIS — K21.9 GASTROESOPHAGEAL REFLUX DISEASE, ESOPHAGITIS PRESENCE NOT SPECIFIED: ICD-10-CM

## 2020-03-04 DIAGNOSIS — M25.561 CHRONIC PAIN OF BOTH KNEES: ICD-10-CM

## 2020-03-04 DIAGNOSIS — Z12.31 ENCOUNTER FOR SCREENING MAMMOGRAM FOR BREAST CANCER: ICD-10-CM

## 2020-03-04 DIAGNOSIS — E55.9 VITAMIN D DEFICIENCY: ICD-10-CM

## 2020-03-04 DIAGNOSIS — M25.562 CHRONIC PAIN OF BOTH KNEES: ICD-10-CM

## 2020-03-04 LAB
ALBUMIN SERPL BCP-MCNC: 3.6 G/DL (ref 3.5–5)
ALP SERPL-CCNC: 71 U/L (ref 46–116)
ALT SERPL W P-5'-P-CCNC: 56 U/L (ref 12–78)
ANION GAP SERPL CALCULATED.3IONS-SCNC: 4 MMOL/L (ref 4–13)
AST SERPL W P-5'-P-CCNC: 31 U/L (ref 5–45)
BASOPHILS # BLD AUTO: 0.06 THOUSANDS/ΜL (ref 0–0.1)
BASOPHILS NFR BLD AUTO: 1 % (ref 0–1)
BILIRUB SERPL-MCNC: 0.24 MG/DL (ref 0.2–1)
BUN SERPL-MCNC: 14 MG/DL (ref 5–25)
CALCIUM SERPL-MCNC: 8.8 MG/DL (ref 8.3–10.1)
CHLORIDE SERPL-SCNC: 109 MMOL/L (ref 100–108)
CO2 SERPL-SCNC: 33 MMOL/L (ref 21–32)
CREAT SERPL-MCNC: 0.94 MG/DL (ref 0.6–1.3)
EOSINOPHIL # BLD AUTO: 0.18 THOUSAND/ΜL (ref 0–0.61)
EOSINOPHIL NFR BLD AUTO: 3 % (ref 0–6)
ERYTHROCYTE [DISTWIDTH] IN BLOOD BY AUTOMATED COUNT: 13.3 % (ref 11.6–15.1)
GFR SERPL CREATININE-BSD FRML MDRD: 64 ML/MIN/1.73SQ M
GLUCOSE SERPL-MCNC: 90 MG/DL (ref 65–140)
HCT VFR BLD AUTO: 42.7 % (ref 34.8–46.1)
HGB BLD-MCNC: 13.6 G/DL (ref 11.5–15.4)
IMM GRANULOCYTES # BLD AUTO: 0.01 THOUSAND/UL (ref 0–0.2)
IMM GRANULOCYTES NFR BLD AUTO: 0 % (ref 0–2)
LYMPHOCYTES # BLD AUTO: 2.08 THOUSANDS/ΜL (ref 0.6–4.47)
LYMPHOCYTES NFR BLD AUTO: 32 % (ref 14–44)
MCH RBC QN AUTO: 29.7 PG (ref 26.8–34.3)
MCHC RBC AUTO-ENTMCNC: 31.9 G/DL (ref 31.4–37.4)
MCV RBC AUTO: 93 FL (ref 82–98)
MONOCYTES # BLD AUTO: 0.6 THOUSAND/ΜL (ref 0.17–1.22)
MONOCYTES NFR BLD AUTO: 9 % (ref 4–12)
NEUTROPHILS # BLD AUTO: 3.58 THOUSANDS/ΜL (ref 1.85–7.62)
NEUTS SEG NFR BLD AUTO: 55 % (ref 43–75)
NRBC BLD AUTO-RTO: 0 /100 WBCS
PLATELET # BLD AUTO: 145 THOUSANDS/UL (ref 149–390)
PMV BLD AUTO: 11.7 FL (ref 8.9–12.7)
POTASSIUM SERPL-SCNC: 3.7 MMOL/L (ref 3.5–5.3)
PROT SERPL-MCNC: 7 G/DL (ref 6.4–8.2)
RBC # BLD AUTO: 4.58 MILLION/UL (ref 3.81–5.12)
SODIUM SERPL-SCNC: 146 MMOL/L (ref 136–145)
TSH SERPL DL<=0.05 MIU/L-ACNC: 1.07 UIU/ML (ref 0.36–3.74)
WBC # BLD AUTO: 6.51 THOUSAND/UL (ref 4.31–10.16)

## 2020-03-04 PROCEDURE — 84443 ASSAY THYROID STIM HORMONE: CPT | Performed by: INTERNAL MEDICINE

## 2020-03-04 PROCEDURE — 85025 COMPLETE CBC W/AUTO DIFF WBC: CPT | Performed by: INTERNAL MEDICINE

## 2020-03-04 PROCEDURE — 3008F BODY MASS INDEX DOCD: CPT | Performed by: INTERNAL MEDICINE

## 2020-03-04 PROCEDURE — 90471 IMMUNIZATION ADMIN: CPT | Performed by: INTERNAL MEDICINE

## 2020-03-04 PROCEDURE — 3074F SYST BP LT 130 MM HG: CPT | Performed by: INTERNAL MEDICINE

## 2020-03-04 PROCEDURE — 3078F DIAST BP <80 MM HG: CPT | Performed by: INTERNAL MEDICINE

## 2020-03-04 PROCEDURE — 99214 OFFICE O/P EST MOD 30 MIN: CPT | Performed by: INTERNAL MEDICINE

## 2020-03-04 PROCEDURE — 36415 COLL VENOUS BLD VENIPUNCTURE: CPT | Performed by: INTERNAL MEDICINE

## 2020-03-04 PROCEDURE — 90682 RIV4 VACC RECOMBINANT DNA IM: CPT | Performed by: INTERNAL MEDICINE

## 2020-03-04 PROCEDURE — 80053 COMPREHEN METABOLIC PANEL: CPT | Performed by: INTERNAL MEDICINE

## 2020-03-04 PROCEDURE — 82607 VITAMIN B-12: CPT | Performed by: INTERNAL MEDICINE

## 2020-03-04 PROCEDURE — 82728 ASSAY OF FERRITIN: CPT | Performed by: INTERNAL MEDICINE

## 2020-03-04 PROCEDURE — 1036F TOBACCO NON-USER: CPT | Performed by: INTERNAL MEDICINE

## 2020-03-04 PROCEDURE — 82306 VITAMIN D 25 HYDROXY: CPT | Performed by: INTERNAL MEDICINE

## 2020-03-04 RX ORDER — FLUTICASONE PROPIONATE 50 MCG
1 SPRAY, SUSPENSION (ML) NASAL DAILY PRN
Qty: 16 G | Refills: 1 | Status: SHIPPED | OUTPATIENT
Start: 2020-03-04

## 2020-03-04 RX ORDER — ESOMEPRAZOLE MAGNESIUM 40 MG/1
40 CAPSULE, DELAYED RELEASE ORAL
Qty: 60 CAPSULE | Refills: 1 | Status: SHIPPED | OUTPATIENT
Start: 2020-03-04 | End: 2020-04-09 | Stop reason: ALTCHOICE

## 2020-03-04 NOTE — PROGRESS NOTES
Assessment/Plan:    Allergic rhinitis  Start using steroid nasal spray daily, saline spray prn  Fatigue  Symptoms gradually worse  Differential Dx: depression, sleep pathology  Agrees to see sleep, willing to do a sleep study  Depression with anxiety  On Lyrica and venlafaxine  If sleep eval normal, consider psych referral  ? Abilify    Alteration of consciousness  Off Depakote, no further symptoms  Able to drive again next month  Will see neurology prn  Intracranial vascular stenosis  Continue daily ASA, statin  Knee pain, bilateral  No plans of surgery at this time  GERD (gastroesophageal reflux disease)  Change PPI, increase to bid  Consider GI referral   Already on low acid diet  Fibromyalgia  On Lyrica  Diagnoses and all orders for this visit:    Fibromyalgia    Acute rhinitis  -     fluticasone (FLONASE) 50 mcg/act nasal spray; 1 spray into each nostril daily as needed for rhinitis    Depression with anxiety    Alteration of consciousness    Other fatigue  -     Ambulatory referral to Sleep Medicine; Future    Encounter for screening mammogram for breast cancer  -     Mammo screening bilateral w cad; Future    Need for influenza vaccination  -     influenza vaccine, 4590-0403, quadrivalent, recombinant, PF, 0 5 mL, for patients 18 yr+ (FLUBLOK)    Encounter for long-term current use of medication  -     Vitamin B12    Stroke-like symptoms    Restless legs syndrome  -     CBC and differential  -     Comprehensive metabolic panel  -     Ferritin  -     Ambulatory referral to Sleep Medicine; Future    Vitamin D deficiency  -     Vitamin D 25 hydroxy    Other hyperlipidemia  -     Comprehensive metabolic panel  -     TSH, 3rd generation with Free T4 reflex    Balance disorder  -     Ambulatory referral to Physical Therapy; Future    Chronic pain of both knees    Gastroesophageal reflux disease, esophagitis presence not specified  -     esomeprazole (NexIUM) 40 MG capsule;  Take 1 capsule (40 mg total) by mouth 2 (two) times a day before meals    Allergic rhinitis due to other allergic trigger, unspecified seasonality      Follow up in 6 months or as needed  Subjective:      Patient ID: Herberth Norman is a 59 y o  female  Ms Colton Fitzgerald has several concerns  First, she complains of frequent nasal congestion, post nasal drip and a non productive cough  Symptoms started about a month ago  Denies any fever or chills, no nausea or vomiting  She has not tried any over-the-counter medication  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, with frequent clearing of throat  Second, she complains of more frequent fatigue  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 and feels very exhausted by mid day  She drinks a lot of coffee to get going  During the weekend, she usually does her errands on a Saturday, finds herself sleeping all day and is unable to do anything  She does not feel well rested when she wakes up in the morning  She does have a history of depression, takes her medications regularly  Third, she complains of more frequent reflux symptoms  She complains of frequent burping, no epigastric pain, bloating or cramping  She has been taking her pantoprazole every morning  She continues to experience bilateral knee pain, has not had surgery  She complains of poor balance, fell on the stairs few days ago  No head injury or loss of consciousness  She feels she bumps into walls, does not use a cane  She is not driving yet, not interested in physical therapy because of this  The following portions of the patient's history were reviewed and updated as appropriate: allergies, current medications, past medical history, past social history and problem list     Review of Systems   Constitutional: Positive for activity change and fatigue  Negative for appetite change  HENT: Positive for congestion, postnasal drip and rhinorrhea   Negative for sinus pressure and sinus pain  Respiratory: Negative for cough and shortness of breath  Cardiovascular: Negative for chest pain and palpitations  Gastrointestinal: Negative for abdominal pain, nausea and vomiting  Genitourinary: Negative for dysuria  Musculoskeletal: Negative for arthralgias  Neurological: Negative for dizziness and weakness  Psychiatric/Behavioral: Positive for sleep disturbance  Negative for confusion and dysphoric mood  Objective:      /76   Pulse 60   Temp (!) 97 3 °F (36 3 °C) (Oral)   Resp 18   Ht 5' 3" (1 6 m)   Wt 71 4 kg (157 lb 6 4 oz)   SpO2 98%   BMI 27 88 kg/m²          Physical Exam   Constitutional: She is oriented to person, place, and time  Vital signs are normal  She appears well-developed and well-nourished  She does not appear ill  No distress  HENT:   Head: Normocephalic and atraumatic  Eyes: Pupils are equal, round, and reactive to light  Cardiovascular: Normal rate, regular rhythm and normal heart sounds  Pulmonary/Chest: Effort normal and breath sounds normal  She has no wheezes  Abdominal: Soft  Bowel sounds are normal    Musculoskeletal: She exhibits no edema  Neurological: She is alert and oriented to person, place, and time  Skin: Skin is warm  Psychiatric: Her behavior is normal  Her mood appears not anxious  She exhibits a depressed mood  Nursing note and vitals reviewed  Labs & imaging results reviewed with patient

## 2020-03-04 NOTE — ASSESSMENT & PLAN NOTE
Change PPI, increase to bid  Consider GI referral   Already on low acid diet 
Continue daily ASA, statin 
No plans of surgery at this time 
Off Depakote, no further symptoms  Able to drive again next month  Will see neurology prn 
On Lyrica and venlafaxine  If sleep eval normal, consider psych referral  ? Abilify
On [place] 
Start using steroid nasal spray daily, saline spray prn 
Symptoms gradually worse  Differential Dx: depression, sleep pathology  Agrees to see sleep, willing to do a sleep study 
no fever and no chills.

## 2020-03-05 ENCOUNTER — TELEPHONE (OUTPATIENT)
Dept: INTERNAL MEDICINE CLINIC | Facility: CLINIC | Age: 65
End: 2020-03-05

## 2020-03-05 LAB
25(OH)D3 SERPL-MCNC: 37.8 NG/ML (ref 30–100)
FERRITIN SERPL-MCNC: 15 NG/ML (ref 8–388)
VIT B12 SERPL-MCNC: 646 PG/ML (ref 100–900)

## 2020-03-05 NOTE — TELEPHONE ENCOUNTER
Lab results:    Iron stores a bit low, recommend to take ferrous sulfate 325 mg daily  May help with fatigue  This may cause dark stools or constipation / diarrhea  If you are taking vitamin D supplements, you may continue to do so  Will need to monitor your electrolytes, platelet count which was slightly abnormal  (sodium slightly high, may be due to too much water  Platelets a bit low, monitor for bleeding)  Rest of labs normal, including your thyroid  Please schedule appt with sleep specialist, as discussed

## 2020-03-06 ENCOUNTER — TELEPHONE (OUTPATIENT)
Dept: INTERNAL MEDICINE CLINIC | Facility: CLINIC | Age: 65
End: 2020-03-06

## 2020-03-06 NOTE — TELEPHONE ENCOUNTER
Patient states she had Flu shot on 03/04 and almost immediately starting feeling symptoms  Horse, body aches, sore throat sore throat   And just feels crummy      Per PCP she can stay home and we will give her a note if she wants, it is up to her     Patient is going to work

## 2020-03-08 DIAGNOSIS — M79.7 FIBROMYALGIA: ICD-10-CM

## 2020-03-08 DIAGNOSIS — F41.8 DEPRESSION WITH ANXIETY: ICD-10-CM

## 2020-03-09 ENCOUNTER — TELEPHONE (OUTPATIENT)
Dept: INTERNAL MEDICINE CLINIC | Facility: CLINIC | Age: 65
End: 2020-03-09

## 2020-03-09 DIAGNOSIS — K21.9 GASTROESOPHAGEAL REFLUX DISEASE, ESOPHAGITIS PRESENCE NOT SPECIFIED: Primary | ICD-10-CM

## 2020-03-09 RX ORDER — VENLAFAXINE HYDROCHLORIDE 37.5 MG/1
CAPSULE, EXTENDED RELEASE ORAL
Qty: 90 CAPSULE | Refills: 5 | Status: SHIPPED | OUTPATIENT
Start: 2020-03-09 | End: 2020-09-11

## 2020-03-09 NOTE — TELEPHONE ENCOUNTER
Let patient know, insurance will not pay for esomeprazole (Nexium) twice a day  Recommend to take it every morning  Take famotidine 40 mg at bedtime

## 2020-03-09 NOTE — TELEPHONE ENCOUNTER
Patients Esomeprazole Magnesium 40 mgs twice a day is not covered by insurance      Max dose is once daily   Authorization is required [phone] or can change medication

## 2020-03-10 RX ORDER — FAMOTIDINE 40 MG/1
40 TABLET, FILM COATED ORAL
Qty: 90 TABLET | Refills: 1 | Status: SHIPPED | OUTPATIENT
Start: 2020-03-10 | End: 2020-06-05 | Stop reason: SDUPTHER

## 2020-03-10 NOTE — TELEPHONE ENCOUNTER
Patient notified     Wants to know if Famotidine is a new prescription you are calling in that she has to take or if it is OTC and she will also do the Nexium once a day

## 2020-03-10 NOTE — TELEPHONE ENCOUNTER
Famotidine is a new prescription, please take this every night  Continue taking Nexium every morning  Sent famotidine to the pharmacy

## 2020-03-16 DIAGNOSIS — M79.7 FIBROMYALGIA: ICD-10-CM

## 2020-03-16 RX ORDER — PREGABALIN 100 MG/1
100 CAPSULE ORAL 2 TIMES DAILY
Qty: 180 CAPSULE | Refills: 0 | Status: SHIPPED | OUTPATIENT
Start: 2020-03-16 | End: 2020-06-15

## 2020-03-19 DIAGNOSIS — K59.00 CONSTIPATION, UNSPECIFIED CONSTIPATION TYPE: ICD-10-CM

## 2020-03-19 RX ORDER — LACTULOSE 10 G/15ML
SOLUTION ORAL
Qty: 946 ML | Refills: 1 | Status: SHIPPED | OUTPATIENT
Start: 2020-03-19 | End: 2021-07-14

## 2020-03-27 ENCOUNTER — TELEPHONE (OUTPATIENT)
Dept: NEUROLOGY | Facility: CLINIC | Age: 65
End: 2020-03-27

## 2020-04-09 DIAGNOSIS — R05.3 COUGH, PERSISTENT: ICD-10-CM

## 2020-04-09 DIAGNOSIS — K21.9 GASTROESOPHAGEAL REFLUX DISEASE, ESOPHAGITIS PRESENCE NOT SPECIFIED: ICD-10-CM

## 2020-04-09 RX ORDER — PANTOPRAZOLE SODIUM 40 MG/1
TABLET, DELAYED RELEASE ORAL
Qty: 90 TABLET | Refills: 1 | OUTPATIENT
Start: 2020-04-09

## 2020-04-09 RX ORDER — PANTOPRAZOLE SODIUM 40 MG/1
40 TABLET, DELAYED RELEASE ORAL DAILY
Qty: 90 TABLET | Refills: 1 | Status: SHIPPED | OUTPATIENT
Start: 2020-04-09 | End: 2020-10-01

## 2020-05-01 DIAGNOSIS — G45.9 TIA (TRANSIENT ISCHEMIC ATTACK): ICD-10-CM

## 2020-05-01 RX ORDER — ATORVASTATIN CALCIUM 40 MG/1
TABLET, FILM COATED ORAL
Qty: 90 TABLET | Refills: 1 | Status: SHIPPED | OUTPATIENT
Start: 2020-05-01 | End: 2020-11-04

## 2020-06-05 ENCOUNTER — TELEPHONE (OUTPATIENT)
Dept: INTERNAL MEDICINE CLINIC | Facility: CLINIC | Age: 65
End: 2020-06-05

## 2020-06-05 DIAGNOSIS — K21.9 GASTROESOPHAGEAL REFLUX DISEASE, ESOPHAGITIS PRESENCE NOT SPECIFIED: ICD-10-CM

## 2020-06-05 RX ORDER — FAMOTIDINE 20 MG/1
20 TABLET, FILM COATED ORAL 2 TIMES DAILY PRN
Qty: 180 TABLET | Refills: 0 | Status: SHIPPED | OUTPATIENT
Start: 2020-06-05 | End: 2020-09-01

## 2020-06-15 DIAGNOSIS — M79.7 FIBROMYALGIA: ICD-10-CM

## 2020-06-15 RX ORDER — PREGABALIN 100 MG/1
CAPSULE ORAL
Qty: 180 CAPSULE | Refills: 1 | Status: SHIPPED | OUTPATIENT
Start: 2020-06-15 | End: 2020-12-14 | Stop reason: SDUPTHER

## 2020-09-01 DIAGNOSIS — K21.9 GASTROESOPHAGEAL REFLUX DISEASE, ESOPHAGITIS PRESENCE NOT SPECIFIED: ICD-10-CM

## 2020-09-01 RX ORDER — FAMOTIDINE 20 MG/1
TABLET, FILM COATED ORAL
Qty: 180 TABLET | Refills: 0 | Status: SHIPPED | OUTPATIENT
Start: 2020-09-01 | End: 2021-07-14

## 2020-09-11 DIAGNOSIS — M79.7 FIBROMYALGIA: ICD-10-CM

## 2020-09-11 DIAGNOSIS — F41.8 DEPRESSION WITH ANXIETY: ICD-10-CM

## 2020-09-11 RX ORDER — VENLAFAXINE HYDROCHLORIDE 37.5 MG/1
CAPSULE, EXTENDED RELEASE ORAL
Qty: 90 CAPSULE | Refills: 0 | Status: SHIPPED | OUTPATIENT
Start: 2020-09-11 | End: 2020-10-12 | Stop reason: SDUPTHER

## 2020-10-01 DIAGNOSIS — K21.9 GASTROESOPHAGEAL REFLUX DISEASE: ICD-10-CM

## 2020-10-01 DIAGNOSIS — R05.3 COUGH, PERSISTENT: ICD-10-CM

## 2020-10-01 RX ORDER — PANTOPRAZOLE SODIUM 40 MG/1
TABLET, DELAYED RELEASE ORAL
Qty: 90 TABLET | Refills: 0 | Status: SHIPPED | OUTPATIENT
Start: 2020-10-01 | End: 2020-10-12 | Stop reason: SDUPTHER

## 2020-10-12 DIAGNOSIS — K21.9 GASTROESOPHAGEAL REFLUX DISEASE: ICD-10-CM

## 2020-10-12 DIAGNOSIS — R05.3 COUGH, PERSISTENT: ICD-10-CM

## 2020-10-12 DIAGNOSIS — F41.8 DEPRESSION WITH ANXIETY: ICD-10-CM

## 2020-10-12 DIAGNOSIS — M79.7 FIBROMYALGIA: ICD-10-CM

## 2020-10-12 RX ORDER — VENLAFAXINE HYDROCHLORIDE 37.5 MG/1
112.5 CAPSULE, EXTENDED RELEASE ORAL DAILY
Qty: 90 CAPSULE | Refills: 0 | Status: SHIPPED | OUTPATIENT
Start: 2020-10-12 | End: 2020-11-16 | Stop reason: SDUPTHER

## 2020-10-12 RX ORDER — PANTOPRAZOLE SODIUM 40 MG/1
40 TABLET, DELAYED RELEASE ORAL DAILY
Qty: 90 TABLET | Refills: 0 | Status: SHIPPED | OUTPATIENT
Start: 2020-10-12 | End: 2021-05-25 | Stop reason: SDUPTHER

## 2020-11-04 DIAGNOSIS — G45.9 TIA (TRANSIENT ISCHEMIC ATTACK): ICD-10-CM

## 2020-11-04 RX ORDER — ATORVASTATIN CALCIUM 40 MG/1
TABLET, FILM COATED ORAL
Qty: 90 TABLET | Refills: 1 | Status: SHIPPED | OUTPATIENT
Start: 2020-11-04 | End: 2021-05-09

## 2020-11-16 DIAGNOSIS — F41.8 DEPRESSION WITH ANXIETY: ICD-10-CM

## 2020-11-16 DIAGNOSIS — M79.7 FIBROMYALGIA: ICD-10-CM

## 2020-11-16 RX ORDER — VENLAFAXINE HYDROCHLORIDE 37.5 MG/1
112.5 CAPSULE, EXTENDED RELEASE ORAL DAILY
Qty: 90 CAPSULE | Refills: 0 | Status: SHIPPED | OUTPATIENT
Start: 2020-11-16 | End: 2020-12-10 | Stop reason: SDUPTHER

## 2020-11-20 PROBLEM — R56.9 UNSPECIFIED CONVULSIONS (HCC): Status: ACTIVE | Noted: 2020-11-20

## 2020-11-25 ENCOUNTER — OFFICE VISIT (OUTPATIENT)
Dept: INTERNAL MEDICINE CLINIC | Facility: CLINIC | Age: 65
End: 2020-11-25
Payer: COMMERCIAL

## 2020-11-25 VITALS
OXYGEN SATURATION: 98 % | WEIGHT: 155 LBS | HEIGHT: 63 IN | TEMPERATURE: 97.3 F | SYSTOLIC BLOOD PRESSURE: 122 MMHG | BODY MASS INDEX: 27.46 KG/M2 | HEART RATE: 67 BPM | DIASTOLIC BLOOD PRESSURE: 76 MMHG | RESPIRATION RATE: 18 BRPM

## 2020-11-25 DIAGNOSIS — M25.562 ACUTE PAIN OF LEFT KNEE: ICD-10-CM

## 2020-11-25 DIAGNOSIS — R51.9 NONINTRACTABLE HEADACHE, UNSPECIFIED CHRONICITY PATTERN, UNSPECIFIED HEADACHE TYPE: ICD-10-CM

## 2020-11-25 DIAGNOSIS — M79.7 FIBROMYALGIA: ICD-10-CM

## 2020-11-25 DIAGNOSIS — K59.04 CHRONIC IDIOPATHIC CONSTIPATION: ICD-10-CM

## 2020-11-25 DIAGNOSIS — K21.9 GASTROESOPHAGEAL REFLUX DISEASE, UNSPECIFIED WHETHER ESOPHAGITIS PRESENT: ICD-10-CM

## 2020-11-25 DIAGNOSIS — F41.8 DEPRESSION WITH ANXIETY: ICD-10-CM

## 2020-11-25 DIAGNOSIS — E78.49 OTHER HYPERLIPIDEMIA: ICD-10-CM

## 2020-11-25 DIAGNOSIS — Z23 NEED FOR INFLUENZA VACCINATION: ICD-10-CM

## 2020-11-25 DIAGNOSIS — Z00.00 HEALTH MAINTENANCE EXAMINATION: Primary | ICD-10-CM

## 2020-11-25 DIAGNOSIS — R29.90 STROKE-LIKE SYMPTOMS: ICD-10-CM

## 2020-11-25 DIAGNOSIS — Z23 NEED FOR PNEUMOCOCCAL VACCINATION: ICD-10-CM

## 2020-11-25 PROBLEM — R40.4 ALTERATION OF CONSCIOUSNESS: Status: RESOLVED | Noted: 2019-10-30 | Resolved: 2020-11-25

## 2020-11-25 PROBLEM — R07.89 CHEST TIGHTNESS: Status: RESOLVED | Noted: 2019-09-25 | Resolved: 2020-11-25

## 2020-11-25 PROCEDURE — 90471 IMMUNIZATION ADMIN: CPT | Performed by: INTERNAL MEDICINE

## 2020-11-25 PROCEDURE — 1036F TOBACCO NON-USER: CPT | Performed by: INTERNAL MEDICINE

## 2020-11-25 PROCEDURE — 99397 PER PM REEVAL EST PAT 65+ YR: CPT | Performed by: INTERNAL MEDICINE

## 2020-11-25 PROCEDURE — 3078F DIAST BP <80 MM HG: CPT | Performed by: INTERNAL MEDICINE

## 2020-11-25 PROCEDURE — 3008F BODY MASS INDEX DOCD: CPT | Performed by: INTERNAL MEDICINE

## 2020-11-25 PROCEDURE — 4040F PNEUMOC VAC/ADMIN/RCVD: CPT | Performed by: INTERNAL MEDICINE

## 2020-11-25 PROCEDURE — 90732 PPSV23 VACC 2 YRS+ SUBQ/IM: CPT | Performed by: INTERNAL MEDICINE

## 2020-11-25 PROCEDURE — 90662 IIV NO PRSV INCREASED AG IM: CPT | Performed by: INTERNAL MEDICINE

## 2020-11-25 PROCEDURE — 90472 IMMUNIZATION ADMIN EACH ADD: CPT | Performed by: INTERNAL MEDICINE

## 2020-11-25 PROCEDURE — 3074F SYST BP LT 130 MM HG: CPT | Performed by: INTERNAL MEDICINE

## 2020-11-25 RX ORDER — CARBAMAZEPINE 100 MG/1
100 CAPSULE, EXTENDED RELEASE ORAL 2 TIMES DAILY
Qty: 60 CAPSULE | Refills: 1 | Status: SHIPPED | OUTPATIENT
Start: 2020-11-25 | End: 2021-07-09

## 2020-12-02 ENCOUNTER — OFFICE VISIT (OUTPATIENT)
Dept: OBGYN CLINIC | Facility: HOSPITAL | Age: 65
End: 2020-12-02
Payer: COMMERCIAL

## 2020-12-02 ENCOUNTER — HOSPITAL ENCOUNTER (OUTPATIENT)
Dept: RADIOLOGY | Facility: HOSPITAL | Age: 65
Discharge: HOME | End: 2020-12-02
Attending: ORTHOPAEDIC SURGERY
Payer: COMMERCIAL

## 2020-12-02 VITALS
HEART RATE: 64 BPM | SYSTOLIC BLOOD PRESSURE: 131 MMHG | WEIGHT: 155.2 LBS | DIASTOLIC BLOOD PRESSURE: 85 MMHG | BODY MASS INDEX: 27.5 KG/M2 | HEIGHT: 63 IN

## 2020-12-02 DIAGNOSIS — M25.562 LEFT KNEE PAIN, UNSPECIFIED CHRONICITY: ICD-10-CM

## 2020-12-02 DIAGNOSIS — M17.12 PRIMARY OSTEOARTHRITIS OF LEFT KNEE: Primary | ICD-10-CM

## 2020-12-02 DIAGNOSIS — M25.562 ACUTE PAIN OF LEFT KNEE: ICD-10-CM

## 2020-12-02 PROCEDURE — 3079F DIAST BP 80-89 MM HG: CPT | Performed by: ORTHOPAEDIC SURGERY

## 2020-12-02 PROCEDURE — 3075F SYST BP GE 130 - 139MM HG: CPT | Performed by: ORTHOPAEDIC SURGERY

## 2020-12-02 PROCEDURE — 3008F BODY MASS INDEX DOCD: CPT | Performed by: ORTHOPAEDIC SURGERY

## 2020-12-02 PROCEDURE — 99213 OFFICE O/P EST LOW 20 MIN: CPT | Performed by: ORTHOPAEDIC SURGERY

## 2020-12-02 PROCEDURE — 73562 X-RAY EXAM OF KNEE 3: CPT

## 2020-12-02 PROCEDURE — 1036F TOBACCO NON-USER: CPT | Performed by: ORTHOPAEDIC SURGERY

## 2020-12-10 DIAGNOSIS — M79.7 FIBROMYALGIA: ICD-10-CM

## 2020-12-10 DIAGNOSIS — F41.8 DEPRESSION WITH ANXIETY: ICD-10-CM

## 2020-12-10 RX ORDER — VENLAFAXINE HYDROCHLORIDE 37.5 MG/1
112.5 CAPSULE, EXTENDED RELEASE ORAL DAILY
Qty: 90 CAPSULE | Refills: 1 | Status: SHIPPED | OUTPATIENT
Start: 2020-12-10 | End: 2021-01-07 | Stop reason: SDUPTHER

## 2020-12-14 DIAGNOSIS — M79.7 FIBROMYALGIA: ICD-10-CM

## 2020-12-14 RX ORDER — PREGABALIN 100 MG/1
100 CAPSULE ORAL 2 TIMES DAILY
Qty: 180 CAPSULE | Refills: 1 | Status: SHIPPED | OUTPATIENT
Start: 2020-12-14 | End: 2021-06-14 | Stop reason: SDUPTHER

## 2021-01-07 DIAGNOSIS — M79.7 FIBROMYALGIA: ICD-10-CM

## 2021-01-07 DIAGNOSIS — F41.8 DEPRESSION WITH ANXIETY: ICD-10-CM

## 2021-01-07 RX ORDER — VENLAFAXINE HYDROCHLORIDE 37.5 MG/1
112.5 CAPSULE, EXTENDED RELEASE ORAL DAILY
Qty: 90 CAPSULE | Refills: 5 | Status: SHIPPED | OUTPATIENT
Start: 2021-01-07 | End: 2021-03-10

## 2021-01-13 NOTE — TELEPHONE ENCOUNTER
Initial attempt  VM is full  Will try again 
Patient notified and appt scheduled
Please reschedule appointment with me    Refills sent
Detail Level: Generalized

## 2021-03-10 ENCOUNTER — TELEPHONE (OUTPATIENT)
Dept: INTERNAL MEDICINE CLINIC | Facility: CLINIC | Age: 66
End: 2021-03-10

## 2021-03-10 DIAGNOSIS — M79.7 FIBROMYALGIA: ICD-10-CM

## 2021-03-10 DIAGNOSIS — F41.8 DEPRESSION WITH ANXIETY: ICD-10-CM

## 2021-03-10 RX ORDER — VENLAFAXINE HYDROCHLORIDE 37.5 MG/1
CAPSULE, EXTENDED RELEASE ORAL
Qty: 90 CAPSULE | Refills: 5 | Status: SHIPPED | OUTPATIENT
Start: 2021-03-10 | End: 2021-03-10 | Stop reason: SDUPTHER

## 2021-03-10 RX ORDER — VENLAFAXINE HYDROCHLORIDE 75 MG/1
75 CAPSULE, EXTENDED RELEASE ORAL
Qty: 90 CAPSULE | Refills: 1 | Status: SHIPPED | OUTPATIENT
Start: 2021-03-10 | End: 2021-09-23

## 2021-03-10 RX ORDER — VENLAFAXINE HYDROCHLORIDE 37.5 MG/1
37.5 CAPSULE, EXTENDED RELEASE ORAL DAILY
Qty: 90 CAPSULE | Refills: 1 | Status: SHIPPED | OUTPATIENT
Start: 2021-03-10 | End: 2021-05-25 | Stop reason: ALTCHOICE

## 2021-03-10 NOTE — TELEPHONE ENCOUNTER
Please call patient  Insurance will not cover 3 tablets of the venlafaxine  I sent a 75 mg and 37 5 mg to the pharmacy, take both daily

## 2021-04-07 ENCOUNTER — TELEPHONE (OUTPATIENT)
Dept: NEUROLOGY | Facility: CLINIC | Age: 66
End: 2021-04-07

## 2021-04-07 NOTE — TELEPHONE ENCOUNTER
Detailed message left for patient  Requested a call back for response to questions and if anything further is needed

## 2021-04-07 NOTE — TELEPHONE ENCOUNTER
I see that she saw her PCP back on 11/25 for this and carbamazepine was prescribed  It does sound like trigeminal neuralgia is possible  Reasonable to try carbamazepine and then discuss at upcoming follow up visit  Have symptoms recently worsened significantly? How severe/disabling is the pain?

## 2021-04-07 NOTE — TELEPHONE ENCOUNTER
Patient calling to report increased frequency headpains  Brief pains to right side of forhead, sometimes occur to left side of head as well  Patient described as a stabbing pain  No associated symptoms  Has had an increased amount of stress lately  No other known triggers/causes  Carbamazepine was prescribed by PCP, patient did not yet start medication  F/U scheduled with you for 4/30/2021  Patient asking if you had any recommendations prior

## 2021-04-22 ENCOUNTER — TELEPHONE (OUTPATIENT)
Dept: NEUROLOGY | Facility: CLINIC | Age: 66
End: 2021-04-22

## 2021-05-09 DIAGNOSIS — G45.9 TIA (TRANSIENT ISCHEMIC ATTACK): ICD-10-CM

## 2021-05-09 RX ORDER — ATORVASTATIN CALCIUM 40 MG/1
TABLET, FILM COATED ORAL
Qty: 90 TABLET | Refills: 1 | Status: SHIPPED | OUTPATIENT
Start: 2021-05-09 | End: 2021-05-25 | Stop reason: SDUPTHER

## 2021-05-17 ENCOUNTER — VBI (OUTPATIENT)
Dept: ADMINISTRATIVE | Facility: OTHER | Age: 66
End: 2021-05-17

## 2021-05-17 NOTE — TELEPHONE ENCOUNTER
05/17/21 8:12 AM     See documentation in the [facility] SmartForm       [facility]

## 2021-05-25 ENCOUNTER — OFFICE VISIT (OUTPATIENT)
Dept: INTERNAL MEDICINE CLINIC | Facility: CLINIC | Age: 66
End: 2021-05-25
Payer: COMMERCIAL

## 2021-05-25 VITALS
WEIGHT: 149 LBS | TEMPERATURE: 97 F | HEIGHT: 63 IN | DIASTOLIC BLOOD PRESSURE: 68 MMHG | HEART RATE: 59 BPM | SYSTOLIC BLOOD PRESSURE: 112 MMHG | BODY MASS INDEX: 26.4 KG/M2 | OXYGEN SATURATION: 98 %

## 2021-05-25 DIAGNOSIS — Z79.899 ENCOUNTER FOR LONG-TERM CURRENT USE OF MEDICATION: ICD-10-CM

## 2021-05-25 DIAGNOSIS — Z23 NEED FOR SHINGLES VACCINE: ICD-10-CM

## 2021-05-25 DIAGNOSIS — K21.9 GASTROESOPHAGEAL REFLUX DISEASE: ICD-10-CM

## 2021-05-25 DIAGNOSIS — M79.7 FIBROMYALGIA: ICD-10-CM

## 2021-05-25 DIAGNOSIS — Z78.0 POSTMENOPAUSAL: ICD-10-CM

## 2021-05-25 DIAGNOSIS — M17.0 PRIMARY OSTEOARTHRITIS OF BOTH KNEES: ICD-10-CM

## 2021-05-25 DIAGNOSIS — Z71.9 HEALTH COUNSELING: ICD-10-CM

## 2021-05-25 DIAGNOSIS — F41.8 DEPRESSION WITH ANXIETY: Primary | ICD-10-CM

## 2021-05-25 DIAGNOSIS — R51.9 NONINTRACTABLE HEADACHE, UNSPECIFIED CHRONICITY PATTERN, UNSPECIFIED HEADACHE TYPE: ICD-10-CM

## 2021-05-25 DIAGNOSIS — E55.9 VITAMIN D DEFICIENCY: ICD-10-CM

## 2021-05-25 DIAGNOSIS — K59.04 CHRONIC IDIOPATHIC CONSTIPATION: ICD-10-CM

## 2021-05-25 DIAGNOSIS — Z12.31 ENCOUNTER FOR SCREENING MAMMOGRAM FOR BREAST CANCER: ICD-10-CM

## 2021-05-25 DIAGNOSIS — E78.49 OTHER HYPERLIPIDEMIA: ICD-10-CM

## 2021-05-25 DIAGNOSIS — G45.9 TIA (TRANSIENT ISCHEMIC ATTACK): ICD-10-CM

## 2021-05-25 PROCEDURE — 99214 OFFICE O/P EST MOD 30 MIN: CPT | Performed by: INTERNAL MEDICINE

## 2021-05-25 PROCEDURE — 3008F BODY MASS INDEX DOCD: CPT | Performed by: INTERNAL MEDICINE

## 2021-05-25 PROCEDURE — 1036F TOBACCO NON-USER: CPT | Performed by: INTERNAL MEDICINE

## 2021-05-25 PROCEDURE — 1160F RVW MEDS BY RX/DR IN RCRD: CPT | Performed by: INTERNAL MEDICINE

## 2021-05-25 PROCEDURE — 3288F FALL RISK ASSESSMENT DOCD: CPT | Performed by: INTERNAL MEDICINE

## 2021-05-25 PROCEDURE — 3725F SCREEN DEPRESSION PERFORMED: CPT | Performed by: INTERNAL MEDICINE

## 2021-05-25 PROCEDURE — 1101F PT FALLS ASSESS-DOCD LE1/YR: CPT | Performed by: INTERNAL MEDICINE

## 2021-05-25 RX ORDER — ZOSTER VACCINE RECOMBINANT, ADJUVANTED 50 MCG/0.5
0.5 KIT INTRAMUSCULAR ONCE
Qty: 1 EACH | Refills: 1 | Status: SHIPPED | OUTPATIENT
Start: 2021-05-25 | End: 2021-05-25

## 2021-05-25 RX ORDER — ATORVASTATIN CALCIUM 40 MG/1
40 TABLET, FILM COATED ORAL EVERY EVENING
Qty: 90 TABLET | Refills: 1 | Status: SHIPPED | OUTPATIENT
Start: 2021-05-25 | End: 2021-11-29 | Stop reason: SDUPTHER

## 2021-05-25 RX ORDER — PANTOPRAZOLE SODIUM 40 MG/1
40 TABLET, DELAYED RELEASE ORAL DAILY
Qty: 90 TABLET | Refills: 1 | Status: SHIPPED | OUTPATIENT
Start: 2021-05-25 | End: 2021-11-29 | Stop reason: SDUPTHER

## 2021-05-25 NOTE — PROGRESS NOTES
Assessment/Plan:    Depression with anxiety  Stable, stopped taking 37 5 mg of venlafaxine 2 months ago and feels better  Constipation  Continue Miralax daily, Colace bid  Primary osteoarthritis of both knees  Follow up with Ortho, she is considering surgery this fall  Other hyperlipidemia  Lipids due, on statin  GERD (gastroesophageal reflux disease)  Takes PPI daily  Fibromyalgia  Stable, takes Lyrica bid  Nonintractable headache  Improved, intermittent symptoms  She reports taking carbamazepine  Follow up with neurology  Stroke-like symptoms  On ASA and statin  Follow up with neurology  Diagnoses and all orders for this visit:    Depression with anxiety  -     CBC and differential    Gastroesophageal reflux disease  -     pantoprazole (PROTONIX) 40 mg tablet; Take 1 tablet (40 mg total) by mouth daily    TIA (transient ischemic attack)  -     atorvastatin (LIPITOR) 40 mg tablet; Take 1 tablet (40 mg total) by mouth every evening    Other hyperlipidemia  -     Comprehensive metabolic panel  -     Lipid panel  -     TSH, 3rd generation with Free T4 reflex    Fibromyalgia    Primary osteoarthritis of both knees    Vitamin D deficiency  -     Vitamin D 25 hydroxy    Encounter for long-term current use of medication  -     Vitamin B12    Encounter for screening mammogram for breast cancer  -     Mammo screening bilateral w cad; Future    Postmenopausal  -     DXA bone density spine hip and pelvis; Future    Need for shingles vaccine  -     Zoster Vac Recomb Adjuvanted (Shingrix) 50 MCG/0 5ML SUSR; Inject 0 5 mL into a muscle once for 1 dose Repeat dose in 2 to 6 months    Nonintractable headache, unspecified chronicity pattern, unspecified headache type    Health counseling  Comments:  Received COVID vaccine  Shingrix at the pharmacy  Mammogram and bone density due, ?repeat colonoscopy  Chronic idiopathic constipation      Follow up in 6 months or as needed        Subjective: Patient ID: Zay Miguel is a 72 y o  female c/o left knee pain  Pain suddenly worsened when she woke up once morning  Pain more often recently, worse after prolonged sitting  She does use ointments, takes medication as needed  She does have an appointment with Orthopedics, interested in surgery  She is now deciding whether to have the right or left knee done first       She continues to have episodes of forgetfulness  She says that she nude she had appointment with Neurology all week but for got it on the day itself and missed the appointment  She reports intermittent right-sided headaches, says that she has been taking the carbamazepine  She stopped taking the additional venlafaxine about 2 months ago  She had found that she is able to cry, feels more herself  She does not want to restart the additional pill, will stay on 75 mg daily  She complains of pain on her right small toe  She reports bruising it a few weeks ago  The bruising has resolved but still has some pain and redness  She is wearing wide shoes, no wound  She is worried about socializing even if she is vaccinated, has not been to the grocery store  The following portions of the patient's history were reviewed and updated as appropriate: allergies, current medications, past medical history, past social history and problem list     Review of Systems   Constitutional: Negative for appetite change and fatigue  HENT: Negative for congestion, ear pain and postnasal drip  Eyes: Negative for visual disturbance  Respiratory: Negative for cough and shortness of breath  Cardiovascular: Negative for chest pain and leg swelling  Gastrointestinal: Negative for abdominal pain, constipation and diarrhea  Genitourinary: Negative for dysuria, frequency and urgency  Musculoskeletal: Negative for arthralgias and myalgias  Skin: Negative for rash and wound  Neurological: Negative for dizziness, numbness and headaches  Hematological: Does not bruise/bleed easily  Psychiatric/Behavioral: Negative for confusion  The patient is not nervous/anxious  Objective:      /68   Pulse 59   Temp (!) 97 °F (36 1 °C)   Ht 5' 3" (1 6 m)   Wt 67 6 kg (149 lb)   SpO2 98%   BMI 26 39 kg/m²          Physical Exam  Vitals signs and nursing note reviewed  Constitutional:       General: She is not in acute distress  Appearance: She is well-developed  HENT:      Head: Normocephalic and atraumatic  Eyes:      Pupils: Pupils are equal, round, and reactive to light  Cardiovascular:      Rate and Rhythm: Normal rate and regular rhythm  Heart sounds: Normal heart sounds  Pulmonary:      Effort: Pulmonary effort is normal       Breath sounds: Normal breath sounds  No wheezing  Abdominal:      General: Bowel sounds are normal       Palpations: Abdomen is soft  Skin:     General: Skin is warm  Findings: No rash  Neurological:      Mental Status: She is alert and oriented to person, place, and time  Psychiatric:         Mood and Affect: Mood and affect normal  Mood is not anxious or depressed  Speech: Speech normal          Behavior: Behavior normal            Depression Screening Follow-up Plan: Patient's depression screening was positive with a PHQ-2 score of 0  Their PHQ-9 score was 2  Patient assessed for underlying major depression  They have no active suicidal ideations  Brief counseling provided and recommend additional follow-up/re-evaluation next office visit

## 2021-06-14 DIAGNOSIS — M79.7 FIBROMYALGIA: ICD-10-CM

## 2021-06-14 RX ORDER — PREGABALIN 100 MG/1
100 CAPSULE ORAL 2 TIMES DAILY
Qty: 180 CAPSULE | Refills: 1 | Status: SHIPPED | OUTPATIENT
Start: 2021-06-14 | End: 2021-11-29 | Stop reason: SDUPTHER

## 2021-06-22 ENCOUNTER — OFFICE VISIT (OUTPATIENT)
Dept: OBGYN CLINIC | Facility: HOSPITAL | Age: 66
End: 2021-06-22
Payer: COMMERCIAL

## 2021-06-22 ENCOUNTER — HOSPITAL ENCOUNTER (OUTPATIENT)
Dept: RADIOLOGY | Facility: HOSPITAL | Age: 66
Discharge: HOME | End: 2021-06-22
Attending: ORTHOPAEDIC SURGERY
Payer: COMMERCIAL

## 2021-06-22 VITALS
HEART RATE: 64 BPM | DIASTOLIC BLOOD PRESSURE: 81 MMHG | SYSTOLIC BLOOD PRESSURE: 117 MMHG | HEIGHT: 63 IN | WEIGHT: 147 LBS | BODY MASS INDEX: 26.05 KG/M2

## 2021-06-22 DIAGNOSIS — M17.12 PRIMARY OSTEOARTHRITIS OF LEFT KNEE: Primary | ICD-10-CM

## 2021-06-22 DIAGNOSIS — G89.29 CHRONIC PAIN OF BOTH KNEES: ICD-10-CM

## 2021-06-22 DIAGNOSIS — M25.562 CHRONIC PAIN OF BOTH KNEES: ICD-10-CM

## 2021-06-22 DIAGNOSIS — M25.562 CHRONIC PAIN OF LEFT KNEE: ICD-10-CM

## 2021-06-22 DIAGNOSIS — M25.561 PAIN IN BOTH KNEES, UNSPECIFIED CHRONICITY: ICD-10-CM

## 2021-06-22 DIAGNOSIS — Z01.812 ENCOUNTER FOR PRE-OPERATIVE LABORATORY TESTING: ICD-10-CM

## 2021-06-22 DIAGNOSIS — G89.29 CHRONIC PAIN OF LEFT KNEE: ICD-10-CM

## 2021-06-22 DIAGNOSIS — M25.561 CHRONIC PAIN OF BOTH KNEES: ICD-10-CM

## 2021-06-22 DIAGNOSIS — M17.0 PRIMARY OSTEOARTHRITIS OF BOTH KNEES: ICD-10-CM

## 2021-06-22 DIAGNOSIS — M25.562 PAIN IN BOTH KNEES, UNSPECIFIED CHRONICITY: ICD-10-CM

## 2021-06-22 DIAGNOSIS — M17.11 PRIMARY OSTEOARTHRITIS OF RIGHT KNEE: ICD-10-CM

## 2021-06-22 PROCEDURE — 73562 X-RAY EXAM OF KNEE 3: CPT

## 2021-06-22 PROCEDURE — 99213 OFFICE O/P EST LOW 20 MIN: CPT | Performed by: ORTHOPAEDIC SURGERY

## 2021-06-22 RX ORDER — ACETAMINOPHEN 325 MG/1
975 TABLET ORAL ONCE
Status: CANCELLED | OUTPATIENT
Start: 2021-06-22 | End: 2021-06-22

## 2021-06-22 RX ORDER — FERROUS SULFATE TAB EC 324 MG (65 MG FE EQUIVALENT) 324 (65 FE) MG
324 TABLET DELAYED RESPONSE ORAL
Qty: 60 TABLET | Refills: 0 | Status: SHIPPED | OUTPATIENT
Start: 2021-06-22 | End: 2021-08-24

## 2021-06-22 RX ORDER — ONDANSETRON 2 MG/ML
4 INJECTION INTRAMUSCULAR; INTRAVENOUS ONCE
Status: CANCELLED | OUTPATIENT
Start: 2021-06-22 | End: 2021-06-22

## 2021-06-22 RX ORDER — GABAPENTIN 300 MG/1
300 CAPSULE ORAL ONCE
Status: CANCELLED | OUTPATIENT
Start: 2021-06-22 | End: 2021-06-22

## 2021-06-22 RX ORDER — ASCORBIC ACID 500 MG
500 TABLET ORAL 2 TIMES DAILY
Qty: 60 TABLET | Refills: 0 | Status: SHIPPED | OUTPATIENT
Start: 2021-06-22 | End: 2021-08-24

## 2021-06-22 RX ORDER — FOLIC ACID 1 MG/1
1 TABLET ORAL DAILY
Qty: 30 TABLET | Refills: 0 | Status: SHIPPED | OUTPATIENT
Start: 2021-06-22 | End: 2021-07-20 | Stop reason: SDUPTHER

## 2021-06-22 RX ORDER — CEFAZOLIN SODIUM 2 G/50ML
2000 SOLUTION INTRAVENOUS ONCE
Status: CANCELLED | OUTPATIENT
Start: 2021-06-22 | End: 2021-06-22

## 2021-06-22 NOTE — H&P (VIEW-ONLY)
Assessment  Diagnoses and all orders for this visit:    Primary osteoarthritis of both knees    Chronic pain of both knees    Primary osteoarthritis of left knee    Primary osteoarthritis of right knee    Discussion and Plan:    Weightbearing as tolerated bilateral lower extremities   The patient presents with debilitating bilateral chronic knee pain left worse than right that is interfering with her ADLs, she has inability to go up and down stairs secondary to this pain dysfunction she has tried prior physical therapy injections; long discussion was held with the patient regarding treatment options this point she has failed conservative management and she is a good candidate for elective left total knee replacement  Risk and benefits were discussed in detail with the patient these include, but are not limited to:  Bleeding, clots, infection, intraoperative complications, failure to thrive, pain  Preoperative vitamins were sent to her pharmacy as well as Lovenox was sent to her pharmacy   She will be appropriately scheduled for elective left total knee replacement  Follow-up with primary care doctor for preop risk stratification   Follow-up as a postop patient    Subjective:   Patient ID: Zamzam Adams is a 72 y o  female      70-year-old female presents for evaluation of bilateral knee pain  Left worse than right  Pain is worse with weight-bearing activities, ascending and descending stairs  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 [employer]  She denies history of diabetes, she denies being on blood thinners  She states has had prior treatment in the past for her bilateral knees including injection and physical therapy  Ambulates without assistive devices            The following portions of the patient's history were reviewed and updated as appropriate: allergies, current medications, past family history, past medical history, past social history, past surgical history and problem list     Review of Systems   Constitutional: Negative for appetite change and fever  HENT: Negative for sore throat  Eyes: Negative for visual disturbance  Respiratory: Negative for shortness of breath  Cardiovascular: Negative for chest pain  Gastrointestinal: Negative for nausea and vomiting  Musculoskeletal: Positive for arthralgias and myalgias  Skin: Negative for wound  Objective:  /81   Pulse 64   Ht 5' 3" (1 6 m)   Wt 66 7 kg (147 lb)   BMI 26 04 kg/m²       Right Knee Exam     Comments:  Knee is in varus   No effusion, no erythema or warmth   Range of motion from 0-110 degrees   There is crepitance with range of motion  There is tenderness to palpation along the medial joint line and lateral joint line  Stable to varus and valgus stress      Left Knee Exam     Comments:  Knee is in varus   No effusion, no erythema or warmth   Range of motion from 0-110 degrees   There is crepitance with range of motion  There is tenderness to palpation along the medial joint line and lateral joint line  Stable to varus and valgus stress              Physical Exam  Vitals and nursing note reviewed  Constitutional:       Appearance: Normal appearance  HENT:      Head: Normocephalic and atraumatic  Nose: Nose normal       Mouth/Throat:      Mouth: Mucous membranes are moist    Eyes:      Extraocular Movements: Extraocular movements intact  Conjunctiva/sclera: Conjunctivae normal    Cardiovascular:      Rate and Rhythm: Normal rate  Pulses: Normal pulses  Pulmonary:      Effort: Pulmonary effort is normal       Breath sounds: Normal breath sounds  Abdominal:      Palpations: Abdomen is soft  Musculoskeletal:      Cervical back: Normal range of motion  Comments: See Ortho Exam   Skin:     General: Skin is warm  Capillary Refill: Capillary refill takes less than 2 seconds  Neurological:      General: No focal deficit present  Mental Status: She is alert  Mental status is at baseline  Psychiatric:         Mood and Affect: Mood normal          Behavior: Behavior normal            I have personally reviewed pertinent films in PACS and my interpretation is as follows      X-rays right knee from today show moderate to severe degenerative changes mostly in the medial and patellofemoral compartments as evidenced by joint space narrowing, subchondral sclerosis and osteophyte formation    X-rays left knee December 2020 show severe degenerative changes mostly in the medial and patellofemoral compartment as evidenced by joint space narrowing, subchondral sclerosis, osteophyte formation and bone-on-bone apposition

## 2021-06-22 NOTE — PROGRESS NOTES
Assessment  Diagnoses and all orders for this visit:    Primary osteoarthritis of both knees    Chronic pain of both knees    Primary osteoarthritis of left knee    Primary osteoarthritis of right knee    Discussion and Plan:    Weightbearing as tolerated bilateral lower extremities   The patient presents with debilitating bilateral chronic knee pain left worse than right that is interfering with her ADLs, she has inability to go up and down stairs secondary to this pain dysfunction she has tried prior physical therapy injections; long discussion was held with the patient regarding treatment options this point she has failed conservative management and she is a good candidate for elective left total knee replacement  Risk and benefits were discussed in detail with the patient these include, but are not limited to:  Bleeding, clots, infection, intraoperative complications, failure to thrive, pain  Preoperative vitamins were sent to her pharmacy as well as Lovenox was sent to her pharmacy   She will be appropriately scheduled for elective left total knee replacement  Follow-up with primary care doctor for preop risk stratification   Follow-up as a postop patient    Subjective:   Patient ID: Steve Otero is a 72 y o  female      80-year-old female presents for evaluation of bilateral knee pain  Left worse than right  Pain is worse with weight-bearing activities, ascending and descending stairs  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 [employer]  She denies history of diabetes, she denies being on blood thinners  She states has had prior treatment in the past for her bilateral knees including injection and physical therapy  Ambulates without assistive devices            The following portions of the patient's history were reviewed and updated as appropriate: allergies, current medications, past family history, past medical history, past social history, past surgical history and problem list     Review of Systems   Constitutional: Negative for appetite change and fever  HENT: Negative for sore throat  Eyes: Negative for visual disturbance  Respiratory: Negative for shortness of breath  Cardiovascular: Negative for chest pain  Gastrointestinal: Negative for nausea and vomiting  Musculoskeletal: Positive for arthralgias and myalgias  Skin: Negative for wound  Objective:  /81   Pulse 64   Ht 5' 3" (1 6 m)   Wt 66 7 kg (147 lb)   BMI 26 04 kg/m²       Right Knee Exam     Comments:  Knee is in varus   No effusion, no erythema or warmth   Range of motion from 0-110 degrees   There is crepitance with range of motion  There is tenderness to palpation along the medial joint line and lateral joint line  Stable to varus and valgus stress      Left Knee Exam     Comments:  Knee is in varus   No effusion, no erythema or warmth   Range of motion from 0-110 degrees   There is crepitance with range of motion  There is tenderness to palpation along the medial joint line and lateral joint line  Stable to varus and valgus stress              Physical Exam  Vitals and nursing note reviewed  Constitutional:       Appearance: Normal appearance  HENT:      Head: Normocephalic and atraumatic  Nose: Nose normal       Mouth/Throat:      Mouth: Mucous membranes are moist    Eyes:      Extraocular Movements: Extraocular movements intact  Conjunctiva/sclera: Conjunctivae normal    Cardiovascular:      Rate and Rhythm: Normal rate  Pulses: Normal pulses  Pulmonary:      Effort: Pulmonary effort is normal       Breath sounds: Normal breath sounds  Abdominal:      Palpations: Abdomen is soft  Musculoskeletal:      Cervical back: Normal range of motion  Comments: See Ortho Exam   Skin:     General: Skin is warm  Capillary Refill: Capillary refill takes less than 2 seconds  Neurological:      General: No focal deficit present  Mental Status: She is alert  Mental status is at baseline  Psychiatric:         Mood and Affect: Mood normal          Behavior: Behavior normal            I have personally reviewed pertinent films in PACS and my interpretation is as follows      X-rays right knee from today show moderate to severe degenerative changes mostly in the medial and patellofemoral compartments as evidenced by joint space narrowing, subchondral sclerosis and osteophyte formation    X-rays left knee December 2020 show severe degenerative changes mostly in the medial and patellofemoral compartment as evidenced by joint space narrowing, subchondral sclerosis, osteophyte formation and bone-on-bone apposition

## 2021-06-27 PROBLEM — Z01.818 PREOP EXAM FOR INTERNAL MEDICINE: Status: ACTIVE | Noted: 2021-06-27

## 2021-06-27 NOTE — PROGRESS NOTES
Internal Medicine Pre-Operative Evaluation:     Reason for Visit: Pre-operative Evaluation for Risk Stratification and Optimization    Patient ID: Nory Rueda is a 72 y o  female  Surgery: Arthroplasty of left knee  Referring Provider: Dr Molly Jim      Primary osteoarthritis left knee   Failed conservative measures   Electing to undergo total joint arthroplasty    Pre-operative Optimization for planned surgery   Patient is surgically optimized for planned procedure   Patient meets preoperative quality goals as noted below   Recommendations as listed in PLAN section below  Irwin Bonilla 4328 surgical nurse  navigator with any questions regarding preoperative plan or schedule   Follow up visit with [facility] medical team 1 week after discharge from surgery as scheduled        Patient Active Problem List   Diagnosis    Left ureteral calculus    Hydronephrosis with ureteral calculus    Constipation    Allergic rhinitis    Colonic inertia    Depression with anxiety    Fibromyalgia    Neoplasm of uncertain behavior of skin    Nephrolithiasis    Pes anserinus bursitis of right knee    Primary osteoarthritis of right knee    Restless legs syndrome    Vitamin D deficiency    Arthritis of knee    Arthralgia    Fatigue    Hallux valgus    Primary osteoarthritis of both knees    Anemia    Other hyperlipidemia    GERD (gastroesophageal reflux disease)    Hand pain    Acquired hallux interphalangeus of right foot    Stroke-like symptoms    Intracranial vascular stenosis    Balance disorder    Unspecified convulsions (HCC)    Chronic pain of both knees    Primary osteoarthritis of left knee    Nonintractable headache    Preop exam for internal medicine          Plan:     1  Further preoperative workup as follows:   - none no further testing required may proceed with surgery    2   Medication Management/Recommendations:   - continue all current medicines through morning of surgery with sip of water except the following:  - hold aspirin 7 days prior to surgery  - avoid use of NSAID such as motrin, advil, aleve for 5 days prior to surgery    3  Patient requires further consultation with:   No Consults Required    4  Discharge Planning / Barriers to Discharge  none identified - patients has post discharge therapy plan in place, transportation arranged for discharge day, adequate family support at home to assist with discharge to home  Recommendations to Proceed withSurgery    No contraindications to planned surgery     Diagnoses and all orders for this visit:    Primary osteoarthritis of left knee  -     Ambulatory referral to Family Practice    Other hyperlipidemia    Vitamin D deficiency    Gastroesophageal reflux disease, unspecified whether esophagitis present    Other fatigue    Chronic pain of left knee  -     Ambulatory referral to Family Practice              Assessment of Pre-Operative Risks     MLJ Quality Hard Stops:  BMI (<40) : Estimated body mass index is 25 51 kg/m² as calculated from the following:    Height as of this encounter: 5' 4" (1 626 m)  Weight as of this encounter: 67 4 kg (148 lb 9 6 oz)  Hgb ( >11): Lab Results   Component Value Date    HGB 14 2 06/29/2021     HbA1c (<7 0) :   Lab Results   Component Value Date    HGBA1C 5 3 06/29/2021     GFR (>60) (Less then 45 = Nephrology consult):  Estimated Creatinine Clearance: 71 6 mL/min (by C-G formula based on SCr of 0 74 mg/dL)        Active Decompensated Chronic Conditions which would delay surgery  No acutely decompensated medical issues such as recent CVA, MI, new onset arrhythmia, severe aortic stenosis, CHF, uncontrolled COPD       Exercise Capacity: (if less the 4 mets consider functional assessment via cardiac stress testing or consultation)    · Able to walk 2 blocks without symptoms?: Yes  · Able to walk 1 flights without symptoms?: Yes    Assessment of intra and post operative respiratory, hemodynamic and thrombotic risks     Prior Anesthesia Reactions: No     Personal history of venous thromboembolic disease? No    History of steroid use > 5 mg for >2 weeks within last year? No    Cardiac Risk Estimation: per the Revised Cardiac Risk Index (Circ  100:[id number], 1999),     The patient's risk factors for cardiac complications include :  none,     Sarah Mendez has a in hospital cardiac risk of RCI RISK CLASS I (0 risk factors, risk of major cardiac compl  appr  0 5%) based on RCRI calculator          Pre-Op Data Reviewed:       Laboratory Results: I have personally reviewed the pertinent laboratory results/reports     EKG: I have personally reviewed pertinent reports  I personally reviewed and interpreted this tracing in the electronic medical record    OLD RECORDS: reviewed old records in the chart review section if EHR on day of visit  Previous cardiopulmonary studies within the past year:  · Echocardiogram: yes, 2019 ; normal EF and no valvular abnormalities  · Cardiac Catheterization: no  · Stress Test: yes, 2018; exercise stress test; 6 minutes and achieved target HR; no ischemia      Subjective:           History of Present Illness: Sarah Mendez is a 72 y o  female who presents to the office today for a preoperative consultation at the request of surgeon  The patient understands this is an elective procedure and not emergent  They are electing to undergo planned procedure with an understanding that all surgery has inherent risk  They have worked with their surgeon and failed conservative treatment measures  Today they present for preoperative risk assessment and recommendations for optimization in preparation for surgery  Patient with ongoing arthritic pain of the left knee failed conservative treatments  Currently rates pain a 5/10  It is inhibiting her activities of daily living  She failed conservative measures  Pain located to the left knee  She has elected undergo total knee arthroplasty    Seen today for optimization in the surgical center        ROS: No TIA's or unusual headaches, no dysphagia  No prolonged cough  No dyspnea or chest pain on exertion  No abdominal pain, change in bowel habits, black or bloody stools  No urinary tract or BPH symptoms  Positive reported pain in arthritic joint  Positive difficulty with gait  No skin rashes or issues              Objective:      /79   Pulse 65   Ht 5' 4" (1 626 m)   Wt 67 4 kg (148 lb 9 6 oz)   BMI 25 51 kg/m²       General Appearance: no distress, conversive  HEENT: PERRLA, conjuctiva normal; oropharynx clear; mucous membranes moist;   Neck:  Supple, no lymphadenopathy or thyromegaly  Lungs: CTA, normal respiratory effort, no retractions, expiratory effort normal  CV: regular rate and rhythm , PMI normal   ABD: soft non tender, no masses , no hepatic or splenomegaly  EXT: DP pulses intact, no lymphadenopathy, no edema  Skin: normal turgor, normal texture, no rash  Psych: affect normal, mood normal  Neuro: AAOx3        The following portions of the patient's history were reviewed and updated as appropriate: allergies, current medications, past family history, past medical history, past social history, past surgical history and problem list      Past History:       Past Medical History:   Diagnosis Date    Arthralgia     Atypical chest pain     Balance disorder     Cervical rib     last assessed 9/13/12    Depression     Fatigue     Fibromyalgia     Fibromyalgia     GERD (gastroesophageal reflux disease)     Hydronephrosis with renal and ureteral calculus obstruction     last assessed 5/1/17    Lacunar stroke ([facility] [address] )     Leukopenia     last assessed 5/19/16    Memory loss     Restless leg syndrome     last assessed 11/8/13    Sebaceous cyst     last assessed 2/13/13    Skin tag     last assessed 2/13/13    Vitamin D deficiency     Past Surgical History:   Procedure Laterality Date    COLONOSCOPY  2011    fiberoptic    KNEE SURGERY      right knee 2006 meniscal tear stage 04    PA COLONOSCOPY FLX DX W/COLLJ SPEC WHEN PFRMD N/A 3/16/2017    Procedure: COLONOSCOPY;  Surgeon: Dara Krishnamurthy MD;  Location: [facility] GI LAB; Service: Gastroenterology    PA CYSTO/URETERO W/LITHOTRIPSY &INDWELL STENT INSRT Left 4/26/2017    Procedure: CYSTOSCOPY URETEROSCOPY; RETROGRADE PYELOGRAM; STONE EXTRACTION; INSERTION STENT URETERAL;  Surgeon: Walker Fay MD;  Location:  MAIN OR;  Service: Urology          Social History     Tobacco Use    Smoking status: Former Smoker    Smokeless tobacco: Never Used   Vaping Use    Vaping Use: Never used   Substance Use Topics    Alcohol use: Yes     Comment: rarely    Drug use: No     Family History   Problem Relation Age of Onset    Osteoporosis Mother     Heart failure Father     Coronary artery disease Father     Fibromyalgia Sister     Heart failure Family     Coronary artery disease Family     Osteoporosis Family     Alcohol abuse Neg Hx     Substance Abuse Neg Hx     Mental illness Neg Hx     Depression Neg Hx           Allergies:      Allergies   Allergen Reactions    Chocolate - Food Allergy GI Intolerance    Ciprofloxacin Hallucinations    Dramamine [Dimenhydrinate]     Nuts - Food Allergy GI Intolerance    Oat - Food Allergy GI Intolerance        Current Medications:       Current Outpatient Medications:     ascorbic acid (VITAMIN C) 500 MG tablet, Take 1 tablet (500 mg total) by mouth 2 (two) times a day, Disp: 60 tablet, Rfl: 0    aspirin 81 mg chewable tablet, Chew 1 tablet (81 mg total) daily, Disp: 30 tablet, Rfl: 0    atorvastatin (LIPITOR) 40 mg tablet, Take 1 tablet (40 mg total) by mouth every evening, Disp: 90 tablet, Rfl: 1    carbamazepine (CARBATROL) 100 MG 12 hr capsule, Take 1 capsule (100 mg total) by mouth 2 (two) times a day, Disp: 60 capsule, Rfl: 1    enoxaparin (LOVENOX) 40 mg/0 4 mL, Inject 0 4 mL (40 mg total) under the skin daily in the early morning for 28 doses, Disp: 11 2 mL, Rfl: 0    ferrous sulfate 324 (65 Fe) mg, Take 1 tablet (324 mg total) by mouth 2 (two) times a day before meals, Disp: 60 tablet, Rfl: 0    fluticasone (FLONASE) 50 mcg/act nasal spray, 1 spray into each nostril daily as needed for rhinitis, Disp: 16 g, Rfl: 1    folic acid (FOLVITE) 1 mg tablet, Take 1 tablet (1 mg total) by mouth daily, Disp: 30 tablet, Rfl: 0    mometasone (NASONEX) 50 mcg/act nasal spray, 2 sprays into each nostril daily, Disp: 17 g, Rfl: 0    Multiple Vitamins-Minerals (MULTIVITAMIN WITH MINERALS) tablet, Take 1 tablet by mouth daily, Disp: , Rfl:     pantoprazole (PROTONIX) 40 mg tablet, Take 1 tablet (40 mg total) by mouth daily, Disp: 90 tablet, Rfl: 1    pregabalin (LYRICA) 100 mg capsule, Take 1 capsule (100 mg total) by mouth 2 (two) times a day, Disp: 180 capsule, Rfl: 1    venlafaxine (EFFEXOR-XR) 75 mg 24 hr capsule, Take 1 capsule (75 mg total) by mouth daily with breakfast With 37 5 mg, Disp: 90 capsule, Rfl: 1    Cholecalciferol (VITAMIN D3) 1000 units CAPS, Take 1 capsule by mouth daily (Patient not taking: Reported on 7/1/2021), Disp: , Rfl:     diclofenac sodium (VOLTAREN) 1 %, Apply 2 g topically 3 (three) times a day as needed (pain) (Patient not taking: Reported on 7/1/2021), Disp: 100 g, Rfl: 1    famotidine (PEPCID) 20 mg tablet, TAKE 1 TABLET(20 MG) BY MOUTH TWICE DAILY AS NEEDED FOR HEARTBURN (Patient not taking: Reported on 7/1/2021), Disp: 180 tablet, Rfl: 0    lactulose (CHRONULAC) 10 g/15 mL solution, TAKE 15 ML BY MOUTH TWICE DAILY (Patient not taking: Reported on 7/1/2021), Disp: 946 mL, Rfl: 1                  Time of visit including pre-visit chart review, visit and post-visit coordination of plan and care , review of pre-surgical lab work, preparation and time spent documenting note in electronic medical record, time spent face-to-face in physical examination answering patient questions: 45 minutes       Venkat Bella, [address] [place] [address] Drive

## 2021-06-27 NOTE — ASSESSMENT & PLAN NOTE
 Patient surgically optimized for planned procedure   Hold aspirin 1 week prior to surgery   Patient meets preoperative quality goals as noted   Contact surgical nurse  navigator with any questions regarding preoperative plan or schedule   Follow up visit with [facility] medical team 1 week after discharge from surgery

## 2021-06-27 NOTE — PATIENT INSTRUCTIONS
 Hold NSAIDS (advil, alleve, motrin) minimum 5 days prior to surgery   Hold Asprin minimum 7 days prior to surgery   Contact surgical nurse  navigator with any questions regarding preoperative plan or schedule   Follow up visit with [facility] medical team 1 week after discharge from surgery

## 2021-06-28 ENCOUNTER — TELEPHONE (OUTPATIENT)
Dept: OBGYN CLINIC | Facility: HOSPITAL | Age: 66
End: 2021-06-28

## 2021-06-28 NOTE — TELEPHONE ENCOUNTER
Called left vm that there is lab worked need to be done prior to visit on 7/1 with Joni Mao  I explained if the labs aren't done we would need to reschedule appointment

## 2021-06-29 ENCOUNTER — APPOINTMENT (OUTPATIENT)
Dept: LAB | Age: 66
End: 2021-06-29
Payer: COMMERCIAL

## 2021-06-29 ENCOUNTER — TELEPHONE (OUTPATIENT)
Dept: OBGYN CLINIC | Facility: HOSPITAL | Age: 66
End: 2021-06-29

## 2021-06-29 ENCOUNTER — LAB (OUTPATIENT)
Dept: LAB | Age: 66
End: 2021-06-29
Payer: COMMERCIAL

## 2021-06-29 DIAGNOSIS — M25.562 CHRONIC PAIN OF LEFT KNEE: ICD-10-CM

## 2021-06-29 DIAGNOSIS — M17.12 PRIMARY OSTEOARTHRITIS OF LEFT KNEE: ICD-10-CM

## 2021-06-29 DIAGNOSIS — G89.29 CHRONIC PAIN OF LEFT KNEE: ICD-10-CM

## 2021-06-29 DIAGNOSIS — Z01.812 ENCOUNTER FOR PRE-OPERATIVE LABORATORY TESTING: ICD-10-CM

## 2021-06-29 LAB
25(OH)D3 SERPL-MCNC: 33.2 NG/ML (ref 30–100)
ABO GROUP BLD: NORMAL
ALBUMIN SERPL BCP-MCNC: 3.6 G/DL (ref 3.5–5)
ALP SERPL-CCNC: 75 U/L (ref 46–116)
ALT SERPL W P-5'-P-CCNC: 52 U/L (ref 12–78)
ANION GAP SERPL CALCULATED.3IONS-SCNC: 3 MMOL/L (ref 4–13)
APTT PPP: 29 SECONDS (ref 23–37)
AST SERPL W P-5'-P-CCNC: 31 U/L (ref 5–45)
BASOPHILS # BLD AUTO: 0.04 THOUSANDS/ΜL (ref 0–0.1)
BASOPHILS NFR BLD AUTO: 1 % (ref 0–1)
BILIRUB SERPL-MCNC: 0.27 MG/DL (ref 0.2–1)
BLD GP AB SCN SERPL QL: NEGATIVE
BUN SERPL-MCNC: 18 MG/DL (ref 5–25)
CALCIUM SERPL-MCNC: 9.2 MG/DL (ref 8.3–10.1)
CHLORIDE SERPL-SCNC: 108 MMOL/L (ref 100–108)
CHOLEST SERPL-MCNC: 119 MG/DL (ref 50–200)
CO2 SERPL-SCNC: 28 MMOL/L (ref 21–32)
CREAT SERPL-MCNC: 0.74 MG/DL (ref 0.6–1.3)
EOSINOPHIL # BLD AUTO: 0.17 THOUSAND/ΜL (ref 0–0.61)
EOSINOPHIL NFR BLD AUTO: 3 % (ref 0–6)
ERYTHROCYTE [DISTWIDTH] IN BLOOD BY AUTOMATED COUNT: 13.2 % (ref 11.6–15.1)
EST. AVERAGE GLUCOSE BLD GHB EST-MCNC: 105 MG/DL
GFR SERPL CREATININE-BSD FRML MDRD: 85 ML/MIN/1.73SQ M
GLUCOSE SERPL-MCNC: 90 MG/DL (ref 65–140)
HBA1C MFR BLD: 5.3 %
HCT VFR BLD AUTO: 44.2 % (ref 34.8–46.1)
HDLC SERPL-MCNC: 50 MG/DL
HGB BLD-MCNC: 14.2 G/DL (ref 11.5–15.4)
IMM GRANULOCYTES # BLD AUTO: 0.01 THOUSAND/UL (ref 0–0.2)
IMM GRANULOCYTES NFR BLD AUTO: 0 % (ref 0–2)
INR PPP: 0.96 (ref 0.84–1.19)
LDLC SERPL CALC-MCNC: 40 MG/DL (ref 0–100)
LYMPHOCYTES # BLD AUTO: 1.77 THOUSANDS/ΜL (ref 0.6–4.47)
LYMPHOCYTES NFR BLD AUTO: 33 % (ref 14–44)
MCH RBC QN AUTO: 29.4 PG (ref 26.8–34.3)
MCHC RBC AUTO-ENTMCNC: 32.1 G/DL (ref 31.4–37.4)
MCV RBC AUTO: 92 FL (ref 82–98)
MONOCYTES # BLD AUTO: 0.45 THOUSAND/ΜL (ref 0.17–1.22)
MONOCYTES NFR BLD AUTO: 8 % (ref 4–12)
NEUTROPHILS # BLD AUTO: 3 THOUSANDS/ΜL (ref 1.85–7.62)
NEUTS SEG NFR BLD AUTO: 55 % (ref 43–75)
NONHDLC SERPL-MCNC: 69 MG/DL
NRBC BLD AUTO-RTO: 0 /100 WBCS
PLATELET # BLD AUTO: 154 THOUSANDS/UL (ref 149–390)
PMV BLD AUTO: 12.4 FL (ref 8.9–12.7)
POTASSIUM SERPL-SCNC: 3.6 MMOL/L (ref 3.5–5.3)
PROT SERPL-MCNC: 7.1 G/DL (ref 6.4–8.2)
PROTHROMBIN TIME: 12.8 SECONDS (ref 11.6–14.5)
RBC # BLD AUTO: 4.83 MILLION/UL (ref 3.81–5.12)
RH BLD: POSITIVE
SODIUM SERPL-SCNC: 139 MMOL/L (ref 136–145)
SPECIMEN EXPIRATION DATE: NORMAL
TRIGL SERPL-MCNC: 146 MG/DL
VIT B12 SERPL-MCNC: 705 PG/ML (ref 100–900)
WBC # BLD AUTO: 5.44 THOUSAND/UL (ref 4.31–10.16)

## 2021-06-29 PROCEDURE — 85730 THROMBOPLASTIN TIME PARTIAL: CPT

## 2021-06-29 PROCEDURE — 83036 HEMOGLOBIN GLYCOSYLATED A1C: CPT

## 2021-06-29 PROCEDURE — 93005 ELECTROCARDIOGRAM TRACING: CPT

## 2021-06-29 PROCEDURE — 36415 COLL VENOUS BLD VENIPUNCTURE: CPT

## 2021-06-29 PROCEDURE — 80053 COMPREHEN METABOLIC PANEL: CPT

## 2021-06-29 PROCEDURE — 80061 LIPID PANEL: CPT

## 2021-06-29 PROCEDURE — 82607 VITAMIN B-12: CPT

## 2021-06-29 PROCEDURE — 82306 VITAMIN D 25 HYDROXY: CPT

## 2021-06-29 PROCEDURE — 86850 RBC ANTIBODY SCREEN: CPT

## 2021-06-29 PROCEDURE — 85610 PROTHROMBIN TIME: CPT

## 2021-06-29 PROCEDURE — 86900 BLOOD TYPING SEROLOGIC ABO: CPT

## 2021-06-29 PROCEDURE — 86901 BLOOD TYPING SEROLOGIC RH(D): CPT

## 2021-06-29 PROCEDURE — 85025 COMPLETE CBC W/AUTO DIFF WBC: CPT

## 2021-06-29 NOTE — TELEPHONE ENCOUNTER
Preoperative Elective Admission Assessment- Spoke to patient directly  Wednesday surgery, no Ravi  [employer]  Living Situation:  Pt reports living at home, alone  She reports having a multilevel home, condo style, with second floor full bathroom (step in tub)  She denies having a half bathroom on entry level  Steps: 1 to enter from back entrance, and addtl 14 to second floor bedroom and bathroom  First Floor Setup: Living room on entry, no bathroom  DME: Pt denies cane, RW or BSC  Patient's Current Level of Function: independent with ambulation and ADLs    Post-op Caregiver: Erica Buitrago, staying 1 week with patient    Post-op Transport: Friends will rotate  Outpatient Physical Therapy Site: [facility] or [facility], will look into which one is closest      Medication Management: Pt reports managing daily medications herself                    Preferred Pharmacy for Post-op Medications:  [facility]                     Blood Management Vitamin Regimen: Pt reports taking folic acid, vitamin C and Iron daily  Post-op anticoagulant: Nothing Prescribed, will send to surgeon     Discharge Plan: Pt educated that our goal, if at all possible, is to appropriately discharge patient based off their post-op function while striving to maintain maximal independence  If possible, the goal is to discharge patient to home and for them to attend outpatient physical therapy  Barriers to DC identified preoperatively:   *None identified     BMI: 26 04     [employer]: Pt denied enrollment    Patient Education:  Pt educated on post op pain, early mobilization (POD0), average LOS, and goals for outpatient PT  pt encouraged to call me with questions, concerns or issues

## 2021-07-01 ENCOUNTER — OFFICE VISIT (OUTPATIENT)
Dept: INTERNAL MEDICINE CLINIC | Facility: CLINIC | Age: 66
End: 2021-07-01
Payer: COMMERCIAL

## 2021-07-01 VITALS
HEIGHT: 64 IN | DIASTOLIC BLOOD PRESSURE: 79 MMHG | BODY MASS INDEX: 25.37 KG/M2 | WEIGHT: 148.6 LBS | HEART RATE: 65 BPM | SYSTOLIC BLOOD PRESSURE: 117 MMHG

## 2021-07-01 DIAGNOSIS — R53.83 OTHER FATIGUE: ICD-10-CM

## 2021-07-01 DIAGNOSIS — E78.49 OTHER HYPERLIPIDEMIA: ICD-10-CM

## 2021-07-01 DIAGNOSIS — G89.29 CHRONIC PAIN OF LEFT KNEE: ICD-10-CM

## 2021-07-01 DIAGNOSIS — Z01.818 PRE-OPERATIVE CLEARANCE: ICD-10-CM

## 2021-07-01 DIAGNOSIS — E55.9 VITAMIN D DEFICIENCY: ICD-10-CM

## 2021-07-01 DIAGNOSIS — M25.562 CHRONIC PAIN OF LEFT KNEE: ICD-10-CM

## 2021-07-01 DIAGNOSIS — M17.12 PRIMARY OSTEOARTHRITIS OF LEFT KNEE: Primary | ICD-10-CM

## 2021-07-01 DIAGNOSIS — K21.9 GASTROESOPHAGEAL REFLUX DISEASE, UNSPECIFIED WHETHER ESOPHAGITIS PRESENT: ICD-10-CM

## 2021-07-01 LAB
ATRIAL RATE: 60 BPM
P AXIS: 60 DEGREES
PR INTERVAL: 128 MS
QRS AXIS: 24 DEGREES
QRSD INTERVAL: 68 MS
QT INTERVAL: 420 MS
QTC INTERVAL: 420 MS
T WAVE AXIS: 32 DEGREES
VENTRICULAR RATE: 60 BPM

## 2021-07-01 PROCEDURE — 93010 ELECTROCARDIOGRAM REPORT: CPT | Performed by: INTERNAL MEDICINE

## 2021-07-01 PROCEDURE — 99244 OFF/OP CNSLTJ NEW/EST MOD 40: CPT | Performed by: INTERNAL MEDICINE

## 2021-07-01 PROCEDURE — 1036F TOBACCO NON-USER: CPT | Performed by: INTERNAL MEDICINE

## 2021-07-01 PROCEDURE — 1160F RVW MEDS BY RX/DR IN RCRD: CPT | Performed by: INTERNAL MEDICINE

## 2021-07-01 PROCEDURE — 3008F BODY MASS INDEX DOCD: CPT | Performed by: INTERNAL MEDICINE

## 2021-07-09 ENCOUNTER — TELEPHONE (OUTPATIENT)
Dept: OBGYN CLINIC | Facility: HOSPITAL | Age: 66
End: 2021-07-09

## 2021-07-09 DIAGNOSIS — R51.9 NONINTRACTABLE HEADACHE, UNSPECIFIED CHRONICITY PATTERN, UNSPECIFIED HEADACHE TYPE: ICD-10-CM

## 2021-07-09 RX ORDER — CARBAMAZEPINE 100 MG/1
CAPSULE, EXTENDED RELEASE ORAL
Qty: 60 CAPSULE | Refills: 1 | Status: SHIPPED | OUTPATIENT
Start: 2021-07-09 | End: 2021-09-03

## 2021-07-09 NOTE — TELEPHONE ENCOUNTER
Please refer this call to the nurse navigator    They will gladly inform the patient of their name and telephone number

## 2021-07-09 NOTE — TELEPHONE ENCOUNTER
Pt contacted  Provided information for [facility] for outpatient PT appt and recommended 7/19 appt for postoperative appt  All other questions answered

## 2021-07-09 NOTE — TELEPHONE ENCOUNTER
Patient sees Dr Garcia    Patient is having surgery on 07/15 and she hasn't received a call to set up her PT appt's yet  She would also like a call back regarding her Nurse Navigators name and phone number for after surgery      M[address]ira  [phone]

## 2021-07-14 ENCOUNTER — ANESTHESIA EVENT (OUTPATIENT)
Dept: PERIOP | Facility: HOSPITAL | Age: 66
End: 2021-07-14
Payer: COMMERCIAL

## 2021-07-14 RX ORDER — AMOXICILLIN 250 MG
1 CAPSULE ORAL DAILY
COMMUNITY
End: 2021-11-29 | Stop reason: ALTCHOICE

## 2021-07-14 NOTE — PRE-PROCEDURE INSTRUCTIONS
Pre-Surgery Instructions:   Medication Instructions    ascorbic acid (VITAMIN C) 500 MG tablet pt instructed to not take on day of surgery     aspirin 81 mg chewable tablet pt instructed to not take on day of surgery     atorvastatin (LIPITOR) 40 mg tablet pt takes in the evening    carbamazepine (CARBATROL) 100 MG 12 hr capsule pt instructed to take on day of sugery with 1-2 sips of water    ferrous sulfate 324 (65 Fe) mg pt instructed to not take on day of surgery     fluticasone (FLONASE) 50 mcg/act nasal spray pt can use prn on day of surgery     folic acid (FOLVITE) 1 mg tablet pt instructed to not take on day of surgery     Multiple Vitamins-Minerals (MULTIVITAMIN WITH MINERALS) tablet pt instructed to not take on day of surgery     pantoprazole (PROTONIX) 40 mg tablet pt instructed to take on day of surgery with 1-2 sips of water    pregabalin (LYRICA) 100 mg capsule pt instructed to take on day of surgery with 1-2 sips of water    senna-docusate sodium (SENOKOT S) 8 6-50 mg per tablet pt instructed to not take on day of surgery     venlafaxine (EFFEXOR-XR) 75 mg 24 hr capsule pt instructed to take on day of surgery with 1-2 sips off water    Pt denies fever, sob, sore throat and cough  Pt verbalized understanding of shower and med instructions

## 2021-07-14 NOTE — ANESTHESIA PREPROCEDURE EVALUATION
Procedure:  ARTHROPLASTY KNEE TOTAL - left (Left Knee)    Relevant Problems   ANESTHESIA (within normal limits)      CARDIO   (+) Other hyperlipidemia      ENDO (within normal limits)      GI/HEPATIC   (+) GERD (gastroesophageal reflux disease)      /RENAL   (+) Hydronephrosis with ureteral calculus   (+) Nephrolithiasis      GYN (within normal limits)      HEMATOLOGY   (+) Anemia      MUSCULOSKELETAL   (+) Arthritis of knee   (+) Fibromyalgia   (+) Pes anserinus bursitis of right knee   (+) Primary osteoarthritis of both knees   (+) Primary osteoarthritis of left knee   (+) Primary osteoarthritis of right knee      NEURO/PSYCH   (+) Depression with anxiety   (+) Fibromyalgia   (+) Nonintractable headache   (+) Unspecified convulsions (HCC)      PULMONARY (within normal limits)      Other   (+) Chronic pain of both knees   (+) Intracranial vascular stenosis        Physical Exam    Airway    Mallampati score: II  TM Distance: >3 FB  Neck ROM: full     Dental   No notable dental hx     Cardiovascular  Rhythm: regular, Rate: normal, Cardiovascular exam normal    Pulmonary  Pulmonary exam normal Breath sounds clear to auscultation,     Other Findings        Anesthesia Plan  ASA Score- 3     Anesthesia Type- IV sedation with anesthesia, regional and spinal with ASA Monitors  Additional Monitors:   Airway Plan:     Comment: Left adductor canal and IPACK blocks for postoperative pain control  Plan Factors-    Chart reviewed  Existing labs reviewed  Patient summary reviewed  Induction- intravenous  Postoperative Plan- Plan for postoperative opioid use  Informed Consent- Anesthetic plan and risks discussed with patient  I personally reviewed this patient with the CRNA  Discussed and agreed on the Anesthesia Plan with the CRNA  Rosalia Cadena           Recent labs personally reviewed:  Lab Results   Component Value Date    WBC 5 44 06/29/2021    HGB 14 2 06/29/2021     06/29/2021     Lab Results Component Value Date     06/29/2015    K 3 6 06/29/2021    BUN 18 06/29/2021    CREATININE 0 74 06/29/2021    GLUCOSE 94 04/25/2017     Lab Results   Component Value Date    PTT 29 06/29/2021      Lab Results   Component Value Date    INR 0 96 06/29/2021       Blood type O    Lab Results   Component Value Date    HGBA1C 5 3 06/29/2021       I, Kelvin Morris MD, have personally seen and evaluated the patient prior to anesthetic care  I have reviewed the pre-anesthetic record, and other medical records if appropriate to the anesthetic care  If a CRNA is involved in the case, I have reviewed the CRNA assessment, if present, and agree  Risks/benefits and alternatives discussed with patient including possible PONV, sore throat, and possibility of rare anesthetic and surgical emergencies

## 2021-07-15 ENCOUNTER — ANESTHESIA (OUTPATIENT)
Dept: PERIOP | Facility: HOSPITAL | Age: 66
End: 2021-07-15
Payer: COMMERCIAL

## 2021-07-15 ENCOUNTER — HOSPITAL ENCOUNTER (OUTPATIENT)
Facility: HOSPITAL | Age: 66
Setting detail: OUTPATIENT SURGERY
Discharge: HOME | End: 2021-07-17
Attending: ORTHOPAEDIC SURGERY | Admitting: ORTHOPAEDIC SURGERY
Payer: COMMERCIAL

## 2021-07-15 DIAGNOSIS — Z96.652 STATUS POST TOTAL KNEE REPLACEMENT, LEFT: Primary | ICD-10-CM

## 2021-07-15 LAB
ABO GROUP BLD: NORMAL
GLUCOSE SERPL-MCNC: 85 MG/DL (ref 65–140)
RH BLD: POSITIVE

## 2021-07-15 PROCEDURE — C1776 JOINT DEVICE (IMPLANTABLE): HCPCS | Performed by: ORTHOPAEDIC SURGERY

## 2021-07-15 PROCEDURE — 97163 PT EVAL HIGH COMPLEX 45 MIN: CPT

## 2021-07-15 PROCEDURE — 27447 TOTAL KNEE ARTHROPLASTY: CPT | Performed by: ORTHOPAEDIC SURGERY

## 2021-07-15 PROCEDURE — C1713 ANCHOR/SCREW BN/BN,TIS/BN: HCPCS | Performed by: ORTHOPAEDIC SURGERY

## 2021-07-15 PROCEDURE — 82948 REAGENT STRIP/BLOOD GLUCOSE: CPT

## 2021-07-15 DEVICE — ATTUNE KNEE SYSTEM TIBIAL INSERT FIXED BEARING POSTERIOR STABILIZED 4 5MM AOX
Type: IMPLANTABLE DEVICE | Site: KNEE | Status: FUNCTIONAL
Brand: ATTUNE

## 2021-07-15 DEVICE — SMARTSET HV HIGH VISCOSITY BONE CEMENT 40G
Type: IMPLANTABLE DEVICE | Site: KNEE | Status: FUNCTIONAL
Brand: SMARTSET

## 2021-07-15 DEVICE — ATTUNE KNEE SYSTEM TIBIAL BASE FIXED BEARING SIZE 3 CEMENTED
Type: IMPLANTABLE DEVICE | Site: KNEE | Status: FUNCTIONAL
Brand: ATTUNE

## 2021-07-15 DEVICE — ATTUNE PATELLA MEDIALIZED DOME 32MM CEMENTED AOX
Type: IMPLANTABLE DEVICE | Site: KNEE | Status: FUNCTIONAL
Brand: ATTUNE

## 2021-07-15 DEVICE — ATTUNE KNEE SYSTEM FEMORAL POSTERIOR STABILIZED NARROW SIZE 4N LEFT CEMENTED
Type: IMPLANTABLE DEVICE | Site: KNEE | Status: FUNCTIONAL
Brand: ATTUNE

## 2021-07-15 RX ORDER — OXYCODONE HYDROCHLORIDE 5 MG/1
TABLET ORAL
Qty: 30 TABLET | Refills: 0 | Status: SHIPPED | OUTPATIENT
Start: 2021-07-15 | End: 2021-07-22 | Stop reason: SDUPTHER

## 2021-07-15 RX ORDER — ACETAMINOPHEN 325 MG/1
650 TABLET ORAL EVERY 6 HOURS SCHEDULED
Status: DISCONTINUED | OUTPATIENT
Start: 2021-07-15 | End: 2021-07-17 | Stop reason: HOSPADM

## 2021-07-15 RX ORDER — FLUTICASONE PROPIONATE 50 MCG
1 SPRAY, SUSPENSION (ML) NASAL DAILY PRN
Status: DISCONTINUED | OUTPATIENT
Start: 2021-07-15 | End: 2021-07-17 | Stop reason: HOSPADM

## 2021-07-15 RX ORDER — POLYETHYLENE GLYCOL 3350 17 G/17G
17 POWDER, FOR SOLUTION ORAL DAILY PRN
Status: DISCONTINUED | OUTPATIENT
Start: 2021-07-15 | End: 2021-07-17 | Stop reason: HOSPADM

## 2021-07-15 RX ORDER — METHOCARBAMOL 500 MG/1
500 TABLET, FILM COATED ORAL EVERY 6 HOURS PRN
Status: DISCONTINUED | OUTPATIENT
Start: 2021-07-15 | End: 2021-07-17 | Stop reason: HOSPADM

## 2021-07-15 RX ORDER — MAGNESIUM HYDROXIDE 1200 MG/15ML
LIQUID ORAL AS NEEDED
Status: DISCONTINUED | OUTPATIENT
Start: 2021-07-15 | End: 2021-07-15 | Stop reason: HOSPADM

## 2021-07-15 RX ORDER — OXYCODONE HYDROCHLORIDE 5 MG/1
5 TABLET ORAL EVERY 4 HOURS PRN
Status: DISCONTINUED | OUTPATIENT
Start: 2021-07-15 | End: 2021-07-15

## 2021-07-15 RX ORDER — OXYCODONE HYDROCHLORIDE 5 MG/1
2.5 TABLET ORAL EVERY 4 HOURS PRN
Status: DISCONTINUED | OUTPATIENT
Start: 2021-07-15 | End: 2021-07-15

## 2021-07-15 RX ORDER — FENTANYL CITRATE/PF 50 MCG/ML
50 SYRINGE (ML) INJECTION
Status: DISCONTINUED | OUTPATIENT
Start: 2021-07-15 | End: 2021-07-15 | Stop reason: HOSPADM

## 2021-07-15 RX ORDER — LIDOCAINE HYDROCHLORIDE 10 MG/ML
INJECTION, SOLUTION EPIDURAL; INFILTRATION; INTRACAUDAL; PERINEURAL AS NEEDED
Status: DISCONTINUED | OUTPATIENT
Start: 2021-07-15 | End: 2021-07-15

## 2021-07-15 RX ORDER — ONDANSETRON 2 MG/ML
4 INJECTION INTRAMUSCULAR; INTRAVENOUS EVERY 8 HOURS PRN
Status: DISCONTINUED | OUTPATIENT
Start: 2021-07-15 | End: 2021-07-17 | Stop reason: HOSPADM

## 2021-07-15 RX ORDER — DOCUSATE SODIUM 100 MG/1
100 CAPSULE, LIQUID FILLED ORAL 2 TIMES DAILY
Status: DISCONTINUED | OUTPATIENT
Start: 2021-07-15 | End: 2021-07-15 | Stop reason: SDUPTHER

## 2021-07-15 RX ORDER — OXYCODONE HYDROCHLORIDE 5 MG/1
5 TABLET ORAL EVERY 4 HOURS PRN
Status: DISCONTINUED | OUTPATIENT
Start: 2021-07-15 | End: 2021-07-17 | Stop reason: HOSPADM

## 2021-07-15 RX ORDER — SODIUM CHLORIDE, SODIUM LACTATE, POTASSIUM CHLORIDE, CALCIUM CHLORIDE 600; 310; 30; 20 MG/100ML; MG/100ML; MG/100ML; MG/100ML
1.5 INJECTION, SOLUTION INTRAVENOUS CONTINUOUS
Status: DISCONTINUED | OUTPATIENT
Start: 2021-07-15 | End: 2021-07-17 | Stop reason: HOSPADM

## 2021-07-15 RX ORDER — ROPIVACAINE HYDROCHLORIDE 2 MG/ML
INJECTION, SOLUTION EPIDURAL; INFILTRATION; PERINEURAL
Status: COMPLETED | OUTPATIENT
Start: 2021-07-15 | End: 2021-07-15

## 2021-07-15 RX ORDER — PROPOFOL 10 MG/ML
INJECTION, EMULSION INTRAVENOUS CONTINUOUS PRN
Status: DISCONTINUED | OUTPATIENT
Start: 2021-07-15 | End: 2021-07-15

## 2021-07-15 RX ORDER — HYDROMORPHONE HCL IN WATER/PF 6 MG/30 ML
0.2 PATIENT CONTROLLED ANALGESIA SYRINGE INTRAVENOUS EVERY 4 HOURS PRN
Status: DISCONTINUED | OUTPATIENT
Start: 2021-07-15 | End: 2021-07-15

## 2021-07-15 RX ORDER — EPHEDRINE SULFATE 50 MG/ML
INJECTION INTRAVENOUS AS NEEDED
Status: DISCONTINUED | OUTPATIENT
Start: 2021-07-15 | End: 2021-07-15

## 2021-07-15 RX ORDER — FENTANYL CITRATE 50 UG/ML
INJECTION, SOLUTION INTRAMUSCULAR; INTRAVENOUS AS NEEDED
Status: DISCONTINUED | OUTPATIENT
Start: 2021-07-15 | End: 2021-07-15

## 2021-07-15 RX ORDER — DOCUSATE SODIUM 100 MG/1
100 CAPSULE, LIQUID FILLED ORAL 2 TIMES DAILY
COMMUNITY

## 2021-07-15 RX ORDER — SODIUM CHLORIDE, SODIUM LACTATE, POTASSIUM CHLORIDE, CALCIUM CHLORIDE 600; 310; 30; 20 MG/100ML; MG/100ML; MG/100ML; MG/100ML
125 INJECTION, SOLUTION INTRAVENOUS CONTINUOUS
Status: DISCONTINUED | OUTPATIENT
Start: 2021-07-15 | End: 2021-07-15

## 2021-07-15 RX ORDER — SENNOSIDES 8.6 MG
1 TABLET ORAL
Status: DISCONTINUED | OUTPATIENT
Start: 2021-07-15 | End: 2021-07-15 | Stop reason: SDUPTHER

## 2021-07-15 RX ORDER — CARBAMAZEPINE 100 MG/1
100 TABLET, EXTENDED RELEASE ORAL 2 TIMES DAILY
Status: DISCONTINUED | OUTPATIENT
Start: 2021-07-15 | End: 2021-07-17 | Stop reason: HOSPADM

## 2021-07-15 RX ORDER — LIDOCAINE HYDROCHLORIDE 10 MG/ML
0.5 INJECTION, SOLUTION EPIDURAL; INFILTRATION; INTRACAUDAL; PERINEURAL ONCE AS NEEDED
Status: DISCONTINUED | OUTPATIENT
Start: 2021-07-15 | End: 2021-07-15 | Stop reason: HOSPADM

## 2021-07-15 RX ORDER — CEFAZOLIN SODIUM 2 G/50ML
2000 SOLUTION INTRAVENOUS EVERY 8 HOURS
Status: COMPLETED | OUTPATIENT
Start: 2021-07-15 | End: 2021-07-15

## 2021-07-15 RX ORDER — ONDANSETRON 2 MG/ML
4 INJECTION INTRAMUSCULAR; INTRAVENOUS ONCE AS NEEDED
Status: DISCONTINUED | OUTPATIENT
Start: 2021-07-15 | End: 2021-07-15 | Stop reason: HOSPADM

## 2021-07-15 RX ORDER — HYDROMORPHONE HCL IN WATER/PF 6 MG/30 ML
0.2 PATIENT CONTROLLED ANALGESIA SYRINGE INTRAVENOUS
Status: DISCONTINUED | OUTPATIENT
Start: 2021-07-15 | End: 2021-07-15 | Stop reason: HOSPADM

## 2021-07-15 RX ORDER — SENNOSIDES 8.6 MG
1 TABLET ORAL DAILY
Status: DISCONTINUED | OUTPATIENT
Start: 2021-07-15 | End: 2021-07-17 | Stop reason: HOSPADM

## 2021-07-15 RX ORDER — HYDROMORPHONE HCL/PF 1 MG/ML
0.5 SYRINGE (ML) INJECTION
Status: DISCONTINUED | OUTPATIENT
Start: 2021-07-15 | End: 2021-07-15 | Stop reason: HOSPADM

## 2021-07-15 RX ORDER — VENLAFAXINE HYDROCHLORIDE 75 MG/1
75 CAPSULE, EXTENDED RELEASE ORAL
Status: DISCONTINUED | OUTPATIENT
Start: 2021-07-16 | End: 2021-07-17 | Stop reason: HOSPADM

## 2021-07-15 RX ORDER — GABAPENTIN 300 MG/1
300 CAPSULE ORAL ONCE
Status: DISCONTINUED | OUTPATIENT
Start: 2021-07-15 | End: 2021-07-15 | Stop reason: HOSPADM

## 2021-07-15 RX ORDER — CEFAZOLIN SODIUM 2 G/50ML
2000 SOLUTION INTRAVENOUS ONCE
Status: COMPLETED | OUTPATIENT
Start: 2021-07-15 | End: 2021-07-15

## 2021-07-15 RX ORDER — ONDANSETRON 2 MG/ML
4 INJECTION INTRAMUSCULAR; INTRAVENOUS ONCE
Status: DISCONTINUED | OUTPATIENT
Start: 2021-07-15 | End: 2021-07-15

## 2021-07-15 RX ORDER — BUPIVACAINE HYDROCHLORIDE 7.5 MG/ML
INJECTION, SOLUTION EPIDURAL; RETROBULBAR AS NEEDED
Status: DISCONTINUED | OUTPATIENT
Start: 2021-07-15 | End: 2021-07-15

## 2021-07-15 RX ORDER — ACETAMINOPHEN 325 MG/1
975 TABLET ORAL ONCE
Status: COMPLETED | OUTPATIENT
Start: 2021-07-15 | End: 2021-07-15

## 2021-07-15 RX ORDER — DIPHENHYDRAMINE HYDROCHLORIDE 50 MG/ML
12.5 INJECTION INTRAMUSCULAR; INTRAVENOUS ONCE AS NEEDED
Status: DISCONTINUED | OUTPATIENT
Start: 2021-07-15 | End: 2021-07-15 | Stop reason: HOSPADM

## 2021-07-15 RX ORDER — PANTOPRAZOLE SODIUM 40 MG/1
40 TABLET, DELAYED RELEASE ORAL
Status: DISCONTINUED | OUTPATIENT
Start: 2021-07-16 | End: 2021-07-17 | Stop reason: HOSPADM

## 2021-07-15 RX ORDER — MIDAZOLAM HYDROCHLORIDE 2 MG/2ML
INJECTION, SOLUTION INTRAMUSCULAR; INTRAVENOUS AS NEEDED
Status: DISCONTINUED | OUTPATIENT
Start: 2021-07-15 | End: 2021-07-15

## 2021-07-15 RX ORDER — OXYCODONE HYDROCHLORIDE 10 MG/1
10 TABLET ORAL EVERY 4 HOURS PRN
Status: DISCONTINUED | OUTPATIENT
Start: 2021-07-15 | End: 2021-07-17 | Stop reason: HOSPADM

## 2021-07-15 RX ORDER — DOCUSATE SODIUM 100 MG/1
100 CAPSULE, LIQUID FILLED ORAL 2 TIMES DAILY
Status: DISCONTINUED | OUTPATIENT
Start: 2021-07-15 | End: 2021-07-17 | Stop reason: HOSPADM

## 2021-07-15 RX ORDER — METOCLOPRAMIDE HYDROCHLORIDE 5 MG/ML
10 INJECTION INTRAMUSCULAR; INTRAVENOUS ONCE AS NEEDED
Status: DISCONTINUED | OUTPATIENT
Start: 2021-07-15 | End: 2021-07-15 | Stop reason: HOSPADM

## 2021-07-15 RX ORDER — PREGABALIN 100 MG/1
100 CAPSULE ORAL 2 TIMES DAILY
Status: DISCONTINUED | OUTPATIENT
Start: 2021-07-16 | End: 2021-07-17 | Stop reason: HOSPADM

## 2021-07-15 RX ORDER — ATORVASTATIN CALCIUM 40 MG/1
40 TABLET, FILM COATED ORAL
Status: DISCONTINUED | OUTPATIENT
Start: 2021-07-16 | End: 2021-07-17 | Stop reason: HOSPADM

## 2021-07-15 RX ORDER — ONDANSETRON 2 MG/ML
INJECTION INTRAMUSCULAR; INTRAVENOUS AS NEEDED
Status: DISCONTINUED | OUTPATIENT
Start: 2021-07-15 | End: 2021-07-15

## 2021-07-15 RX ORDER — HYDROMORPHONE HCL/PF 1 MG/ML
0.5 SYRINGE (ML) INJECTION EVERY 4 HOURS PRN
Status: DISPENSED | OUTPATIENT
Start: 2021-07-15 | End: 2021-07-17

## 2021-07-15 RX ORDER — CHLORHEXIDINE GLUCONATE 0.12 MG/ML
15 RINSE ORAL ONCE
Status: COMPLETED | OUTPATIENT
Start: 2021-07-15 | End: 2021-07-15

## 2021-07-15 RX ADMIN — EPHEDRINE SULFATE 10 MG: 50 INJECTION, SOLUTION INTRAVENOUS at 08:02

## 2021-07-15 RX ADMIN — FENTANYL CITRATE 15 MCG: 50 INJECTION INTRAMUSCULAR; INTRAVENOUS at 07:52

## 2021-07-15 RX ADMIN — SODIUM CHLORIDE, SODIUM LACTATE, POTASSIUM CHLORIDE, AND CALCIUM CHLORIDE 1000 ML: .6; .31; .03; .02 INJECTION, SOLUTION INTRAVENOUS at 13:07

## 2021-07-15 RX ADMIN — CEFAZOLIN SODIUM 2000 MG: 2 SOLUTION INTRAVENOUS at 23:15

## 2021-07-15 RX ADMIN — EPHEDRINE SULFATE 10 MG: 50 INJECTION, SOLUTION INTRAVENOUS at 08:17

## 2021-07-15 RX ADMIN — ROPIVACAINE HYDROCHLORIDE 25 ML: 2 INJECTION, SOLUTION EPIDURAL; INFILTRATION at 07:28

## 2021-07-15 RX ADMIN — CEFAZOLIN SODIUM 2000 MG: 2 SOLUTION INTRAVENOUS at 07:45

## 2021-07-15 RX ADMIN — CEFAZOLIN SODIUM 2000 MG: 2 SOLUTION INTRAVENOUS at 16:23

## 2021-07-15 RX ADMIN — ACETAMINOPHEN 650 MG: 325 TABLET, FILM COATED ORAL at 23:15

## 2021-07-15 RX ADMIN — ACETAMINOPHEN 650 MG: 325 TABLET, FILM COATED ORAL at 13:06

## 2021-07-15 RX ADMIN — ENOXAPARIN SODIUM 40 MG: 40 INJECTION SUBCUTANEOUS at 22:03

## 2021-07-15 RX ADMIN — MIDAZOLAM 2 MG: 1 INJECTION INTRAMUSCULAR; INTRAVENOUS at 07:32

## 2021-07-15 RX ADMIN — ROPIVACAINE HYDROCHLORIDE 20 ML: 2 INJECTION, SOLUTION EPIDURAL; INFILTRATION; PERINEURAL at 07:32

## 2021-07-15 RX ADMIN — TRANEXAMIC ACID 1000 MG: 1 INJECTION, SOLUTION INTRAVENOUS at 08:06

## 2021-07-15 RX ADMIN — SODIUM CHLORIDE, SODIUM LACTATE, POTASSIUM CHLORIDE, AND CALCIUM CHLORIDE 125 ML/HR: .6; .31; .03; .02 INJECTION, SOLUTION INTRAVENOUS at 10:25

## 2021-07-15 RX ADMIN — METHOCARBAMOL 500 MG: 500 TABLET, FILM COATED ORAL at 16:23

## 2021-07-15 RX ADMIN — CARBAMAZEPINE 100 MG: 100 TABLET, EXTENDED RELEASE ORAL at 17:26

## 2021-07-15 RX ADMIN — PHENYLEPHRINE HYDROCHLORIDE 20 MCG/MIN: 10 INJECTION INTRAVENOUS at 08:28

## 2021-07-15 RX ADMIN — BUPIVACAINE HYDROCHLORIDE 1.6 ML: 7.5 INJECTION, SOLUTION EPIDURAL; RETROBULBAR at 07:52

## 2021-07-15 RX ADMIN — SENNOSIDES 8.6 MG: 8.6 TABLET ORAL at 13:06

## 2021-07-15 RX ADMIN — PROPOFOL 100 MCG/KG/MIN: 10 INJECTION, EMULSION INTRAVENOUS at 08:02

## 2021-07-15 RX ADMIN — FENTANYL CITRATE 35 MCG: 50 INJECTION INTRAMUSCULAR; INTRAVENOUS at 07:53

## 2021-07-15 RX ADMIN — DOCUSATE SODIUM 100 MG: 100 CAPSULE ORAL at 13:05

## 2021-07-15 RX ADMIN — ACETAMINOPHEN 975 MG: 325 TABLET, FILM COATED ORAL at 06:23

## 2021-07-15 RX ADMIN — SODIUM CHLORIDE, SODIUM LACTATE, POTASSIUM CHLORIDE, AND CALCIUM CHLORIDE: .6; .31; .03; .02 INJECTION, SOLUTION INTRAVENOUS at 08:48

## 2021-07-15 RX ADMIN — DOCUSATE SODIUM 100 MG: 100 CAPSULE ORAL at 17:26

## 2021-07-15 RX ADMIN — OXYCODONE HYDROCHLORIDE 10 MG: 10 TABLET ORAL at 21:46

## 2021-07-15 RX ADMIN — SODIUM CHLORIDE, SODIUM LACTATE, POTASSIUM CHLORIDE, AND CALCIUM CHLORIDE 1.5 ML/KG/HR: .6; .31; .03; .02 INJECTION, SOLUTION INTRAVENOUS at 11:30

## 2021-07-15 RX ADMIN — LIDOCAINE HYDROCHLORIDE 5 ML: 10 INJECTION, SOLUTION EPIDURAL; INFILTRATION; INTRACAUDAL; PERINEURAL at 08:02

## 2021-07-15 RX ADMIN — HYDROMORPHONE HYDROCHLORIDE 0.2 MG: 0.2 INJECTION, SOLUTION INTRAMUSCULAR; INTRAVENOUS; SUBCUTANEOUS at 18:30

## 2021-07-15 RX ADMIN — CHLORHEXIDINE GLUCONATE 15 ML: 1.2 SOLUTION ORAL at 06:24

## 2021-07-15 RX ADMIN — ACETAMINOPHEN 650 MG: 325 TABLET, FILM COATED ORAL at 17:26

## 2021-07-15 RX ADMIN — SODIUM CHLORIDE, SODIUM LACTATE, POTASSIUM CHLORIDE, AND CALCIUM CHLORIDE: .6; .31; .03; .02 INJECTION, SOLUTION INTRAVENOUS at 07:25

## 2021-07-15 RX ADMIN — OXYCODONE HYDROCHLORIDE 5 MG: 5 TABLET ORAL at 13:06

## 2021-07-15 RX ADMIN — ONDANSETRON 4 MG: 2 INJECTION INTRAMUSCULAR; INTRAVENOUS at 09:03

## 2021-07-15 RX ADMIN — HYDROMORPHONE HYDROCHLORIDE 0.5 MG: 1 INJECTION, SOLUTION INTRAMUSCULAR; INTRAVENOUS; SUBCUTANEOUS at 21:45

## 2021-07-15 RX ADMIN — OXYCODONE HYDROCHLORIDE 5 MG: 5 TABLET ORAL at 17:26

## 2021-07-15 NOTE — DISCHARGE INSTRUCTIONS
Discharge Instructions - [address] 72 y o  female MRN: [id number]  Unit/Bed#: PACU 02    Weight Bearing Status:                                           Weight Bearing as tolerated to the left lower extremity  DVT prophylaxis:  Complete course of Lovenox as directed    Pain:  Continue analgesics as directed    Showering Instructions:   Do not shower until follow-up appointment  Dressing Instructions:   Keep dressing clean, dry and intact until follow up appointment  Driving Instructions:  No driving until cleared by Orthopaedic Surgery  PT/OT:  Continue PT/OT on outpatient basis as directed    Appt Instructions:    If you do not have your appointment, please call the clinic at [phone]  Otherwise followup as scheduled below:

## 2021-07-15 NOTE — CONSULTS
Internal Medicine  Consultation Note    Patient: Geetha Queen  Age/sex: 72 y o  female  Medical Record #: [id number]    Assessment/Plan    Status Post left Total KNEE ARTHROPLASTY   Continue post op pain control measures as prescribed   Follow bowel regimen to help decrease narcotic induced constipation    Follow post operative hemoglobin with serial CBC and treat accordingly   Monitor WBC and fever curve post op while encouraging use of incentive spirometer   DVT prophylaxis in place and reviewed  Left PCA stenosis  · Continue atorvastatin  · Resume ASA post-op day 1   · Avoid hypotension  Headache syndrome  · Continue Carbatrol  PRE-OP HGB LEVEL: 14 2    Subjective/ HPI: Geetha Queen was seen and examined  Hx of KNEE pain failed out patient conservative measures  Elected to undergo total KNEE arthroplasty We are asked to see patient for post op management of underlying medical co-morbidities as outlined above  Pt did well intra and post operatively with good hemodynamics  Pt currently comfortable and without any reported post op nausea  ROS:   A 10 point ROS was performed; negative except as noted above       Social History:    Substance Use History:   Social History     Substance and Sexual Activity   Alcohol Use Not Currently     Social History     Tobacco Use   Smoking Status Former Smoker   Smokeless Tobacco Never Used     Social History     Substance and Sexual Activity   Drug Use No       Family History:    Family History   Problem Relation Age of Onset    Osteoporosis Mother     Heart failure Father     Coronary artery disease Father     Fibromyalgia Sister     Heart failure Family     Coronary artery disease Family     Osteoporosis Family     Alcohol abuse Neg Hx     Substance Abuse Neg Hx     Mental illness Neg Hx     Depression Neg Hx          Review of Scheduled Meds:  Current Facility-Administered Medications   Medication Dose Route Frequency Provider Last Rate    acetaminophen  650 mg Oral Q6H [facility] Dominick Zarate MD      [START ON 7/16/2021] atorvastatin  40 mg Oral Daily With Sky Liu MD      [START ON 7/16/2021] carBAMazepine  100 mg Oral BID Dominick Zarate MD      cefazolin  2,000 mg Intravenous Q8H Dominick Zarate MD      docusate sodium  100 mg Oral BID Dominick Zarate MD      docusate sodium  100 mg Oral BID Dominick Zarate MD      enoxaparin  40 mg Subcutaneous Daily Dominick Zarate MD      fluticasone  1 spray Nasal Daily PRN Dominick Zarate MD      HYDROmorphone  0 2 mg Intravenous Q4H PRN Dominick Zarate MD      lactated ringers  1,000 mL Intravenous Once PRN Dominick Zarate MD      And    lactated ringers  1,000 mL Intravenous Once PRN Dominick Zarate MD      lactated ringers  1 5 mL/kg/hr Intravenous Continuous Dominick Zarate MD 1 5 mL/kg/hr (07/15/21 1130)    methocarbamol  500 mg Oral Q6H PRN Dominick Zarate MD      ondansetron  4 mg Intravenous Q8H PRN Dominick Zarate MD      oxyCODONE  2 5 mg Oral Q4H PRN Dominick Zarate MD      oxyCODONE  5 mg Oral Q4H PRN Dominick Zarate MD      [START ON 7/16/2021] pantoprazole  40 mg Oral Daily Before Breakfast Dominick Zarate MD      polyethylene glycol  17 g Oral Daily PRN MD Segun Timmons ON 7/16/2021] pregabalin  100 mg Oral BID Dominick Zarate MD      senna  1 tablet Oral Daily Dominick Zarate MD      senna  1 tablet Oral HS Dominick Zarate MD      sodium chloride  1,000 mL Intravenous Once PRN Dominick Zarate MD      And    sodium chloride  1,000 mL Intravenous Once PRN MD Segun Timmons [START ON 7/16/2021] venlafaxine  75 mg Oral Daily With Breakfast Dominick Zarate MD         Labs:                Invalid input(s): LABGLOM, CMP             Results from last 7 days   Lab Units 07/15/21  0935   POC GLUCOSE mg/dl 85       Lab Results   Component Value Date    URINECX 20,000-29,000 cfu/ml Mixed Contaminants X4 04/25/2017       Input and Output Summary (last 24 hours): Intake/Output Summary (Last 24 hours) at 7/15/2021 1240  Last data filed at 7/15/2021 1130  Gross per 24 hour   Intake 2300 ml   Output 710 ml   Net 1590 ml       Imaging:     No orders to display       *Labs /Radiology studiesLabs reviewed  *Medications reviewed and reconciled as needed  *Please refer to order section for additional ordered labs studies  *Case discussed with primary attending during morning huddle case rounds    Vitals:   Temp (24hrs), Av 2 °F (36 2 °C), Min:96 4 °F (35 8 °C), Max:97 7 °F (36 5 °C)    Temp:  [96 4 °F (35 8 °C)-97 7 °F (36 5 °C)] 97 7 °F (36 5 °C)  HR:  [60-74] 60  Resp:  [12-20] 16  BP: ()/(51-74) 105/62  SpO2:  [96 %-100 %] 99 %  Body mass index is 26 05 kg/m²  Physical Exam:   General Appearance: no distress, conversive  HEENT: PERRLA, conjuctiva normal; oropharynx clear; mucous membranes moist;   Neck:  Supple, no lymphadenopathy or thyromegaly  Lungs: CTA, normal respiratory effort, no retractions, expiratory effort normal  CV: regular rate and rhythm , PMI normal   ABD: soft non tender, no masses , no hepatic or splenomegaly  EXT: DP pulses intact, no lymphadenopathy, no edema ;  left KNEE dressing in place  Skin: normal turgor, normal texture, no rash  Psych: affect normal, mood normal  Neuro: AAOx3          Invasive Devices     Peripheral Intravenous Line            Peripheral IV 07/15/21 Right Hand <1 day          Drain            Closed/Suction Drain Left Knee Accordion 10 Fr  <1 day    Urethral Catheter Latex 16 Fr  <1 day                   Code Status: Level 1 - Full Code  Current Length of Stay: 0 day(s)    Total floor / unit time spent today 1 hour  Coordination of patient's care was performed in conjunction with primary service   Time invested included review of patient's labs, vitals, and management of their comorbidities with continued monitoring, examination of patient as well as answering patient questions, documenting her findings and creating progress note in electronic medical record,  ordering appropriate diagnostic testing  ** Please Note: Fluency Direct voice to text software may have been used in the creation of this document   Audio transcription errors may occur**

## 2021-07-15 NOTE — ANESTHESIA PROCEDURE NOTES
Peripheral Block    Patient location during procedure: holding area  Start time: 7/15/2021 7:32 AM  Reason for block: at surgeon's request and post-op pain management  Staffing  Performed: anesthesiologist   Anesthesiologist: Nely Mccarthy MD  Preanesthetic Checklist  Completed: patient identified, IV checked, site marked, risks and benefits discussed, surgical consent, monitors and equipment checked, pre-op evaluation and timeout performed  Peripheral Block  Patient position: right lateral decubitus  Prep: ChloraPrep  Patient monitoring: continuous pulse ox and frequent blood pressure checks  Block type: ipack block  Laterality: left  Injection technique: single-shot  Procedures: ultrasound guided, Ultrasound guidance required for the procedure to increase accuracy and safety of medication placement and decrease risk of complications    Ultrasound permanent image savedropivacaine (NAROPIN) 0 2% perineural infiltration, 20 mL  Needle  Needle type: Stimuplex   Needle localization: ultrasound guidance  Test dose: negative  Assessment  Injection assessment: incremental injection, local visualized surrounding nerve on ultrasound, negative aspiration for CSF, negative aspiration for heme and no paresthesia on injection  Paresthesia pain: none  Heart rate change: no  Slow fractionated injection: yes  Post-procedure:  site cleaned  patient tolerated the procedure well with no immediate complications

## 2021-07-15 NOTE — ANESTHESIA PROCEDURE NOTES
Spinal Block    Patient location during procedure: OR  Start time: 7/15/2021 7:52 AM  Reason for block: procedure for pain, at surgeon's request and primary anesthetic  Staffing  Performed: anesthesiologist   Anesthesiologist: Nely Mccarthy MD  Preanesthetic Checklist  Completed: patient identified, IV checked, site marked, risks and benefits discussed, surgical consent, monitors and equipment checked, pre-op evaluation and timeout performed  Spinal Block  Patient position: sitting  Prep: ChloraPrep  Patient monitoring: cardiac monitor and frequent blood pressure checks  Approach: midline  Location: L3-4  Injection technique: single-shot  Needle  Needle type: pencil-tip   Needle gauge: 25 G  Needle length: 10 cm  Assessment  Sensory level: T4  Injection Assessment:  negative aspiration for heme, no paresthesia on injection and positive aspiration for clear CSF    Post-procedure:  adhesive bandage applied, pressure dressing applied, secured with tape, site cleaned and sterile dressing applied

## 2021-07-15 NOTE — ANESTHESIA POSTPROCEDURE EVALUATION
Post-Op Assessment Note    CV Status:  Stable    Pain management: adequate     Mental Status:  Alert and awake   Hydration Status:  Euvolemic   PONV Controlled:  Controlled   Airway Patency:  Patent      Post Op Vitals Reviewed: Yes      Staff: CRNA   Comments: vss report rn        No complications documented      BP 92/51 (07/15/21 0928)    Temp 97 5 °F (36 4 °C) (07/15/21 0928)    Pulse 70 (07/15/21 0928)   Resp 14 (07/15/21 0928)    SpO2 98 % (07/15/21 0928)

## 2021-07-15 NOTE — PLAN OF CARE
Problem: PHYSICAL THERAPY ADULT  Goal: Performs mobility at highest level of function for planned discharge setting  See evaluation for individualized goals  Description: Treatment/Interventions: Functional transfer training, LE strengthening/ROM, Elevations, Therapeutic exercise, Endurance training, Equipment eval/education, Bed mobility, Gait training, Spoke to nursing  Equipment Recommended: Bryant Bryant       See flowsheet documentation for full assessment, interventions and recommendations  Note: Prognosis: Good  Problem List: Decreased strength, Decreased range of motion, Decreased endurance, Impaired balance, Decreased mobility, Pain  Assessment: Pt is a 72 y o  female seen for PT evaluation s/p admit to [facility] on [facility]/15/[facility]  Pt was admitted with a primary dx of: primary OA of L knee  Pt is POD#0 s/p L TKA  PT now consulted for assessment of mobility and d/c needs  Pt with PT evaluation, Activity Beginning POD#0 orders  Pts current comorbidities effecting treatment include: arthralgia, atypical chest pain, balance disorder, depression, fatigue, fibromyalgia, lacunar stroke, leukopenia, memory loss, restless leg syndrome    Pts current clinical presentation is Unstable/ Unpredictable (high complexity) due to Ongoing medical management for primary dx, Increased reliance on more restrictive AD compared to baseline, Decreased activity tolerance compared to baseline, Fall risk, Increased assistance needed from caregiver at current time, s/p post op day 0, Trending lab values, s/p surgical intervention  Prior to admission, pt was I with ADLs and ambulating w/o AD  Pt lives alone in a 2 story condo with 1 DAVID and full flight up to bathroom  Pt's friend will be staying with her upon d/c  Upon evaluation, pt currently is requiring Toni for bed mobility; Toni for transfers and Toni for ambulation 3 ft w/ RW   Pt presents at PT eval functioning below baseline and currently w/ overall mobility deficits 2* to: LLE weakness, impaired balance, decreased endurance, gait deviations, pain, decreased activity tolerance compared to baseline, decreased functional mobility tolerance compared to baseline, altered sensation, fall risk  Pt currently at a fall risk 2* to impairments listed above  Pt will continue to benefit from skilled acute PT interventions to address stated impairments; to maximize functional mobility; for ongoing pt/ family training; and DME needs  At conclusion of PT session pt returned back in chair with phone and call bell within reach  Pt denies any further questions at this time  The patient's AM-PAC Basic Mobility Inpatient Short Form Raw Score is 17, Standardized Score is 39 67  A standardized score less than 42 9 suggests the patient may benefit from discharge to post-acute rehabilitation services  However, anticipate that with improved pain levels pt will quickly progress to allow for d/c home  Please also refer to the recommendation of the Physical Therapist for safe discharge planning  Recommend home with OPPT and social support upon hospital D/C  Barriers to Discharge: Inaccessible home environment (full flight up to bathroom)        PT Discharge Recommendation: Home with outpatient rehabilitation          See flowsheet documentation for full assessment

## 2021-07-15 NOTE — OP NOTE
OPERATIVE REPORT  PATIENT NAME: Nory Rueda    :  1955  MRN: [id number]  Pt Location: [facility] OR ROOM 04    SURGERY DATE: 7/15/2021    Surgeon(s) and Role:     * Mariela García MD - Primary     * Angeli Gill PA-C - Assisting     * Libby Desir MD - Assisting    Preop Diagnosis:  Primary osteoarthritis of left knee [M17 12]  Chronic pain of left knee [M25 562, G89 29]    Post-Op Diagnosis Codes:     * Primary osteoarthritis of left knee [M17 12]     * Chronic pain of left knee [M25 562, G89 29]    Procedure(s) (LRB):  ARTHROPLASTY KNEE TOTAL - left (Left)    Specimen(s):  * No specimens in log *    Estimated Blood Loss:   Minimal    Drains:  Closed/Suction Drain Left Knee Accordion 10 Fr  (Active)   Number of days: 0       Urethral Catheter Latex 16 Fr  (Active)   Number of days: 0       Anesthesia Type:   Choice    Operative Indications:  Primary osteoarthritis of left knee [M17 12]  Chronic pain of left knee [M25 562, G89 29]      Operative Findings:  depuy attune   Femur-4N   Poly-5   Tibia-3   [id number]    Complications:   None    Procedure and Technique: Following induction of adequate level of spinal anesthesia, Jacob catheters and sterilely introduced this patient's bladder  Antibiotics were administered  The left thigh was then fitted with a thigh-high tourniquet  The left lower extremity then underwent sterile prep drape  The left lower extremity was exsanguinated gravity, the tourniquet inflated to 300 mmHg  A midline knee incision was created the knee in flexion  Full-thickness flaps raised in order to access the extensor mechanism  A medial arthrotomy was created open up the knee joint  Bony and soft tissue releases were performed where necessary  The distal femoral cut was made 6° valgus  This is made of a long intramedullary pepe  The proximal tibia cut was made next  As this was a varus knee, 2 mm reference medially    Care was taken in order to protect the integrity medial collateral, lateral collateral, posterior structures during these maneuvers  The distal femoral sizing guide was used, and the appropriate 4 in 1 cutting blocks impacted  The anterior, posterior, chamfer cuts were then made  The box cut was made for the posterior stabilized unit  With the trial components in position, the knee was taken through range of motion, and found to be capable full extension, good flexion, no mid flexion valgus instability  The patella was then resurfaced will utilize the manufacture's equipment, was found to be a size 32 mm button  The trial components removed and the knee was prepared for insertion of cemented components  The cemented tibia, cemented femur, trial poly, and cemented patella placed  Excess cement was removed, the knee was brought into extension  The cement was allowed to cure  The trial poly was taken out, the knee was packed off  The tourniquet deflated, hemostasis was secured  The insert polyethylene was then snapped into position  The knee was taken through a final range of motion, found to be capable full extension, good flexion, no mid flexion valgus instability, and excellent patellar tracking  Satisfied with the extent of surgery, the wounds then flushed with saline closed  A Betadine soak was initiated  A drain was placed deep brought out via separate lateral stab incision  The arthrotomy was closed number Vicryl suture  The subcu tissue closed 2 Vicryl suture  The skin was closed staples  Sterile dressings were applied    He she was then transferred to recovery room stable condition with plans to include physical therapy weight-bearing to tolerance, she will require DVT prophylaxis with Lovenox   I was present for the entire procedure    Patient Disposition:  PACU     SIGNATURE: Kaz Sharma MD  DATE: July 15, 2021  TIME: 9:15 AM

## 2021-07-15 NOTE — INTERVAL H&P NOTE
H&P reviewed  After examining the patient I find no changes in the patients condition since the H&P had been written  Vitals:    07/15/21 0545   BP: 105/74   Pulse: 74   Resp: 16   Temp: (!) 96 4 °F (35 8 °C)   SpO2: 96%   Head:  Present  CVS:  RRR  Lungs:  Clear bilateral  Abdomen:  Present  Assessment:  Symptomatic osteoarthritis of the left knee in this adult female that remains painful increased dysfunction despite appropriate nonsurgical care  Plan:   To OR for left total knee arthroplasty

## 2021-07-15 NOTE — ANESTHESIA PROCEDURE NOTES
Peripheral Block    Patient location during procedure: holding area  Start time: 7/15/2021 7:28 AM  Reason for block: at surgeon's request and post-op pain management  Staffing  Performed: anesthesiologist   Anesthesiologist: Geetha Gaffney MD  Preanesthetic Checklist  Completed: patient identified, IV checked, site marked, risks and benefits discussed, surgical consent, monitors and equipment checked, pre-op evaluation and timeout performed  Peripheral Block  Patient position: supine  Prep: ChloraPrep  Patient monitoring: continuous pulse ox and frequent blood pressure checks  Block type: adductor canal block  Laterality: left  Injection technique: single-shot  Procedures: ultrasound guided, Ultrasound guidance required for the procedure to increase accuracy and safety of medication placement and decrease risk of complications    Ultrasound permanent image savedropivacaine (NAROPIN) 0 2% perineural infiltration, 25 mL  Needle  Needle type: Stimuplex   Needle localization: ultrasound guidance  Test dose: negative  Assessment  Injection assessment: incremental injection, local visualized surrounding nerve on ultrasound, negative aspiration for CSF, negative aspiration for heme and no paresthesia on injection  Paresthesia pain: none  Heart rate change: no  Slow fractionated injection: yes  Post-procedure:  site cleaned  patient tolerated the procedure well with no immediate complications

## 2021-07-15 NOTE — PHYSICAL THERAPY NOTE
Physical Therapy Evaluation    Patient's Name: Sarah Mendez    Admitting Diagnosis  Primary osteoarthritis of left knee [M17 12]  Chronic pain of left knee [M25 562, G10 29]    Problem List  Patient Active Problem List   Diagnosis    Left ureteral calculus    Hydronephrosis with ureteral calculus    Constipation    Allergic rhinitis    Colonic inertia    Depression with anxiety    Fibromyalgia    Neoplasm of uncertain behavior of skin    Nephrolithiasis    Pes anserinus bursitis of right knee    Primary osteoarthritis of right knee    Restless legs syndrome    Vitamin D deficiency    Arthritis of knee    Arthralgia    Fatigue    Hallux valgus    Primary osteoarthritis of both knees    Anemia    Other hyperlipidemia    GERD (gastroesophageal reflux disease)    Hand pain    Acquired hallux interphalangeus of right foot    Stroke-like symptoms    Intracranial vascular stenosis    Balance disorder    Unspecified convulsions (HCC)    Chronic pain of both knees    Primary osteoarthritis of left knee    Nonintractable headache    Preop exam for internal medicine    Status post total knee replacement, left       Past Medical History  Past Medical History:   Diagnosis Date    Arthralgia     Atypical chest pain     Balance disorder     Cervical rib     last assessed 9/13/12    Depression     Fatigue     Fibromyalgia     Fibromyalgia     GERD (gastroesophageal reflux disease)     Hydronephrosis with renal and ureteral calculus obstruction     last assessed 5/1/17    Lacunar stroke ([facility] [address] )     Leukopenia     last assessed 5/19/16    Memory loss     Restless leg syndrome     last assessed 11/8/13    Sebaceous cyst     last assessed 2/13/13    Skin tag     last assessed 2/13/13    Vitamin D deficiency        Past Surgical History  Past Surgical History:   Procedure Laterality Date    COLONOSCOPY  2011    fiberoptic    DENTAL SURGERY      KNEE SURGERY      right knee 2006 meniscal tear stage 04    LA COLONOSCOPY FLX DX W/COLLJ SPEC WHEN PFRMD N/A 3/16/2017    Procedure: COLONOSCOPY;  Surgeon: Viki Miller MD;  Location: [facility]; Service: Gastroenterology    LA CYSTO/URETERO W/LITHOTRIPSY &INDWELL STENT INSRT Left 4/26/2017    Procedure: CYSTOSCOPY URETEROSCOPY; RETROGRADE PYELOGRAM; STONE EXTRACTION; INSERTION STENT URETERAL;  Surgeon: Karen Andrew MD;  [facility];  Service: Urology        07/15/21 1358   PT Last Visit   PT Visit Date 07/15/21   Note Type   Note type Evaluation   Pain Assessment   Pain Assessment Tool 0-10   Pain Score 3   Pain Location/Orientation Orientation: Left; Location: [facility]   Hospital Pain Intervention(s) Repositioned;Cold applied; Ambulation/increased activity   Home Living   Type of [address] [place] [address] Two level;Stairs to enter with rails;Bed/bath upstairs   Home Equipment Other (Comment)  (consuelo)   Additional Comments Pt lives in a 2 story condo with 1 DAVID and full flight up to bathroom  Was ambulating I w/o AD PTA   Prior Function   Level of [place] Independent with ADLs and functional mobility   Lives With Alone   Receives Help From Friend(s)   ADL Assistance Independent   IADLs Independent   Falls in the last 6 months 0   Vocational Full time employment   Comments Pt's friend will be staying with her upon d/c to help as needed   Restrictions/Precautions   Weight Bearing Precautions Per Order Yes   LLE Weight Bearing Per Order WBAT   Other Precautions Multiple lines; Fall Risk;Pain   Cognition   Overall Cognitive Status WFL   Orientation Level Oriented X4   Memory Decreased recall of precautions   Following Commands Follows one step commands without difficulty   Comments Pt pleasant and cooperative, but expresses some anxiety/fear with mobility   RLE Assessment   RLE Assessment WFL   LLE Assessment   LLE Assessment X  (limited knee AROM 2* pain, hip and knee WFL)   Bed Mobility   Supine to Sit 4  Minimal assistance   Additional items Assist x 1;HOB elevated; Increased time required;Verbal cues;LE management   Transfers   Sit to Stand 4  Minimal assistance   Additional items Assist x 1; Increased time required;Verbal cues   Stand to Sit 4  Minimal assistance   Additional items Assist x 1; Increased time required;Verbal cues   Additional Comments Transfers with RW  VCs for proper hand placement and to avoid pulling up on RW   Ambulation/Elevation   Gait pattern L Knee Shayy; Antalgic;Decreased L stance; Short stride; Excessively slow   Gait Assistance 4  Minimal assist   Additional items Assist x 1;Verbal cues   Assistive Device Rolling walker   Distance 3ft from bed to chair with 2 episodes of L knee buckling   Balance   Static Sitting Fair +   Dynamic Sitting Fair +   Static Standing Fair -   Dynamic Standing Poor +   Ambulatory Poor +   Activity Tolerance   Activity Tolerance Patient limited by pain   Nurse Made Aware ok to see per RN   Assessment   Prognosis Good   Problem List Decreased strength;Decreased range of motion;Decreased endurance; Impaired balance;Decreased mobility;Pain   Assessment Pt is a 72 y o  female seen for PT evaluation s/p admit to [facility] on 7/15/2021  Pt was admitted with a primary dx of: primary OA of L knee  Pt is POD#0 s/p L TKA  PT now consulted for assessment of mobility and d/c needs  Pt with PT evaluation, Activity Beginning POD#0 orders  Pts current comorbidities effecting treatment include: arthralgia, atypical chest pain, balance disorder, depression, fatigue, fibromyalgia, lacunar stroke, leukopenia, memory loss, restless leg syndrome    Pts current clinical presentation is Unstable/ Unpredictable (high complexity) due to Ongoing medical management for primary dx, Increased reliance on more restrictive AD compared to baseline, Decreased activity tolerance compared to baseline, Fall risk, Increased assistance needed from caregiver at current time, s/p post op day 0, Trending lab values, s/p surgical intervention  Prior to admission, pt was I with ADLs and ambulating w/o AD  Pt lives alone in a 2 story condo with 1 DAVID and full flight up to bathroom  Pt's friend will be staying with her upon d/c  Upon evaluation, pt currently is requiring Toni for bed mobility; Toni for transfers and Toni for ambulation 3 ft w/ RW  Pt presents at PT eval functioning below baseline and currently w/ overall mobility deficits 2* to: LLE weakness, impaired balance, decreased endurance, gait deviations, pain, decreased activity tolerance compared to baseline, decreased functional mobility tolerance compared to baseline, altered sensation, fall risk  Pt currently at a fall risk 2* to impairments listed above  Pt will continue to benefit from skilled acute PT interventions to address stated impairments; to maximize functional mobility; for ongoing pt/ family training; and DME needs  At conclusion of PT session pt returned back in chair with phone and call bell within reach  Pt denies any further questions at this time  The patient's AM-PAC Basic Mobility Inpatient Short Form Raw Score is 17, Standardized Score is 39 67  A standardized score less than 42 9 suggests the patient may benefit from discharge to post-acute rehabilitation services  However, anticipate that with improved pain levels pt will quickly progress to allow for d/c home  Please also refer to the recommendation of the Physical Therapist for safe discharge planning  Recommend home with OPPT and social support upon hospital D/C  Barriers to Discharge Inaccessible home environment  (full flight up to bathroom)   Goals   Patient Goals to have less pain   STG Expiration Date 07/25/21   Short Term Goal #1 In 10 days pt will be able to: 1  Demonstrate ability to perform all aspects of bed mobility independently to increase functional independence  2  Perform functional transfers at mod I level to facilitate safe return to previous living environment    3   Ambulate 300 ft with LRAD at mod I level with stable vitals to improve safety with household distances and reduce fall risk  4  Climb 1+12 steps with HR at mod I level to simulate entrance to home and access to bathroom  5  Improve LE strength grades by 1 to increase ease of functional mobility with transfers and gait  6  Pt will demonstrate improved balance by one grade order to decrease risk of falls  PT Treatment Day 0   Plan   Treatment/Interventions Functional transfer training;LE strengthening/ROM; Elevations; Therapeutic exercise; Endurance training;Equipment eval/education; Bed mobility;Gait training;Spoke to nursing   PT Frequency Twice a day   Recommendation   PT Discharge Recommendation Home with outpatient rehabilitation   Equipment Recommended [facility] Recommended Wheeled walker   Change/add to [employer]?  No   AM-PAC Basic Mobility Inpatient   Turning in Bed Without Bedrails 3   Lying on Back to Sitting on Edge of Flat Bed 3   Moving Bed to Chair 3   Standing Up From Chair 3   Walk in Room 3   Climb 3-5 Stairs 2   Basic Mobility Inpatient Raw Score 17   Basic Mobility Standardized Score 39 67   Modified [place] Scale   Modified [place] Scale 4       Jose Narvaez, PT, DPT

## 2021-07-16 LAB
ANION GAP SERPL CALCULATED.3IONS-SCNC: 4 MMOL/L (ref 4–13)
BASOPHILS # BLD AUTO: 0.04 THOUSANDS/ΜL (ref 0–0.1)
BASOPHILS NFR BLD AUTO: 1 % (ref 0–1)
BUN SERPL-MCNC: 11 MG/DL (ref 5–25)
CALCIUM SERPL-MCNC: 8.2 MG/DL (ref 8.3–10.1)
CHLORIDE SERPL-SCNC: 104 MMOL/L (ref 100–108)
CO2 SERPL-SCNC: 27 MMOL/L (ref 21–32)
CREAT SERPL-MCNC: 0.66 MG/DL (ref 0.6–1.3)
DME PARACHUTE DELIVERY DATE ACTUAL: NORMAL
DME PARACHUTE DELIVERY DATE REQUESTED: NORMAL
DME PARACHUTE DELIVERY DATE REQUESTED: NORMAL
DME PARACHUTE ITEM DESCRIPTION: NORMAL
DME PARACHUTE ITEM DESCRIPTION: NORMAL
DME PARACHUTE ORDER STATUS: NORMAL
DME PARACHUTE ORDER STATUS: NORMAL
DME PARACHUTE SUPPLIER NAME: NORMAL
DME PARACHUTE SUPPLIER NAME: NORMAL
DME PARACHUTE SUPPLIER PHONE: NORMAL
DME PARACHUTE SUPPLIER PHONE: NORMAL
EOSINOPHIL # BLD AUTO: 0.07 THOUSAND/ΜL (ref 0–0.61)
EOSINOPHIL NFR BLD AUTO: 1 % (ref 0–6)
ERYTHROCYTE [DISTWIDTH] IN BLOOD BY AUTOMATED COUNT: 13.4 % (ref 11.6–15.1)
GFR SERPL CREATININE-BSD FRML MDRD: 93 ML/MIN/1.73SQ M
GLUCOSE SERPL-MCNC: 119 MG/DL (ref 65–140)
HCT VFR BLD AUTO: 36.2 % (ref 34.8–46.1)
HGB BLD-MCNC: 11.4 G/DL (ref 11.5–15.4)
IMM GRANULOCYTES # BLD AUTO: 0.01 THOUSAND/UL (ref 0–0.2)
IMM GRANULOCYTES NFR BLD AUTO: 0 % (ref 0–2)
LYMPHOCYTES # BLD AUTO: 0.69 THOUSANDS/ΜL (ref 0.6–4.47)
LYMPHOCYTES NFR BLD AUTO: 10 % (ref 14–44)
MCH RBC QN AUTO: 29.2 PG (ref 26.8–34.3)
MCHC RBC AUTO-ENTMCNC: 31.5 G/DL (ref 31.4–37.4)
MCV RBC AUTO: 93 FL (ref 82–98)
MONOCYTES # BLD AUTO: 0.52 THOUSAND/ΜL (ref 0.17–1.22)
MONOCYTES NFR BLD AUTO: 7 % (ref 4–12)
NEUTROPHILS # BLD AUTO: 5.77 THOUSANDS/ΜL (ref 1.85–7.62)
NEUTS SEG NFR BLD AUTO: 81 % (ref 43–75)
NRBC BLD AUTO-RTO: 0 /100 WBCS
PLATELET # BLD AUTO: 104 THOUSANDS/UL (ref 149–390)
PMV BLD AUTO: 12.4 FL (ref 8.9–12.7)
POTASSIUM SERPL-SCNC: 3.8 MMOL/L (ref 3.5–5.3)
RBC # BLD AUTO: 3.91 MILLION/UL (ref 3.81–5.12)
SODIUM SERPL-SCNC: 135 MMOL/L (ref 136–145)
WBC # BLD AUTO: 7.1 THOUSAND/UL (ref 4.31–10.16)

## 2021-07-16 PROCEDURE — 99213 OFFICE O/P EST LOW 20 MIN: CPT | Performed by: NURSE PRACTITIONER

## 2021-07-16 PROCEDURE — NC001 PR NO CHARGE: Performed by: ORTHOPAEDIC SURGERY

## 2021-07-16 PROCEDURE — 85025 COMPLETE CBC W/AUTO DIFF WBC: CPT | Performed by: ORTHOPAEDIC SURGERY

## 2021-07-16 PROCEDURE — 97110 THERAPEUTIC EXERCISES: CPT

## 2021-07-16 PROCEDURE — 97167 OT EVAL HIGH COMPLEX 60 MIN: CPT

## 2021-07-16 PROCEDURE — 97116 GAIT TRAINING THERAPY: CPT

## 2021-07-16 PROCEDURE — NC001 PR NO CHARGE: Performed by: NURSE PRACTITIONER

## 2021-07-16 PROCEDURE — 97530 THERAPEUTIC ACTIVITIES: CPT

## 2021-07-16 PROCEDURE — 80048 BASIC METABOLIC PNL TOTAL CA: CPT | Performed by: ORTHOPAEDIC SURGERY

## 2021-07-16 RX ORDER — ASPIRIN 81 MG/1
81 TABLET ORAL DAILY
Status: DISCONTINUED | OUTPATIENT
Start: 2021-07-16 | End: 2021-07-17 | Stop reason: HOSPADM

## 2021-07-16 RX ADMIN — OXYCODONE HYDROCHLORIDE 10 MG: 10 TABLET ORAL at 01:58

## 2021-07-16 RX ADMIN — ATORVASTATIN CALCIUM 40 MG: 40 TABLET, FILM COATED ORAL at 17:12

## 2021-07-16 RX ADMIN — OXYCODONE HYDROCHLORIDE 10 MG: 10 TABLET ORAL at 06:26

## 2021-07-16 RX ADMIN — SENNOSIDES 8.6 MG: 8.6 TABLET ORAL at 09:22

## 2021-07-16 RX ADMIN — VENLAFAXINE HYDROCHLORIDE 75 MG: 75 CAPSULE, EXTENDED RELEASE ORAL at 09:22

## 2021-07-16 RX ADMIN — ACETAMINOPHEN 650 MG: 325 TABLET, FILM COATED ORAL at 23:22

## 2021-07-16 RX ADMIN — CARBAMAZEPINE 100 MG: 100 TABLET, EXTENDED RELEASE ORAL at 17:12

## 2021-07-16 RX ADMIN — CARBAMAZEPINE 100 MG: 100 TABLET, EXTENDED RELEASE ORAL at 09:22

## 2021-07-16 RX ADMIN — DOCUSATE SODIUM 100 MG: 100 CAPSULE ORAL at 09:22

## 2021-07-16 RX ADMIN — PANTOPRAZOLE SODIUM 40 MG: 40 TABLET, DELAYED RELEASE ORAL at 06:26

## 2021-07-16 RX ADMIN — PREGABALIN 100 MG: 100 CAPSULE ORAL at 17:11

## 2021-07-16 RX ADMIN — IRON SUCROSE 200 MG: 20 INJECTION, SOLUTION INTRAVENOUS at 12:18

## 2021-07-16 RX ADMIN — ASPIRIN 81 MG: 81 TABLET, COATED ORAL at 09:22

## 2021-07-16 RX ADMIN — HYDROMORPHONE HYDROCHLORIDE 0.5 MG: 1 INJECTION, SOLUTION INTRAMUSCULAR; INTRAVENOUS; SUBCUTANEOUS at 09:29

## 2021-07-16 RX ADMIN — METHOCARBAMOL 500 MG: 500 TABLET, FILM COATED ORAL at 01:58

## 2021-07-16 RX ADMIN — ONDANSETRON 4 MG: 2 INJECTION INTRAMUSCULAR; INTRAVENOUS at 17:16

## 2021-07-16 RX ADMIN — METHOCARBAMOL 500 MG: 500 TABLET, FILM COATED ORAL at 15:27

## 2021-07-16 RX ADMIN — ACETAMINOPHEN 650 MG: 325 TABLET, FILM COATED ORAL at 06:26

## 2021-07-16 RX ADMIN — ENOXAPARIN SODIUM 40 MG: 40 INJECTION SUBCUTANEOUS at 21:06

## 2021-07-16 RX ADMIN — OXYCODONE HYDROCHLORIDE 10 MG: 10 TABLET ORAL at 15:27

## 2021-07-16 RX ADMIN — ACETAMINOPHEN 650 MG: 325 TABLET, FILM COATED ORAL at 17:11

## 2021-07-16 RX ADMIN — DOCUSATE SODIUM 100 MG: 100 CAPSULE ORAL at 17:12

## 2021-07-16 RX ADMIN — ACETAMINOPHEN 650 MG: 325 TABLET, FILM COATED ORAL at 12:13

## 2021-07-16 NOTE — PLAN OF CARE
Problem: PHYSICAL THERAPY ADULT  Goal: Performs mobility at highest level of function for planned discharge setting  See evaluation for individualized goals  Description: Treatment/Interventions: Functional transfer training, LE strengthening/ROM, Elevations, Therapeutic exercise, Endurance training, Equipment eval/education, Bed mobility, Gait training, Spoke to nursing  Equipment Recommended: Sara Feliciano       See flowsheet documentation for full assessment, interventions and recommendations  Outcome: Progressing  Note: Prognosis: Good  Problem List: Decreased strength, Decreased range of motion, Decreased endurance, Impaired balance, Decreased mobility, Pain  Assessment: Pt limited by pain & nausea this session, but was able to ambualte short distances with use of RW without LOB or observed knee buckling  Did require extensive time to perform all tasks due to the same, but followed commands & instrutions for sequencing of all tasks  Anticipate mobiilty will improve significantly in upcoming sessions with further medical management for symptoms & practice of all mobility tasks  Discussed the same with pt who verbalized understanding  Plan to increase ambulation next session & trial steps if appropriate, working towards goals & discharge established in initial eval   Barriers to Discharge: Inaccessible home environment  Barriers to Discharge Comments: increased amb, DAVID, full flight to bathroom     PT Discharge Recommendation: Home with outpatient rehabilitation     PT - OK to Discharge: No    See flowsheet documentation for full assessment

## 2021-07-16 NOTE — PHYSICAL THERAPY NOTE
Physical Therapy Progress Note     07/16/21 0840   PT Last Visit   PT Visit Date 07/16/21   Note Type   Note Type Treatment   Pain Assessment   Pain Assessment Tool FLACC   Pain Rating: FLACC (Rest) - Face 1   Pain Rating: FLACC (Rest) - Legs 0   Pain Rating: FLACC (Rest) - Activity 0   Pain Rating: FLACC (Rest) - Cry 1   Pain Rating: FLACC (Rest) - Consolability 0   Score: FLACC (Rest) 2   Pain Rating: FLACC (Activity) - Face 2   Pain Rating: FLACC (Activity) - Legs 1   Pain Rating: FLACC (Activity) - Activity 1   Pain Rating: FLACC (Activity) - Cry 2   Pain Rating: FLACC (Activity) - Consolability 1   Score: FLACC (Activity) 7   Restrictions/Precautions   LLE Weight Bearing Per Order WBAT   Other Precautions WBS;Pain; Fall Risk;Multiple lines  (hemovac)   Subjective   Subjective Pt encountered seated in recliner, appeared fatigue & reported poor sleep, but agreeable to treatment  Reports increased pain & nausea with activity, which appeared to reside with seated rest   No aditional complaints given during session  Transfers   Sit to Stand 4  Minimal assistance   Additional items Assist x 1; Armrests; Increased time required;Verbal cues   Stand to Sit 4  Minimal assistance   Additional items Assist x 1; Armrests; Increased time required;Verbal cues   Ambulation/Elevation   Gait pattern Excessively slow; Step to;Short stride; Foward flexed; Inconsistent emili; Shuffling;Decreased L stance;Decreased foot clearance; Antalgic; Improper Weight shift   Gait Assistance 4  Minimal assist   Additional items Assist x 1   Assistive Device Rolling walker   Distance 15', 8'   Balance   Static Sitting Fair +   Static Standing Fair -   Ambulatory Poor +   Activity Tolerance   Activity Tolerance Patient limited by fatigue;Patient limited by pain   Nurse Made Aware yes   Assessment   Prognosis Good   Problem List Decreased strength;Decreased range of motion;Decreased endurance; Impaired balance;Decreased mobility;Pain   Assessment Pt limited by pain & nausea this session, but was able to ambualte short distances with use of RW without LOB or observed knee buckling  Did require extensive time to perform all tasks due to the same, but followed commands & instrutions for sequencing of all tasks  Anticipate mobiilty will improve significantly in upcoming sessions with further medical management for symptoms & practice of all mobility tasks  Discussed the same with pt who verbalized understanding  Plan to increase ambulation next session & trial steps if appropriate, working towards goals & discharge established in initial eval    Barriers to Discharge Inaccessible home environment   Barriers to Discharge Comments increased amb, ADVID, full flight to bathroom   Goals   Patient Goals to feel better   STG Expiration Date 07/25/21   PT Treatment Day 1   Plan   Treatment/Interventions Functional transfer training;LE strengthening/ROM; Elevations; Therapeutic exercise; Endurance training;Bed mobility; Equipment eval/education;Patient/family training;Gait training   Progress Progressing toward goals   PT Frequency Twice a day   Recommendation   PT Discharge Recommendation Home with outpatient rehabilitation   Equipment Recommended [facility] Recommended Wheeled walker   PT - OK to Discharge No   AM-PAC Basic Mobility Inpatient   Turning in Bed Without Bedrails 3   Lying on Back to Sitting on Edge of Flat Bed 3   Moving Bed to Chair 3   Standing Up From Chair 3   Walk in Room 3   Climb 3-5 Stairs 2   Basic Mobility Inpatient Raw Score 17   Basic Mobility Standardized Score 39 67   The patient's AM-PAC Basic Mobility Inpatient Short Form Raw Score is 17, Standardized Score is 39 67  A standardized score greater than 42 9 suggests the patient may benefit from discharge to home  Please also refer to the recommendation of the Physical Therapist for safe discharge planning            Betty Mejiai, PTA

## 2021-07-16 NOTE — PHYSICAL THERAPY NOTE
Physical Therapy Progress Note     07/16/21 1325   PT Last Visit   PT Visit Date 07/16/21   Pain Assessment   Pain Assessment Tool 0-10   Pain Score 7   Pain Location/Orientation Orientation: Left; Location: Knee   Restrictions/Precautions   Weight Bearing Precautions Per Order Yes   LLE Weight Bearing Per Order WBAT   Other Precautions WBS;Pain; Fall Risk;Multiple lines   General   Chart Reviewed Yes   Response to Previous Treatment Patient with no complaints from previous session  Family/Caregiver Present No   Cognition   Overall Cognitive Status WFL   Arousal/Participation Alert; Responsive; Cooperative   Following Commands Follows one step commands without difficulty   Subjective   Subjective Patient seated in chair, reporting fatigue however agreeable to physical therapy  Cleared by RN for session  Bed Mobility   Sit to Supine 4  Minimal assistance   Additional items Assist x 1;HOB elevated; Bedrails; Increased time required;Verbal cues;LE management   Transfers   Sit to Stand 4  Minimal assistance   Additional items Assist x 1; Armrests; Increased time required;Verbal cues   Stand to Sit 4  Minimal assistance   Additional items Assist x 1; Armrests; Increased time required;Verbal cues   Ambulation/Elevation   Gait pattern Excessively slow; Step to;Short stride; Foward flexed;Decreased foot clearance; Inconsistent emili; Antalgic; Improper Weight shift   Gait Assistance 4  Minimal assist   Additional items Assist x 1   Assistive Device Rolling walker   Distance 20ftx2   Balance   Static Sitting Fair +   Dynamic Sitting Fair +   Static Standing Fair -   Dynamic Standing Poor +   Ambulatory Poor +   Endurance Deficit   Endurance Deficit Yes   Endurance Deficit Description pain, fatigue, nausea    Activity Tolerance   Activity Tolerance Patient limited by fatigue;Patient limited by pain   Nurse Made Aware appropriate to see   Exercises   Quad Sets Supine;5 reps;AROM; Left   Heelslides Sitting;5 reps;AAROM; Left   Ankle Pumps Sitting;Supine;20 reps;AROM; Bilateral   Assessment   Prognosis Good   Problem List Decreased strength;Decreased range of motion;Decreased endurance; Impaired balance;Decreased mobility;Pain   Assessment Patient was minimal assist x1 for sit to stand transfers from chair with patient able to ambulate short distance with use of RW, minimal assist, however patient required frequent standing rest breaks secondary to pain  Patient had reports of nausea and dizziness once seated EOB after ambulation trial with patient vomiting, BP taken 127/80, RN informed  No LOB noted during session with patient demonstrating overall good RW management  Patient able to tolerate seated/supine exercise with patient educated on exercise program  Patient limited this session by pain and nausea and would continue to benefit from physical therapy to improve functional mobilty until medically cleared  Goals   Patient Goals to feel better   STG Expiration Date 07/25/21   Short Term Goal #1 In 10 days pt will be able to: 1  Demonstrate ability to perform all aspects of bed mobility independently to increase functional independence  2  Perform functional transfers at mod I level to facilitate safe return to previous living environment  3   Ambulate 300 ft with LRAD at mod I level with stable vitals to improve safety with household distances and reduce fall risk  4  Climb 1+12 steps with HR at mod I level to simulate entrance to home and access to bathroom  5  Improve LE strength grades by 1 to increase ease of functional mobility with transfers and gait  6  Pt will demonstrate improved balance by one grade order to decrease risk of falls  PT Treatment Day 2   Plan   Treatment/Interventions Functional transfer training;LE strengthening/ROM; Therapeutic exercise; Endurance training;Patient/family training;Gait training;Spoke to nursing   Progress Progressing toward goals   PT Frequency Twice a day   Recommendation   PT Discharge Recommendation Home with outpatient rehabilitation   Equipment Recommended [address] [facility] Recommended Wheeled walker   Change/add to [employer]? No   PT - OK to Discharge No   Additional Comments increase amb and trial steps     An AM-PAC Basic Mobility standardized score less than 42 9 suggests the patient may benefit from discharge to post-acute rehab services      Su Brannon, PTA

## 2021-07-16 NOTE — PROGRESS NOTES
Internal Medicine Progress Note  Patient: Evelin Sosa  Age/sex: 72 y o  female  Medical Record #: [id number]      ASSESSMENT/PLAN: (Interval History)  Evelin Sosa is seen and examined and management for following issues:    Status Post left Total KNEE ARTHROPLASTY   Pain controlled   Continue encourage incentive spirometry; monitor fever curve   DVT prophylaxis in place and reviewed  Results from last 7 days   Lab Units 07/16/21  0441   WBC Thousand/uL 7 10   HEMOGLOBIN g/dL 11 4*   HEMATOCRIT % 36 2   PLATELETS Thousands/uL 104*          Operative acute blood loss anemia  · Decrease in hgb levels from preop labs results; suspect due to post operation extravasation losses along with potential dilutional effects of fluids  · Initiate surgery optimization venofer protocol/transfusion  · Transfuse PRBC if hgb less then 8 0 (per ortho protocol)  or symptomatic  · Monitor follow up CBC post intervention to trend effect    Left PCA stenosis  · Continue atorvastatin  · Resume ASA post-op day 1   · Avoid hypotension  Headache syndrome  · Continue Carbatrol  PRE-OP HGB LEVEL: 14 2      The above assessment and plan was reviewed and updated as determined by my evaluation of the patient on 7/16/2021      Labs:   Results from last 7 days   Lab Units 07/16/21  0441   WBC Thousand/uL 7 10   HEMOGLOBIN g/dL 11 4*   HEMATOCRIT % 36 2   PLATELETS Thousands/uL 104*     Results from last 7 days   Lab Units 07/16/21  0441   SODIUM mmol/L 135*   POTASSIUM mmol/L 3 8   CHLORIDE mmol/L 104   CO2 mmol/L 27   BUN mg/dL 11   CREATININE mg/dL 0 66   CALCIUM mg/dL 8 2*             Results from last 7 days   Lab Units 07/15/21  0935   POC GLUCOSE mg/dl 85       Review of Scheduled Meds:  Current Facility-Administered Medications   Medication Dose Route Frequency Provider Last Rate    acetaminophen  650 mg Oral Q6H [address] Brooks Noguera MD      atorvastatin  40 mg Oral Daily With Rony Hale MD      carBAMazepine  100 mg Oral BID Lamar Haddad MD      docusate sodium  100 mg Oral BID Lamar Haddad MD      enoxaparin  40 mg Subcutaneous Daily Lamar Haddad MD      fluticasone  1 spray Nasal Daily PRN Lamar Haddad MD      HYDROmorphone  0 5 mg Intravenous Q4H PRN [place] Spray, PA-C      lactated ringers  1,000 mL Intravenous Once PRN Lamar Haddad MD      And    lactated ringers  1,000 mL Intravenous Once PRN Lamar Haddad MD 1,000 mL (07/15/21 1307)    lactated ringers  1 5 mL/kg/hr Intravenous Continuous Lamar Haddad MD 1 5 mL/kg/hr (07/15/21 1130)    methocarbamol  500 mg Oral Q6H PRN Lamar Haddad MD      ondansetron  4 mg Intravenous Q8H PRN Lamar Haddad MD      oxyCODONE  10 mg Oral Q4H PRN Trinity Spray, PA-C      oxyCODONE  5 mg Oral Q4H PRN Trinity Spray, PA-C      pantoprazole  40 mg Oral Daily Before Breakfast Lamra Haddad MD      polyethylene glycol  17 g Oral Daily PRN Lamar Haddad MD      pregabalin  100 mg Oral BID Lamar Haddad MD      senna  1 tablet Oral Daily Lamar Haddad MD      sodium chloride  1,000 mL Intravenous Once PRN Lamar Haddad MD      And    sodium chloride  1,000 mL Intravenous Once PRN Lamar Haddad MD      venlafaxine  75 mg Oral Daily With Breakfast Lamar Haddad MD         Subjective/ HPI: Patient seen and examined  Patients overnight issues or events were reviewed with nursing or staff during rounds or morning huddle session  New or overnight issues include the following:     Pt without any overnight events or reported nursing issues      ROS:   A 10 point ROS was performed; negative except as noted above         Imaging:     No orders to display       *Labs /Radiology studies Reviewed  *Medications  reviewed and reconciled as needed  *Please refer to order section for additional ordered labs studies  *Case discussed with primary attending during morning huddle case rounds    Physical Examination:  Vitals:   Vitals:    07/15/21 1905 07/15/21 2301 07/15/21 2353 07/16/21 0308   BP: 136/80 130/76 131/78 133/78   Pulse: 56 56 60 58   Resp: 18 18 18 18   Temp: 98 9 °F (37 2 °C) 99 °F (37 2 °C) 98 6 °F (37 °C) 98 6 °F (37 °C)   TempSrc:       SpO2: 98% 93% 97% 97%   Weight:       Height:           General Appearance: no distress, conversive  HEENT: PERRLA, conjuctiva normal; oropharynx clear; mucous membranes moist;   Neck:  Supple, no lymphadenopathy or thyromegaly  Lungs: CTA, normal respiratory effort, no retractions, expiratory effort normal  CV: regular rate and rhythm , PMI normal   ABD: soft non tender, no masses , no hepatic or splenomegaly  EXT: DP pulses intact, no lymphadenopathy, no edema; Lt knee surgical dressing in place  Skin: normal turgor, normal texture, no rash  Psych: affect normal, mood normal  Neuro: AAOx3    : flores removed ; due to void    The above physical exam was reviewed and updated as determined by my evaluation of the patient on 7/16/2021  Invasive Devices     Peripheral Intravenous Line            Peripheral IV 07/15/21 Right Hand 1 day          Drain            Closed/Suction Drain Left Knee Accordion 10 Fr  <1 day                   VTE Pharmacologic Prophylaxis: Enoxaparin  Code Status: Level 1 - Full Code  Current Length of Stay: 0 day(s)      Total floor / unit time spent today 30 minutes  Coordination of patient's care was performed in conjunction with primary service  Time invested included review of patient's labs, vitals, and management of their comorbidities with continued monitoring, examination of patient as well as answering patient questions, documenting her findings and creating progress note in electronic medical record,  ordering appropriate diagnostic testing  ** Please Note:  voice to text software may have been used in the creation of this document   Although proof errors in transcription or interpretation are a potential of such software**

## 2021-07-16 NOTE — PLAN OF CARE
Problem: PHYSICAL THERAPY ADULT  Goal: Performs mobility at highest level of function for planned discharge setting  See evaluation for individualized goals  Description: Treatment/Interventions: Functional transfer training, LE strengthening/ROM, Elevations, Therapeutic exercise, Endurance training, Equipment eval/education, Bed mobility, Gait training, Spoke to nursing  Equipment Recommended: Dannie Letters       See flowsheet documentation for full assessment, interventions and recommendations  Outcome: Progressing  Note: Prognosis: Good  Problem List: Decreased strength, Decreased range of motion, Decreased endurance, Impaired balance, Decreased mobility, Pain  Assessment: Patient was minimal assist x1 for sit to stand transfers from chair with patient able to ambulate short distance with use of RW, minimal assist, however patient required frequent standing rest breaks secondary to pain  Patient had reports of nausea and dizziness once seated EOB after ambulation trial with patient vomiting, BP taken 127/80, RN informed  No LOB noted during session with patient demonstrating overall good RW management  Patient able to tolerate seated/supine exercise with patient educated on exercise program  Patient limited this session by pain and nausea and would continue to benefit from physical therapy to improve functional mobilty until medically cleared  Barriers to Discharge: Inaccessible home environment  Barriers to Discharge Comments: increased amb, DAVID, full flight to bathroom     PT Discharge Recommendation: Home with outpatient rehabilitation     PT - OK to Discharge: No    See flowsheet documentation for full assessment

## 2021-07-16 NOTE — PROGRESS NOTES
Pedro 72 y o  female MRN: [id number]  Unit/Bed#: -01      Subjective:  72 y  o female post operative day 1 left total knee arthroplasty  Pt doing well  Pain controlled      Labs:  0   Lab Value Date/Time    HCT 44 2 06/29/2021 1524    HCT 42 7 03/04/2020 1636    HCT 45 4 09/25/2019 1103    HCT 44 0 06/29/2015 0917    HGB 14 2 06/29/2021 1524    HGB 13 6 03/04/2020 1636    HGB 14 5 09/25/2019 1103    HGB 14 0 06/29/2015 0917    INR 0 96 06/29/2021 1512    WBC 5 44 06/29/2021 1524    WBC 6 51 03/04/2020 1636    WBC 5 91 09/25/2019 1103    WBC 4 08 (L) 06/29/2015 0917    ESR 6 06/29/2015 0917    CRP 0 2 06/29/2015 0917       Meds:    Current Facility-Administered Medications:     acetaminophen (TYLENOL) tablet 650 mg, 650 mg, Oral, Q6H [address], Lamar Haddad MD, 650 mg at 07/15/21 2315    atorvastatin (LIPITOR) tablet 40 mg, 40 mg, Oral, Daily With Manju Parra MD    carBAMazepine (TEGretol XR) 12 hr tablet 100 mg, 100 mg, Oral, BID, Lamar Haddad MD, 100 mg at 07/15/21 1726    docusate sodium (COLACE) capsule 100 mg, 100 mg, Oral, BID, Lamar Haddad MD, 100 mg at 07/15/21 1726    enoxaparin (LOVENOX) subcutaneous injection 40 mg, 40 mg, Subcutaneous, Daily, Lamar Haddad MD, 40 mg at 07/15/21 2203    fluticasone (FLONASE) 50 mcg/act nasal spray 1 spray, 1 spray, Nasal, Daily PRN, Lamar Haddad MD    HYDROmorphone (DILAUDID) injection 0 5 mg, 0 5 mg, Intravenous, Q4H PRN, [place] Spray, PA-C, 0 5 mg at 07/15/21 2145    lactated ringers bolus 1,000 mL, 1,000 mL, Intravenous, Once PRN **AND** lactated ringers bolus 1,000 mL, 1,000 mL, Intravenous, Once PRN, Lamar Haddad MD, Last Rate: 1,000 mL/hr at 07/15/21 1307, 1,000 mL at 07/15/21 1307    lactated ringers infusion, 1 5 mL/kg/hr, Intravenous, Continuous, Lamar Haddad MD, Last Rate: 100 1 mL/hr at 07/15/21 1130, 1 5 mL/kg/hr at 07/15/21 1130    methocarbamol (ROBAXIN) tablet 500 mg, 500 mg, Oral, Q6H PRN, Shelly Benoit Delma Horowitz MD, 500 mg at 07/16/21 0158    ondansetron (ZOFRAN) injection 4 mg, 4 mg, Intravenous, Q8H PRN, Jaime Kaur MD    oxyCODONE (ROXICODONE) immediate release tablet 10 mg, 10 mg, Oral, Q4H PRN, Avery Velasquez PA-C, 10 mg at 07/16/21 0158    oxyCODONE (ROXICODONE) IR tablet 5 mg, 5 mg, Oral, Q4H PRN, Avery Velasquez PA-C    pantoprazole (PROTONIX) EC tablet 40 mg, 40 mg, Oral, Daily Before Breakfast, Jaime Kaur MD    polyethylene glycol (MIRALAX) packet 17 g, 17 g, Oral, Daily PRN, Jaime Kaur MD    pregabalin (LYRICA) capsule 100 mg, 100 mg, Oral, BID, Jaime Kaur MD    [facility]) tablet 8 6 mg, 1 tablet, Oral, Daily, Jaime Kaur MD, 8 6 mg at 07/15/21 1306    sodium chloride 0 9 % bolus 1,000 mL, 1,000 mL, Intravenous, Once PRN **AND** sodium chloride 0 9 % bolus 1,000 mL, 1,000 mL, Intravenous, Once PRN, Jaime Kaur MD    venlafaxine [facility] ) 24 hr capsule 75 mg, 75 mg, Oral, Daily With Breakfast, Jaime Kaur MD    Blood Culture:   No results found for: BLOODCX    Wound Culture:   No results found for: WOUNDCULT    Ins and Outs:  I/O last 24 hours: In: 2900 [P O :600; I V :2300]  Out: 2737 [Urine:2360; Drains:377]          Physical:  Vitals:    07/16/21 0308   BP: 133/78   Pulse: 58   Resp: 18   Temp: 98 6 °F (37 °C)   SpO2: 97%     left lower extremity:  · Dressings C/D/I  · Toes warm and well perfused  · HVx1    _*_*_*_*_*_*_*_*_*_*_*_*_*_*_*_*_*_*_*_*_*_*_*_*_*_*_*_*_*_*_*_*_*_*_*_*_*_*_*_*_*    Assessment: 72 y  o female post operative day 1 left total knee arthroplasty   Doing well    Plan:  · Weight Bearing as tolerated  · Up and out of bed  · DVT prophylaxis  · Drain: pull later  · Analgesics  · PT/OT  · Will continue to assess for acute blood loss anemia   · APS follow up    Saida Mary MD

## 2021-07-16 NOTE — CASE MANAGEMENT
LOS 1 Day  Not a Bundle  Not a Readmission  OP Surgery    CM spoke with pt, AOx4 to discuss role of CM and DC planning  Pt lives alone in a Condo with 1 davida to enter, bathroom on 2nd floor  Pt would like a RW and BSC as suggested by PT  CM placed order in [facility]  IPTA  Denies hx of VNA or IP rehab  Pt discussed OPPT recs  Pt has a hx of IP treatment for MH in the last 2 years, but did not specify when or where, not currently in treatment  PCP is Dr Darryl Delgado  Pt uses [address] on [facility]  Primary Contact:  Monisha Warner (friend) [phone]  No POA/LW  Will have transport home when stable    CM reviewed d/c planning process including the following: identifying help at home, patient preference for d/c planning needs, Discharge Lounge, [facility] Meds to Bed program, availability of treatment team to discuss questions or concerns patient and/or family may have regarding understanding medications and recognizing signs and symptoms once discharged  CM also encouraged patient to follow up with all recommended appointments after discharge  Patient advised of importance for patient and family to participate in managing patients medical well being

## 2021-07-16 NOTE — PLAN OF CARE
Problem: OCCUPATIONAL THERAPY ADULT  Goal: Performs self-care activities at highest level of function for planned discharge setting  See evaluation for individualized goals  Description: Treatment Interventions: ADL retraining, Functional transfer training, Patient/family training, Equipment evaluation/education, Energy conservation, Compensatory technique education, Endurance training  Equipment Recommended: Bedside commode       See flowsheet documentation for full assessment, interventions and recommendations  Note: Limitation: Decreased ADL status, Decreased endurance, Decreased self-care trans, Decreased high-level ADLs  Prognosis: Good  Assessment: Pt is a 72 y o  female admitted to [facility] on 7/15/2021 with Status post total knee replacement, left  Pt has a PMH of osteoarthritis in B/L knee, balance disorder, and depression with anxiety  PTA, Pt's baseline was Independent in ADLs, IADLs, and functional mobility  Pt lives at home alone in a 2 story condo with 1  DAVID with railings  Pt is currently Max assist with LB ADLs such as bathing and dressing and Min assist with functional mobility (utilizing a RW)  Pt's friend was present during OT eval and demonstrated good carryover of eduction and adaptive techniques  Pt reported her friend will be staying with her for 1 week to assist with ADLs and IADLs  Pt was limited 2* pain in her knee-unable to lower footrest fully       OT Discharge Recommendation:  (home pending progress)  OT - OK to Discharge: No

## 2021-07-16 NOTE — PROGRESS NOTES
Peripheral Nerve Block Follow-up Note - Acute Pain Service    Bess Albright 72 y o  female MRN: [id number]  Unit/Bed#: -Rick Encounter: [id number]      Assessment:   Principal Problem:    Status post total knee replacement, left    Bess Albright is a 72y o  year old female s/p left TKR  Left AC and IPACK peripheral block done on 7/15/2021 which has resolved, no motor or sensory deficits  Patient reporting severe anterior knee pain  Plan:   - Recommend standard oral opioid regimen with oxycodone 5 mg/10 mg PO q4hrs PRN for moderate/severe pain, Dilaudid 0 5 mg IV q4hrs PRN for breakthrough pain  - Plan to discontinue IV opioids tomorrow    - Multimodal analgesia with:  - Tylenol 975 mg PO q8hrs standing  - Robaxin 500 mg PO q6hrs   - Lyrica 100 mg PO BID; home medication     Bowel Management  · Colace 100mg BID  · Senna daily   · Miralax daily as needed    APS will continue to follow  Please contact Acute Pain Service - [facility] via [employer] from [phone] with additional questions or concerns  See [employer] or Paul for additional contacts and after hours information  Pain History  Current pain location(s): left anterior knee  Pain Scale:  4-10  24 hour history: Patient reporting severe left knee pain at present time, mild improvement in pain with PRN oxycodone  Was unable to sleep last night due to severity of pain  Tolerating diet  Ambulated this morning       Opioid requirement previous 24 hours:   Oxycodone 40 mg, IV Dilaudid 1 2 mg    Meds/Allergies   all current active meds have been reviewed, current meds:   Current Facility-Administered Medications   Medication Dose Route Frequency    acetaminophen (TYLENOL) tablet 650 mg  650 mg Oral Q6H [facility]    aspirin (ECOTRIN LOW STRENGTH) EC tablet 81 mg  81 mg Oral Daily    atorvastatin (LIPITOR) tablet 40 mg  40 mg Oral Daily With Dinner    carBAMazepine (TEGretol XR) 12 hr tablet 100 mg  100 mg Oral BID    docusate sodium (COLACE) capsule 100 mg  100 mg Oral BID    enoxaparin (LOVENOX) subcutaneous injection 40 mg  40 mg Subcutaneous Daily    fluticasone (FLONASE) 50 mcg/act nasal spray 1 spray  1 spray Nasal Daily PRN    HYDROmorphone (DILAUDID) injection 0 5 mg  0 5 mg Intravenous Q4H PRN    iron sucrose (VENOFER) 200 mg in sodium chloride 0 9 % 100 mL IVPB  200 mg Intravenous Daily    lactated ringers bolus 1,000 mL  1,000 mL Intravenous Once PRN    And    lactated ringers bolus 1,000 mL  1,000 mL Intravenous Once PRN    lactated ringers infusion  1 5 mL/kg/hr Intravenous Continuous    methocarbamol (ROBAXIN) tablet 500 mg  500 mg Oral Q6H PRN    ondansetron (ZOFRAN) injection 4 mg  4 mg Intravenous Q8H PRN    oxyCODONE (ROXICODONE) immediate release tablet 10 mg  10 mg Oral Q4H PRN    oxyCODONE (ROXICODONE) IR tablet 5 mg  5 mg Oral Q4H PRN    pantoprazole (PROTONIX) EC tablet 40 mg  40 mg Oral Daily Before Breakfast    polyethylene glycol (MIRALAX) packet 17 g  17 g Oral Daily PRN    pregabalin (LYRICA) capsule 100 mg  100 mg Oral BID    senna (SENOKOT) tablet 8 6 mg  1 tablet Oral Daily    sodium chloride 0 9 % bolus 1,000 mL  1,000 mL Intravenous Once PRN    And    sodium chloride 0 9 % bolus 1,000 mL  1,000 mL Intravenous Once PRN    venlafaxine (EFFEXOR-XR) 24 hr capsule 75 mg  75 mg Oral Daily With Breakfast    and PTA meds:   Prior to Admission Medications   Prescriptions Last Dose Informant Patient Reported? Taking?    Multiple Vitamins-Minerals (MULTIVITAMIN WITH MINERALS) tablet 7/14/2021 at 0630 Self Yes Yes   Sig: Take 1 tablet by mouth daily   ascorbic acid (VITAMIN C) 500 MG tablet 7/14/2021 at 1830  No Yes   Sig: Take 1 tablet (500 mg total) by mouth 2 (two) times a day   aspirin 81 mg chewable tablet 7/14/2021 at 0630 Self No Yes   Sig: Chew 1 tablet (81 mg total) daily   atorvastatin (LIPITOR) 40 mg tablet 7/14/2021 at 1830  No Yes   Sig: Take 1 tablet (40 mg total) by mouth every evening   carbamazepine (CARBATROL) 100 MG 12 hr capsule 7/15/2021 at 0630  No Yes   Sig: TAKE 1 CAPSULE(100 MG TOTAL) BY MOUTH TWICE DAILY   docusate sodium (COLACE) 100 mg capsule   Yes Yes   Sig: Take 100 mg by mouth 2 (two) times a day   enoxaparin (LOVENOX) 40 mg/0 4 mL   No No   Sig: Inject 0 4 mL (40 mg total) under the skin daily in the early morning for 28 doses   ferrous sulfate 324 (65 Fe) mg 2021 at 1830  No Yes   Sig: Take 1 tablet (324 mg total) by mouth 2 (two) times a day before meals   fluticasone (FLONASE) 50 mcg/act nasal spray More than a month at Unknown time Self No No   Si spray into each nostril daily as needed for rhinitis   folic acid (FOLVITE) 1 mg tablet 2021 at 0630  No Yes   Sig: Take 1 tablet (1 mg total) by mouth daily   pantoprazole (PROTONIX) 40 mg tablet 7/15/2021 at 0500  No Yes   Sig: Take 1 tablet (40 mg total) by mouth daily   pregabalin (LYRICA) 100 mg capsule 7/15/2021 at 0500  No Yes   Sig: Take 1 capsule (100 mg total) by mouth 2 (two) times a day   senna-docusate sodium (SENOKOT S) 8 6-50 mg per tablet 2021 at 1830  Yes Yes   Sig: Take 1 tablet by mouth daily   venlafaxine (EFFEXOR-XR) 75 mg 24 hr capsule 7/15/2021 at 0500  No Yes   Sig: Take 1 capsule (75 mg total) by mouth daily with breakfast With 37 5 mg   Patient taking differently: Take 75 mg by mouth daily with breakfast       Facility-Administered Medications: None       Allergies   Allergen Reactions    Chocolate - Food Allergy GI Intolerance    Ciprofloxacin Hallucinations    Dramamine [Dimenhydrinate]     Nuts - Food Allergy GI Intolerance    Oat - Food Allergy GI Intolerance    Other Other (See Comments)     Pepsi, Coca-Cola, peaches, bananas, potatoes ,stawberries    Peach [Prunus Persica] Other (See Comments)     GI intolerance        Objective     Temp:  [97 7 °F (36 5 °C)-99 °F (37 2 °C)] 98 8 °F (37 1 °C)  HR:  [46-61] 53  Resp:  [16-19] 18  BP: ()/(58-80) 139/76    Physical Exam  Vitals reviewed     Constitutional: General: She is not in acute distress  Appearance: She is not ill-appearing or diaphoretic  HENT:      Head: Normocephalic and atraumatic  Nose: Nose normal    Pulmonary:      Effort: Pulmonary effort is normal  No respiratory distress  Musculoskeletal:      Cervical back: Normal range of motion  Comments: Left knee ace wrap dressing intact   Skin:     General: Skin is warm and dry  Coloration: Skin is pale  Neurological:      Mental Status: She is alert and oriented to person, place, and time  Sensory: Sensation is intact  No sensory deficit  Motor: No tremor  Psychiatric:         Attention and Perception: Attention normal          Mood and Affect: Mood normal  Mood is not anxious  Speech: Speech normal          Behavior: Behavior normal  Behavior is cooperative  Lab Results:   Results from last 7 days   Lab Units 07/16/21  0441   WBC Thousand/uL 7 10   HEMOGLOBIN g/dL 11 4*   HEMATOCRIT % 36 2   PLATELETS Thousands/uL 104*      Results from last 7 days   Lab Units 07/16/21  0441   POTASSIUM mmol/L 3 8   CHLORIDE mmol/L 104   CO2 mmol/L 27   BUN mg/dL 11   CREATININE mg/dL 0 66   CALCIUM mg/dL 8 2*       Imaging Studies: I have personally reviewed pertinent reports  Counseling / Coordination of Care  Total floor / unit time spent today 15 minutes  Greater than 50% of total time was spent with the patient and / or family counseling and / or coordination of care  A description of the counseling / coordination of care:  Reviewed plan of care and medications with patient, RN staff, and primary care service  Please note that the APS provides consultative services regarding pain management only  With the exception of ketamine, peripheral nerve catheters, and epidural infusions (and except when indicated), final decisions regarding starting or changing doses of analgesic medications are at the discretion of the consulting service    Off hours consultation and/or medication management is generally not available      AIDAN Mcpherson  Acute Pain Service

## 2021-07-16 NOTE — OCCUPATIONAL THERAPY NOTE
Occupational Therapy Evaluation     Patient Name: Luis Manuel Bain  [id number] Date: 7/16/2021  Problem List  Principal Problem:    Status post total knee replacement, left    Past Medical History  Past Medical History:   Diagnosis Date    Arthralgia     Atypical chest pain     Balance disorder     Cervical rib     last assessed 9/13/12    Depression     Fatigue     Fibromyalgia     Fibromyalgia     GERD (gastroesophageal reflux disease)     Hydronephrosis with renal and ureteral calculus obstruction     last assessed 5/1/17    Lacunar stroke ([address] )     Leukopenia     last assessed 5/19/16    Memory loss     Restless leg syndrome     last assessed 11/8/13    Sebaceous cyst     last assessed 2/13/13    Skin tag     last assessed 2/13/13    Vitamin D deficiency      Past Surgical History  Past Surgical History:   Procedure Laterality Date    COLONOSCOPY  2011    fiberoptic    DENTAL SURGERY      KNEE SURGERY      right knee 2006 meniscal tear stage 04    GA COLONOSCOPY FLX DX W/COLLJ SPEC WHEN PFRMD N/A 3/16/2017    Procedure: COLONOSCOPY;  Surgeon: Davy Bey MD;  Location: [facility]; Service: Gastroenterology    GA CYSTO/URETERO W/LITHOTRIPSY &INDWELL STENT INSRT Left 4/26/2017    Procedure: CYSTOSCOPY URETEROSCOPY; RETROGRADE PYELOGRAM; STONE EXTRACTION; INSERTION STENT URETERAL;  Surgeon: Viola Scott MD;  Location: [facility];  Service: Urology    GA TOTAL KNEE ARTHROPLASTY Left 7/15/2021    Procedure: ARTHROPLASTY KNEE TOTAL - left;  Surgeon: Judge Danii MD;  Location: [facility];  Service: Orthopedics         07/16/21 0930   OT Last Visit   OT Visit Date 07/16/21   Note Type   Note type Evaluation   Restrictions/Precautions   LLE Weight Bearing Per Order WBAT   Other Precautions WBS; Multiple lines; Fall Risk;Pain   Pain Assessment   Pain Assessment Tool 0-10   Pain Score 7   Pain Location/Orientation Orientation: Left; Location: Leg;Location: Knee   Home Living   Type of Home House; Other (Comment)  (condo)   Home Layout Two level;Bed/bath upstairs;Stairs to enter with rails   Bathroom Shower/Tub Tub/shower unit   [address] [place] [address] Dr fischer   Prior Function   Level of [place] Independent with ADLs and functional mobility   Lives With Alone   Receives Help From Friend(s)   ADL Assistance Independent   IADLs Independent   Falls in the last 6 months 0   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[employer])   Lifestyle   Autonomy independent with ADLs, IADLs, and functional mobility PTA   Reciprocal Relationships supportive friends   Service to Others works FT as  at [employer] enjoys hiking and hockey   Psychosocial   Psychosocial (WDL) WDL   Subjective   Subjective "I feel really loopy right now"   ADL   Where Assessed Edge of bed   Eating Assistance 5  Supervision/Setup   Grooming Assistance 5  Supervision/Setup   UB Bathing Assistance 5  Supervision/Setup   LB Bathing Assistance 2  Maximal Assistance   [address] 5  Supervision/Setup   LB Dressing Assistance 2  Maximal [address]  3  Moderate Assistance   Bed Mobility   Additional Comments Pt oob in chair   Activity Tolerance   Activity Tolerance Patient limited by fatigue;Patient limited by pain   RUE Assessment   RUE Assessment WFL   LUE Assessment   LUE Assessment WFL   Cognition   Overall Cognitive Status WFL   Assessment   Limitation Decreased ADL status; Decreased endurance;Decreased self-care trans;Decreased high-level ADLs   Prognosis Good   Assessment Pt is a 72 y o  female admitted to [facility] on 7/15/2021 with Status post total knee replacement, left  Pt has a PMH of osteoarthritis in B/L knee, balance disorder, and depression with anxiety  PTA, Pt's baseline was Independent in ADLs, IADLs, and functional mobility  Pt lives at home alone in a 2 story condo with 1  DAVID with railings   Pt is currently Max assist with LB ADLs such as bathing and dressing and Min assist with functional mobility (utilizing a RW)  Pt's friend was present during OT eval and demonstrated good carryover of eduction and adaptive techniques  Pt reported her friend will be staying with her for 1 week to assist with ADLs and IADLs  Pt was limited 2* pain in her knee-unable to lower footrest fully  Goals   Patient Goals to have less pain and go home   LTG Time Frame 10-14   Long Term Goal please see below   Plan   Treatment Interventions ADL retraining;Functional transfer training;Patient/family training;Equipment evaluation/education; Energy conservation; Compensatory technique education; Endurance training   Goal Expiration Date 07/30/21   OT Frequency 3-5x/wk   Recommendation   OT Discharge Recommendation   (home pending progress)   Equipment Recommended Bedside commode   OT - OK to Discharge No   AM-PAC Daily Activity Inpatient   Lower Body Dressing 2   Bathing 2   Toileting 3   Upper Body Dressing 4   Grooming 3   Eating 4   Daily Activity Raw Score 18   Daily Activity Standardized Score (Calc for Raw Score >=11) 38 66   AM-PAC Applied Cognition Inpatient   Following a Speech/Presentation 4   Understanding Ordinary Conversation 4   Taking Medications 4   Remembering Where Things Are Placed or Put Away 4   Remembering List of 4-5 Errands 4   Taking Care of Complicated Tasks 4   Applied Cognition Raw Score 24   Applied Cognition Standardized Score 62 21       The patient's raw score on the AM-PAC Daily Activity inpatient short form is 18, standardized score is 38 66, less than 39 4  Patients at this level are likely to benefit from discharge to post-acute rehabilitation services  Please refer to the recommendation of the Occupational Therapist for safe discharge planning  The below listed goals are to be completed within 10-14 days      1  Pt will complete LB bathing with Supervision with AD/DME prn     2  Pt will be Supervision in LB dressing tasks seated with AD/DME prn     3    Pt will be Supervision with toileting with AD/DME prn     4  Pt will demonstrate carryover of body mechanics and energy conservation during ADL/IADL tasks such as LB dressing and bathing and toileting  5  Pt will improve functional transfers on/off all surfaces at Supervision level with use of AD/DME prn with increased safety awareness and good balance  6    Pt will be Supervision with AD/DME management and functional mobility for home/community activities  7  Pt will tolerate a 30 min tx session to increase endurance during ADLs, IADLs, and functional transfers/mobility      Reddy Shukla

## 2021-07-17 VITALS
SYSTOLIC BLOOD PRESSURE: 127 MMHG | HEART RATE: 81 BPM | RESPIRATION RATE: 20 BRPM | HEIGHT: 63 IN | WEIGHT: 147.06 LBS | DIASTOLIC BLOOD PRESSURE: 74 MMHG | OXYGEN SATURATION: 94 % | TEMPERATURE: 97.9 F | BODY MASS INDEX: 26.06 KG/M2

## 2021-07-17 LAB
ANION GAP SERPL CALCULATED.3IONS-SCNC: 5 MMOL/L (ref 4–13)
BASOPHILS # BLD AUTO: 0.03 THOUSANDS/ΜL (ref 0–0.1)
BASOPHILS NFR BLD AUTO: 0 % (ref 0–1)
BUN SERPL-MCNC: 12 MG/DL (ref 5–25)
CALCIUM SERPL-MCNC: 8.4 MG/DL (ref 8.3–10.1)
CHLORIDE SERPL-SCNC: 108 MMOL/L (ref 100–108)
CO2 SERPL-SCNC: 26 MMOL/L (ref 21–32)
CREAT SERPL-MCNC: 0.55 MG/DL (ref 0.6–1.3)
EOSINOPHIL # BLD AUTO: 0.24 THOUSAND/ΜL (ref 0–0.61)
EOSINOPHIL NFR BLD AUTO: 3 % (ref 0–6)
ERYTHROCYTE [DISTWIDTH] IN BLOOD BY AUTOMATED COUNT: 13.9 % (ref 11.6–15.1)
GFR SERPL CREATININE-BSD FRML MDRD: 99 ML/MIN/1.73SQ M
GLUCOSE SERPL-MCNC: 92 MG/DL (ref 65–140)
HCT VFR BLD AUTO: 39.9 % (ref 34.8–46.1)
HGB BLD-MCNC: 12.6 G/DL (ref 11.5–15.4)
IMM GRANULOCYTES # BLD AUTO: 0.02 THOUSAND/UL (ref 0–0.2)
IMM GRANULOCYTES NFR BLD AUTO: 0 % (ref 0–2)
LYMPHOCYTES # BLD AUTO: 0.98 THOUSANDS/ΜL (ref 0.6–4.47)
LYMPHOCYTES NFR BLD AUTO: 13 % (ref 14–44)
MCH RBC QN AUTO: 29.5 PG (ref 26.8–34.3)
MCHC RBC AUTO-ENTMCNC: 31.6 G/DL (ref 31.4–37.4)
MCV RBC AUTO: 93 FL (ref 82–98)
MONOCYTES # BLD AUTO: 0.86 THOUSAND/ΜL (ref 0.17–1.22)
MONOCYTES NFR BLD AUTO: 11 % (ref 4–12)
NEUTROPHILS # BLD AUTO: 5.74 THOUSANDS/ΜL (ref 1.85–7.62)
NEUTS SEG NFR BLD AUTO: 73 % (ref 43–75)
NRBC BLD AUTO-RTO: 0 /100 WBCS
PLATELET # BLD AUTO: 104 THOUSANDS/UL (ref 149–390)
PMV BLD AUTO: 12.7 FL (ref 8.9–12.7)
POTASSIUM SERPL-SCNC: 3.8 MMOL/L (ref 3.5–5.3)
RBC # BLD AUTO: 4.27 MILLION/UL (ref 3.81–5.12)
SODIUM SERPL-SCNC: 139 MMOL/L (ref 136–145)
WBC # BLD AUTO: 7.87 THOUSAND/UL (ref 4.31–10.16)

## 2021-07-17 PROCEDURE — 97116 GAIT TRAINING THERAPY: CPT

## 2021-07-17 PROCEDURE — NC001 PR NO CHARGE: Performed by: ORTHOPAEDIC SURGERY

## 2021-07-17 PROCEDURE — 97530 THERAPEUTIC ACTIVITIES: CPT

## 2021-07-17 PROCEDURE — 80048 BASIC METABOLIC PNL TOTAL CA: CPT | Performed by: ORTHOPAEDIC SURGERY

## 2021-07-17 PROCEDURE — 97535 SELF CARE MNGMENT TRAINING: CPT

## 2021-07-17 PROCEDURE — 85025 COMPLETE CBC W/AUTO DIFF WBC: CPT | Performed by: ORTHOPAEDIC SURGERY

## 2021-07-17 RX ADMIN — DOCUSATE SODIUM 100 MG: 100 CAPSULE ORAL at 08:18

## 2021-07-17 RX ADMIN — OXYCODONE HYDROCHLORIDE 5 MG: 5 TABLET ORAL at 14:25

## 2021-07-17 RX ADMIN — ACETAMINOPHEN 650 MG: 325 TABLET, FILM COATED ORAL at 12:15

## 2021-07-17 RX ADMIN — IRON SUCROSE 200 MG: 20 INJECTION, SOLUTION INTRAVENOUS at 08:24

## 2021-07-17 RX ADMIN — VENLAFAXINE HYDROCHLORIDE 75 MG: 75 CAPSULE, EXTENDED RELEASE ORAL at 08:19

## 2021-07-17 RX ADMIN — ACETAMINOPHEN 650 MG: 325 TABLET, FILM COATED ORAL at 05:06

## 2021-07-17 RX ADMIN — OXYCODONE HYDROCHLORIDE 5 MG: 5 TABLET ORAL at 09:37

## 2021-07-17 RX ADMIN — PREGABALIN 100 MG: 100 CAPSULE ORAL at 08:18

## 2021-07-17 RX ADMIN — ONDANSETRON 4 MG: 2 INJECTION INTRAMUSCULAR; INTRAVENOUS at 09:37

## 2021-07-17 RX ADMIN — HYDROMORPHONE HYDROCHLORIDE 0.5 MG: 1 INJECTION, SOLUTION INTRAMUSCULAR; INTRAVENOUS; SUBCUTANEOUS at 12:15

## 2021-07-17 RX ADMIN — SENNOSIDES 8.6 MG: 8.6 TABLET ORAL at 08:18

## 2021-07-17 RX ADMIN — CARBAMAZEPINE 100 MG: 100 TABLET, EXTENDED RELEASE ORAL at 08:19

## 2021-07-17 RX ADMIN — PANTOPRAZOLE SODIUM 40 MG: 40 TABLET, DELAYED RELEASE ORAL at 06:30

## 2021-07-17 RX ADMIN — ASPIRIN 81 MG: 81 TABLET, COATED ORAL at 08:18

## 2021-07-17 NOTE — PROGRESS NOTES
Internal Medicine Progress Note  Patient: Ally Falcon  Age/sex: 72 y o  female  Medical Record #: [id number]      ASSESSMENT/PLAN: (Interval History)  Ally Falocn is seen and examined and management for following issues:    Status Post left Total KNEE ARTHROPLASTY   Pain controlled   Continue encourage incentive spirometry; monitor fever curve   DVT prophylaxis in place and reviewed  Results from last 7 days   Lab Units 07/17/21  0458   WBC Thousand/uL 7 87   HEMOGLOBIN g/dL 12 6   HEMATOCRIT % 39 9   PLATELETS Thousands/uL 104*       Operative acute blood loss anemia  · Decrease in hgb levels from preop labs results; suspect due to post operation extravasation losses along with potential dilutional effects of fluids  · S/P venofer transfusion x 2  · Transfuse PRBC if hgb less then 8 0 (per ortho protocol)  or symptomatic  · Monitor follow up CBC post intervention to trend effect     Left PCA stenosis  · Continue atorvastatin  · Resumed ASA post-op day 1   · Avoid hypotension  Headache syndrome  · Continue Carbatrol  PRE-OP HGB LEVEL: 14 2    Pt is medically stable for DC  The above assessment and plan was reviewed and updated as determined by my evaluation of the patient on 7/17/2021      Labs:   Results from last 7 days   Lab Units 07/17/21  0458 07/16/21  0441   WBC Thousand/uL 7 87 7 10   HEMOGLOBIN g/dL 12 6 11 4*   HEMATOCRIT % 39 9 36 2   PLATELETS Thousands/uL 104* 104*     Results from last 7 days   Lab Units 07/17/21  0458 07/16/21  0441   SODIUM mmol/L 139 135*   POTASSIUM mmol/L 3 8 3 8   CHLORIDE mmol/L 108 104   CO2 mmol/L 26 27   BUN mg/dL 12 11   CREATININE mg/dL 0 55* 0 66   CALCIUM mg/dL 8 4 8 2*             Results from last 7 days   Lab Units 07/15/21  0935   POC GLUCOSE mg/dl 85       Review of Scheduled Meds:  Current Facility-Administered Medications   Medication Dose Route Frequency Provider Last Rate    acetaminophen  650 mg Oral Q6H [address] Zhanna Crabtree MD      aspirin  81 mg Oral Daily Марина Law PA-C      atorvastatin  40 mg Oral Daily With Cristobal Moody MD      carBAMazepine  100 mg Oral BID Marion Howard MD      docusate sodium  100 mg Oral BID Marion Howard MD      enoxaparin  40 mg Subcutaneous Daily Marion Howard MD      fluticasone  1 spray Nasal Daily PRN Marion Howard MD      HYDROmorphone  0 5 mg Intravenous Q4H PRN  KitchenCAN      iron sucrose  200 mg Intravenous Daily Марина Law PA-C 0 mg (07/16/21 1401)    lactated ringers  1 5 mL/kg/hr Intravenous Continuous Marion Howard MD 1 5 mL/kg/hr (07/15/21 1130)    methocarbamol  500 mg Oral Q6H PRN Marion Howard MD      ondansetron  4 mg Intravenous Q8H PRN Marion Howard MD      oxyCODONE  10 mg Oral Q4H PRN  Kitchen, CAN      oxyCODONE  5 mg Oral Q4H PRN  KitchenCAN      pantoprazole  40 mg Oral Daily Before Breakfast Marion Howard MD      polyethylene glycol  17 g Oral Daily PRN Marion Howard MD      pregabalin  100 mg Oral BID Marion Howard MD      senna  1 tablet Oral Daily Marion Howard MD      venlafaxine  75 mg Oral Daily With Breakfast Marion Howard MD         Subjective/ HPI: Patient seen and examined  Patients overnight issues or events were reviewed with nursing or staff during rounds or morning huddle session  New or overnight issues include the following:     Pt without any overnight events or reported nursing issues      ROS:   A 10 point ROS was performed; negative except as noted above         Imaging:     No orders to display       *Labs /Radiology studies Reviewed  *Medications  reviewed and reconciled as needed  *Please refer to order section for additional ordered labs studies  *Case discussed with primary attending during morning huddle case rounds    Physical Examination:  Vitals:   Vitals:    07/16/21 2321 07/17/21 0301 07/17/21 0301 07/17/21 0820   BP: 142/80 137/79 137/79 127/74   Pulse: 65 70 69 81   Resp: 18 18 18 20   Temp: 98 9 °F (37 2 °C) 99 1 °F (37 3 °C) 99 1 °F (37 3 °C) 97 9 °F (36 6 °C)   TempSrc:       SpO2: 93% 92% 90% 94%   Weight:       Height:           General Appearance: no distress, conversive  HEENT: PERRLA, conjuctiva normal; oropharynx clear; mucous membranes moist;   Neck:  Supple, no lymphadenopathy or thyromegaly  Lungs: CTA, normal respiratory effort, no retractions, expiratory effort normal  CV: regular rate and rhythm , PMI normal   ABD: soft non tender, no masses , no hepatic or splenomegaly  EXT: DP pulses intact, no lymphadenopathy, no edema; Lt knee surgical dressing in place  Skin: normal turgor, normal texture, no rash  Psych: affect normal, mood normal  Neuro: AAOx3    : voiding    The above physical exam was reviewed and updated as determined by my evaluation of the patient on 7/17/2021  Invasive Devices     Peripheral Intravenous Line            Peripheral IV 07/16/21 Proximal;Right;Ventral (anterior) Forearm <1 day                   VTE Pharmacologic Prophylaxis: Enoxaparin  Code Status: Level 1 - Full Code  Current Length of Stay: 0 day(s)      Total floor / unit time spent today 30 minutes  Coordination of patient's care was performed in conjunction with primary service  Time invested included review of patient's labs, vitals, and management of their comorbidities with continued monitoring, examination of patient as well as answering patient questions, documenting her findings and creating progress note in electronic medical record,  ordering appropriate diagnostic testing  ** Please Note:  voice to text software may have been used in the creation of this document   Although proof errors in transcription or interpretation are a potential of such software**

## 2021-07-17 NOTE — PLAN OF CARE
Problem: OCCUPATIONAL THERAPY ADULT  Goal: Performs self-care activities at highest level of function for planned discharge setting  See evaluation for individualized goals  Description: Treatment Interventions: ADL retraining, Functional transfer training  Equipment Recommended: Bedside commode       See flowsheet documentation for full assessment, interventions and recommendations  Outcome: Progressing  Note: Limitation: Decreased ADL status, Decreased endurance, Decreased self-care trans, Decreased high-level ADLs  Prognosis: Good  Assessment: Patient participated in Skilled OT session this date with interventions consisting of ADL re training with the use of correct body mechnaics, Energy Conservation techniques and safety awareness and fall prevention techniques  Patient agreeable to OT treatment session, upon arrival patient was found seated OOB to Chair  In comparison to previous session, patient with improvements in ADLs and transfers*   Patient requiring ocassional safety reminders  Patient has attained all treatment goals and is now demonstrating ability to complete UB/LB ADLs at supervision, with the use of Rolling Walker  Patient was educated on ECON and dressing LB , patient verbalized understanding and demonstrated recommended plan correctly  Patient appears to be functioning at baseline  No further acute OT needs identified at this time to warrant continuation of services  D/C OT services  From OT standpoint, recommendation at time of d/c would be Home with family support  The patient's raw score on the AM-PAC Daily Activity inpatient short form is 21, standardized score is 44 27, greater than 39 4  Patients at this level are likely to benefit from discharge to home  Please refer to the recommendation of the Occupational Therapist for safe discharge planning       OT Discharge Recommendation: No rehabilitation needs  OT - OK to Discharge: No

## 2021-07-17 NOTE — DISCHARGE SUMMARY
ORTHOPEDICS DISCHARGE SUMMARY  Alcira Watson 72 y o  female MRN: [id number]  Unit/Bed#: -82    Attending Physician: Charlie Reilly    Admitting diagnosis: Primary osteoarthritis of left knee [M17 12]  Chronic pain of left knee [M25 562, G89 29]    Discharge diagnosis: Primary osteoarthritis of left knee [M17 12]  Chronic pain of left knee [M25 562, G89 29]    Date of admission: 7/15/2021    Date of discharge: 07/17/21         Procedure:  Left total knee arthroplasty    HPI:  This is a 72y o  year old female that presented to the office with signs and symptoms of left knee osteoarthritis  They tried and failed conservative treatment measures and wished to proceed with surgical intervention  The risks, benefits, and complications of the procedure were discussed with the patient and informed consent was obtained  Hospital Course: The patient was admitted to the hospital on 7/15/2021 and underwent an uncomplicated left total knee arthroplasty  They were transferred to the floor after a brief stay in the post-anesthesia care unit  Their pain was well managed with IV and oral pain medications  They began therapy on post operative day #1  Lovenox was also started for DVT prophylaxis 12 hours post operatively  Hemovac drain was removed on POD1  On discharge date pt was cleared by PT and the medicine team and determined to be safe for discharge  Daily discussion was had with the patient, nursing staff, orthopaedic team, and family members if present  All questions were answered to the patients satisifaction  0   Lab Value Date/Time    HGB 12 6 07/17/2021 0458    HGB 11 4 (L) 07/16/2021 0441    HGB 14 2 06/29/2021 1524    HGB 13 6 03/04/2020 1636    HGB 14 5 09/25/2019 1103    HGB 14 3 01/23/2019 0920    HGB 14 6 10/03/2017 1245    HGB 13 2 04/26/2017 0449    HGB 14 6 04/25/2017 1832    HGB 15 5 (H) 04/25/2017 1149    HGB 14 0 06/29/2015 0917           Discharge Instructions:   The patient was discharged weight bearing as tolerated to the left lower extremity  Lovenox will be continued for 28 days  Continue PT/OT  Take pain medications as instructed  Discharge Medications: For the complete list of discharge medications, please refer to the patient's medication reconciliation

## 2021-07-17 NOTE — OCCUPATIONAL THERAPY NOTE
OccupationalTherapy Progress Note     Patient Name: Israel Shukla  BJRQN'R Date: 7/17/2021  Problem List  Principal Problem:    Status post total knee replacement, left          07/17/21 1340   OT Last Visit   OT Visit Date 07/17/21   Note Type   Note Type Treatment   Restrictions/Precautions   Weight Bearing Precautions Per Order Yes   LLE Weight Bearing Per Order WBAT   Other Precautions WBS; Fall Risk;Pain   General   Response to Previous Treatment Patient with no complaints from previous session   Lifestyle   Autonomy independent with ADLs, IADLs, and functional mobility PTA   Reciprocal Relationships supportive friends   Service to Others works FT as  at [employer] enjoys [employer] and hockey   Pain Assessment   Pain Assessment Tool Pain Assessment not indicated - pt denies pain   Pain Score No Pain   ADL   Where Assessed Chair   LB Dressing Assistance 5  Supervision/Setup   LB Dressing Deficit Thread RLE into pants; Thread LLE into pants; Thread RLE into underwear; Thread LLE into underwear;Pull up over hips   LB Dressing Comments cues to dress affected knee first, instructed on ECON   Transfers   Sit to Stand 5  Supervision   Additional items Increased time required   Stand to Sit 5  Supervision   Additional items Increased time required   Cognition   Overall Cognitive Status WFL   Arousal/Participation Cooperative   Attention Within functional limits   Orientation Level Oriented X4   Memory Within functional limits   Following Commands Follows all commands and directions without difficulty   Comments instructed on incentive spirometer as pt reports she forgets the first part of her hospitalization   Activity Tolerance   Activity Tolerance Patient tolerated treatment well   Medical Staff Made Aware SPoke to PT Sandra Wakefield   Assessment   Assessment Patient participated in Skilled OT session this date with interventions consisting of ADL re training with the use of correct body mechnaics, Energy Conservation techniques and safety awareness and fall prevention techniques  Patient agreeable to OT treatment session, upon arrival patient was found seated OOB to Chair  In comparison to previous session, patient with improvements in ADLs and transfers*   Patient requiring ocassional safety reminders  Patient has attained all treatment goals and is now demonstrating ability to complete UB/LB ADLs at supervision, with the use of Rolling Walker  Patient was educated on ECON and dressing LB , patient verbalized understanding and demonstrated recommended plan correctly  Patient appears to be functioning at baseline  No further acute OT needs identified at this time to warrant continuation of services  D/C OT services  From OT standpoint, recommendation at time of d/c would be Home with family support  The patient's raw score on the AM-PAC Daily Activity inpatient short form is 21, standardized score is 44 27, greater than 39 4  Patients at this level are likely to benefit from discharge to home  Please refer to the recommendation of the Occupational Therapist for safe discharge planning     Plan   Treatment Interventions ADL retraining;Functional transfer training   Goal Expiration Date 07/30/21   OT Treatment Day 1   OT Frequency 3-5x/wk   Recommendation   OT Discharge Recommendation No rehabilitation needs   Equipment Recommended Bedside commode   AM-[facility] Daily Activity Inpatient   Lower Body Dressing 3   Bathing 3   Toileting 3   Upper Body Dressing 4   Grooming 4   Eating 4   Daily Activity Raw Score 21   Daily Activity Standardized Score (Calc for Raw Score >=11) 44 27   AM-[facility] Applied Cognition Inpatient   Following a Speech/Presentation 4   Understanding Ordinary Conversation 4   Taking Medications 4   Remembering Where Things Are Placed or Put Away 4   Remembering List of 4-5 Errands 4   Taking Care of Complicated Tasks 4   Applied Cognition Raw Score 24   Applied Cognition Standardized Score 62 21   Modified [place] Scale   Modified [place] Scale 3     Lucas Marcial MS, OTR/L

## 2021-07-17 NOTE — CASE MANAGEMENT
Pt chart reviewed  Per medical team, pt is medically stable for discharged  Pt to discharged home with OPPT services  Cm spoke to pt via phone, confirmed discharged, DME: RW and BSC delivered at bed side  Per pt, friend to transport upon discharged  Cm rec'd TT from PT team, per PT, pt is not cleared for discharged pending stairs eval    Cm will continue to follow

## 2021-07-17 NOTE — PHYSICAL THERAPY NOTE
PHYSICAL THERAPY Treatment NOTE    Patient Name: Evelin Sosa  HPROA'K Date: 7/17/2021 07/17/21 0935   PT Last Visit   PT Visit Date 07/17/21   Note Type   Note Type BID visit/treatment   Pain Assessment   Pain Assessment Tool 0-10   Pain Score No Pain  (0 at rest, 6 following mobility)   Pain Location/Orientation Orientation: Left; Location: Knee   Restrictions/Precautions   LLE Weight Bearing Per Order WBAT   General   Chart Reviewed Yes   Additional Pertinent History Pt  is a 71 yo F who presents POD2 s/p L TKA  WBAT LLE  Family/Caregiver Present Yes  (friend)   Cognition   Overall Cognitive Status WFL   Arousal/Participation Cooperative   Attention Within functional limits   Orientation Level Oriented X4   Memory Within functional limits   Following Commands Follows multistep commands with increased time or repetition   Comments Pt  was identified with full name and birthdate   Subjective   Subjective Pt  agreeable to PT session   Bed Mobility   Sit to Supine 5  Supervision   Additional items Assist x 1;HOB elevated; Bedrails  (VCs for hand placement, sequencing, and use of bedrails)   Transfers   Sit to Stand 5  Supervision   Additional items Assist x 1; Increased time required;Verbal cues  (for hand placement and sequencing)   Stand to Sit 5  Supervision   Additional items Assist x 1;Verbal cues  (to reach back to control descent)   Ambulation/Elevation   Gait pattern Improper Weight shift;Narrow SHAYY; Forward Flexion;Decreased foot clearance;Shuffling; Inconsistent emili; Redundant gait at times; Short stride; Step to;Excessively slow   Gait Assistance 4  Minimal assist   Additional items Assist x 1;Verbal cues  (for posture and gait mechanics)   Assistive Device Rolling walker   Distance 5'x2, 20'x1   Balance   Static Sitting Good   Dynamic Sitting Fair +   Static Standing Fair   Dynamic Standing Fair -   Ambulatory Poor + Endurance Deficit   Endurance Deficit Yes   Endurance Deficit Description postural and gait degradation noted with fatigue   Activity Tolerance   Activity Tolerance Patient limited by fatigue   Nurse Made Aware spoke to RN, Cleared for PT session   Assessment   Prognosis Good   Problem List Decreased strength;Decreased range of motion;Decreased endurance; Impaired balance;Decreased mobility; Decreased coordination;Decreased safety awareness;Pain   Assessment Pt  is a 73 yo F who presents POD2 s/p L TKA  WBAT LLE  Pt  Agreeable to PT session, Pt  Identified with full name and birthdate  Pt  Demonstrated increased functional independence and increased activity tolerance as evidenced above  Pt  Tolerated increased gait distances however continues to be limited by activity tolerance and pain  Pt  Is progressing towards goals, as expected and will benefit from continued skilled therapy to increase functional independence and aid patient in return to PLOF with decreased burden of care  D/C recommendation is OPPT when medically appropriate  Recommend increased gait training and stair training prior to D/C  Goals   Patient Goals to get home   STG Expiration Date 07/27/21   PT Treatment Day 0   Plan   Treatment/Interventions Functional transfer training;LE strengthening/ROM; Therapeutic exercise; Endurance training;Patient/family training;Cognitive reorientation;Equipment eval/education; Bed mobility;Gait training;Spoke to nursing;Spoke to case management   Progress Progressing toward goals   PT Frequency   (3-5x/wk)   Recommendation   PT Discharge Recommendation Home with outpatient rehabilitation   Equipment Recommended [facility] Recommended Wheeled walker   PT - OK to Discharge No   Additional Comments Increase ambulation distance and trial stairs   AM-PAC Basic Mobility Inpatient   Turning in Bed Without Bedrails 3   Lying on Back to Sitting on Edge of Flat Bed 4   Moving Bed to Chair 3   Standing Up From Chair 4   Walk in Room 3   Climb 3-5 Stairs 3   Basic Mobility Inpatient Raw Score 20   Basic Mobility Standardized Score 43 99     The patient's AM-PAC Basic Mobility Inpatient Short Form Raw Score is 20, Standardized Score is 43 99  A standardized score greater than 42 9 suggests the patient may benefit from discharge to home  Please also refer to the recommendation of the Physical Therapist for safe discharge planning      Manuel Magaña, PT, DPT 7/17/2021

## 2021-07-17 NOTE — PROGRESS NOTES
Orthopedics Progress / Post-op Note  Idris Salvador 72 y o  female MRN: [id number]  Unit/Bed#: -01      Subjective:  72 y  o female post operative day 2 s/p  left total knee arthroplasty  Reports pain in the left knee this morning  No numbness/tingling  No acute issues overnight        Objective:    Labs:  0   Lab Value Date/Time    HCT 36 2 07/16/2021 0441    HCT 44 2 06/29/2021 1524    HCT 42 7 03/04/2020 1636    HCT 44 0 06/29/2015 0917    HGB 11 4 (L) 07/16/2021 0441    HGB 14 2 06/29/2021 1524    HGB 13 6 03/04/2020 1636    HGB 14 0 06/29/2015 0917    INR 0 96 06/29/2021 1512    WBC 7 10 07/16/2021 0441    WBC 5 44 06/29/2021 1524    WBC 6 51 03/04/2020 1636    WBC 4 08 (L) 06/29/2015 0917    ESR 6 06/29/2015 0917    CRP 0 2 06/29/2015 0917       Meds:    Current Facility-Administered Medications:     acetaminophen (TYLENOL) tablet 650 mg, 650 mg, Oral, Q6H [facility], Sal Kennedy MD, 650 mg at 07/17/21 0506    aspirin (ECOTRIN LOW STRENGTH) EC tablet 81 mg, 81 mg, Oral, Daily, Марина Law PA-C, 81 mg at 07/16/21 1152    atorvastatin (LIPITOR) tablet 40 mg, 40 mg, Oral, Daily With Pradip Oliver MD, 40 mg at 07/16/21 1712    carBAMazepine (TEGretol XR) 12 hr tablet 100 mg, 100 mg, Oral, BID, Sal Kennedy MD, 100 mg at 07/16/21 1712    docusate sodium (COLACE) capsule 100 mg, 100 mg, Oral, BID, Sal Kennedy MD, 100 mg at 07/16/21 1712    enoxaparin (LOVENOX) subcutaneous injection 40 mg, 40 mg, Subcutaneous, Daily, Sal Kennedy MD, 40 mg at 07/16/21 2106    fluticasone (FLONASE) 50 mcg/act nasal spray 1 spray, 1 spray, Nasal, Daily PRN, Sal Kennedy MD    HYDROmorphone (DILAUDID) injection 0 5 mg, 0 5 mg, Intravenous, Q4H PRN, Michelle Medina PA-C, 0 5 mg at 07/16/21 0929    iron sucrose (VENOFER) 200 mg in sodium chloride 0 9 % 100 mL IVPB, 200 mg, Intravenous, Daily, Марина Law PA-C, Stopped at 07/16/21 1401    lactated ringers infusion, 1 5 mL/kg/hr, Intravenous, Continuous, Hang Doherty MD, Last Rate: 100 1 mL/hr at 07/15/21 1130, 1 5 mL/kg/hr at 07/15/21 1130    methocarbamol (ROBAXIN) tablet 500 mg, 500 mg, Oral, Q6H PRN, Hang Doherty MD, 500 mg at 07/16/21 1527    ondansetron (ZOFRAN) injection 4 mg, 4 mg, Intravenous, Q8H PRN, Hang Doherty MD, 4 mg at 07/16/21 1716    oxyCODONE (ROXICODONE) immediate release tablet 10 mg, 10 mg, Oral, Q4H PRN, Othelia Meline, PA-C, 10 mg at 07/16/21 1527    oxyCODONE (ROXICODONE) IR tablet 5 mg, 5 mg, Oral, Q4H PRN, Othelia Meline, PA-C    pantoprazole (PROTONIX) EC tablet 40 mg, 40 mg, Oral, Daily Before Breakfast, Hang Doherty MD, 40 mg at 07/16/21 0001    polyethylene glycol (MIRALAX) packet 17 g, 17 g, Oral, Daily PRN, Hang Doherty MD    pregabalin (LYRICA) capsule 100 mg, 100 mg, Oral, BID, Hang Doherty MD, 100 mg at 07/16/21 1711    senna (SENOKOT) tablet 8 6 mg, 1 tablet, Oral, Daily, Hang Doherty MD, 8 6 mg at 07/16/21 5588    venlafaxine (EFFEXOR-XR) 24 hr capsule 75 mg, 75 mg, Oral, Daily With Breakfast, Hang Doherty MD, 75 mg at 07/16/21 2234    Blood Culture:   No results found for: BLOODCX    Wound Culture:   No results found for: WOUNDCULT    Ins and Outs:  I/O last 24 hours: In: 340 [P O :240; IV Piggyback:100]  Out: 4699 [Urine:3450; Drains:202]      Orthopedic Physical Exam:  Vitals:    07/17/21 0301   BP: 137/79   Pulse: 69   Resp: 18   Temp: 99 1 °F (37 3 °C)   SpO2: 90%   Sitting upright in NAD, breathing unlabored  left Lower Extremity extremity:  · ACE/Dressings C/D/I, no saturation or leaking  · + ankle DF/PF, EHL/FHL  · Sensation intact  · No calf tenderness or pitting edema    _*_*_*_*_*_*_*_*_*_*_*_*_*_*_*_*_*_*_*_*_*_*_*_*_*_*_*_*_*_*_*_*_*_*_*_*_*_*_*_*_*    Assessment: 72 y  o female  post operative day 2 s/p  left total knee arthroplasty  Doing well  Plan:  · WBAT LLE  · Up and out of bed, with assistance as needed    · DVT prophylaxis (Lovenox)  · Analgesics  · PT/OT  · Dispo: Ortho will follow  Discharge planning  Will continue to assess for acute blood loss anemia   AM labs currently pending, will follow-up        Roverto Gamez PA-C

## 2021-07-17 NOTE — PHYSICAL THERAPY NOTE
PHYSICAL THERAPY Treatment NOTE    Patient Name: Israel Shukla  QYJVQ'M Date: 7/17/2021 07/17/21 1322   PT Last Visit   PT Visit Date 07/17/21   Note Type   Note Type BID visit/treatment   Pain Assessment   Pain Assessment Tool 0-10   Pain Score No Pain   Pain Location/Orientation Orientation: Left; Location: Leg   Restrictions/Precautions   Weight Bearing Precautions Per Order Yes   LLE Weight Bearing Per Order WBAT   Other Precautions WBS; Fall Risk;Pain   General   Chart Reviewed Yes   Additional Pertinent History Pt  is a 71 yo F who presents POD 2 s/p L TKA WBAT LLE  Family/Caregiver Present No   Cognition   Overall Cognitive Status WFL   Arousal/Participation Cooperative   Attention Within functional limits   Orientation Level Oriented X4   Memory Within functional limits   Following Commands Follows all commands and directions without difficulty   Comments Pt  was identified with full name and birthdate    Subjective   Subjective Pt  agreeable to PT session   Bed Mobility   Supine to Sit Unable to assess   Sit to Supine Unable to assess   Additional Comments Pt  seated OOB in Recl[place], prior to and following PT session   Transfers   Sit to Stand 5  Supervision   Additional items Increased time required;Verbal cues  (for hand placement and sequencing)   Stand to Sit 5  Supervision   Additional items Increased time required;Verbal cues  (to reach back to control descent)   Ambulation/Elevation   Gait pattern Antalgic;Narrow SHAYY; Decreased foot clearance;Decreased L stance; Inconsistent emili; Short stride   Gait Assistance 5  Supervision  (close supervision)   Assistive Device Rolling walker   Distance 60'x1   Stair Management Assistance 5  Supervision   Additional items Verbal cues  (for sequencing and body mechanics)   Stair Management Technique Two rails; Step to pattern   Number of Stairs 3   Balance   Static Sitting Good   Dynamic Sitting Fair +   Static Standing Fair   Dynamic Standing Fair -   Ambulatory Poor +   Endurance Deficit   Endurance Deficit Yes   Endurance Deficit Description postural and gait degradation noted with fatigue   Activity Tolerance   Activity Tolerance Patient limited by fatigue   Nurse Made Aware Spoke to RN, Cleared for PT session   Assessment   Prognosis Good   Problem List Decreased strength;Decreased range of motion;Decreased endurance; Impaired balance;Decreased mobility; Decreased safety awareness;Pain;Orthopedic restrictions   Assessment Pt  is a 73 yo F who presents POD 2 s/p L TKA WBAT LLE  Pt  Agreeable to PT session, Pt  Identified with full name and birthdate  Pt  Demonstrated increased functional independence and increased activity tolerance as evidenced above  Pt  Ambulated increased distances and demonstrated ability to negotiate stairs  Pt  Is progressing towards goals, as expected and will benefit from continued skilled therapy to increase functional independence and aid patient in return to PLOF with decreased burden of care  D/C recommendation is Home with OPPT when medically appropriate  Goals   Patient Goals to get home   STG Expiration Date 07/27/21   PT Treatment Day 0   Plan   Treatment/Interventions LE strengthening/ROM; Functional transfer training; Therapeutic exercise;Elevations; Endurance training;Cognitive reorientation;Patient/family training;Equipment eval/education; Bed mobility;Gait training;Spoke to nursing   Progress Progressing toward goals   PT Frequency   (3-5x/wk)   Recommendation   PT Discharge Recommendation Home with outpatient rehabilitation   Equipment Recommended [address] Recommended Wheeled walker   PT - OK to Discharge Yes   Additional Comments D/C home with [address] in Bed Without Bedrails 4   Lying on Back to Sitting on Edge of Flat Bed 3   Moving Bed to Chair 3   Standing Up From Chair 4   Walk in Room 3   Climb 3-5 Stairs 3   Basic Mobility Inpatient Raw Score 20   Basic Mobility Standardized Score 43 99     The patient's AM-PAC Basic Mobility Inpatient Short Form Raw Score is 20, Standardized Score is 43 99  A standardized score greater than 42 9 suggests the patient may benefit from discharge to home  Please also refer to the recommendation of the Physical Therapist for safe discharge planning      Constance Mcclendon, PT, DPT 7/17/2021

## 2021-07-17 NOTE — PLAN OF CARE
Problem: PHYSICAL THERAPY ADULT  Goal: Performs mobility at highest level of function for planned discharge setting  See evaluation for individualized goals  Description: Treatment/Interventions: Functional transfer training, LE strengthening/ROM, Elevations, Therapeutic exercise, Endurance training, Equipment eval/education, Bed mobility, Gait training, Spoke to nursing  Equipment Recommended: Demi Barrett       See flowsheet documentation for full assessment, interventions and recommendations  Outcome: Progressing  Note: Prognosis: Good  Problem List: Decreased strength, Decreased range of motion, Decreased endurance, Impaired balance, Decreased mobility, Decreased safety awareness, Pain, Orthopedic restrictions  Assessment: Pt  is a 71 yo F who presents POD 2 s/p L TKA WBAT LLE  Pt  Agreeable to PT session, Pt  Identified with full name and birthdate  Pt  Demonstrated increased functional independence and increased activity tolerance as evidenced above  Pt  Ambulated increased distances and demonstrated ability to negotiate stairs  Pt  Is progressing towards goals, as expected and will benefit from continued skilled therapy to increase functional independence and aid patient in return to PLOF with decreased burden of care  D/C recommendation is Home with OPPT when medically appropriate  Barriers to Discharge: Inaccessible home environment  Barriers to Discharge Comments: increased amb, DAVID, full flight to bathroom     PT Discharge Recommendation: Home with outpatient rehabilitation     PT - OK to Discharge: Yes    See flowsheet documentation for full assessment

## 2021-07-17 NOTE — PLAN OF CARE
Problem: MOBILITY - ADULT  Goal: Maintain or return to baseline ADL function  Description: INTERVENTIONS:  -  Assess patient's ability to carry out ADLs; assess patient's baseline for ADL function and identify physical deficits which impact ability to perform ADLs (bathing, care of mouth/teeth, toileting, grooming, dressing, etc )  - Assess/evaluate cause of self-care deficits   - Assess range of motion  - Assess patient's mobility; develop plan if impaired  - Assess patient's need for assistive devices and provide as appropriate  - Encourage maximum independence but intervene and supervise when necessary  - Involve family in performance of ADLs  - Assess for home care needs following discharge   - Consider OT consult to assist with ADL evaluation and planning for discharge  - Provide patient education as appropriate  Outcome: Progressing  Goal: Maintains/Returns to pre admission functional level  Description: INTERVENTIONS:  - Perform BMAT or MOVE assessment daily    - Set and communicate daily mobility goal to care team and patient/family/caregiver  - Collaborate with rehabilitation services on mobility goals if consulted  - Perform Range of Motion 3 times a day  - Reposition patient every 2 hours    - Dangle patient 3 times a day  - Stand patient 3 times a day  - Ambulate patient 3 times a day  - Out of bed to chair 3 times a day   - Out of bed for meals 3 times a day  - Out of bed for toileting  - Record patient progress and toleration of activity level   Outcome: Progressing     Problem: SKIN/TISSUE INTEGRITY - ADULT  Goal: Skin Integrity remains intact(Skin Breakdown Prevention)  Description: Assess:  -Perform Benitez assessment every shift  -Clean and moisturize skin every shift  -Inspect skin when repositioning, toileting, and assisting with ADLS  -Assess extremities for adequate circulation and sensation     Bed Management:  -Have minimal linens on bed & keep smooth, unwrinkled  -Change linens as needed when moist or perspiring  -Avoid sitting or lying in one position for more than 2 hours while in bed  -Keep HOB at 45degrees     Toileting:  -Offer bedside commode  -Assess for incontinence every 2 hours      Activity:  -Mobilize patient 3 times a day  -Encourage activity and walks on unit  -Encourage or provide ROM exercises   -Turn and reposition patient every 2 Hours  -Use appropriate equipment to lift or move patient in bed  -Instruct/ Assist with weight shifting every when out of bed in chair  -Consider limitation of chair time 2 hour intervals    Skin Care:  -Avoid use of baby powder, tape, friction and shearing, constrictive clothing  -Relieve pressure over bony prominences using allevyn patch  -Do not massage red bony areas    Next Steps:  -Consider consults to  interdisciplinary teams such as PT  Outcome: Progressing  Goal: Incision(s), wounds(s) or drain site(s) healing without S/S of infection  Description: INTERVENTIONS  - Assess and document dressing, incision, wound bed, drain sites and surrounding tissue  - Provide patient and family education  - Perform skin care/dressing changes every shift  Outcome: Progressing  Goal: Pressure injury heals and does not worsen  Description: Interventions:  - Implement low air loss mattress or specialty surface (Criteria met)  - Apply silicone foam dressing  - Instruct/assist with weight shifting every 30 minutes when in chair   - Limit chair time to 2 hour intervals  - Apply fecal or urinary incontinence containment device   - Perform passive or active ROM every shift  - Turn and reposition patient & offload bony prominences every shift hours   - Utilize friction reducing device or surface for transfers   - Consider consults to  interdisciplinary teams such as PT  - Consider nutrition services referral as needed  Outcome: Progressing     Problem: MUSCULOSKELETAL - ADULT  Goal: Maintain or return mobility to safest level of function  Description: INTERVENTIONS:  - Assess patient's ability to carry out ADLs; assess patient's baseline for ADL function and identify physical deficits which impact ability to perform ADLs (bathing, care of mouth/teeth, toileting, grooming, dressing, etc )  - Assess/evaluate cause of self-care deficits   - Assess range of motion  - Assess patient's mobility  - Assess patient's need for assistive devices and provide as appropriate  - Encourage maximum independence but intervene and supervise when necessary  - Involve family in performance of ADLs  - Assess for home care needs following discharge   - Consider OT consult to assist with ADL evaluation and planning for discharge  - Provide patient education as appropriate  Outcome: Progressing  Goal: Maintain proper alignment of affected body part  Description: INTERVENTIONS:  - Support, maintain and protect limb and body alignment  - Provide patient/ family with appropriate education  Outcome: Progressing     Problem: PAIN - ADULT  Goal: Verbalizes/displays adequate comfort level or baseline comfort level  Description: Interventions:  - Encourage patient to monitor pain and request assistance  - Assess pain using appropriate pain scale  - Administer analgesics based on type and severity of pain and evaluate response  - Implement non-pharmacological measures as appropriate and evaluate response  - Consider cultural and social influences on pain and pain management  - Notify physician/advanced practitioner if interventions unsuccessful or patient reports new pain  Outcome: Progressing     Problem: INFECTION - ADULT  Goal: Absence or prevention of progression during hospitalization  Description: INTERVENTIONS:  - Assess and monitor for signs and symptoms of infection  - Monitor lab/diagnostic results  - Monitor all insertion sites, i e  indwelling lines, tubes, and drains  - Monitor endotracheal if appropriate and nasal secretions for changes in amount and color  - [place] appropriate cooling/warming therapies per order  - Administer medications as ordered  - Instruct and encourage patient and family to use good hand hygiene technique  - Identify and instruct in appropriate isolation precautions for identified infection/condition  Outcome: Progressing  Goal: Absence of fever/infection during neutropenic period  Description: INTERVENTIONS:  - Monitor WBC    Outcome: Progressing     Problem: SAFETY ADULT  Goal: Maintain or return to baseline ADL function  Description: INTERVENTIONS:  -  Assess patient's ability to carry out ADLs; assess patient's baseline for ADL function and identify physical deficits which impact ability to perform ADLs (bathing, care of mouth/teeth, toileting, grooming, dressing, etc )  - Assess/evaluate cause of self-care deficits   - Assess range of motion  - Assess patient's mobility; develop plan if impaired  - Assess patient's need for assistive devices and provide as appropriate  - Encourage maximum independence but intervene and supervise when necessary  - Involve family in performance of ADLs  - Assess for home care needs following discharge   - Consider OT consult to assist with ADL evaluation and planning for discharge  - Provide patient education as appropriate  Outcome: Progressing  Goal: Maintains/Returns to pre admission functional level  Description: INTERVENTIONS:  - Perform BMAT or MOVE assessment daily    - Set and communicate daily mobility goal to care team and patient/family/caregiver  - Collaborate with rehabilitation services on mobility goals if consulted  - Perform Range of Motion 3 times a day  - Reposition patient every 2 hours    - Dangle patient 3 times a day  - Stand patient 3 times a day  - Ambulate patient 3 times a day  - Out of bed to chair 3 times a day   - Out of bed for meals 3 times a day  - Out of bed for toileting  - Record patient progress and toleration of activity level   Outcome: Progressing  Goal: Patient will remain free of falls  Description: INTERVENTIONS:  - Educate patient/family on patient safety including physical limitations  - Instruct patient to call for assistance with activity   - Consult OT/PT to assist with strengthening/mobility   - Keep Call bell within reach  - Keep bed low and locked with side rails adjusted as appropriate  - Keep care items and personal belongings within reach  - Initiate and maintain comfort rounds  - Make Fall Risk Sign visible to staff  - Offer Toileting every 2 Hours, in advance of need  - Initiate/Maintain bedalarm  - Apply yellow socks and bracelet for high fall risk patients  - Consider moving patient to room near nurses station  Outcome: Progressing

## 2021-07-19 ENCOUNTER — TELEPHONE (OUTPATIENT)
Dept: OBGYN CLINIC | Facility: HOSPITAL | Age: 66
End: 2021-07-19

## 2021-07-19 ENCOUNTER — EVALUATION (OUTPATIENT)
Dept: PHYSICAL THERAPY | Age: 66
End: 2021-07-19
Payer: COMMERCIAL

## 2021-07-19 DIAGNOSIS — Z96.652 STATUS POST TOTAL LEFT KNEE REPLACEMENT: Primary | ICD-10-CM

## 2021-07-19 DIAGNOSIS — M17.12 PRIMARY OSTEOARTHRITIS OF LEFT KNEE: ICD-10-CM

## 2021-07-19 DIAGNOSIS — G89.29 CHRONIC PAIN OF LEFT KNEE: ICD-10-CM

## 2021-07-19 DIAGNOSIS — M25.562 CHRONIC PAIN OF LEFT KNEE: ICD-10-CM

## 2021-07-19 PROCEDURE — 97110 THERAPEUTIC EXERCISES: CPT | Performed by: PHYSICAL THERAPIST

## 2021-07-19 PROCEDURE — 97161 PT EVAL LOW COMPLEX 20 MIN: CPT | Performed by: PHYSICAL THERAPIST

## 2021-07-19 PROCEDURE — 97116 GAIT TRAINING THERAPY: CPT | Performed by: PHYSICAL THERAPIST

## 2021-07-19 NOTE — TELEPHONE ENCOUNTER
Patient contacted for a postoperative follow up assessment  A detailed VM was left requesting a call back

## 2021-07-19 NOTE — PROGRESS NOTES
SOC-INTERNAL MEDICINE POST-OPERATIVE VISIT      NAME: Zamzam Adams  AGE: 72 y o  SEX: female  : 1955     DATE: 2021     Internal Medicine Pre-Operative Evaluation:     Reason for Visit: Post-operative follow up SOC-IM     Surgery: Arthroplasty of left knee  Surgeon: Dr Bimal Connolly  Primary Care  Provider: Oralia Calles MD    Interval History     Patient status post left knee replacement  Doing well postoperatively has been to physical therapy for 1 session so far  She is due for 2nd upcoming session  Rates her pain as 6/10 but has not been using her Tylenol around the clock I have encouraged her to do so  She denies any post operative constipation issues or fevers at home  She is eating well without difficulty  She had her postoperative visit upcoming in 2 days with her surgeon  I did look at the surrounding area of the bandage site there is no erythema or discharge    She denies any lightheadedness while standing up or during her initial physical therapy session           Assessment  Plan:     Status Post Total Joint Arthroplasty  · doing well post left knee  arthroplasty  · Following surgeon recommended DVT prophylaxis  · attending physical therapy sessions and progressing  · current pain scale reported 6/10  · follow up appointment scheduled for incision check and surgical follow up with orthopedics  · medication reconciliation performed  · patient to resume follow up care with their PCP for ongoing care of their pre-surgical co-morbidities      Patient's additional co morbidities as below which will have ongoing medical management per the patients PCP:    Patient Active Problem List   Diagnosis    Left ureteral calculus    Hydronephrosis with ureteral calculus    Constipation    Allergic rhinitis    Colonic inertia    Depression with anxiety    Fibromyalgia    Neoplasm of uncertain behavior of skin    Nephrolithiasis    Pes anserinus bursitis of right knee    Primary osteoarthritis of right knee    Restless legs syndrome    Vitamin D deficiency    Arthritis of knee    Arthralgia    Fatigue    Hallux valgus    Primary osteoarthritis of both knees    Anemia    Other hyperlipidemia    GERD (gastroesophageal reflux disease)    Hand pain    Acquired hallux interphalangeus of right foot    Stroke-like symptoms    Intracranial vascular stenosis    Balance disorder    Unspecified convulsions (HCC)    Chronic pain of both knees    Primary osteoarthritis of left knee    Nonintractable headache    Preop exam for internal medicine    Status post total knee replacement, left         Review of Systems:      No TIA's or unusual headaches, no dysphagia  No prolonged cough  No dyspnea or chest pain on exertion  No abdominal pain, change in bowel habits, black or bloody stools  No urinary tract or BPH symptoms  Pt with mild pain in surgical joint          Current Medications:     Current Outpatient Medications   Medication Instructions    ascorbic acid (VITAMIN C) 500 mg, Oral, 2 times daily    aspirin 81 mg, Oral, Daily    atorvastatin (LIPITOR) 40 mg, Oral, Every evening    carbamazepine (CARBATROL) 100 MG 12 hr capsule TAKE 1 CAPSULE(100 MG TOTAL) BY MOUTH TWICE DAILY    docusate sodium (COLACE) 100 mg, Oral, 2 times daily    enoxaparin (LOVENOX) 40 mg, Subcutaneous, Daily (early morning)    ferrous sulfate 324 mg, Oral, 2 times daily before meals    fluticasone (FLONASE) 50 mcg/act nasal spray 1 spray, Nasal, Daily PRN    folic acid (FOLVITE) 1 mg, Oral, Daily    Multiple Vitamins-Minerals (MULTIVITAMIN WITH MINERALS) tablet 1 tablet, Oral, Daily    oxyCODONE (ROXICODONE) 5 mg immediate release tablet 1 pill po Q4 Hrs prn    pantoprazole (PROTONIX) 40 mg, Oral, Daily    pregabalin (LYRICA) 100 mg, Oral, 2 times daily    senna-docusate sodium (SENOKOT S) 8 6-50 mg per tablet 1 tablet, Oral, Daily    venlafaxine (EFFEXOR-XR) 75 mg, Oral, Daily with breakfast, With 37 5 mg          Past History:       Past Medical History:   Diagnosis Date    Arthralgia     Atypical chest pain     Balance disorder     Cervical rib     last assessed 9/13/12    Depression     Fatigue     Fibromyalgia     Fibromyalgia     GERD (gastroesophageal reflux disease)     Hydronephrosis with renal and ureteral calculus obstruction     last assessed 5/1/17    Lacunar stroke ([facility] [address] )     Leukopenia     last assessed 5/19/16    Memory loss     Restless leg syndrome     last assessed 11/8/13    Sebaceous cyst     last assessed 2/13/13    Skin tag     last assessed 2/13/13    Vitamin D deficiency     Past Surgical History:   Procedure Laterality Date    COLONOSCOPY  2011    fiberoptic    DENTAL SURGERY      KNEE SURGERY      right knee 2006 meniscal tear stage 04    OH COLONOSCOPY FLX DX W/COLLJ SPEC WHEN PFRMD N/A 3/16/2017    Procedure: COLONOSCOPY;  Surgeon: Trever Ron MD;  Location: [facility]; Service: Gastroenterology    OH CYSTO/URETERO W/LITHOTRIPSY &INDWELL STENT INSRT Left 4/26/2017    Procedure: CYSTOSCOPY URETEROSCOPY; RETROGRADE PYELOGRAM; STONE EXTRACTION; INSERTION STENT URETERAL;  Surgeon: Jean Cevallos MD;  [facility];  Service: Urology    OH TOTAL KNEE ARTHROPLASTY Left 7/15/2021    Procedure: ARTHROPLASTY KNEE TOTAL - left;  Surgeon: Roselyn Najera MD;  [facility];  Service: Orthopedics         Physical Exam:      There were no vitals taken for this visit      General Appearance: no distress, conversive  HEENT: PERRLA, conjuctiva normal; oropharynx clear; mucous membranes moist;   Neck:  Supple, no lymphadenopathy or thyromegaly  Lungs: CTA, normal respiratory effort, no retractions, expiratory effort normal  CV: regular rate and rhythm , PMI normal   ABD: soft non tender, no masses , no hepatic or splenomegaly  EXT: DP pulses intact, no lymphadenopathy, no edema ;  left KNEE dressing in place  Skin: normal turgor, normal texture, no rash  Psych: affect normal, mood normal  Neuro: AAOx3      Time of visit including pre-visit chart review, visit and post-visit coordination of plan and care , review of pre-surgical lab work, preparation and time spent documenting note in electronic medical record, time spent face-to-face in physical examination answering patient questions: 30 minutes       Dr Beny Collier    [facility]  [email or web address] [email or web address]

## 2021-07-19 NOTE — PROGRESS NOTES
PT Evaluation     Today's date: 2021  Patient name: Devonte Mcrae  : 1955  MRN: [id number]  Referring provider: Rose Hsieh MD  Dx:   Encounter Diagnosis     ICD-10-CM    1  Status post total left knee replacement  Z96 652    2  Primary osteoarthritis of left knee  M17 12 Ambulatory referral to Physical Therapy   3  Chronic pain of left knee  M25 562 Ambulatory referral to Physical Therapy    G89 29                   Assessment  Assessment details: Pt reports to PT s/p L TKA 7/15  Pt's incision is C/D/I, but pt lacks LE ROM and strength and would benefit from skilled PT in order to return to return to independence with ADLs     Impairments: abnormal coordination, abnormal gait, abnormal muscle firing, abnormal muscle tone, abnormal or restricted ROM, abnormal movement, activity intolerance, impaired physical strength, lacks appropriate home exercise program, pain with function and weight-bearing intolerance    Goals  In 4 weeks pt will:  -Be independent with phase I of HEP  -Increase LE strength by 1/2 grade  -Increase LE ROM by 10 degrees    By discharge pt will:  -Be independent with Phase II of HEP  -Demonstrate full LE strength  -Demonstrate full LE ROM  -Report minimal pain with ADLs    Plan  Patient would benefit from: skilled physical therapy  Planned modality interventions: cryotherapy  Planned therapy interventions: abdominal trunk stabilization, joint mobilization, manual therapy, motor coordination training, muscle pump exercises, neuromuscular re-education, patient education, body mechanics training, strengthening, stretching, therapeutic activities, therapeutic exercise, fine motor coordination training, functional ROM exercises and home exercise program  Frequency: 2x week  Duration in weeks: 6  Plan of Care beginning date: 2021  Plan of Care expiration date: 2021  Treatment plan discussed with: patient and PTA        Subjective Evaluation    History of Present Illness  Mechanism of injury: Pt reports to PT s/p L TKA 7/15  Pt reports pain is well controlled with medication at this time  Pt has family staying with her to assist with ADLs  Pt has been walking with RW since surgery     Pain  Current pain ratin  At best pain ratin  At worst pain ratin    Patient Goals  Patient goals for therapy: decreased pain, improved balance, increased motion, increased strength, independence with ADLs/IADLs and return to sport/leisure activities          Objective     Passive Range of Motion     Additional Passive Range of Motion Details  L Knee: 20-40    Strength/Myotome Testing     Left Knee   Flexion: 3+  Extension: 2    Right Knee   Flexion: 5  Extension: 5             Precautions: :L TKA 7/15, Fibromyalgia       Manuals                                                                 Neuro Re-Ed                                                                                                        Ther Ex                          Heel slides x50            SAQ-isometrics 5" x20                                                                             Ther Activity                                       Gait Training             Ambulation 3x50'                         Modalities

## 2021-07-19 NOTE — PATIENT INSTRUCTIONS
·  Follow up with orthopedic surgery as scheduled for incision and post operative care  · Follow up with PCP for ongoing medical care as previous to surgery  · Use Tylenol 325 mg every 8 hours around the clock  · You are released form care from the surgical optimization medical team however we remain available should you have any questions in the future

## 2021-07-20 ENCOUNTER — OFFICE VISIT (OUTPATIENT)
Dept: INTERNAL MEDICINE CLINIC | Facility: CLINIC | Age: 66
End: 2021-07-20
Payer: COMMERCIAL

## 2021-07-20 VITALS
DIASTOLIC BLOOD PRESSURE: 78 MMHG | SYSTOLIC BLOOD PRESSURE: 122 MMHG | HEIGHT: 63 IN | WEIGHT: 150.8 LBS | BODY MASS INDEX: 26.72 KG/M2 | HEART RATE: 57 BPM

## 2021-07-20 DIAGNOSIS — D64.89 ANEMIA DUE TO OTHER CAUSE, NOT CLASSIFIED: ICD-10-CM

## 2021-07-20 DIAGNOSIS — M25.562 CHRONIC PAIN OF LEFT KNEE: ICD-10-CM

## 2021-07-20 DIAGNOSIS — E78.49 OTHER HYPERLIPIDEMIA: ICD-10-CM

## 2021-07-20 DIAGNOSIS — M17.12 PRIMARY OSTEOARTHRITIS OF LEFT KNEE: ICD-10-CM

## 2021-07-20 DIAGNOSIS — G89.29 CHRONIC PAIN OF LEFT KNEE: ICD-10-CM

## 2021-07-20 DIAGNOSIS — Z96.652 STATUS POST TOTAL KNEE REPLACEMENT, LEFT: Primary | ICD-10-CM

## 2021-07-20 PROCEDURE — 1160F RVW MEDS BY RX/DR IN RCRD: CPT | Performed by: INTERNAL MEDICINE

## 2021-07-20 PROCEDURE — 1036F TOBACCO NON-USER: CPT | Performed by: INTERNAL MEDICINE

## 2021-07-20 PROCEDURE — 99213 OFFICE O/P EST LOW 20 MIN: CPT | Performed by: INTERNAL MEDICINE

## 2021-07-20 RX ORDER — FOLIC ACID 1 MG/1
1 TABLET ORAL DAILY
Qty: 30 TABLET | Refills: 0 | Status: SHIPPED | OUTPATIENT
Start: 2021-07-20 | End: 2021-08-24

## 2021-07-21 ENCOUNTER — OFFICE VISIT (OUTPATIENT)
Dept: PHYSICAL THERAPY | Age: 66
End: 2021-07-21
Payer: COMMERCIAL

## 2021-07-21 DIAGNOSIS — Z96.652 STATUS POST TOTAL LEFT KNEE REPLACEMENT: Primary | ICD-10-CM

## 2021-07-21 PROCEDURE — 97140 MANUAL THERAPY 1/> REGIONS: CPT | Performed by: PHYSICAL THERAPIST

## 2021-07-21 PROCEDURE — 97112 NEUROMUSCULAR REEDUCATION: CPT | Performed by: PHYSICAL THERAPIST

## 2021-07-21 PROCEDURE — 97110 THERAPEUTIC EXERCISES: CPT | Performed by: PHYSICAL THERAPIST

## 2021-07-21 NOTE — PROGRESS NOTES
Daily Note     Today's date: 2021  Patient name: Bess Albright  : 1955  MRN: [id number]  Referring provider: Kristal Herrera MD  Dx:   Encounter Diagnosis     ICD-10-CM    1  Status post total left knee replacement  G94 662                   Subjective: Pt reports minimal changes      Objective: See treatment diary below      Assessment: Tolerated treatment well  Patient demonstrated fatigue post treatment, would benefit from continued PT and pt displayed increased pain-free ROM at end of session      Plan: Continue per plan of care  Progress treatment as tolerated         Precautions: :L TKA 7/15, Fibromyalgia       Manuals             PROM MSITH                                                   Neuro Re-Ed             FT EO 3x20s                                                                                          Ther Ex             Nustep 10'            Heel slides x50            SAQ-isometrics 5" x20            Std hip abd/marching 3x10 ea                                                                Ther Activity                                       Gait Training             Ambulation                          Modalities

## 2021-07-22 ENCOUNTER — HOSPITAL ENCOUNTER (OUTPATIENT)
Dept: RADIOLOGY | Facility: HOSPITAL | Age: 66
Discharge: HOME | End: 2021-07-22
Attending: ORTHOPAEDIC SURGERY
Payer: COMMERCIAL

## 2021-07-22 ENCOUNTER — OFFICE VISIT (OUTPATIENT)
Dept: OBGYN CLINIC | Facility: HOSPITAL | Age: 66
End: 2021-07-22

## 2021-07-22 VITALS
BODY MASS INDEX: 26.58 KG/M2 | WEIGHT: 150 LBS | DIASTOLIC BLOOD PRESSURE: 74 MMHG | HEART RATE: 59 BPM | SYSTOLIC BLOOD PRESSURE: 117 MMHG | HEIGHT: 63 IN

## 2021-07-22 DIAGNOSIS — M17.12 PRIMARY OSTEOARTHRITIS OF LEFT KNEE: ICD-10-CM

## 2021-07-22 DIAGNOSIS — M17.12 PRIMARY OSTEOARTHRITIS OF LEFT KNEE: Primary | ICD-10-CM

## 2021-07-22 DIAGNOSIS — Z96.652 STATUS POST TOTAL KNEE REPLACEMENT, LEFT: ICD-10-CM

## 2021-07-22 PROCEDURE — 73560 X-RAY EXAM OF KNEE 1 OR 2: CPT

## 2021-07-22 PROCEDURE — 99024 POSTOP FOLLOW-UP VISIT: CPT | Performed by: ORTHOPAEDIC SURGERY

## 2021-07-22 RX ORDER — OXYCODONE HYDROCHLORIDE 5 MG/1
TABLET ORAL
Qty: 30 TABLET | Refills: 0 | Status: SHIPPED | OUTPATIENT
Start: 2021-07-22 | End: 2021-08-27 | Stop reason: HOSPADM

## 2021-07-22 NOTE — PROGRESS NOTES
Assessment:  1  Primary osteoarthritis of left knee  XR knee 1 or 2 vw left   2  Status post total knee replacement, left  oxyCODONE (ROXICODONE) 5 mg immediate release tablet       Plan:  The patient is doing well and should continue daily Lovenox, pain medications as needed and current physical therapy regimen  The patient can shower letting soapy water over incision, yet should not to submerge the incision, and then pat dry  Oxycodone is refilled  The patient should follow up in one week  To do next visit:  Return in about 1 week (around 7/29/2021)  The above stated was discussed in layman's terms and the patient expressed understanding  All questions were answered to the patient's satisfaction  Scribe Attestation    I,:  Sourav Medley am acting as a scribe while in the presence of the attending physician :       I,:  Destiny Greco MD personally performed the services described in this documentation    as scribed in my presence :             Subjective: Myron Gallo is a 72 y o  female who presents one week s/p left TKA, 7/15/2021  The patient is doing well  Today she complains of generalized left knee pain  She does participate in physical therapy  She does use Lovenox daily  She does use oxycodone 3-4x/day and Tylenol for pain control  She denies fever, chills or shortness of breath          Review of systems negative unless otherwise specified in HPI    Past Medical History:   Diagnosis Date    Arthralgia     Atypical chest pain     Balance disorder     Cervical rib     last assessed 9/13/12    Depression     Fatigue     Fibromyalgia     Fibromyalgia     GERD (gastroesophageal reflux disease)     Hydronephrosis with renal and ureteral calculus obstruction     last assessed 5/1/17    Lacunar stroke ([facility] [address] )     Leukopenia     last assessed 5/19/16    Memory loss     Restless leg syndrome     last assessed 11/8/13    Sebaceous cyst     last assessed 2/13/13    Skin tag last assessed 2/13/13    Vitamin D deficiency        Past Surgical History:   Procedure Laterality Date    COLONOSCOPY  2011    fiberoptic    DENTAL SURGERY      KNEE SURGERY      right knee 2006 meniscal tear stage 04    VT COLONOSCOPY FLX DX W/COLLJ SPEC WHEN PFRMD N/A 3/16/2017    Procedure: COLONOSCOPY;  Surgeon: Cedrick Scott MD;  Location: [facility];   Service: Gastroenterology    VT CYSTO/URETERO W/LITHOTRIPSY &INDWELL STENT INSRT Left 4/26/2017    Procedure: CYSTOSCOPY URETEROSCOPY; RETROGRADE PYELOGRAM; STONE EXTRACTION; INSERTION STENT URETERAL;  Surgeon: Pepito Osborne MD;  Location:  MAIN OR;  Service: Urology    VT TOTAL KNEE ARTHROPLASTY Left 7/15/2021    Procedure: ARTHROPLASTY KNEE TOTAL - left;  Surgeon: Holland Butler MD;  [facility];  Service: Orthopedics       Family History   Problem Relation Age of Onset    Osteoporosis Mother     Heart failure Father     Coronary artery disease Father     Fibromyalgia Sister     Heart failure Family     Coronary artery disease Family     Osteoporosis Family     Alcohol abuse Neg Hx     Substance Abuse Neg Hx     Mental illness Neg Hx     Depression Neg Hx        Social History     Occupational History    Occupation:      Employer: [address] [place], [place] [address]   Tobacco Use    Smoking status: Former Smoker    Smokeless tobacco: Never Used   Vaping Use    Vaping Use: Never used   Substance and Sexual Activity    Alcohol use: Not Currently    Drug use: No    Sexual activity: Not Currently         Current Outpatient Medications:     aspirin 81 mg chewable tablet, Chew 1 tablet (81 mg total) daily, Disp: 30 tablet, Rfl: 0    atorvastatin (LIPITOR) 40 mg tablet, Take 1 tablet (40 mg total) by mouth every evening, Disp: 90 tablet, Rfl: 1    carbamazepine (CARBATROL) 100 MG 12 hr capsule, TAKE 1 CAPSULE(100 MG TOTAL) BY MOUTH TWICE DAILY, Disp: 60 capsule, Rfl: 1    docusate sodium (COLACE) 100 mg capsule, Take 100 mg by mouth 2 (two) times a day, Disp: , Rfl:     enoxaparin (LOVENOX) 40 mg/0 4 mL, Inject 0 4 mL (40 mg total) under the skin daily in the early morning for 28 doses, Disp: 11 2 mL, Rfl: 0    ferrous sulfate 324 (65 Fe) mg, Take 1 tablet (324 mg total) by mouth 2 (two) times a day before meals, Disp: 60 tablet, Rfl: 0    folic acid (FOLVITE) 1 mg tablet, Take 1 tablet (1 mg total) by mouth daily, Disp: 30 tablet, Rfl: 0    Multiple Vitamins-Minerals (MULTIVITAMIN WITH MINERALS) tablet, Take 1 tablet by mouth daily, Disp: , Rfl:     oxyCODONE (ROXICODONE) 5 mg immediate release tablet, 1 pill po Q4 Hrs prn, Disp: 30 tablet, Rfl: 0    pantoprazole (PROTONIX) 40 mg tablet, Take 1 tablet (40 mg total) by mouth daily, Disp: 90 tablet, Rfl: 1    pregabalin (LYRICA) 100 mg capsule, Take 1 capsule (100 mg total) by mouth 2 (two) times a day, Disp: 180 capsule, Rfl: 1    venlafaxine (EFFEXOR-XR) 75 mg 24 hr capsule, Take 1 capsule (75 mg total) by mouth daily with breakfast With 37 5 mg (Patient taking differently: Take 75 mg by mouth daily with breakfast ), Disp: 90 capsule, Rfl: 1    ascorbic acid (VITAMIN C) 500 MG tablet, Take 1 tablet (500 mg total) by mouth 2 (two) times a day (Patient not taking: Reported on 7/20/2021), Disp: 60 tablet, Rfl: 0    fluticasone (FLONASE) 50 mcg/act nasal spray, 1 spray into each nostril daily as needed for rhinitis (Patient not taking: Reported on 7/20/2021), Disp: 16 g, Rfl: 1    senna-docusate sodium (SENOKOT S) 8 6-50 mg per tablet, Take 1 tablet by mouth daily (Patient not taking: Reported on 7/20/2021), Disp: , Rfl:     Allergies   Allergen Reactions    Banana - Food Allergy GI Intolerance    Chocolate - Food Allergy GI Intolerance    Ciprofloxacin Hallucinations    Dramamine [Dimenhydrinate]     Nuts - Food Allergy GI Intolerance    Oat - Food Allergy GI Intolerance    Peach [Prunus Persica] Other (See Comments)     GI intolerance     Strawberry (Diagnostic) - Food Allergy GI Intolerance    Sweet Potato - Food Allergy GI Intolerance            Vitals:    07/22/21 1618   BP: 117/74   Pulse: 59       Objective:  Physical exam  · General: Awake, Alert, Oriented  · Eyes: Pupils equal, round and reactive to light  · Heart: regular rate and rhythm  · Lungs: No audible wheezing  · Abdomen: soft                    Ortho Exam  Left knee:  Incision clean dry and intact  Staples well approximated   No erythema and no ecchymosis  Appropriate swelling of lower limb  Appropriate warmth of knee  Extensor mechanism intact  Extension near full  Flexion 75-80  Calf compartments soft and supple  Sensation intact  Toes are warm sensate and mobile      Diagnostics, reviewed and taken today if performed as documented: The attending physician has personally reviewed the pertinent films in PACS and interpretation is as follows:  Left knee x-ray:  Well aligned prosthesis with no acute changes  Procedures, if performed today:    Procedures    None performed      Portions of the record may have been created with voice recognition software  Occasional wrong word or "sound a like" substitutions may have occurred due to the inherent limitations of voice recognition software  Read the chart carefully and recognize, using context, where substitutions have occurred

## 2021-07-23 NOTE — TELEPHONE ENCOUNTER
Patient contacted for a postoperative follow up assessment  Patient reports 2/10 pain when sitting and 5/10 pain when walking  With the RW  Patient confirmed next post-op PT appointment on Monday 7/26 at 2 PM      Patient is taking Oxycodone 5mg every 4 hours, ASA 81mg daily, Lovenox injections daily, Colace 100mg daily, increase to BID per after visit summary  Patient has not had a BM yet but is passing gas  Patient denies increase in swelling and reports that surgeon removed dressing during post-op appointment on 7/22  Patient denies nausea, vomiting, chest ain, shortness of breath, fever, dizziness and calf pain  Patient repots having abdominal pain related to not having a complete BM  NN advised patient to take Colace 100mg BID, as listed on after visit summary,  instead of once daily  Patient confirmed next post-op appointment with surgeon on 7/29 at 2:30 PM  Patient does not have any other questions or concerns

## 2021-07-26 ENCOUNTER — OFFICE VISIT (OUTPATIENT)
Dept: PHYSICAL THERAPY | Age: 66
End: 2021-07-26
Payer: COMMERCIAL

## 2021-07-26 DIAGNOSIS — M17.12 PRIMARY OSTEOARTHRITIS OF LEFT KNEE: ICD-10-CM

## 2021-07-26 DIAGNOSIS — M25.562 CHRONIC PAIN OF LEFT KNEE: ICD-10-CM

## 2021-07-26 DIAGNOSIS — G89.29 CHRONIC PAIN OF LEFT KNEE: ICD-10-CM

## 2021-07-26 DIAGNOSIS — Z96.652 STATUS POST TOTAL LEFT KNEE REPLACEMENT: Primary | ICD-10-CM

## 2021-07-26 PROCEDURE — 97116 GAIT TRAINING THERAPY: CPT | Performed by: SPECIALIST/TECHNOLOGIST

## 2021-07-26 PROCEDURE — 97110 THERAPEUTIC EXERCISES: CPT | Performed by: SPECIALIST/TECHNOLOGIST

## 2021-07-26 PROCEDURE — 97140 MANUAL THERAPY 1/> REGIONS: CPT | Performed by: SPECIALIST/TECHNOLOGIST

## 2021-07-26 NOTE — PROGRESS NOTES
Daily Note     Today's date: 2021  Patient name: Bess Albright  : 1955  MRN: [id number]  Referring provider: Kristal Herrera MD  Dx:   Encounter Diagnosis     ICD-10-CM    1  Status post total left knee replacement  Z96 652    2  Primary osteoarthritis of left knee  M17 12    3  Chronic pain of left knee  M25 562     G89 29                   Subjective: Pt reports compliance with HEP  Objective: See treatment diary below    Assessment: Gradual improvement in PROM flexion and extension and increased quad activation noted with SAQ and LAQ this visit  Tolerated treatment well  Patient demonstrated fatigue post treatment, exhibited good technique with therapeutic exercises and would benefit from continued PT    Plan: Continue per plan of care  Progress treatment as tolerated         Precautions: :L TKA 7/15, Fibromyalgia       Manuals            PROM SMITH KM, 80* flx today                                                  Neuro Re-Ed             FT EO 3x20s                                                                                          Ther Ex             Nustep 10' 15'           Heel slides x50 np           SAQ-isometrics 5" x20 2x15 AAROM -> ecc           Std hip abd/marching 3x10 ea 3x10 ea           LAQ  2x10           HR  2x10                                     Ther Activity                                       Gait Training             Ambulation  RW 200ft                        Modalities

## 2021-07-28 ENCOUNTER — OFFICE VISIT (OUTPATIENT)
Dept: PHYSICAL THERAPY | Age: 66
End: 2021-07-28
Payer: COMMERCIAL

## 2021-07-28 DIAGNOSIS — M17.12 PRIMARY OSTEOARTHRITIS OF LEFT KNEE: ICD-10-CM

## 2021-07-28 DIAGNOSIS — Z96.652 STATUS POST TOTAL LEFT KNEE REPLACEMENT: Primary | ICD-10-CM

## 2021-07-28 DIAGNOSIS — M25.562 CHRONIC PAIN OF LEFT KNEE: ICD-10-CM

## 2021-07-28 DIAGNOSIS — G89.29 CHRONIC PAIN OF LEFT KNEE: ICD-10-CM

## 2021-07-28 PROCEDURE — 97116 GAIT TRAINING THERAPY: CPT | Performed by: SPECIALIST/TECHNOLOGIST

## 2021-07-28 PROCEDURE — 97110 THERAPEUTIC EXERCISES: CPT | Performed by: SPECIALIST/TECHNOLOGIST

## 2021-07-28 NOTE — PROGRESS NOTES
Daily Note     Today's date: 2021  Patient name: Sarah Christianson  : 1955  MRN: [id number]  Referring provider: Conrad Franco MD  Dx:   Encounter Diagnosis     ICD-10-CM    1  Status post total left knee replacement  Z96 652    2  Primary osteoarthritis of left knee  M17 12    3  Chronic pain of left knee  M25 562     G89 29                   Subjective: Pt reports soreness and fatigue following previous treatment session  Objective: See treatment diary below    Assessment: Improved SLS time with ambulation, pt able to perform quad stretngthing independently this visit demonstrating improved quad activation  Tolerated treatment well  Patient demonstrated fatigue post treatment, exhibited good technique with therapeutic exercises and would benefit from continued PT    Plan: Continue per plan of care  Progress treatment as tolerated         Precautions: :L TKA 7/15, Fibromyalgia       Manuals           PROM SMITH KM, 80* flx today 86* seated w strap                                                 Neuro Re-Ed             FT EO 3x20s                                                                                          Ther Ex             Nustep 10' 15' 15'          Heel slides x50 np           SAQ-isometrics 5" x20 2x15 AAROM -> ecc 2x15          Std hip abd/marching 3x10 ea 3x10 ea 3x10 ea 2HHA          LAQ  2x10 30x          HR  2x10 2x10          SLR   15x w assist, 5x independent                       Ther Activity                                       Gait Training             Ambulation  RW 200ft RW 200ft                       Modalities

## 2021-07-29 ENCOUNTER — OFFICE VISIT (OUTPATIENT)
Dept: OBGYN CLINIC | Facility: HOSPITAL | Age: 66
End: 2021-07-29

## 2021-07-29 VITALS
WEIGHT: 150 LBS | DIASTOLIC BLOOD PRESSURE: 70 MMHG | HEIGHT: 63 IN | BODY MASS INDEX: 26.58 KG/M2 | SYSTOLIC BLOOD PRESSURE: 107 MMHG | HEART RATE: 57 BPM

## 2021-07-29 DIAGNOSIS — M17.12 PRIMARY OSTEOARTHRITIS OF LEFT KNEE: ICD-10-CM

## 2021-07-29 DIAGNOSIS — Z96.652 STATUS POST TOTAL KNEE REPLACEMENT, LEFT: Primary | ICD-10-CM

## 2021-07-29 PROCEDURE — 3008F BODY MASS INDEX DOCD: CPT | Performed by: INTERNAL MEDICINE

## 2021-07-29 PROCEDURE — 99024 POSTOP FOLLOW-UP VISIT: CPT | Performed by: ORTHOPAEDIC SURGERY

## 2021-07-29 NOTE — PROGRESS NOTES
Assessment:   Diagnosis ICD-10-CM Associated Orders   1  Status post total knee replacement, left  R51 [id number]        Plan:  72 y o  female s/p left TKA performed on 7/15/2021  Patient had her staples removed in office today, which she tolerated well  I discussed that she should continue to work with physical therapy to regain her motion  I will see her back in office in 4 weeks for a re-evaluation of the left knee  Staples removed   ABD pad and ace bandage applied     To do next visit:  Return in about 4 weeks (around 8/26/2021) for Recheck left knee   The above stated was discussed in layman's terms and the patient expressed understanding  All questions were answered to the patient's satisfaction  Scribe Attestation    I,:  Fe Whiteside am acting as a scribe while in the presence of the attending physician :       I,:  Michael Onofre MD personally performed the services described in this documentation    as scribed in my presence :             Subjective: Geetha Queen is a 72 y o  female who presents to the office today s/p left TKA performed on 7/15/2021  Patient stated that she had a fall yesterday while trying to use the cane but did not cause her any pain  Patient stated that she has been doing physical therapy, which she has began to flex the knee to 86 degrees  Patient stated that she ambulates with a walker, and utilizes a cane for the stairs  She stated that she takes oxycodone and tylenol for pain relief  Patient stated that bending the knee causes the most discomfort  Review of systems negative unless otherwise specified in HPI  Review of Systems   All other systems reviewed and are negative        Past Medical History:   Diagnosis Date    Arthralgia     Atypical chest pain     Balance disorder     Cervical rib     last assessed 9/13/12    Depression     Fatigue     Fibromyalgia     Fibromyalgia     GERD (gastroesophageal reflux disease)     Hydronephrosis with renal and ureteral calculus obstruction     last assessed 5/1/17    Lacunar stroke ([facility] [address] )     Leukopenia     last assessed 5/19/16    Memory loss     Restless leg syndrome     last assessed 11/8/13    Sebaceous cyst     last assessed 2/13/13    Skin tag     last assessed 2/13/13    Vitamin D deficiency        Past Surgical History:   Procedure Laterality Date    COLONOSCOPY  2011    fiberoptic    DENTAL SURGERY      KNEE SURGERY      right knee 2006 meniscal tear stage 04    AL COLONOSCOPY FLX DX W/COLLJ SPEC WHEN PFRMD N/A 3/16/2017    Procedure: COLONOSCOPY;  Surgeon: Kenji Curiel MD;  Location: [facility];   Service: Gastroenterology    AL CYSTO/URETERO W/LITHOTRIPSY &INDWELL STENT INSRT Left 4/26/2017    Procedure: CYSTOSCOPY URETEROSCOPY; RETROGRADE PYELOGRAM; STONE EXTRACTION; INSERTION STENT URETERAL;  Surgeon: Danni Harley MD;  Location:  MAIN OR;  Service: Urology    AL TOTAL KNEE ARTHROPLASTY Left 7/15/2021    Procedure: ARTHROPLASTY KNEE TOTAL - left;  Surgeon: Kelvin Black MD;  Location: [facility];  Service: Orthopedics       Family History   Problem Relation Age of Onset    Osteoporosis Mother     Heart failure Father     Coronary artery disease Father     Fibromyalgia Sister     Heart failure Family     Coronary artery disease Family     Osteoporosis Family     Alcohol abuse Neg Hx     Substance Abuse Neg Hx     Mental illness Neg Hx     Depression Neg Hx        Social History     Occupational History    Occupation:      Employer: [address] [place] [address] [employer]   Tobacco Use    Smoking status: Former Smoker    Smokeless tobacco: Never Used   Vaping Use    Vaping Use: Never used   Substance and Sexual Activity    Alcohol use: Not Currently    Drug use: No    Sexual activity: Not Currently         Current Outpatient Medications:     ascorbic acid (VITAMIN C) 500 MG tablet, Take 1 tablet (500 mg total) by mouth 2 (two) times a day (Patient not taking: Reported on 7/20/2021), Disp: 60 tablet, Rfl: 0    aspirin 81 mg chewable tablet, Chew 1 tablet (81 mg total) daily, Disp: 30 tablet, Rfl: 0    atorvastatin (LIPITOR) 40 mg tablet, Take 1 tablet (40 mg total) by mouth every evening, Disp: 90 tablet, Rfl: 1    carbamazepine (CARBATROL) 100 MG 12 hr capsule, TAKE 1 CAPSULE(100 MG TOTAL) BY MOUTH TWICE DAILY, Disp: 60 capsule, Rfl: 1    docusate sodium (COLACE) 100 mg capsule, Take 100 mg by mouth 2 (two) times a day, Disp: , Rfl:     enoxaparin (LOVENOX) 40 mg/0 4 mL, Inject 0 4 mL (40 mg total) under the skin daily in the early morning for 28 doses, Disp: 11 2 mL, Rfl: 0    ferrous sulfate 324 (65 Fe) mg, Take 1 tablet (324 mg total) by mouth 2 (two) times a day before meals, Disp: 60 tablet, Rfl: 0    fluticasone (FLONASE) 50 mcg/act nasal spray, 1 spray into each nostril daily as needed for rhinitis (Patient not taking: Reported on 7/20/2021), Disp: 16 g, Rfl: 1    folic acid (FOLVITE) 1 mg tablet, Take 1 tablet (1 mg total) by mouth daily, Disp: 30 tablet, Rfl: 0    Multiple Vitamins-Minerals (MULTIVITAMIN WITH MINERALS) tablet, Take 1 tablet by mouth daily, Disp: , Rfl:     oxyCODONE (ROXICODONE) 5 mg immediate release tablet, 1 pill po Q4 Hrs prn, Disp: 30 tablet, Rfl: 0    pantoprazole (PROTONIX) 40 mg tablet, Take 1 tablet (40 mg total) by mouth daily, Disp: 90 tablet, Rfl: 1    pregabalin (LYRICA) 100 mg capsule, Take 1 capsule (100 mg total) by mouth 2 (two) times a day, Disp: 180 capsule, Rfl: 1    senna-docusate sodium (SENOKOT S) 8 6-50 mg per tablet, Take 1 tablet by mouth daily (Patient not taking: Reported on 7/20/2021), Disp: , Rfl:     venlafaxine (EFFEXOR-XR) 75 mg 24 hr capsule, Take 1 capsule (75 mg total) by mouth daily with breakfast With 37 5 mg (Patient taking differently: Take 75 mg by mouth daily with breakfast ), Disp: 90 capsule, Rfl: 1    Allergies   Allergen Reactions    Banana - Food Allergy GI Intolerance    Chocolate - Food Allergy GI Intolerance    Ciprofloxacin Hallucinations    Dramamine [Dimenhydrinate]     Nuts - Food Allergy GI Intolerance    Oat - Food Allergy GI Intolerance    Peach [Prunus Persica] Other (See Comments)     GI intolerance     Strawberry (Diagnostic) - Food Allergy GI Intolerance    Sweet Potato - Food Allergy GI Intolerance            Vitals:    07/29/21 1454   BP: 107/70   Pulse: 57       Objective:                    Right Knee Exam   Right knee exam is normal       Left Knee Exam     Tenderness   Left knee tenderness location: over incision     Range of Motion   Extension: 0   Left knee flexion: 85      Other   Erythema: absent  Scars: present  Sensation: normal  Pulse: present  Swelling: none  Effusion: no effusion present    Comments:  Incision well healed, dry, intact   Able to perform SLR   Compartments soft   Toes pink and mobile   Sensation intact             Diagnostics, reviewed and taken today if performed as documented:    None performed      The attending physician has personally reviewed the pertinent films in PACS and interpretation is as follows:      Procedures, if performed today:    None performed      Portions of the record may have been created with voice recognition software  Occasional wrong word or "sound a like" substitutions may have occurred due to the inherent limitations of voice recognition software  Read the chart carefully and recognize, using context, where substitutions have occurred

## 2021-08-02 ENCOUNTER — OFFICE VISIT (OUTPATIENT)
Dept: PHYSICAL THERAPY | Age: 66
End: 2021-08-02
Payer: COMMERCIAL

## 2021-08-02 DIAGNOSIS — M25.562 CHRONIC PAIN OF LEFT KNEE: ICD-10-CM

## 2021-08-02 DIAGNOSIS — Z96.652 STATUS POST TOTAL LEFT KNEE REPLACEMENT: Primary | ICD-10-CM

## 2021-08-02 DIAGNOSIS — M17.12 PRIMARY OSTEOARTHRITIS OF LEFT KNEE: ICD-10-CM

## 2021-08-02 DIAGNOSIS — G89.29 CHRONIC PAIN OF LEFT KNEE: ICD-10-CM

## 2021-08-02 PROCEDURE — 97116 GAIT TRAINING THERAPY: CPT | Performed by: SPECIALIST/TECHNOLOGIST

## 2021-08-02 PROCEDURE — 97110 THERAPEUTIC EXERCISES: CPT | Performed by: SPECIALIST/TECHNOLOGIST

## 2021-08-02 PROCEDURE — 97140 MANUAL THERAPY 1/> REGIONS: CPT | Performed by: SPECIALIST/TECHNOLOGIST

## 2021-08-02 NOTE — PROGRESS NOTES
Daily Note     Today's date: 2021  Patient name: Osmin Gaspar  : 1955  MRN: [id number]  Referring provider: Rizwan Dai MD  Dx:   Encounter Diagnosis     ICD-10-CM    1  Status post total left knee replacement  Z96 652    2  Primary osteoarthritis of left knee  M17 12    3  Chronic pain of left knee  M25 562     G89 29                   Subjective: Pt reports she's been walking more with RW    Objective: See treatment diary below    Assessment: Pt demonstrates good safety with a SPC- advised to start using for household distances  Pt would benefit form continued manual interventions to improve ROM into flexion and extension  Tolerated treatment well  Patient demonstrated fatigue post treatment, exhibited good technique with therapeutic exercises and would benefit from continued PT    Plan: Continue per plan of care  Progress treatment as tolerated         Precautions: :L TKA 7/15, Fibromyalgia       Manuals          PROM SMITH KM, 84* flx today 86* seated w strap KM 20'                                                Neuro Re-Ed             FT EO 3x20s                                                                                          Ther Ex             Nustep 10' 15' 15' 15' L3         Heel slides x50 np           SAQ-isometrics 5" x20 2x15 AAROM -> ecc 2x15          Std hip abd/marching 3x10 ea 3x10 ea 3x10 ea 2HHA          LAQ  2x10 30x          HR  2x10 2x10          SLR   15x w assist, 5x independent          Step Ups    20x          Leg Press SL    30# 30x Seat-2         Ther Activity                                       Gait Training             Ambulation  RW 200ft RW 200ft 150ft SPC                      Modalities

## 2021-08-05 ENCOUNTER — OFFICE VISIT (OUTPATIENT)
Dept: PHYSICAL THERAPY | Age: 66
End: 2021-08-05
Payer: COMMERCIAL

## 2021-08-05 DIAGNOSIS — Z96.652 STATUS POST TOTAL LEFT KNEE REPLACEMENT: Primary | ICD-10-CM

## 2021-08-05 PROCEDURE — 97140 MANUAL THERAPY 1/> REGIONS: CPT | Performed by: PHYSICAL THERAPIST

## 2021-08-05 PROCEDURE — 97110 THERAPEUTIC EXERCISES: CPT | Performed by: PHYSICAL THERAPIST

## 2021-08-05 NOTE — PROGRESS NOTES
Daily Note     Today's date: 2021  Patient name: mAinta Saunders  : 1955  MRN: [id number]  Referring provider: Carlos Richey MD  Dx:   Encounter Diagnosis     ICD-10-CM    1  Status post total left knee replacement  Z96 652                   Subjective: Pt reports continued discomfort/stiffness with ADLs      Objective: See treatment diary below      Assessment: Tolerated treatment well  Patient demonstrated fatigue post treatment, would benefit from continued PT and pt remains most limited in knee flexion due to pain, is progessing with LE strengthening      Plan: Continue per plan of care  Progress treatment as tolerated         Precautions: :L TKA 7/15, Fibromyalgia       Manuals         PROM SMITH KM, 84* flx today 86* seated w strap KM 20' SMITH                                               Neuro Re-Ed             FT EO 3x20s                                                                                          Ther Ex             Nustep 10' 15' 15' 15' L3 15' L3        Heel slides x50 np           SAQ-isometrics 5" x20 2x15 AAROM -> ecc 2x15  2# 4x10        Std hip abd/marching 3x10 ea 3x10 ea 3x10 ea 2HHA  3x10 2#        LAQ  2x10 30x          HR  2x10 2x10          SLR   15x w assist, 5x independent          Step Ups    20x          Leg Press SL    30# 30x Seat-2         Ther Activity                                       Gait Training             Ambulation  RW 200ft RW 200ft 150ft SPC                      Modalities

## 2021-08-09 ENCOUNTER — OFFICE VISIT (OUTPATIENT)
Dept: PHYSICAL THERAPY | Age: 66
End: 2021-08-09
Payer: COMMERCIAL

## 2021-08-09 ENCOUNTER — TELEPHONE (OUTPATIENT)
Dept: FAMILY MEDICINE CLINIC | Facility: CLINIC | Age: 66
End: 2021-08-09

## 2021-08-09 DIAGNOSIS — M17.12 PRIMARY OSTEOARTHRITIS OF LEFT KNEE: ICD-10-CM

## 2021-08-09 DIAGNOSIS — Z96.652 STATUS POST TOTAL LEFT KNEE REPLACEMENT: Primary | ICD-10-CM

## 2021-08-09 DIAGNOSIS — G89.29 CHRONIC PAIN OF LEFT KNEE: ICD-10-CM

## 2021-08-09 DIAGNOSIS — M25.562 CHRONIC PAIN OF LEFT KNEE: ICD-10-CM

## 2021-08-09 PROCEDURE — 97110 THERAPEUTIC EXERCISES: CPT

## 2021-08-09 PROCEDURE — 97140 MANUAL THERAPY 1/> REGIONS: CPT

## 2021-08-09 NOTE — TELEPHONE ENCOUNTER
[facility] patients CPT 04116 (vitamin D), 03813 (Vitamin B12) denied due to dx codes M25 562 (Chronic pain of left knee), G89 29 (Chronic pain of left knee), Z01 812 (Encounter for pre-operative laboratory testing), M17 12 (Primary osteoarthritis of left knee)  inconsistent with px  DOS 6/29/21  Will route to provider as both labs were WNL for any additional dx codes to use

## 2021-08-09 NOTE — PROGRESS NOTES
Daily Note     Today's date: 2021  Patient name: Arlette Gallego  : 1955  MRN: [id number]  Referring provider: Socorro Wick MD  Dx:   Encounter Diagnosis     ICD-10-CM    1  Status post total left knee replacement  Z96 652    2  Primary osteoarthritis of left knee  M17 12    3  Chronic pain of left knee  M25 562     G89 29                   Subjective: Patient reports "It hurts, it feels really stiff today "      Objective: See treatment diary below      Assessment: Tolerated treatment fair  Had sensation of knee buckling /p nustep, able to cont amb with standing rest  Patient cont to have discomfort with knee flexion  Unable to use nustep with full ROM d/t decreased pain tolerance  Quad lag noted with SAQ/LAQ  Patient relys on use of UE for step ups d/t lack of knee flexion  Patient would benefit from continued PT      Plan: Continue per plan of care        Precautions: :L TKA 7/15, Fibromyalgia       Manuals        PROM SMITH KM, 84* flx today 86* seated w strap KM 20' SMITH ALANA                                              Neuro Re-Ed             FT EO 3x20s                                                                                          Ther Ex             Nustep 10' 15' 15' 15' L3 15' L3 15' L3       Heel slides x50 np    x20       SAQ-isometrics 5" x20 2x15 AAROM -> ecc 2x15  2# 4x10 2# 3" 4x10       Std hip abd/marching 3x10 ea 3x10 ea 3x10 ea 2HHA  3x10 2#        LAQ  2x10 30x  30x 30x       HR  2x10 2x10  2x10 2x10       SLR   15x w assist, 5x independent   2x10       Step Ups    20x   20x       Leg Press SL    30# 30x Seat-2         Ther Activity                                       Gait Training             Ambulation  RW 200ft RW 200ft 150ft SPC  50 ft SPC                    Modalities

## 2021-08-12 ENCOUNTER — OFFICE VISIT (OUTPATIENT)
Dept: PHYSICAL THERAPY | Age: 66
End: 2021-08-12
Payer: COMMERCIAL

## 2021-08-12 DIAGNOSIS — G89.29 CHRONIC PAIN OF LEFT KNEE: ICD-10-CM

## 2021-08-12 DIAGNOSIS — M25.562 CHRONIC PAIN OF LEFT KNEE: ICD-10-CM

## 2021-08-12 DIAGNOSIS — M17.12 PRIMARY OSTEOARTHRITIS OF LEFT KNEE: ICD-10-CM

## 2021-08-12 DIAGNOSIS — Z96.652 STATUS POST TOTAL LEFT KNEE REPLACEMENT: Primary | ICD-10-CM

## 2021-08-12 PROCEDURE — 97110 THERAPEUTIC EXERCISES: CPT

## 2021-08-12 PROCEDURE — 97140 MANUAL THERAPY 1/> REGIONS: CPT

## 2021-08-12 NOTE — PROGRESS NOTES
Daily Note     Today's date: 2021  Patient name: Avelino Guido  : 1955  MRN: [id number]  Referring provider: Anitra Mcallister MD  Dx:   Encounter Diagnosis     ICD-10-CM    1  Status post total left knee replacement  Z96 652    2  Primary osteoarthritis of left knee  M17 12    3  Chronic pain of left knee  M25 562     G89 29                   Subjective: Pt reports that she is feeling stiff today and notes that she is having pain today  Objective: See treatment diary below      Assessment: Tolerated treatment well  Patient given HEP  Pt with tightness in L knee flexion with limited ROM and painful end ranges  Pain is limiting ROM  Pt is given verbal cues for using a heel toe gait with amb  Pt amb with a SPC  Pt with weakness in L LE  Pt would benefit from continued therapy for strengthening for functional mobility  Plan: Continue per plan of care        Precautions: :L TKA 7/15, Fibromyalgia       Manuals    8      PROM SMITH KM, 80* flx today 86* seated w strap KM 20' SMITH ALANA TK                                             Neuro Re-Ed             FT EO 3x20s                                                                                          Ther Ex             Nustep 10' 15' 15' 15' L3 15' L3 15' L3 15' L3      Heel slides x50 np    x20 5"x30 on PB      SAQ-isometrics 5" x20 2x15 AAROM -> ecc 2x15  2# 4x10 2# 3" 4x10 2# 3" 3x10      Std hip abd/marching 3x10 ea 3x10 ea 3x10 ea 2HHA  3x10 2#  3x10      LAQ  2x10 30x  30x 30x 30x      HR  2x10 2x10  2x10 2x10       SLR   15x w assist, 5x independent   2x10 3x10      Step Ups    20x   20x       Leg Press SL    30# 30x Seat-2         Ther Activity                                       Gait Training             Ambulation  RW 200ft RW 200ft 150ft SPC  50 ft SPC                    Modalities

## 2021-08-16 ENCOUNTER — OFFICE VISIT (OUTPATIENT)
Dept: PHYSICAL THERAPY | Age: 66
End: 2021-08-16
Payer: COMMERCIAL

## 2021-08-16 DIAGNOSIS — M25.562 CHRONIC PAIN OF LEFT KNEE: ICD-10-CM

## 2021-08-16 DIAGNOSIS — Z96.652 STATUS POST TOTAL LEFT KNEE REPLACEMENT: Primary | ICD-10-CM

## 2021-08-16 DIAGNOSIS — G89.29 CHRONIC PAIN OF LEFT KNEE: ICD-10-CM

## 2021-08-16 DIAGNOSIS — M17.12 PRIMARY OSTEOARTHRITIS OF LEFT KNEE: ICD-10-CM

## 2021-08-16 PROCEDURE — 97116 GAIT TRAINING THERAPY: CPT | Performed by: SPECIALIST/TECHNOLOGIST

## 2021-08-16 PROCEDURE — 97110 THERAPEUTIC EXERCISES: CPT | Performed by: SPECIALIST/TECHNOLOGIST

## 2021-08-16 NOTE — PROGRESS NOTES
Daily Note     Today's date: 2021  Patient name: Myron Gallo  : 1955  MRN: [id number]  Referring provider: Terry Velazquez MD  Dx:   Encounter Diagnosis     ICD-10-CM    1  Status post total left knee replacement  Z96 652    2  Primary osteoarthritis of left knee  M17 12    3  Chronic pain of left knee  M25 562     G89 29                   Subjective: Pt reports she's decreased use of prescriptions pain meds  Objective: See treatment diary below    Assessment: Pt's ROM progress has been stagnant due to fear/pain avoidance, unable to push pt past ~86* knee flexion- pt requests clinician to stop due to pain  Tx focus on functional strengthening in [facility]- pt instructed to increase walking distance at home to improve mobility and endurance as 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  Tolerated treatment fair  Patient would benefit from continued PT to improve ROM, strength and ambulatory function  Plan: Continue per plan of care  Progress treatment as tolerated         Precautions: :L TKA 7/15, Fibromyalgia     Manuals      PROM SMITH KM, 84* flx today 86* seated w strap KM 20' SMITH ALANA TK Seated w Strap 5x30s 80*                                            Neuro Re-Ed             FT EO 3x20s                                                                                          Ther Ex             Nustep 10' 15' 15' 15' L3 15' L3 15' L3 15' L3 15'      Heel slides x50 np    x20 5"x30 on PB Step Taps 14" 20x     SAQ-isometrics 5" x20 2x15 AAROM -> ecc 2x15  2# 4x10 2# 3" 4x10 2# 3" 3x10      Std hip abd/marching 3x10 ea 3x10 ea 3x10 ea 2HHA  3x10 2#  3x10      LAQ  2x10 30x  30x 30x 30x      HR  2x10 2x10  2x10 2x10       SLR   15x w assist, 5x independent   2x10 3x10      Step Ups    20x   20x  8" 30x     Leg Press SL    30# 30x Seat-2    30x Seat 2 30#     Sled Push         4x 50ft 10# Ther Activity                                       Gait Training             Ambulation  RW 200ft RW 200ft 150ft SPC  50 ft SPC  10 min SPC                  Modalities

## 2021-08-19 ENCOUNTER — TELEPHONE (OUTPATIENT)
Dept: OBGYN CLINIC | Facility: HOSPITAL | Age: 66
End: 2021-08-19

## 2021-08-19 ENCOUNTER — OFFICE VISIT (OUTPATIENT)
Dept: PHYSICAL THERAPY | Age: 66
End: 2021-08-19
Payer: COMMERCIAL

## 2021-08-19 DIAGNOSIS — M25.562 CHRONIC PAIN OF LEFT KNEE: ICD-10-CM

## 2021-08-19 DIAGNOSIS — M17.12 PRIMARY OSTEOARTHRITIS OF LEFT KNEE: ICD-10-CM

## 2021-08-19 DIAGNOSIS — Z96.652 STATUS POST TOTAL LEFT KNEE REPLACEMENT: Primary | ICD-10-CM

## 2021-08-19 DIAGNOSIS — G89.29 CHRONIC PAIN OF LEFT KNEE: ICD-10-CM

## 2021-08-19 PROCEDURE — 97140 MANUAL THERAPY 1/> REGIONS: CPT

## 2021-08-19 PROCEDURE — 97110 THERAPEUTIC EXERCISES: CPT

## 2021-08-19 NOTE — TELEPHONE ENCOUNTER
Pt contacted  She reports the oxycodone made her "loopy" and would like to try tramadol at this time for the worsening pain  She is aware we will prescribe per surgeons team message, and pharmacy should make her aware when it is ready

## 2021-08-19 NOTE — TELEPHONE ENCOUNTER
Message reviewed     She would have left the hospital with oxycodone,    she may have oxycodone refilled     Alternative medication,    she may have tramadol filled     Please let me know her choice     And the appointment of the 24th it is appropriate      INES

## 2021-08-19 NOTE — TELEPHONE ENCOUNTER
Dr Loman Lombard     Patient had sx 7/15  Knee replacement      Patient takes Aleve and Tylenol, said that she over walked at the grocery store and has no relief for her pain/losing sleep  Her ROM is at 87 degrees  Patient is asking if she can try a different pain med? Should she attend PT today?      # [phone]

## 2021-08-19 NOTE — TELEPHONE ENCOUNTER
I spoke to patient  She reports feeling "set back" and "stuck" at 87 degrees ROM, she reports PT continues to push "no pain no gain" and mentioned potential manipulation  At this time here and I discussed since recent pain from walking at grocery store, to continue PT and exercises, but to not strain push herself when uncomfortable since recent pain from walking  We discussed resting and icing  Pt has appt on 8/24, and I advised her the surgeon would evaluate her progress, and ROM  She states since increased pain with PT, tylenol and Aleve are not enough, and she is requesting something addtl for pain  She is aware I will send to surgeon and be in touch

## 2021-08-19 NOTE — PROGRESS NOTES
Daily Note     Today's date: 2021  Patient name: Ronal Chung  : 1955  MRN: [id number]  Referring provider: Katherine Tracey MD  Dx:   Encounter Diagnosis     ICD-10-CM    1  Status post total left knee replacement  Z96 652    2  Primary osteoarthritis of left knee  M17 12    3  Chronic pain of left knee  M25 562     G89 29        Start Time: 1358          Subjective: Pt reports having pain today in L knee  Pt notes that she saw dr and reports that she over did it on Tuesday due to increased activity of walking in grocery store and is to "take it easy at PT today" per dr orders per pt  Pt reports that she has stomach cramps today  Objective: See treatment diary below      Assessment: Tolerated treatment well  Patient with tight and painful end ranges  Pt does well with there ex  Pt is able to increase wt with there ex today despite c/o stomach issues  Pt with decreased edema in L knee today  Pt with weakness in L LE/quads  Pt would benefit from continued therapy for strengthening for functional mobility  Plan: Continue per plan of care        Precautions: :L TKA 7/15, Fibromyalgia     Manuals /    PROM SMITH KM, 80* flx today 86* seated w strap KM 20' SMITH ALANA TK Seated w Strap 5x30s 80*                                            Neuro Re-Ed             FT EO 3x20s                                                                                          Ther Ex             Nustep 10' 15' 15' 15' L3 15' L3 15' L3 15' L3 15'  15'    Heel slides x50 np    x20 5"x30 on PB Step Taps 14" 20x 5" x30    SAQ-isometrics 5" x20 2x15 AAROM -> ecc 2x15  2# 4x10 2# 3" 4x10 2# 3" 3x10  2# 3x10    Std hip abd/marching 3x10 ea 3x10 ea 3x10 ea 2HHA  3x10 2#  3x10  2# 3x10    LAQ  2x10 30x  30x 30x 30x  2# 3x10    HR  2x10 2x10  2x10 2x10       SLR   15x w assist, 5x independent   2x10 3x10      Step Ups    20x   20x  8" 30x 8" 20x    Leg Press SL    30# 30x Seat-2 30x Seat 2 30#     Sled Push         4x 50ft 10#                                                         Ther Activity                                       Gait Training             Ambulation  RW 200ft RW 200ft 150ft SPC  50 ft SPC  10 min SPC                  Modalities

## 2021-08-23 ENCOUNTER — TELEPHONE (OUTPATIENT)
Dept: NEUROLOGY | Facility: CLINIC | Age: 66
End: 2021-08-23

## 2021-08-23 RX ORDER — POLYETHYLENE GLYCOL 3350 17 G/17G
17 POWDER, FOR SOLUTION ORAL DAILY
COMMUNITY

## 2021-08-23 NOTE — TELEPHONE ENCOUNTER
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

## 2021-08-23 NOTE — TELEPHONE ENCOUNTER
Confirmed with patient that there have been no further events  Forms completed and given to Shereen for you to review and sign

## 2021-08-23 NOTE — TELEPHONE ENCOUNTER
We can complete the forms if there have been no events of altered awareness or other transient neurological symptoms that would interfere with safe driving since her 6/2245 visit

## 2021-08-24 ENCOUNTER — OFFICE VISIT (OUTPATIENT)
Dept: OBGYN CLINIC | Facility: HOSPITAL | Age: 66
End: 2021-08-24

## 2021-08-24 ENCOUNTER — TELEPHONE (OUTPATIENT)
Dept: OBGYN CLINIC | Facility: HOSPITAL | Age: 66
End: 2021-08-24

## 2021-08-24 VITALS
HEIGHT: 63 IN | SYSTOLIC BLOOD PRESSURE: 135 MMHG | HEART RATE: 70 BPM | DIASTOLIC BLOOD PRESSURE: 87 MMHG | WEIGHT: 148.8 LBS | BODY MASS INDEX: 26.36 KG/M2

## 2021-08-24 DIAGNOSIS — Z47.1 AFTERCARE FOLLOWING LEFT KNEE JOINT REPLACEMENT SURGERY: ICD-10-CM

## 2021-08-24 DIAGNOSIS — Z96.652 AFTERCARE FOLLOWING LEFT KNEE JOINT REPLACEMENT SURGERY: ICD-10-CM

## 2021-08-24 DIAGNOSIS — T84.82XA ARTHROFIBROSIS OF TOTAL KNEE REPLACEMENT, INITIAL ENCOUNTER (HCC): Primary | ICD-10-CM

## 2021-08-24 PROCEDURE — 99024 POSTOP FOLLOW-UP VISIT: CPT | Performed by: ORTHOPAEDIC SURGERY

## 2021-08-24 PROCEDURE — 3008F BODY MASS INDEX DOCD: CPT | Performed by: INTERNAL MEDICINE

## 2021-08-24 RX ORDER — CEPHALEXIN 500 MG/1
CAPSULE ORAL
Qty: 20 CAPSULE | Refills: 1 | Status: SHIPPED | OUTPATIENT
Start: 2021-08-24 | End: 2021-11-29 | Stop reason: ALTCHOICE

## 2021-08-24 RX ORDER — SODIUM CHLORIDE, SODIUM LACTATE, POTASSIUM CHLORIDE, CALCIUM CHLORIDE 600; 310; 30; 20 MG/100ML; MG/100ML; MG/100ML; MG/100ML
125 INJECTION, SOLUTION INTRAVENOUS CONTINUOUS
Status: CANCELLED | OUTPATIENT
Start: 2021-08-24

## 2021-08-24 NOTE — PROGRESS NOTES
Assessment:   Diagnosis ICD-10-CM Associated Orders   1  Arthrofibrosis of left total knee replacement, initial encounter [facility])  T84 82XA Ambulatory referral to Physical Therapy   2  Aftercare following left knee joint replacement surgery  Z47 1 Ambulatory referral to Physical Therapy    Z96 652 cephalexin (KEFLEX) 500 mg capsule    7/15/2021       Plan:  6 weeks s/p left total knee arthroplasty with arthrofibrosis  Recommendation is made to undergo a manipulation under anesthesia to break-up her adhesions  Hold formal therapy until post-manipulation  Weight bearing and activities as tolerated  To do next visit:  Return for post-op, no x-rays  The above stated was discussed in layman's terms and the patient expressed understanding  All questions were answered to the patient's satisfaction  Scribe Attestation    I,:  Matt Emerson am acting as a scribe while in the presence of the attending physician :       I,:  Mariela García MD personally performed the services described in this documentation    as scribed in my presence :             Subjective: Nory Rueda is a 72 y o  female who presents repeat evaluation 6 weeks s/p left total knee arthroplasty with pain and stiffness  She has been attending therapy and finds that her flexion is unchanged from her last office visit at the 2 week post-op anastasiya         Review of systems negative unless otherwise specified in HPI  Review of Systems    Past Medical History:   Diagnosis Date    Arthralgia     Atypical chest pain     Balance disorder     Cervical rib     last assessed 9/13/12    Depression     Fatigue     Fibromyalgia     Fibromyalgia     GERD (gastroesophageal reflux disease)     Hydronephrosis with renal and ureteral calculus obstruction     last assessed 5/1/17    Lacunar stroke ([facility] [address] )     Leukopenia     last assessed 5/19/16    Memory loss     Restless leg syndrome     last assessed 11/8/13    Sebaceous cyst     last assessed 2/13/13    Skin tag     last assessed 2/13/13    Vitamin D deficiency        Past Surgical History:   Procedure Laterality Date    COLONOSCOPY  2011    fiberoptic    DENTAL SURGERY      KNEE SURGERY      right knee 2006 meniscal tear stage 04    MT COLONOSCOPY FLX DX W/COLLJ SPEC WHEN PFRMD N/A 3/16/2017    Procedure: COLONOSCOPY;  Surgeon: Estelita Awad MD;  Location: [facility] GI LAB;   Service: Gastroenterology    MT CYSTO/URETERO W/LITHOTRIPSY &INDWELL STENT INSRT Left 4/26/2017    Procedure: CYSTOSCOPY URETEROSCOPY; RETROGRADE PYELOGRAM; STONE EXTRACTION; INSERTION STENT URETERAL;  Surgeon: Janice Pierre MD;  Location:  MAIN OR;  Service: Urology    MT TOTAL KNEE ARTHROPLASTY Left 7/15/2021    Procedure: ARTHROPLASTY KNEE TOTAL - left;  Surgeon: Orlando Wyman MD;  Location: [facility];  Service: Orthopedics       Family History   Problem Relation Age of Onset    Osteoporosis Mother     Heart failure Father     Coronary artery disease Father     Fibromyalgia Sister     Heart failure Family     Coronary artery disease Family     Osteoporosis Family     Alcohol abuse Neg Hx     Substance Abuse Neg Hx     Mental illness Neg Hx     Depression Neg Hx        Social History     Occupational History    Occupation:      Employer: [employer]   Tobacco Use    Smoking status: Former Smoker    Smokeless tobacco: Never Used   Vaping Use    Vaping Use: Never used   Substance and Sexual Activity    Alcohol use: Not Currently    Drug use: No    Sexual activity: Not Currently         Current Outpatient Medications:     aspirin 81 mg chewable tablet, Chew 1 tablet (81 mg total) daily, Disp: 30 tablet, Rfl: 0    atorvastatin (LIPITOR) 40 mg tablet, Take 1 tablet (40 mg total) by mouth every evening, Disp: 90 tablet, Rfl: 1    carbamazepine (CARBATROL) 100 MG 12 hr capsule, TAKE 1 CAPSULE(100 MG TOTAL) BY MOUTH TWICE DAILY, Disp: 60 capsule, Rfl: 1    docusate sodium (COLACE) 100 mg capsule, Take 100 mg by mouth 2 (two) times a day, Disp: , Rfl:     fluticasone (FLONASE) 50 mcg/act nasal spray, 1 spray into each nostril daily as needed for rhinitis (Patient not taking: Reported on 7/20/2021), Disp: 16 g, Rfl: 1    Multiple Vitamins-Minerals (MULTIVITAMIN WITH MINERALS) tablet, Take 1 tablet by mouth daily, Disp: , Rfl:     oxyCODONE (ROXICODONE) 5 mg immediate release tablet, 1 pill po Q4 Hrs prn, Disp: 30 tablet, Rfl: 0    pantoprazole (PROTONIX) 40 mg tablet, Take 1 tablet (40 mg total) by mouth daily, Disp: 90 tablet, Rfl: 1    polyethylene glycol (GLYCOLAX) 17 GM/SCOOP powder, Take 17 g by mouth daily, Disp: , Rfl:     pregabalin (LYRICA) 100 mg capsule, Take 1 capsule (100 mg total) by mouth 2 (two) times a day, Disp: 180 capsule, Rfl: 1    senna-docusate sodium (SENOKOT S) 8 6-50 mg per tablet, Take 1 tablet by mouth daily (Patient not taking: Reported on 7/20/2021), Disp: , Rfl:     traMADol (ULTRAM) 50 mg tablet, Take 1 tablet (50 mg total) by mouth every 6 (six) hours as needed for moderate pain for up to 30 doses, Disp: 30 tablet, Rfl: 0    venlafaxine (EFFEXOR-XR) 75 mg 24 hr capsule, Take 1 capsule (75 mg total) by mouth daily with breakfast With 37 5 mg (Patient taking differently: Take 75 mg by mouth daily with breakfast ), Disp: 90 capsule, Rfl: 1    Allergies   Allergen Reactions    Banana - Food Allergy GI Intolerance    Chocolate - Food Allergy GI Intolerance    Ciprofloxacin Hallucinations    Dramamine [Dimenhydrinate]     Nuts - Food Allergy GI Intolerance    Oat - Food Allergy GI Intolerance    Peach [Prunus Persica] Other (See Comments)     GI intolerance     Strawberry (Diagnostic) - Food Allergy GI Intolerance    Sweet Potato - Food Allergy GI Intolerance            Vitals:    08/24/21 1322   BP: 135/87   Pulse: 70       Objective:                    Left Knee Exam     Range of Motion   Extension: 0   Left knee flexion: 85  Other   Erythema: absent  Sensation: normal  Swelling: mild  Effusion: no effusion present    Comments:    Healed anterior incision, no signs of infection  Minimal warmth  Intact extensor mechanism   Calf and thigh are soft and non-tender, no signs of DVT            Diagnostics, reviewed and taken today if performed as documented:    None performed          Procedures, if performed today:    Procedures    None performed      Portions of the record may have been created with voice recognition software  Occasional wrong word or "sound a like" substitutions may have occurred due to the inherent limitations of voice recognition software  Read the chart carefully and recognize, using context, where substitutions have occurred

## 2021-08-24 NOTE — TELEPHONE ENCOUNTER
Two weeks of daily PT is recommended after manipulation,    then 3 times a week for 4-6 more additional weeks  Please refer to her nurse navigator regarding the remainder of the questions       Thank you,  INES

## 2021-08-24 NOTE — H&P (VIEW-ONLY)
Assessment:   Diagnosis ICD-10-CM Associated Orders   1  Arthrofibrosis of left total knee replacement, initial encounter [facility])  T84 82XA Ambulatory referral to Physical Therapy   2  Aftercare following left knee joint replacement surgery  Z47 1 Ambulatory referral to Physical Therapy    Z96 652 cephalexin (KEFLEX) 500 mg capsule    7/15/2021       Plan:  6 weeks s/p left total knee arthroplasty with arthrofibrosis  Recommendation is made to undergo a manipulation under anesthesia to break-up her adhesions  Hold formal therapy until post-manipulation  Weight bearing and activities as tolerated  To do next visit:  Return for post-op, no x-rays  The above stated was discussed in layman's terms and the patient expressed understanding  All questions were answered to the patient's satisfaction  Scribe Attestation    I,:  Jasvir Gonzalez am acting as a scribe while in the presence of the attending physician :       I,:  Manolo Moreno MD personally performed the services described in this documentation    as scribed in my presence :             Subjective: Cyril Motley is a 72 y o  female who presents repeat evaluation 6 weeks s/p left total knee arthroplasty with pain and stiffness  She has been attending therapy and finds that her flexion is unchanged from her last office visit at the 2 week post-op anastasiya         Review of systems negative unless otherwise specified in HPI  Review of Systems    Past Medical History:   Diagnosis Date    Arthralgia     Atypical chest pain     Balance disorder     Cervical rib     last assessed 9/13/12    Depression     Fatigue     Fibromyalgia     Fibromyalgia     GERD (gastroesophageal reflux disease)     Hydronephrosis with renal and ureteral calculus obstruction     last assessed 5/1/17    Lacunar stroke ([facility] [address] )     Leukopenia     last assessed 5/19/16    Memory loss     Restless leg syndrome     last assessed 11/8/13    Sebaceous cyst     last assessed 2/13/13    Skin tag     last assessed 2/13/13    Vitamin D deficiency        Past Surgical History:   Procedure Laterality Date    COLONOSCOPY  2011    fiberoptic    DENTAL SURGERY      KNEE SURGERY      right knee 2006 meniscal tear stage 04    IN COLONOSCOPY FLX DX W/COLLJ SPEC WHEN PFRMD N/A 3/16/2017    Procedure: COLONOSCOPY;  Surgeon: Dara Krishnamurthy MD;  Location: [facility] GI LAB;   Service: Gastroenterology    IN CYSTO/URETERO W/LITHOTRIPSY &INDWELL STENT INSRT Left 4/26/2017    Procedure: CYSTOSCOPY URETEROSCOPY; RETROGRADE PYELOGRAM; STONE EXTRACTION; INSERTION STENT URETERAL;  Surgeon: Walker Fay MD;  Location:  MAIN OR;  Service: Urology    IN TOTAL KNEE ARTHROPLASTY Left 7/15/2021    Procedure: ARTHROPLASTY KNEE TOTAL - left;  Surgeon: Maricel Viveros MD;  Location[facility];  Service: Orthopedics       Family History   Problem Relation Age of Onset    Osteoporosis Mother     Heart failure Father     Coronary artery disease Father     Fibromyalgia Sister     Heart failure Family     Coronary artery disease Family     Osteoporosis Family     Alcohol abuse Neg Hx     Substance Abuse Neg Hx     Mental illness Neg Hx     Depression Neg Hx        Social History     Occupational History    Occupation:      Employer: [employer]   Tobacco Use    Smoking status: Former Smoker    Smokeless tobacco: Never Used   Vaping Use    Vaping Use: Never used   Substance and Sexual Activity    Alcohol use: Not Currently    Drug use: No    Sexual activity: Not Currently         Current Outpatient Medications:     aspirin 81 mg chewable tablet, Chew 1 tablet (81 mg total) daily, Disp: 30 tablet, Rfl: 0    atorvastatin (LIPITOR) 40 mg tablet, Take 1 tablet (40 mg total) by mouth every evening, Disp: 90 tablet, Rfl: 1    carbamazepine (CARBATROL) 100 MG 12 hr capsule, TAKE 1 CAPSULE(100 MG TOTAL) BY MOUTH TWICE DAILY, Disp: 60 capsule, Rfl: 1    docusate sodium (COLACE) 100 mg capsule, Take 100 mg by mouth 2 (two) times a day, Disp: , Rfl:     fluticasone (FLONASE) 50 mcg/act nasal spray, 1 spray into each nostril daily as needed for rhinitis (Patient not taking: Reported on 7/20/2021), Disp: 16 g, Rfl: 1    Multiple Vitamins-Minerals (MULTIVITAMIN WITH MINERALS) tablet, Take 1 tablet by mouth daily, Disp: , Rfl:     oxyCODONE (ROXICODONE) 5 mg immediate release tablet, 1 pill po Q4 Hrs prn, Disp: 30 tablet, Rfl: 0    pantoprazole (PROTONIX) 40 mg tablet, Take 1 tablet (40 mg total) by mouth daily, Disp: 90 tablet, Rfl: 1    polyethylene glycol (GLYCOLAX) 17 GM/SCOOP powder, Take 17 g by mouth daily, Disp: , Rfl:     pregabalin (LYRICA) 100 mg capsule, Take 1 capsule (100 mg total) by mouth 2 (two) times a day, Disp: 180 capsule, Rfl: 1    senna-docusate sodium (SENOKOT S) 8 6-50 mg per tablet, Take 1 tablet by mouth daily (Patient not taking: Reported on 7/20/2021), Disp: , Rfl:     traMADol (ULTRAM) 50 mg tablet, Take 1 tablet (50 mg total) by mouth every 6 (six) hours as needed for moderate pain for up to 30 doses, Disp: 30 tablet, Rfl: 0    venlafaxine (EFFEXOR-XR) 75 mg 24 hr capsule, Take 1 capsule (75 mg total) by mouth daily with breakfast With 37 5 mg (Patient taking differently: Take 75 mg by mouth daily with breakfast ), Disp: 90 capsule, Rfl: 1    Allergies   Allergen Reactions    Banana - Food Allergy GI Intolerance    Chocolate - Food Allergy GI Intolerance    Ciprofloxacin Hallucinations    Dramamine [Dimenhydrinate]     Nuts - Food Allergy GI Intolerance    Oat - Food Allergy GI Intolerance    Peach [Prunus Persica] Other (See Comments)     GI intolerance     Strawberry (Diagnostic) - Food Allergy GI Intolerance    Sweet Potato - Food Allergy GI Intolerance            Vitals:    08/24/21 1322   BP: 135/87   Pulse: 70       Objective:                    Left Knee Exam     Range of Motion   Extension: 0   Left knee flexion: 85  Other   Erythema: absent  Sensation: normal  Swelling: mild  Effusion: no effusion present    Comments:    Healed anterior incision, no signs of infection  Minimal warmth  Intact extensor mechanism   Calf and thigh are soft and non-tender, no signs of DVT            Diagnostics, reviewed and taken today if performed as documented:    None performed          Procedures, if performed today:    Procedures    None performed      Portions of the record may have been created with voice recognition software  Occasional wrong word or "sound a like" substitutions may have occurred due to the inherent limitations of voice recognition software  Read the chart carefully and recognize, using context, where substitutions have occurred

## 2021-08-24 NOTE — TELEPHONE ENCOUNTER
Patient wants to know that after manipulation on 8/26/21, she'll have to start PT the day after that  How long will she have to have daily PT after the procedure? Does she need someone at the provider with her? She lives alone, so can she just take an Belt[place]i home? She's wondering if she can be alone or will be drugged for the procedure?     Ajay #[phone]

## 2021-08-25 ENCOUNTER — TELEPHONE (OUTPATIENT)
Dept: OBGYN CLINIC | Facility: HOSPITAL | Age: 66
End: 2021-08-25

## 2021-08-25 NOTE — TELEPHONE ENCOUNTER
Patient made aware the above information and verbalized understanding  Patient advised that she will need to have someone bring her and take her home and stay with her after procedure as she will be under sedation  Patient verbalized understanding

## 2021-08-25 NOTE — TELEPHONE ENCOUNTER
Patient sees Dana Zafar will be taking patient to & from her appt tomorrow with Dr Garcia    Any concerns please call patient at:  [facility] - [phone]

## 2021-08-26 ENCOUNTER — ANESTHESIA EVENT (OUTPATIENT)
Dept: PERIOP | Facility: HOSPITAL | Age: 66
End: 2021-08-26
Payer: COMMERCIAL

## 2021-08-26 RX ORDER — SENNOSIDES 8.6 MG
650 CAPSULE ORAL EVERY 8 HOURS PRN
COMMUNITY

## 2021-08-26 NOTE — PRE-PROCEDURE INSTRUCTIONS
Pre-Surgery Instructions:   Medication Instructions    acetaminophen (TYLENOL) 650 mg CR tablet Instructed patient per Anesthesia Guidelines   aspirin 81 mg chewable tablet Patient was instructed by Physician and understands   atorvastatin (LIPITOR) 40 mg tablet Instructed patient per Anesthesia Guidelines   carbamazepine (CARBATROL) 100 MG 12 hr capsule Instructed patient per Anesthesia Guidelines   docusate sodium (COLACE) 100 mg capsule Instructed patient per Anesthesia Guidelines  hold 8/27    Multiple Vitamins-Minerals (MULTIVITAMIN WITH MINERALS) tablet Instructed patient per Anesthesia Guidelines   pantoprazole (PROTONIX) 40 mg tablet Instructed patient per Anesthesia Guidelines   pregabalin (LYRICA) 100 mg capsule Instructed patient per Anesthesia Guidelines   traMADol (ULTRAM) 50 mg tablet Instructed patient per Anesthesia Guidelines   venlafaxine (EFFEXOR-XR) 75 mg 24 hr capsule Instructed patient per Anesthesia Guidelines  Pre procedure instructions reviewed verbalizes understanding  NPO after MN  Bathing reviewed  Morning meds with water  No NSAIDS   Stop supplements/vitamins

## 2021-08-27 ENCOUNTER — APPOINTMENT (OUTPATIENT)
Dept: PHYSICAL THERAPY | Facility: REHABILITATION | Age: 66
End: 2021-08-27
Payer: COMMERCIAL

## 2021-08-27 ENCOUNTER — HOSPITAL ENCOUNTER (OUTPATIENT)
Facility: HOSPITAL | Age: 66
Setting detail: OUTPATIENT SURGERY
Discharge: HOME | End: 2021-08-27
Attending: ORTHOPAEDIC SURGERY | Admitting: ORTHOPAEDIC SURGERY
Payer: COMMERCIAL

## 2021-08-27 ENCOUNTER — ANESTHESIA (OUTPATIENT)
Dept: PERIOP | Facility: HOSPITAL | Age: 66
End: 2021-08-27
Payer: COMMERCIAL

## 2021-08-27 VITALS
DIASTOLIC BLOOD PRESSURE: 62 MMHG | WEIGHT: 148 LBS | TEMPERATURE: 98.3 F | HEART RATE: 78 BPM | OXYGEN SATURATION: 99 % | BODY MASS INDEX: 26.22 KG/M2 | SYSTOLIC BLOOD PRESSURE: 103 MMHG | RESPIRATION RATE: 17 BRPM | HEIGHT: 63 IN

## 2021-08-27 DIAGNOSIS — Z51.89 AFTERCARE: Primary | ICD-10-CM

## 2021-08-27 PROCEDURE — 27570 FIXATION OF KNEE JOINT: CPT | Performed by: PHYSICIAN ASSISTANT

## 2021-08-27 PROCEDURE — 27570 FIXATION OF KNEE JOINT: CPT | Performed by: ORTHOPAEDIC SURGERY

## 2021-08-27 RX ORDER — SODIUM CHLORIDE, SODIUM LACTATE, POTASSIUM CHLORIDE, CALCIUM CHLORIDE 600; 310; 30; 20 MG/100ML; MG/100ML; MG/100ML; MG/100ML
125 INJECTION, SOLUTION INTRAVENOUS CONTINUOUS
Status: DISCONTINUED | OUTPATIENT
Start: 2021-08-27 | End: 2021-08-27 | Stop reason: HOSPADM

## 2021-08-27 RX ORDER — MEPERIDINE HYDROCHLORIDE 25 MG/ML
12.5 INJECTION INTRAMUSCULAR; INTRAVENOUS; SUBCUTANEOUS
Status: DISCONTINUED | OUTPATIENT
Start: 2021-08-27 | End: 2021-08-27 | Stop reason: HOSPADM

## 2021-08-27 RX ORDER — PROPOFOL 10 MG/ML
INJECTION, EMULSION INTRAVENOUS AS NEEDED
Status: DISCONTINUED | OUTPATIENT
Start: 2021-08-27 | End: 2021-08-27

## 2021-08-27 RX ORDER — OXYCODONE HYDROCHLORIDE 5 MG/1
TABLET ORAL
Qty: 30 TABLET | Refills: 0 | Status: SHIPPED | OUTPATIENT
Start: 2021-08-27 | End: 2021-11-29 | Stop reason: ALTCHOICE

## 2021-08-27 RX ORDER — ALBUTEROL SULFATE 2.5 MG/3ML
2.5 SOLUTION RESPIRATORY (INHALATION) ONCE AS NEEDED
Status: DISCONTINUED | OUTPATIENT
Start: 2021-08-27 | End: 2021-08-27 | Stop reason: HOSPADM

## 2021-08-27 RX ORDER — FENTANYL CITRATE 50 UG/ML
INJECTION, SOLUTION INTRAMUSCULAR; INTRAVENOUS AS NEEDED
Status: DISCONTINUED | OUTPATIENT
Start: 2021-08-27 | End: 2021-08-27

## 2021-08-27 RX ORDER — PROPOFOL 10 MG/ML
INJECTION, EMULSION INTRAVENOUS CONTINUOUS PRN
Status: DISCONTINUED | OUTPATIENT
Start: 2021-08-27 | End: 2021-08-27

## 2021-08-27 RX ORDER — PROMETHAZINE HYDROCHLORIDE 25 MG/ML
12.5 INJECTION, SOLUTION INTRAMUSCULAR; INTRAVENOUS ONCE AS NEEDED
Status: DISCONTINUED | OUTPATIENT
Start: 2021-08-27 | End: 2021-08-27 | Stop reason: HOSPADM

## 2021-08-27 RX ORDER — OXYCODONE HYDROCHLORIDE 5 MG/1
5 TABLET ORAL EVERY 4 HOURS PRN
Status: DISCONTINUED | OUTPATIENT
Start: 2021-08-27 | End: 2021-08-27 | Stop reason: HOSPADM

## 2021-08-27 RX ORDER — FENTANYL CITRATE/PF 50 MCG/ML
25 SYRINGE (ML) INJECTION
Status: DISCONTINUED | OUTPATIENT
Start: 2021-08-27 | End: 2021-08-27 | Stop reason: HOSPADM

## 2021-08-27 RX ORDER — ONDANSETRON 2 MG/ML
4 INJECTION INTRAMUSCULAR; INTRAVENOUS EVERY 6 HOURS PRN
Status: DISCONTINUED | OUTPATIENT
Start: 2021-08-27 | End: 2021-08-27 | Stop reason: HOSPADM

## 2021-08-27 RX ORDER — ONDANSETRON 2 MG/ML
INJECTION INTRAMUSCULAR; INTRAVENOUS AS NEEDED
Status: DISCONTINUED | OUTPATIENT
Start: 2021-08-27 | End: 2021-08-27

## 2021-08-27 RX ORDER — ONDANSETRON 2 MG/ML
4 INJECTION INTRAMUSCULAR; INTRAVENOUS ONCE AS NEEDED
Status: DISCONTINUED | OUTPATIENT
Start: 2021-08-27 | End: 2021-08-27 | Stop reason: HOSPADM

## 2021-08-27 RX ORDER — HYDROMORPHONE HCL/PF 1 MG/ML
0.5 SYRINGE (ML) INJECTION
Status: DISCONTINUED | OUTPATIENT
Start: 2021-08-27 | End: 2021-08-27 | Stop reason: HOSPADM

## 2021-08-27 RX ORDER — LIDOCAINE HYDROCHLORIDE 10 MG/ML
INJECTION, SOLUTION EPIDURAL; INFILTRATION; INTRACAUDAL; PERINEURAL AS NEEDED
Status: DISCONTINUED | OUTPATIENT
Start: 2021-08-27 | End: 2021-08-27

## 2021-08-27 RX ADMIN — SODIUM CHLORIDE, SODIUM LACTATE, POTASSIUM CHLORIDE, AND CALCIUM CHLORIDE: .6; .31; .03; .02 INJECTION, SOLUTION INTRAVENOUS at 07:19

## 2021-08-27 RX ADMIN — PROPOFOL 110 MCG/KG/MIN: 10 INJECTION, EMULSION INTRAVENOUS at 07:25

## 2021-08-27 RX ADMIN — LIDOCAINE HYDROCHLORIDE 50 MG: 10 INJECTION, SOLUTION EPIDURAL; INFILTRATION; INTRACAUDAL; PERINEURAL at 07:25

## 2021-08-27 RX ADMIN — FENTANYL CITRATE 50 MCG: 50 INJECTION INTRAMUSCULAR; INTRAVENOUS at 07:25

## 2021-08-27 RX ADMIN — PROPOFOL 50 MG: 10 INJECTION, EMULSION INTRAVENOUS at 07:25

## 2021-08-27 RX ADMIN — OXYCODONE HYDROCHLORIDE 5 MG: 5 TABLET ORAL at 08:14

## 2021-08-27 RX ADMIN — ONDANSETRON 4 MG: 2 INJECTION INTRAMUSCULAR; INTRAVENOUS at 07:26

## 2021-08-27 NOTE — ANESTHESIA PREPROCEDURE EVALUATION
Procedure:  MANIPULATION JOINT KNEE (Left Knee)    Relevant Problems   ANESTHESIA (within normal limits)      CARDIO   (+) Other hyperlipidemia      ENDO (within normal limits)      GI/HEPATIC   (+) GERD (gastroesophageal reflux disease)      /RENAL   (+) Hydronephrosis with ureteral calculus   (+) Nephrolithiasis      GYN (within normal limits)      HEMATOLOGY   (+) Anemia      MUSCULOSKELETAL   (+) Arthritis of knee   (+) Fibromyalgia   (+) Pes anserinus bursitis of right knee   (+) Primary osteoarthritis of both knees   (+) Primary osteoarthritis of left knee   (+) Primary osteoarthritis of right knee      NEURO/PSYCH   (+) Depression with anxiety   (+) Fibromyalgia   (+) Nonintractable headache   (+) Unspecified convulsions (HCC)      PULMONARY (within normal limits)        Physical Exam    Airway    Mallampati score: I  TM Distance: >3 FB  Neck ROM: full     Dental       Cardiovascular  Cardiovascular exam normal    Pulmonary  Pulmonary exam normal     Other Findings        Anesthesia Plan  ASA Score- 3     Anesthesia Type- IV sedation with anesthesia with ASA Monitors  Additional Monitors:   Airway Plan: ETT  Plan Factors-Exercise tolerance (METS): >4 METS  Chart reviewed  EKG reviewed  Imaging results reviewed  Existing labs reviewed  Patient summary reviewed  Patient is not a current smoker  Patient did not smoke on day of surgery  Obstructive sleep apnea risk education given perioperatively  Induction- intravenous  Postoperative Plan- Plan for postoperative opioid use  Planned trial extubation    Informed Consent- Anesthetic plan and risks discussed with patient  I personally reviewed this patient with the CRNA  Discussed and agreed on the Anesthesia Plan with the CRNA  Rosaline Nelson

## 2021-08-27 NOTE — INTERVAL H&P NOTE
H&P reviewed  After examining the patient I find no changes in the patients condition since the H&P had been written  There were no vitals filed for this visit    Vital signs were taken in the ambulatory suite this AM and recirded elsewhere  Head:  Present  CVS:  RRR  Lungs: clear b/l  Abdomen:  Present  A:  arthrofibrosis s/p left TKA not improving with physical therapy  P:  To OR for manipulation of left TKA under anesthesia

## 2021-08-27 NOTE — OP NOTE
OPERATIVE REPORT  PATIENT NAME: Myron Gallo    :  1955  MRN: [id number]  Pt Location:  OR ROOM 18    SURGERY DATE: 2021    Surgeon(s) and Role:     * Destiny Greco MD - Primary     * Gabriel Grover PA-C - Assisting    Preop Diagnosis:  Arthrofibrosis of total knee replacement, initial encounter [facility]) Meseret Sosa    Post-Op Diagnosis Codes:     * Arthrofibrosis of total knee replacement, initial encounter ([place] [address] ) Meseret Sosa    Procedure(s) (LRB):  MANIPULATION JOINT KNEE (Left)    Specimen(s):  * No specimens in log *    Estimated Blood Loss:   Minimal    Drains:  * No LDAs found *    Anesthesia Type:   General    Operative Indications:  Arthrofibrosis of total knee replacement, initial encounter ([place] [address] ) [T84 82XA]      Operative Findings:  Pre manipulation range of motion of the left knee was from full extension to 75° of flexion  Post manipulation range of motion of the left knee was from full extension to greater than 120° of flexion  Gravity assisted flexion was to beyond 115° of flexion    Complications:   None    Procedure and Technique: Following induction of adequate level of general anesthesia, the left lower extremity was then inspected  Her pre relaxation and pre manipulation ranges of motion were identified  There was slow deliberate corrective forces applied across the left knee flexion was increased to 120°  An 0 point time was Sir audible snaps indicative of long bone fracture  There was she is simply a palpable lysis of adhesions during the manipulation  The knee was cycled through this arc of motion several times to ensure maintenance  Satisfied with the extent of the procedure, she was then awakened from general anesthesia, and taken recovery room stable condition with plans to include ongoing physical therapy to restore range of motion strength the left knee, and follow up in the office in 2 weeks time is recommended    Please note, that as no qualified orthopedic resident was available to assist, the assistance of Binh Campbell was instrumental in the safe execution of this patient's surgery    Started with patient position, application of force in counterforce, and patient transfer, all these were performed under my direct supervision   I was present for the entire procedure    Patient Disposition:  PACU     SIGNATURE: Negrita Berger MD  DATE: August 27, 2021  TIME: 7:30 AM

## 2021-08-27 NOTE — ANESTHESIA POSTPROCEDURE EVALUATION
Post-Op Assessment Note    CV Status:  Stable    Pain management: adequate     Mental Status:  Alert and awake   Hydration Status:  Euvolemic   PONV Controlled:  Controlled   Airway Patency:  Patent      Post Op Vitals Reviewed: Yes      Staff: CRNA         No complications documented      BP   114/68   Temp   97 4   Pulse  63   Resp   14   SpO2   95 room air

## 2021-08-27 NOTE — DISCHARGE INSTRUCTIONS
Discharge Instructions - Quinton Mary 72 y o  female MRN: [id number]  Unit/Bed#: APU 10    Weight Bearing Status:                                           WBAT, LLE    DVT prophylaxis  N/A    Pain:  Continue analgesics as directed    Dressing Instructions:   N/A    Appt Instructions: If you do not have your appointment, please call the clinic at [phone]  Otherwise followup as scheduled     Contact the office sooner if you experience any increased numbness/tingling in the extremities        Miscellaneous:  ***

## 2021-08-30 ENCOUNTER — APPOINTMENT (OUTPATIENT)
Dept: PHYSICAL THERAPY | Facility: REHABILITATION | Age: 66
End: 2021-08-30
Payer: COMMERCIAL

## 2021-08-30 ENCOUNTER — OFFICE VISIT (OUTPATIENT)
Dept: PHYSICAL THERAPY | Facility: REHABILITATION | Age: 66
End: 2021-08-30
Payer: COMMERCIAL

## 2021-08-30 DIAGNOSIS — G89.29 CHRONIC PAIN OF LEFT KNEE: ICD-10-CM

## 2021-08-30 DIAGNOSIS — Z96.652 STATUS POST TOTAL LEFT KNEE REPLACEMENT: Primary | ICD-10-CM

## 2021-08-30 DIAGNOSIS — M25.562 CHRONIC PAIN OF LEFT KNEE: ICD-10-CM

## 2021-08-30 DIAGNOSIS — Z98.890 STATUS POST SURGICAL MANIPULATION OF KNEE JOINT: ICD-10-CM

## 2021-08-30 DIAGNOSIS — M17.12 PRIMARY OSTEOARTHRITIS OF LEFT KNEE: ICD-10-CM

## 2021-08-30 PROCEDURE — 97110 THERAPEUTIC EXERCISES: CPT

## 2021-08-30 PROCEDURE — 97162 PT EVAL MOD COMPLEX 30 MIN: CPT

## 2021-08-30 PROCEDURE — 97140 MANUAL THERAPY 1/> REGIONS: CPT

## 2021-08-30 NOTE — PROGRESS NOTES
PT Evaluation     Today's date: 2021  Patient name: Idris Salvador  : 1955  MRN: [id number]  Referring provider: Cindy Bearden MD  Dx:   Encounter Diagnosis     ICD-10-CM    1  Status post total left knee replacement  Z96 652    2  Primary osteoarthritis of left knee  M17 12    3  Chronic pain of left knee  M25 562     G89 29        Start Time: 845  Stop Time: 930  Total time in clinic (min): 45 minutes    Assessment  Assessment details: Idris Salvador is a pleasant 72 y o  female who presents with s/p TKA and WINIFRED  The patient's greatest concerns are concern at no signs of improvement, fear of not being able to keep active and future ill health (and wanting to prevent it)  The primary movement problem is soft tissue flexibility deficit resulting in limited ROM, inability to improve strength, unable to walk or climb stairs, imapired balance and limiting her ability to care for self, dress independently, drive, exercise or recreation, go to work, perform household chores, sit, sleep, socialize with friends, squat to  objects from the floor, stand and walk  No further referral appears necessary at this time based upon examination results  Primary Impairments:  1)  Limited knee ROM  2) Impaired strength of the hip and knee muscles  3) Impaired balance  4) Poor gait mechanics  5) Poor stair mechanics    Etiologic factors include none recalled by the patient  Impairments: abnormal gait, abnormal or restricted ROM, activity intolerance, impaired balance, impaired physical strength, pain with function and weight-bearing intolerance  Functional limitations: Pain with standing, walking, sitting, stairs  Symptom irritability: moderateUnderstanding of Dx/Px/POC: good   Prognosis: good  Prognosis details: Positive prognostic indicators include good understanding of diagnosis and treatment plan options    Negative prognostic indicators include chronicity of symptoms, anxiety, high symptom irritability and central sensitization  Goals  In 4 weeks, patient will:  Be independent with home exercise program    AROM/PROM 115 deg  Good quad contraction  Step up    In 8 weeks, patient will:  Be independent in symptom management  5x STS within age norms  Minimal edema  Ambulate without AD for 1000 ft  Increase FOTO score to 65      Plan  Plan details: Prognosis above is given PT services 3x/week tapering to 2x/week over the next 2 months and home program adherence  - Stretching and mobilizations to increase ROM  - balance training  - gait training  - stair training  - functional CKC knee flexion  - strengthening  - functional lifting  Patient would benefit from: skilled physical therapy  Planned therapy interventions: activity modification, joint mobilization, manual therapy, motor coordination training, neuromuscular re-education, patient education, self care, therapeutic activities, therapeutic exercise, graded activity, home exercise program and behavior modification  Frequency: 3x week  Duration in visits: 18  Duration in weeks: 8  Plan of Care beginning date: 2021  Plan of Care expiration date: 10/25/2021  Treatment plan discussed with: patient        Subjective Evaluation    History of Present Illness  Date of surgery: 7/15/2021  Mechanism of injury: Lt TKA   WINIFRED   She felt like she never had an improvement in ROM no greater than 87 deg  She has had a lot of pain, but her functionality has improved  Occasionally has buckling, every other day to every day  She feels her balance is still off  Regularly using the [facility] in the home             Recurrent probem    Quality of life: good    Pain  Current pain ratin  At best pain ratin  At worst pain ratin  Location: knee, calf and thigh  Relieving factors: rest and medications  Aggravating factors: stair climbing, standing and walking  Progression: worsening    Social Support  Stairs in house: yes   16  Lives in: multiple-level home  Lives with: alone    Employment status: working (Teaching)  Exercise history: hiking, walking    Treatments  Previous treatment: physical therapy and medication  Current treatment: medication  Discharged from (in last 30 days): inpatient hospitalization  Patient Goals  Patient goals for therapy: decreased pain, improved balance, independence with ADLs/IADLs, return to sport/leisure activities, increased strength and return to work          Objective     Active Range of Motion   Left Knee   Flexion: 75 degrees with pain  Extension: 5 degrees     Right Knee   Flexion: 130 degrees   Extension: -5 degrees     Passive Range of Motion   Left Knee   Flexion: 80 degrees   Extension: 0 degrees     Additional Passive Range of Motion Details  Improved to 88 after manual    Strength/Myotome Testing     Left Knee   Flexion: 3+  Extension: 3+  Quadriceps contraction: poor    Right Knee   Normal strength  Quadriceps contraction: good    Swelling     Left Knee Girth Measurement (cm)   Joint line: 44 cm    Right Knee Girth Measurement (cm)   Joint line: 39 cm    Ambulation   Weight-Bearing Status   Weight-Bearing Status (Left): weight-bearing as tolerated   Assistive device used: single point cane    Ambulation: Level Surfaces   Ambulation with assistive device: independent  Ambulation without assistive device: independent    Additional Level Surfaces Ambulation Details  Decreased emili, limited knee flexion, exaggerated heel-toe action             Precautions: S/p TKA, WINIFRED      Manuals 8/30            PROM 10'                                                   Neuro Re-Ed                                                                                                        Ther Ex             Bike Rocking 8'                                                                                                       Ther Activity                                       Gait Training             Ambulation no AD 3' 100ft Modalities

## 2021-08-31 ENCOUNTER — OFFICE VISIT (OUTPATIENT)
Dept: PHYSICAL THERAPY | Facility: REHABILITATION | Age: 66
End: 2021-08-31
Payer: COMMERCIAL

## 2021-08-31 ENCOUNTER — APPOINTMENT (OUTPATIENT)
Dept: PHYSICAL THERAPY | Facility: REHABILITATION | Age: 66
End: 2021-08-31
Payer: COMMERCIAL

## 2021-08-31 DIAGNOSIS — Z96.652 STATUS POST TOTAL LEFT KNEE REPLACEMENT: Primary | ICD-10-CM

## 2021-08-31 DIAGNOSIS — M17.12 PRIMARY OSTEOARTHRITIS OF LEFT KNEE: ICD-10-CM

## 2021-08-31 DIAGNOSIS — Z98.890 STATUS POST SURGICAL MANIPULATION OF KNEE JOINT: ICD-10-CM

## 2021-08-31 DIAGNOSIS — M25.562 CHRONIC PAIN OF LEFT KNEE: ICD-10-CM

## 2021-08-31 DIAGNOSIS — G89.29 CHRONIC PAIN OF LEFT KNEE: ICD-10-CM

## 2021-08-31 PROCEDURE — 97530 THERAPEUTIC ACTIVITIES: CPT

## 2021-08-31 PROCEDURE — 97140 MANUAL THERAPY 1/> REGIONS: CPT

## 2021-08-31 PROCEDURE — 97112 NEUROMUSCULAR REEDUCATION: CPT

## 2021-08-31 PROCEDURE — 97110 THERAPEUTIC EXERCISES: CPT

## 2021-08-31 NOTE — PROGRESS NOTES
Daily Note     Today's date: 2021  Patient name: Sowmya Fuentes  : 1955  MRN: [id number]  Referring provider: Aicha Garnica MD  Dx:   Encounter Diagnosis     ICD-10-CM    1  Status post total left knee replacement  Z96 652    2  Primary osteoarthritis of left knee  M17 12    3  Chronic pain of left knee  M25 562     G89 29    4  Status post surgical manipulation of knee joint  Z98 890        Start Time: 0800  Stop Time: 0845  Total time in clinic (min): 45 minutes    Subjective: Doing well, "stiff as a tree"      Objective: See treatment diary below  Knee PROM 82 post manual    Assessment: Tolerated treatment well  Patient would benefit from continued PT  1:1 with Kelton Richard, PT, DPT for entirety of tx  Plan: Continue per plan of care  Progress treament per protocol        Precautions: S/p TKA, WINIFRED      Manuals            PROM 10' 10'                                                  Neuro Re-Ed                                                                                                        Ther Ex             Bike Rocking 8' Rocking 10'           SAQ 1/2 foam  x10           Heel slide   2x10 OP           Knee flex assist  3x10                                                               Ther Activity                                       Gait Training             Ambulation no AD 3' 100ft 3' 100 ft                        Modalities

## 2021-09-01 ENCOUNTER — OFFICE VISIT (OUTPATIENT)
Dept: PHYSICAL THERAPY | Facility: REHABILITATION | Age: 66
End: 2021-09-01
Payer: COMMERCIAL

## 2021-09-01 DIAGNOSIS — M25.562 CHRONIC PAIN OF LEFT KNEE: ICD-10-CM

## 2021-09-01 DIAGNOSIS — M17.12 PRIMARY OSTEOARTHRITIS OF LEFT KNEE: ICD-10-CM

## 2021-09-01 DIAGNOSIS — Z98.890 STATUS POST SURGICAL MANIPULATION OF KNEE JOINT: ICD-10-CM

## 2021-09-01 DIAGNOSIS — Z96.652 STATUS POST TOTAL LEFT KNEE REPLACEMENT: Primary | ICD-10-CM

## 2021-09-01 DIAGNOSIS — G89.29 CHRONIC PAIN OF LEFT KNEE: ICD-10-CM

## 2021-09-01 PROCEDURE — 97110 THERAPEUTIC EXERCISES: CPT | Performed by: PHYSICAL THERAPIST

## 2021-09-01 PROCEDURE — 97140 MANUAL THERAPY 1/> REGIONS: CPT | Performed by: PHYSICAL THERAPIST

## 2021-09-01 NOTE — PROGRESS NOTES
Daily Note     Today's date: 2021  Patient name: Karyna Black  : 1955  MRN: [id number]  Referring provider: Qamar Ann MD  Dx:   Encounter Diagnosis     ICD-10-CM    1  Status post total left knee replacement  Z96 652    2  Primary osteoarthritis of left knee  M17 12    3  Chronic pain of left knee  M25 562     G89 29    4  Status post surgical manipulation of knee joint  Z98 890                   Subjective: Patient struggled with work yesterday due to knee pain and stiffness  Reports increased stiffness today  Objective: See treatment diary below    Knee PROM 84 post manual    Assessment: Tolerated treatment well  Patient educated HEP to be performed 3 times daily including: quad set, heel slides, LAQ and seated flexion self OP  Importance of adherence to this volume for appropriate progression of ROM was stressed and patient demonstrated appropriate understanding  Patient is apprhensive about her ability to perform HEP with this frequency and this should be discussed next visit  Patient would benefit from continued PT  Plan: Continue per plan of care  Progress treament per protocol        Precautions: S/p TKA, WINIFRED      Manuals           PROM 10' 10' 10 min                                                  Neuro Re-Ed                                                                                                        Ther Ex             Bike Rocking 8' Rocking 10' Rocking 10'          SAQ / foam  x10 2x10 w/ tactile cueing          Heel slide   2x10 OP 2x10 w/ OP          Knee flex assist  3x10 30x          Quad set   10x w/ posteior knee tactile cueing          LAQ   30x                                    Ther Activity                                       Gait Training             Ambulation no AD 3' 100ft 3' 100 ft                        Modalities

## 2021-09-02 ENCOUNTER — OFFICE VISIT (OUTPATIENT)
Dept: PHYSICAL THERAPY | Facility: REHABILITATION | Age: 66
End: 2021-09-02
Payer: COMMERCIAL

## 2021-09-02 ENCOUNTER — APPOINTMENT (OUTPATIENT)
Dept: PHYSICAL THERAPY | Facility: REHABILITATION | Age: 66
End: 2021-09-02
Payer: COMMERCIAL

## 2021-09-02 DIAGNOSIS — Z96.652 STATUS POST TOTAL LEFT KNEE REPLACEMENT: Primary | ICD-10-CM

## 2021-09-02 DIAGNOSIS — M25.562 CHRONIC PAIN OF LEFT KNEE: ICD-10-CM

## 2021-09-02 DIAGNOSIS — Z98.890 STATUS POST SURGICAL MANIPULATION OF KNEE JOINT: ICD-10-CM

## 2021-09-02 DIAGNOSIS — G89.29 CHRONIC PAIN OF LEFT KNEE: ICD-10-CM

## 2021-09-02 DIAGNOSIS — M17.12 PRIMARY OSTEOARTHRITIS OF LEFT KNEE: ICD-10-CM

## 2021-09-02 PROCEDURE — 97140 MANUAL THERAPY 1/> REGIONS: CPT

## 2021-09-02 PROCEDURE — 97112 NEUROMUSCULAR REEDUCATION: CPT

## 2021-09-02 PROCEDURE — 97530 THERAPEUTIC ACTIVITIES: CPT

## 2021-09-02 PROCEDURE — 97110 THERAPEUTIC EXERCISES: CPT

## 2021-09-02 PROCEDURE — 97116 GAIT TRAINING THERAPY: CPT

## 2021-09-02 NOTE — PROGRESS NOTES
Daily Note     Today's date: 2021  Patient name: Sloane Cifuentes  : 1955  MRN: [id number]  Referring provider: Yobany Iverson MD  Dx:   Encounter Diagnosis     ICD-10-CM    1  Status post total left knee replacement  Z96 652    2  Primary osteoarthritis of left knee  M17 12    3  Chronic pain of left knee  M25 562     G89 29    4  Status post surgical manipulation of knee joint  Z98 890        Start Time: 1600  Stop Time: 5088  Total time in clinic (min): 48 minutes    Subjective: Patient reports extreme fatigue today from work  She is concerned about some bruising on the posterior aspect of the thigh/knee  Objective: See treatment diary below    Knee PROM 82 post manual    Assessment: Tolerated treatment well  Reemphasized frequency of HEP and importance of compliance, patient verbally confirmed understanding  Patient became extremely emotional during PROM, is worried about returning to work  Recommended reaching out to surgeon to see if she can go back on medical leave for the time being  Patient would benefit from continued PT  Plan: Continue per plan of care  Progress treament per protocol        Precautions: S/p TKA, WINIFRED    [id number]  Manuals          PROM 10' 10' 10 min  10', belly breathing                                                Neuro Re-Ed             PNF techniques                                                                                           Ther Ex             Bike Rocking 8' Rocking 10' Rocking 10' Rocking 10'         SAQ  foam  x10 2x10 w/ tactile cueing 2x10 w/ TC         Heel slide   2x10 OP 2x10 w/ OP 2x10 assist         Knee flex assist  3x10 30x x30         Quad set   10x w/ posteior knee tactile cueing 2x10         LAQ   30x                                    Ther Activity                                       Gait Training             Ambulation no AD 3' 100ft 3' 100 ft  5'  AD, VC                      Modalities

## 2021-09-03 ENCOUNTER — TELEPHONE (OUTPATIENT)
Dept: OBGYN CLINIC | Facility: MEDICAL CENTER | Age: 66
End: 2021-09-03

## 2021-09-03 ENCOUNTER — APPOINTMENT (OUTPATIENT)
Dept: PHYSICAL THERAPY | Facility: REHABILITATION | Age: 66
End: 2021-09-03
Payer: COMMERCIAL

## 2021-09-03 ENCOUNTER — OFFICE VISIT (OUTPATIENT)
Dept: PHYSICAL THERAPY | Facility: REHABILITATION | Age: 66
End: 2021-09-03
Payer: COMMERCIAL

## 2021-09-03 DIAGNOSIS — Z98.890 STATUS POST SURGICAL MANIPULATION OF KNEE JOINT: ICD-10-CM

## 2021-09-03 DIAGNOSIS — G89.29 CHRONIC PAIN OF LEFT KNEE: ICD-10-CM

## 2021-09-03 DIAGNOSIS — R51.9 NONINTRACTABLE HEADACHE, UNSPECIFIED CHRONICITY PATTERN, UNSPECIFIED HEADACHE TYPE: ICD-10-CM

## 2021-09-03 DIAGNOSIS — M25.562 CHRONIC PAIN OF LEFT KNEE: ICD-10-CM

## 2021-09-03 DIAGNOSIS — M17.12 PRIMARY OSTEOARTHRITIS OF LEFT KNEE: ICD-10-CM

## 2021-09-03 DIAGNOSIS — Z96.652 STATUS POST TOTAL LEFT KNEE REPLACEMENT: Primary | ICD-10-CM

## 2021-09-03 PROCEDURE — 97140 MANUAL THERAPY 1/> REGIONS: CPT

## 2021-09-03 PROCEDURE — 97530 THERAPEUTIC ACTIVITIES: CPT

## 2021-09-03 PROCEDURE — 97112 NEUROMUSCULAR REEDUCATION: CPT

## 2021-09-03 PROCEDURE — 97110 THERAPEUTIC EXERCISES: CPT

## 2021-09-03 RX ORDER — CARBAMAZEPINE 100 MG/1
CAPSULE, EXTENDED RELEASE ORAL
Qty: 60 CAPSULE | Refills: 1 | Status: SHIPPED | OUTPATIENT
Start: 2021-09-03 | End: 2021-11-29 | Stop reason: SDUPTHER

## 2021-09-03 NOTE — PROGRESS NOTES
Daily Note     Today's date: 9/3/2021  Patient name: Israel Shukla  : 1955  MRN: [id number]  Referring provider: Ki Rios MD  Dx:   Encounter Diagnosis     ICD-10-CM    1  Status post total left knee replacement  Z96 652    2  Primary osteoarthritis of left knee  M17 12    3  Chronic pain of left knee  M25 562     G89 29    4  Status post surgical manipulation of knee joint  Z98 890        Start Time: 1685  Stop Time: 1435  Total time in clinic (min): 50 minutes    Subjective: Patient reports that she is doing better fatigue wise compared to yesterday  She has been doing her HEP more regularly throughout the day  Objective: See treatment diary below    Knee PROM 85 post manual    Assessment: Tolerated treatment well  She was able to improve PROM within session  Educated on error and to not get stuck on small changes in the number  Will reach out to surgeon to inquire about work leave  Patient would benefit from continued PT  Plan: Continue per plan of care  Progress treament per protocol        Precautions: S/p TKA, WINIFRED    [id number]  Manuals 8/30 8/31 9/1 9/2 9/3        PROM 10' 10' 10 min  10', belly breathing 10'                                               Neuro Re-Ed             PNF techniques                                                                                           Ther Ex             Bike Rocking 8' Rocking 10' Rocking 10' Rocking 10' Rocking 10'        SAQ / foam  x10 2x10 w/ tactile cueing 2x10 w/ TC         Heel slide   2x10 OP 2x10 w/ OP 2x10 assist x10 assist        Knee flex assist  3x10 30x x30 2x10        Quad set   10x w/ posteior knee tactile cueing 2x10 3x10        LAQ   30x          Knee slide wall     10'        HS stretch     2x30s        Ther Activity                                       Gait Training             Ambulation no AD 3' 100ft 3' 100 ft  5'  AD, VC                      Modalities

## 2021-09-03 NOTE — TELEPHONE ENCOUNTER
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

## 2021-09-03 NOTE — TELEPHONE ENCOUNTER
Reviewed with the doctor     This will be addressed at her next follow-up appointment       Thank you

## 2021-09-07 ENCOUNTER — OFFICE VISIT (OUTPATIENT)
Dept: PHYSICAL THERAPY | Facility: REHABILITATION | Age: 66
End: 2021-09-07
Payer: COMMERCIAL

## 2021-09-07 DIAGNOSIS — Z96.652 STATUS POST TOTAL LEFT KNEE REPLACEMENT: Primary | ICD-10-CM

## 2021-09-07 DIAGNOSIS — M17.12 PRIMARY OSTEOARTHRITIS OF LEFT KNEE: ICD-10-CM

## 2021-09-07 PROCEDURE — 97530 THERAPEUTIC ACTIVITIES: CPT

## 2021-09-07 PROCEDURE — 97110 THERAPEUTIC EXERCISES: CPT

## 2021-09-07 PROCEDURE — 97116 GAIT TRAINING THERAPY: CPT

## 2021-09-07 PROCEDURE — 97010 HOT OR COLD PACKS THERAPY: CPT

## 2021-09-07 PROCEDURE — 97140 MANUAL THERAPY 1/> REGIONS: CPT

## 2021-09-07 PROCEDURE — 97112 NEUROMUSCULAR REEDUCATION: CPT

## 2021-09-07 NOTE — PROGRESS NOTES
Daily Note     Today's date: 2021  Patient name: Zamzam Adams  : 1955  MRN: [id number]  Referring provider: Renee Acosta MD  Dx:   Encounter Diagnosis     ICD-10-CM    1  Status post total left knee replacement  Z96 652    2  Primary osteoarthritis of left knee  M17 12        Start Time: 4290  Stop Time: 1743  Total time in clinic (min): 58 minutes    Subjective: Patient reports that she has been doing her HEP 3x a day over the weekend and thinks she's doing ok  She had some shooting pain down the whole leg last night that prevented her from sleeping  Objective: See treatment diary below    Knee PROM 85 post manual    Assessment: Tolerated treatment well  She was able to improve PROM within session  Patient would benefit from continued PT  Plan: Continue per plan of care  Progress treament per protocol        Precautions: S/p TKA, WINIFRED    [id number]  Manuals 8/30 8/31 9/1 9/2 9/3 9/7       PROM 10' 10' 10 min  10', belly breathing 10' 10'                                              Neuro Re-Ed             PNF techniques             SLR      2x10                                                                        Ther Ex             Bike Rocking 8' Rocking 10' Rocking 10' Rocking 10' Rocking 10' Rocking 10'       SAQ 1/2 foam  x10 2x10 w/ tactile cueing 2x10 w/ TC  x10       Heel slide   2x10 OP 2x10 w/ OP 2x10 assist x10 assist 2x10       Knee flex assist  3x10 30x x30 2x10 2x10 f/e ea       Quad set   10x w/ posteior knee tactile cueing 2x10 3x10 x10       LAQ   30x   x10 AAROM at end        Knee slide wall     10' HEP       HS/quad stretch     2x30s 2x30s ea       Ther Activity             STS      2x10                    Gait Training             Ambulation no AD 3' 100ft 3' 100 ft  5'  AD, VC  VC's [id number]                    Modalities             Ice      10'

## 2021-09-08 ENCOUNTER — OFFICE VISIT (OUTPATIENT)
Dept: PHYSICAL THERAPY | Facility: REHABILITATION | Age: 66
End: 2021-09-08
Payer: COMMERCIAL

## 2021-09-08 DIAGNOSIS — M17.12 PRIMARY OSTEOARTHRITIS OF LEFT KNEE: ICD-10-CM

## 2021-09-08 DIAGNOSIS — M25.562 CHRONIC PAIN OF LEFT KNEE: ICD-10-CM

## 2021-09-08 DIAGNOSIS — Z98.890 STATUS POST SURGICAL MANIPULATION OF KNEE JOINT: ICD-10-CM

## 2021-09-08 DIAGNOSIS — G89.29 CHRONIC PAIN OF LEFT KNEE: ICD-10-CM

## 2021-09-08 DIAGNOSIS — Z96.652 STATUS POST TOTAL LEFT KNEE REPLACEMENT: Primary | ICD-10-CM

## 2021-09-08 PROCEDURE — 97112 NEUROMUSCULAR REEDUCATION: CPT | Performed by: PHYSICAL THERAPY ASSISTANT

## 2021-09-08 PROCEDURE — 97110 THERAPEUTIC EXERCISES: CPT | Performed by: PHYSICAL THERAPY ASSISTANT

## 2021-09-08 PROCEDURE — 97140 MANUAL THERAPY 1/> REGIONS: CPT | Performed by: PHYSICAL THERAPY ASSISTANT

## 2021-09-08 NOTE — PROGRESS NOTES
Daily Note     Today's date: 2021  Patient name: Luis Manuel Bain  : 1955  MRN: [id number]  Referring provider: Sayra Jones MD  Dx:   Encounter Diagnosis     ICD-10-CM    1  Status post total left knee replacement  Z96 652    2  Primary osteoarthritis of left knee  M17 12    3  Chronic pain of left knee  M25 562     G89 29    4  Status post surgical manipulation of knee joint  Z98 890                   Subjective: Pt reports consistent compliance with HEP 3x/day  Objective: See treatment diary below    Knee PROM 94 post manual    Assessment: Tolerated treatment well  She was able to improve PROM within session  Patient would benefit from continued PT  Plan: Continue per plan of care  Progress treament per protocol        Precautions: S/p TKA, WINIFRED    [id number]  Manuals 8/30 8/31 9/1 9/2 9/3 9/7 9/8      PROM 10' 10' 10 min  10', belly breathing 10' 10' 10'                                             Neuro Re-Ed             PNF techniques             SLR      2x10 2x10                                                                       Ther Ex             Bike Rocking 8' Rocking 10' Rocking 10' Rocking 10' Rocking 10' Rocking 10' Rocking  10'      SAQ 1/2 foam  x10 2x10 w/ tactile cueing 2x10 w/ TC  x10 2x10      Heel slide   2x10 OP 2x10 w/ OP 2x10 assist x10 assist 2x10 2x10      Knee flex assist  3x10 30x x30 2x10 2x10 f/e ea np      Quad set   10x w/ posteior knee tactile cueing 2x10 3x10 x10 2x10      LAQ   30x   x10 AAROM at end  2x10 AROM      Knee slide wall     10' HEP       HS/quad stretch     2x30s 2x30s ea 30"x3 ea      Ther Activity             STS      2x10 2x10                   Gait Training             Ambulation no AD 3' 100ft 3' 100 ft  5'  AD, VC  VC's [id number]                    Modalities             Ice      10' 10'

## 2021-09-09 ENCOUNTER — OFFICE VISIT (OUTPATIENT)
Dept: OBGYN CLINIC | Facility: HOSPITAL | Age: 66
End: 2021-09-09

## 2021-09-09 ENCOUNTER — OFFICE VISIT (OUTPATIENT)
Dept: PHYSICAL THERAPY | Facility: REHABILITATION | Age: 66
End: 2021-09-09
Payer: COMMERCIAL

## 2021-09-09 VITALS
BODY MASS INDEX: 26.75 KG/M2 | SYSTOLIC BLOOD PRESSURE: 111 MMHG | WEIGHT: 151 LBS | HEIGHT: 63 IN | DIASTOLIC BLOOD PRESSURE: 78 MMHG | HEART RATE: 72 BPM

## 2021-09-09 DIAGNOSIS — Z96.652 AFTERCARE FOLLOWING LEFT KNEE JOINT REPLACEMENT SURGERY: ICD-10-CM

## 2021-09-09 DIAGNOSIS — G89.29 CHRONIC PAIN OF LEFT KNEE: ICD-10-CM

## 2021-09-09 DIAGNOSIS — Z96.652 STATUS POST TOTAL LEFT KNEE REPLACEMENT: Primary | ICD-10-CM

## 2021-09-09 DIAGNOSIS — Z98.890 STATUS POST SURGICAL MANIPULATION OF KNEE JOINT: ICD-10-CM

## 2021-09-09 DIAGNOSIS — Z47.1 AFTERCARE FOLLOWING LEFT KNEE JOINT REPLACEMENT SURGERY: ICD-10-CM

## 2021-09-09 DIAGNOSIS — M25.562 CHRONIC PAIN OF LEFT KNEE: ICD-10-CM

## 2021-09-09 DIAGNOSIS — M17.12 PRIMARY OSTEOARTHRITIS OF LEFT KNEE: ICD-10-CM

## 2021-09-09 DIAGNOSIS — T84.82XD ARTHROFIBROSIS OF TOTAL KNEE REPLACEMENT, SUBSEQUENT ENCOUNTER: Primary | ICD-10-CM

## 2021-09-09 PROCEDURE — 97140 MANUAL THERAPY 1/> REGIONS: CPT

## 2021-09-09 PROCEDURE — 99024 POSTOP FOLLOW-UP VISIT: CPT | Performed by: ORTHOPAEDIC SURGERY

## 2021-09-09 PROCEDURE — 97112 NEUROMUSCULAR REEDUCATION: CPT

## 2021-09-09 PROCEDURE — 97010 HOT OR COLD PACKS THERAPY: CPT

## 2021-09-09 PROCEDURE — 97530 THERAPEUTIC ACTIVITIES: CPT

## 2021-09-09 PROCEDURE — 97110 THERAPEUTIC EXERCISES: CPT

## 2021-09-09 NOTE — PROGRESS NOTES
Daily Note     Today's date: 2021  Patient name: Luis Manuel Bain  : 1955  MRN: [id number]  Referring provider: Sayra Jones MD  Dx:   Encounter Diagnosis     ICD-10-CM    1  Status post total left knee replacement  Z96 652    2  Primary osteoarthritis of left knee  M17 12    3  Chronic pain of left knee  M25 562     G89 29    4  Status post surgical manipulation of knee joint  Z98 890        Start Time:   Stop Time: 349  Total time in clinic (min): 53 minutes    Subjective: Pt reported that the surgeon was able to extend her leave from work but it will take a couple weeks to go into effect  She is doing well otherwise, just a bit tired  Objective: See treatment diary below    Knee PROM 94 post manual    Assessment: Tolerated treatment well  She was able to improve PROM within session  Able to progress to standing ROM exercises  Improved quad contraction and SLR  Patient would benefit from continued PT  Plan: Continue per plan of care  Progress treament per protocol        Precautions: S/p TKA, WINIFRED    [id number]  Manuals 8/30 8/31 9/1 9/2 9/3 9/7 9/8 9/9     PROM 10' 10' 10 min  10', belly breathing 10' 10' 10' 5'                                            Neuro Re-Ed             PNF techniques             SLR      2x10 2x10 2x10     Partial step up        2x10, 30s hold                                                         Ther Ex             Bike Rocking 8' Rocking 10' Rocking 10' Rocking 10' Rocking 10' Rocking 10' Rocking  10' Rocking 10'     SAQ 1/2 foam  x10 2x10 w/ tactile cueing 2x10 w/ TC  x10 2x10 2x10     Heel slide   2x10 OP 2x10 w/ OP 2x10 assist x10 assist 2x10 2x10 2x10     Knee flex assist  3x10 30x x30 2x10 2x10 f/e ea np x20 f/e     Quad set   10x w/ posteior knee tactile cueing 2x10 3x10 x10 2x10 x10     LAQ   30x   x10 AAROM at end  2x10 AROM x10 with assist at end     Knee slide wall     10' HEP       HS/quad stretch     2x30s 2x30s ea 30"x3 ea      Ther Activity STS      2x10 2x10 2x10                  Gait Training             Ambulation no AD 3' 100ft 3' 100 ft  5'  AD, VC  VC's [id number]                    Modalities             Ice      10' 10' 10'

## 2021-09-09 NOTE — LETTER
September 9, 2021     Patient: Idris Salvador   YOB: 1955   Date of Visit: 9/9/2021       To Whom it May Concern: Leslie Aceves is under my professional care  She was seen in my office on 9/9/2021  She is recovering from total knee replacement surgery and manipulation under anesthesia  In my professional opinion, she may continue working but should only carry out 50% of her responsibilities for up to the full 12 weeks allowed under [place] guidelines  If you have any questions or concerns, please don't hesitate to call           Sincerely,          Kaylynn Gracia MD        CC: No Recipients

## 2021-09-09 NOTE — PROGRESS NOTES
Assessment/Plan:  1  Arthrofibrosis of total knee replacement, subsequent encounter     2  Aftercare following left knee joint replacement surgery       Scribe Attestation    I,:  Bran Moura am acting as a scribe while in the presence of the attending physician :       I,:  Manolo Moreno MD personally performed the services described in this documentation    as scribed in my presence :         Rashida  Is making progress with her recovery 2 weeks status post manipulation under anesthesia  She is able to flex her knee past 90° today in the office  She should continue with her efforts at physical therapy with aggressive attention to restoring her full range of motion, particularly her flexion  We also spent time today discussing demands of her employment and I recommended that she return to work on 50% duty status for up to the 12 weeks allowed to her under [place] guidelines  A note was provided for her employer today and we will help to facilitate the paperwork necessary for her  I would like to see her back in 1 month for repeat clinical evaluation  Subjective: Follow-up evaluation 2 weeks status post left knee manipulation under anesthesia and 8 weeks status post left total knee arthroplasty    Patient ID: Cyril Motley is a 72 y o  female who returns today for follow-up evaluation 2 weeks status post left knee manipulation under anesthesia and 8 weeks status post left total knee arthroplasty  She states that she is slowly improving  Her range of motion increased to 94° at her most recent therapy session  She does note some increased soreness diffusely about the knee since her manipulation  She would like to discuss her work responsibilities as she does not feel like she has the stamina to work on full duty status on 100% of the time  She denies any new injury or trauma  Review of Systems   Constitutional: Positive for activity change  Negative for chills, fever and unexpected weight change  HENT: Negative for hearing loss, nosebleeds and sore throat  Eyes: Negative for pain, redness and visual disturbance  Respiratory: Negative for cough, shortness of breath and wheezing  Cardiovascular: Negative for chest pain, palpitations and leg swelling  Gastrointestinal: Negative for abdominal pain, nausea and vomiting  Endocrine: Negative for polydipsia and polyuria  Genitourinary: Negative for dysuria and hematuria  Musculoskeletal: Positive for myalgias  Negative for arthralgias  See HPI   Skin: Negative for rash and wound  Neurological: Negative for dizziness, numbness and headaches  Psychiatric/Behavioral: Negative for decreased concentration and suicidal ideas  The patient is not nervous/anxious  Past Medical History:   Diagnosis Date    Arthralgia     Atypical chest pain     Balance disorder     Cervical rib     last assessed 9/13/12    Depression     Fatigue     Fibromyalgia     Fibromyalgia     Fibromyalgia, primary     GERD (gastroesophageal reflux disease)     Hydronephrosis with renal and ureteral calculus obstruction     last assessed 5/1/17    Lacunar stroke ([facility] [address] )     Leukopenia     last assessed 5/19/16    Memory loss     Restless leg syndrome     last assessed 11/8/13    Sebaceous cyst     last assessed 2/13/13    Skin tag     last assessed 2/13/13    Vitamin D deficiency        Past Surgical History:   Procedure Laterality Date    COLONOSCOPY  2011    fiberoptic    DENTAL SURGERY      wisdom teeth    KNEE SURGERY      right knee 2006 meniscal tear stage 04    AK COLONOSCOPY FLX DX W/COLLJ SPEC WHEN PFRMD N/A 3/16/2017    Procedure: COLONOSCOPY;  Surgeon: Daria Curtis MD;  Location: [facility];   Service: Gastroenterology    AK CYSTO/URETERO W/LITHOTRIPSY &INDWELL STENT INSRT Left 4/26/2017    Procedure: CYSTOSCOPY URETEROSCOPY; RETROGRADE PYELOGRAM; STONE EXTRACTION; INSERTION STENT URETERAL;  Surgeon: Rich Mora MD; Location: [facility];  Service: Urology    NV [place] JT+ANESTHESIA Left 8/27/2021    Procedure: MANIPULATION JOINT KNEE;  Surgeon: Roselyn Najera MD;  [facility];  Service: Orthopedics    NV TOTAL KNEE ARTHROPLASTY Left 7/15/2021    Procedure: ARTHROPLASTY KNEE TOTAL - left;  Surgeon: Roselyn Najera MD;  [facility];  Service: Orthopedics       Family History   Problem Relation Age of Onset    Osteoporosis Mother     Heart failure Father     Coronary artery disease Father     Fibromyalgia Sister     Heart failure Family     Coronary artery disease Family     Osteoporosis Family     Alcohol abuse Neg Hx     Substance Abuse Neg Hx     Mental illness Neg Hx     Depression Neg Hx        Social History     Occupational History    Occupation:      Employer: [employer]   Tobacco Use    Smoking status: Former Smoker    Smokeless tobacco: Never Used    Tobacco comment: 23years old quit over 36 year ago   Vaping Use    Vaping Use: Never used   Substance and Sexual Activity    Alcohol use: Yes     Comment: rare    Drug use: No    Sexual activity: Not Currently         Current Outpatient Medications:     acetaminophen (TYLENOL) 650 mg CR tablet, Take 650 mg by mouth every 8 (eight) hours as needed for mild pain, Disp: , Rfl:     aspirin 81 mg chewable tablet, Chew 1 tablet (81 mg total) daily, Disp: 30 tablet, Rfl: 0    atorvastatin (LIPITOR) 40 mg tablet, Take 1 tablet (40 mg total) by mouth every evening, Disp: 90 tablet, Rfl: 1    carbamazepine (CARBATROL) 100 MG 12 hr capsule, TAKE 1 CAPSULE(100 MG TOTAL) BY MOUTH TWICE DAILY, Disp: 60 capsule, Rfl: 1    cephalexin (KEFLEX) 500 mg capsule, Take 4 tablets p o  1 hr prior to dental appointment as instructed, Disp: 20 capsule, Rfl: 1    docusate sodium (COLACE) 100 mg capsule, Take 100 mg by mouth 2 (two) times a day, Disp: , Rfl:     fluticasone (FLONASE) 50 mcg/act nasal spray, 1 spray into each nostril daily as needed for rhinitis (Patient not taking: Reported on 7/20/2021), Disp: 16 g, Rfl: 1    Multiple Vitamins-Minerals (MULTIVITAMIN WITH MINERALS) tablet, Take 1 tablet by mouth daily, Disp: , Rfl:     oxyCODONE (ROXICODONE) 5 mg immediate release tablet, 1 pill po Q4 Hrs prn, Disp: 30 tablet, Rfl: 0    pantoprazole (PROTONIX) 40 mg tablet, Take 1 tablet (40 mg total) by mouth daily, Disp: 90 tablet, Rfl: 1    polyethylene glycol (GLYCOLAX) 17 GM/SCOOP powder, Take 17 g by mouth daily, Disp: , Rfl:     pregabalin (LYRICA) 100 mg capsule, Take 1 capsule (100 mg total) by mouth 2 (two) times a day, Disp: 180 capsule, Rfl: 1    senna-docusate sodium (SENOKOT S) 8 6-50 mg per tablet, Take 1 tablet by mouth daily (Patient not taking: Reported on 7/20/2021), Disp: , Rfl:     traMADol (ULTRAM) 50 mg tablet, Take 1 tablet (50 mg total) by mouth every 6 (six) hours as needed for moderate pain for up to 30 doses, Disp: 30 tablet, Rfl: 0    venlafaxine (EFFEXOR-XR) 75 mg 24 hr capsule, Take 1 capsule (75 mg total) by mouth daily with breakfast With 37 5 mg (Patient taking differently: Take 75 mg by mouth daily with breakfast ), Disp: 90 capsule, Rfl: 1    Allergies   Allergen Reactions    Banana - Food Allergy GI Intolerance    Chocolate - Food Allergy GI Intolerance    Ciprofloxacin Hallucinations    Dramamine [Dimenhydrinate] Other (See Comments)     dizzy    Nuts - Food Allergy GI Intolerance    Oat - Food Allergy GI Intolerance    Peach [Prunus Persica] Other (See Comments)     GI intolerance     Strawberry (Diagnostic) - Food Allergy GI Intolerance    Sweet Potato - Food Allergy GI Intolerance       Objective:  Vitals:    09/09/21 1514   BP: 111/78   Pulse: 72       Body mass index is 26 75 kg/m²  Left Knee Exam     Range of Motion   Left knee extension: 0  Left knee flexion: 90       Tests   Varus: negative Valgus: negative  Drawer:  Anterior - negative     Posterior - negative    Other   Erythema: absent  Scars: present (well healed anterior operative scar)  Sensation: normal  Pulse: present  Swelling: mild  Effusion: no effusion present    Comments:  Stable at 0, 30 and 90 degrees  Neurovascularly in tact distally  No warmth, erythema or signs of infection            Observations   Left Knee   Negative for effusion  Physical Exam  Vitals and nursing note reviewed  Constitutional:       Appearance: She is well-developed  HENT:      Head: Normocephalic and atraumatic  Eyes:      General: No scleral icterus  Conjunctiva/sclera: Conjunctivae normal    Cardiovascular:      Rate and Rhythm: Normal rate  Pulmonary:      Effort: Pulmonary effort is normal  No respiratory distress  Musculoskeletal:      Cervical back: Normal range of motion and neck supple  Left knee: No effusion  Comments: As noted in HPI   Skin:     General: Skin is warm and dry  Neurological:      Mental Status: She is alert and oriented to person, place, and time     Psychiatric:         Behavior: Behavior normal

## 2021-09-10 ENCOUNTER — OFFICE VISIT (OUTPATIENT)
Dept: PHYSICAL THERAPY | Facility: REHABILITATION | Age: 66
End: 2021-09-10
Payer: COMMERCIAL

## 2021-09-10 ENCOUNTER — VBI (OUTPATIENT)
Dept: ADMINISTRATIVE | Facility: OTHER | Age: 66
End: 2021-09-10

## 2021-09-10 DIAGNOSIS — G89.29 CHRONIC PAIN OF LEFT KNEE: ICD-10-CM

## 2021-09-10 DIAGNOSIS — M17.12 PRIMARY OSTEOARTHRITIS OF LEFT KNEE: ICD-10-CM

## 2021-09-10 DIAGNOSIS — Z96.652 STATUS POST TOTAL LEFT KNEE REPLACEMENT: Primary | ICD-10-CM

## 2021-09-10 DIAGNOSIS — M25.562 CHRONIC PAIN OF LEFT KNEE: ICD-10-CM

## 2021-09-10 DIAGNOSIS — Z98.890 STATUS POST SURGICAL MANIPULATION OF KNEE JOINT: ICD-10-CM

## 2021-09-10 PROCEDURE — 97530 THERAPEUTIC ACTIVITIES: CPT

## 2021-09-10 PROCEDURE — 97010 HOT OR COLD PACKS THERAPY: CPT

## 2021-09-10 PROCEDURE — 97140 MANUAL THERAPY 1/> REGIONS: CPT

## 2021-09-10 PROCEDURE — 97112 NEUROMUSCULAR REEDUCATION: CPT

## 2021-09-10 PROCEDURE — 97110 THERAPEUTIC EXERCISES: CPT

## 2021-09-10 NOTE — PROGRESS NOTES
Daily Note     Today's date: 9/10/2021  Patient name: Dominga Antonio  : 1955  MRN: [id number]  Referring provider: Earlene North MD  Dx:   Encounter Diagnosis     ICD-10-CM    1  Status post total left knee replacement  Z96 652    2  Primary osteoarthritis of left knee  M17 12    3  Chronic pain of left knee  M25 562     G89 29    4  Status post surgical manipulation of knee joint  Z98 890        Start Time: 1118  Stop Time: 1215  Total time in clinic (min): 57 minutes    Subjective: Pt reports her calf is really stiff and painful today, but otherwise ok  Flexing knee more while walking into the clinic  Objective: See treatment diary below    Knee PROM 94 post manual    Assessment: Tolerated treatment well  Patient would benefit from continued PT  Plan: Continue per plan of care  Progress treament per protocol        Precautions: S/p TKA, WINIFRED    [id number]  Manuals 8/30 8/31 9/1 9/2 9/3 9/7 9/8 9/9 9/10    PROM 10' 10' 10 min  10', belly breathing 10' 10' 10' 5' 5'                                           Neuro Re-Ed             PNF techniques             SLR      2x10 2x10 2x10 2x10    Partial step up        2x10, 30s hold 2x10 30s hold                                                        Ther Ex             Bike Rocking 8' Rocking 10' Rocking 10' Rocking 10' Rocking 10' Rocking 10' Rocking  10' Rocking 10' Rocking 10'    SAQ 1/2 foam  x10 2x10 w/ tactile cueing 2x10 w/ TC  x10 2x10 2x10 2x10    Heel slide   2x10 OP 2x10 w/ OP 2x10 assist x10 assist 2x10 2x10 2x10 2x10    Knee flex assist  3x10 30x x30 2x10 2x10 f/e ea np x20 f/e x20 f/e    Quad set   10x w/ posteior knee tactile cueing 2x10 3x10 x10 2x10 x10 2x10    LAQ   30x   x10 AAROM at end  2x10 AROM x10 with assist at end x10    Knee slide wall     10' HEP       Calf stretch         2x30s    HS/quad stretch     2x30s 2x30s ea 30"x3 ea      Ther Activity             STS      2x10 2x10 2x10 3x10                 Gait Training Ambulation no AD 3' 100ft 3' 100 ft  5'  AD, VC  VC's [id number]   [id number]                 Modalities             Ice      10' 10' 10' 10'                 Goals  In 4 weeks, patient will:  Be independent with home exercise program    AROM/PROM 115 deg  Good quad contraction  Step up    In 8 weeks, patient will:  Be independent in symptom management  5x STS within age norms  Minimal edema  Ambulate without AD for 1000 ft  Increase FOTO score to 65

## 2021-09-13 ENCOUNTER — OFFICE VISIT (OUTPATIENT)
Dept: PHYSICAL THERAPY | Facility: REHABILITATION | Age: 66
End: 2021-09-13
Payer: COMMERCIAL

## 2021-09-13 DIAGNOSIS — M17.12 PRIMARY OSTEOARTHRITIS OF LEFT KNEE: ICD-10-CM

## 2021-09-13 DIAGNOSIS — Z98.890 STATUS POST SURGICAL MANIPULATION OF KNEE JOINT: ICD-10-CM

## 2021-09-13 DIAGNOSIS — G89.29 CHRONIC PAIN OF LEFT KNEE: ICD-10-CM

## 2021-09-13 DIAGNOSIS — Z96.652 STATUS POST TOTAL LEFT KNEE REPLACEMENT: Primary | ICD-10-CM

## 2021-09-13 DIAGNOSIS — M25.562 CHRONIC PAIN OF LEFT KNEE: ICD-10-CM

## 2021-09-13 PROCEDURE — 97530 THERAPEUTIC ACTIVITIES: CPT

## 2021-09-13 PROCEDURE — 97110 THERAPEUTIC EXERCISES: CPT

## 2021-09-13 PROCEDURE — 97140 MANUAL THERAPY 1/> REGIONS: CPT

## 2021-09-13 PROCEDURE — 97010 HOT OR COLD PACKS THERAPY: CPT

## 2021-09-13 PROCEDURE — 97112 NEUROMUSCULAR REEDUCATION: CPT

## 2021-09-13 NOTE — PROGRESS NOTES
Daily Note     Today's date: 2021  Patient name: Israel Shukla  : 1955  MRN: [id number]  Referring provider: Ki Rios MD  Dx:   Encounter Diagnosis     ICD-10-CM    1  Status post total left knee replacement  Z96 652    2  Primary osteoarthritis of left knee  M17 12    3  Chronic pain of left knee  M25 562     G89 29    4  Status post surgical manipulation of knee joint  Z98 890        Start Time: 818  Stop Time: 910  Total time in clinic (min): 52 minutes    Subjective: Pt reports her calf was really painful over the weekend, she was unable to complete HEP 3x/day  It is starting to feel a bit better today  Objective: See treatment diary below    Knee PROM 95 post manual    Assessment: Tolerated treatment well  Patient would benefit from continued PT  Patient demonstrated good quad contraction with SAQ 1:1 with Michaela Doran, PT, DPT for entirety of tx  Plan: Continue per plan of care  Progress treament per protocol        Precautions: S/p TKA, WINIFRED    [id number]  Manuals 8/30 8/31 9/1 9/2 9/3 9/7 9/8 9/9 9/10 9/13   PROM 10' 10' 10 min  10', belly breathing 10' 10' 10' 5' 5' 5'                                          Neuro Re-Ed             PNF techniques             SLR      2x10 2x10 2x10 2x10 2x10   Partial step up        2x10, 30s hold 2x10 30s hold 3x10                                                       Ther Ex             Bike Rocking 8' Rocking 10' Rocking 10' Rocking 10' Rocking 10' Rocking 10' Rocking  10' Rocking 10' Rocking 10' Rocking 10'   SAQ 1/2 foam  x10 2x10 w/ tactile cueing 2x10 w/ TC  x10 2x10 2x10 2x10 2x10   Heel slide   2x10 OP 2x10 w/ OP 2x10 assist x10 assist 2x10 2x10 2x10 2x10 2x10   Knee flex assist  3x10 30x x30 2x10 2x10 f/e ea np x20 f/e x20 f/e x30 f   Quad set   10x w/ posteior knee tactile cueing 2x10 3x10 x10 2x10 x10 2x10    LAQ   30x   x10 AAROM at end  2x10 AROM x10 with assist at end x10 2x10   Knee slide wall     10' HEP       Calf stretch         2x30s    HS/quad stretch     2x30s 2x30s ea 30"x3 ea HEP     Ther Activity             STS      2x10 2x10 2x10 3x10 2x10 foam, 1x0                Gait Training             Ambulation no AD 3' 100ft 3' 100 ft  5'  AD, VC  VC's [id number]   [id number] x100 ft                Modalities             Ice      10' 10' 10' 10' 8'                Goals  In 4 weeks, patient will:  Be independent with home exercise program    AROM/PROM 115 deg  Good quad contraction  Step up    In 8 weeks, patient will:  Be independent in symptom management  5x STS within age norms  Minimal edema  Ambulate without AD for 1000 ft  Increase FOTO score to 65

## 2021-09-15 ENCOUNTER — OFFICE VISIT (OUTPATIENT)
Dept: PHYSICAL THERAPY | Facility: REHABILITATION | Age: 66
End: 2021-09-15
Payer: COMMERCIAL

## 2021-09-15 DIAGNOSIS — M25.562 CHRONIC PAIN OF LEFT KNEE: ICD-10-CM

## 2021-09-15 DIAGNOSIS — Z98.890 STATUS POST SURGICAL MANIPULATION OF KNEE JOINT: ICD-10-CM

## 2021-09-15 DIAGNOSIS — M17.12 PRIMARY OSTEOARTHRITIS OF LEFT KNEE: ICD-10-CM

## 2021-09-15 DIAGNOSIS — G89.29 CHRONIC PAIN OF LEFT KNEE: ICD-10-CM

## 2021-09-15 DIAGNOSIS — Z96.652 STATUS POST TOTAL LEFT KNEE REPLACEMENT: Primary | ICD-10-CM

## 2021-09-15 PROCEDURE — 97530 THERAPEUTIC ACTIVITIES: CPT

## 2021-09-15 PROCEDURE — 97110 THERAPEUTIC EXERCISES: CPT

## 2021-09-15 PROCEDURE — 97140 MANUAL THERAPY 1/> REGIONS: CPT

## 2021-09-15 PROCEDURE — 97112 NEUROMUSCULAR REEDUCATION: CPT

## 2021-09-15 NOTE — PROGRESS NOTES
Daily Note     Today's date: 9/15/2021  Patient name: Onelia Elizondo  : 1955  MRN: [id number]  Referring provider: Atiya Boyd MD  Dx:   Encounter Diagnosis     ICD-10-CM    1  Status post total left knee replacement  Z96 652    2  Primary osteoarthritis of left knee  M17 12    3  Chronic pain of left knee  M25 562     G89 29    4  Status post surgical manipulation of knee joint  Z98 890                   Subjective: Patient reports calf is sore today  Objective: See treatment diary below      Assessment: Tolerated treatment well  Pt demonstrated some difficulty maintaining extension on SLR, notably when fatigued  Patient required verbal cueing throughout session for good quad contraction with long and short arcs, some VCs needed to prevent assist from B UE with STS  Patient demonstrated good passive extension mobility, moderate limitations in flexion  Patient exhibited good technique with therapeutic exercises and would benefit from continued PT  Patient IEP from 17:00 to 17:20  Patient 1:1 with PT Josafat Bar from 17:21 to 17:53  Plan: Continue per plan of care        Precautions: S/p TKA, WINIFRED    [id number]  Manuals 8/30 8/31 9/1 9/2 9/3 9/7 9/8 9/9 9/10 9/13 9/15   PROM 10' 10' 10 min  10', belly breathing 10' 10' 10' 5' 5' 5' SD 5'   Calf stretch           sd 3'                               Neuro Re-Ed              PNF techniques              SLR      2x10 2x10 2x10 2x10 2x10 2x10   Partial step up        2x10, 30s hold 2x10 30s hold 3x10 3x10                                                           Ther Ex              Bike Rocking 8' Rocking 10' Rocking 10' Rocking 10' Rocking 10' Rocking 10' Rocking  10' Rocking 10' Rocking 10' Rocking 10' Rocking 10'   SAQ 1/2 foam  x10 2x10 w/ tactile cueing 2x10 w/ TC  x10 2x10 2x10 2x10 2x10 3x10   Heel slide   2x10 OP 2x10 w/ OP 2x10 assist x10 assist 2x10 2x10 2x10 2x10 2x10 5" 3x10   Knee flex assist  3x10 30x x30 2x10 2x10 f/e ea np x20 f/e x20 f/e x30 f np   Quad set   10x w/ posteior knee tactile cueing 2x10 3x10 x10 2x10 x10 2x10  30x   LAQ   30x   x10 AAROM at end  2x10 AROM x10 with assist at end x10 2x10 3x10   Knee slide wall     10' HEP        Calf stretch         2x30s     HS/quad stretch     2x30s 2x30s ea 30"x3 ea HEP      Ther Activity              STS      2x10 2x10 2x10 3x10 2x10 foam, 1x0 3x10 foam                 Gait Training              Ambulation no AD 3' 100ft 3' 100 ft  5'  AD, VC  VC's [id number]   [id number] x100 ft                  Modalities              Ice      10' 10' 10' 10' 8' 8'                 Goals  In 4 weeks, patient will:  Be independent with home exercise program    AROM/PROM 115 deg  Good quad contraction  Step up    In 8 weeks, patient will:  Be independent in symptom management  5x STS within age norms  Minimal edema  Ambulate without AD for 1000 ft  Increase FOTO score to 65

## 2021-09-17 ENCOUNTER — EVALUATION (OUTPATIENT)
Dept: PHYSICAL THERAPY | Facility: REHABILITATION | Age: 66
End: 2021-09-17
Payer: COMMERCIAL

## 2021-09-17 DIAGNOSIS — M17.12 PRIMARY OSTEOARTHRITIS OF LEFT KNEE: ICD-10-CM

## 2021-09-17 DIAGNOSIS — Z96.652 STATUS POST TOTAL LEFT KNEE REPLACEMENT: Primary | ICD-10-CM

## 2021-09-17 DIAGNOSIS — M25.562 CHRONIC PAIN OF LEFT KNEE: ICD-10-CM

## 2021-09-17 DIAGNOSIS — Z98.890 STATUS POST SURGICAL MANIPULATION OF KNEE JOINT: ICD-10-CM

## 2021-09-17 DIAGNOSIS — G89.29 CHRONIC PAIN OF LEFT KNEE: ICD-10-CM

## 2021-09-17 PROCEDURE — 97112 NEUROMUSCULAR REEDUCATION: CPT

## 2021-09-17 PROCEDURE — 97110 THERAPEUTIC EXERCISES: CPT

## 2021-09-17 PROCEDURE — 97010 HOT OR COLD PACKS THERAPY: CPT

## 2021-09-17 PROCEDURE — 97140 MANUAL THERAPY 1/> REGIONS: CPT

## 2021-09-17 PROCEDURE — 97530 THERAPEUTIC ACTIVITIES: CPT

## 2021-09-17 PROCEDURE — 97164 PT RE-EVAL EST PLAN CARE: CPT

## 2021-09-17 NOTE — PROGRESS NOTES
Daily Note     Today's date: 2021  Patient name: Myron Gallo  : 1955  MRN: [id number]  Referring provider: Terry Velazquez MD  Dx:   Encounter Diagnosis     ICD-10-CM    1  Status post total left knee replacement  Z96 652    2  Primary osteoarthritis of left knee  M17 12    3  Chronic pain of left knee  M25 562     G89 29    4  Status post surgical manipulation of knee joint  Z98 890        Start Time: 9861  Stop Time: 1130  Total time in clinic (min): 59 minutes    Subjective: Patient is relieved her work leave went through  Had some hip pain last night, but it resolved this morning  She reports that she has been very stiff all morning, and was running late so she missed her morning HEP  She arrives to clinic using a [facility]  She has started to notice some improvements since her WINIFRED, but feels like she has not made a lot of progress this week, potentially due to fatigue  Objective: See treatment diary below  FOTO: 47     PROM: Goniometric measurement revealed the following findings  ROM Right Left   Flexion WFL 95 deg   Extension WFL 2 deg     Strength: MMT revealed the following findings  Joint Motion Right Left   Knee flexion 5/5 4/5   Knee extension 5/5 4-/5   Hip flexion 4-/5 4/5   Hip extension 4/5 3+/5   Hip Abduction 4/5 3+/5                 Assessment: Tolerated treatment well  Pt demonstrated some difficulty maintaining extension on SLR, notably when fatigued  Patient required some VCs needed to prevent assist from B UE with STS and tactile cues for good quad contraction with SAQ  Patient demonstrated good passive extension mobility, moderate limitations in flexion  She also presents with moderate limitations in strength of the knee and hip  Patient exhibited good technique with therapeutic exercises and would benefit from continued PT  She would benefit from 3 visits a week for 6 weeks to return to ADLs, improve gait mechanics, stairs and transfers for a total of 18 visits   1:1 with Una Carroll, PT, DPT for entirety of tx  Plan: Continue per plan of care        Precautions: S/p TKA, WINIFRED    [id number]  Manuals 9/1 9/2 9/3 9/7 9/8 9/9 9/10 9/13 9/15 9/17    PROM 10 min  10', belly breathing 10' 10' 10' 5' 5' 5' SD 5' IS 5'    Calf stretch         sd 3'                                 Neuro Re-Ed              PNF techniques              SLR    2x10 2x10 2x10 2x10 2x10 2x10 2x10    Partial step up      2x10, 30s hold 2x10 30s hold 3x10 3x10 3x10                                                            Ther Ex              Bike Rocking 10' Rocking 10' Rocking 10' Rocking 10' Rocking  10' Rocking 10' Rocking 10' Rocking 10' Rocking 10' Rocking 10'    SAQ 1/2 foam 2x10 w/ tactile cueing 2x10 w/ TC  x10 2x10 2x10 2x10 2x10 3x10 3x10    Heel slide  2x10 w/ OP 2x10 assist x10 assist 2x10 2x10 2x10 2x10 2x10 5" 3x10 3x10    Knee flex assist 30x x30 2x10 2x10 f/e ea np x20 f/e x20 f/e x30 f np     Quad set 10x w/ posteior knee tactile cueing 2x10 3x10 x10 2x10 x10 2x10  30x x30 w/ cues    LAQ 30x   x10 AAROM at end  2x10 AROM x10 with assist at end x10 2x10 3x10 2x10 1#    Knee slide wall   10' HEP          Calf stretch       2x30s       HS/quad stretch   2x30s 2x30s ea 30"x3 ea HEP        Ther Activity              STS    2x10 2x10 2x10 3x10 2x10 foam, 1x0 3x10 foam 3x10 foam                   Gait Training              Ambulation no AD  5'  AD, VC  VC's [id number]   [id number] x100 ft                    Modalities              Ice    10' 10' 10' 10' 8' 8' 10'                  Goals  In 4 weeks, patient will:  Be independent with home exercise program   MET  AROM/PROM 115 deg PROGRESSING  Good quad contraction PROGRESSING  Step up PROGRESSING    In 8 weeks, patient will:  Be independent in symptom management  5x STS within age norms PROGRESSING  Minimal edema  Ambulate without AD for 1000 ft  Increase FOTO score to 65 PROGRESSING

## 2021-09-20 ENCOUNTER — OFFICE VISIT (OUTPATIENT)
Dept: PHYSICAL THERAPY | Facility: REHABILITATION | Age: 66
End: 2021-09-20
Payer: COMMERCIAL

## 2021-09-20 DIAGNOSIS — M17.12 PRIMARY OSTEOARTHRITIS OF LEFT KNEE: ICD-10-CM

## 2021-09-20 DIAGNOSIS — Z98.890 STATUS POST SURGICAL MANIPULATION OF KNEE JOINT: ICD-10-CM

## 2021-09-20 DIAGNOSIS — G89.29 CHRONIC PAIN OF LEFT KNEE: ICD-10-CM

## 2021-09-20 DIAGNOSIS — M25.562 CHRONIC PAIN OF LEFT KNEE: ICD-10-CM

## 2021-09-20 DIAGNOSIS — Z96.652 STATUS POST TOTAL LEFT KNEE REPLACEMENT: Primary | ICD-10-CM

## 2021-09-20 PROCEDURE — 97110 THERAPEUTIC EXERCISES: CPT

## 2021-09-20 PROCEDURE — 97530 THERAPEUTIC ACTIVITIES: CPT

## 2021-09-20 PROCEDURE — 97112 NEUROMUSCULAR REEDUCATION: CPT

## 2021-09-20 PROCEDURE — 97116 GAIT TRAINING THERAPY: CPT

## 2021-09-22 ENCOUNTER — OFFICE VISIT (OUTPATIENT)
Dept: PHYSICAL THERAPY | Facility: REHABILITATION | Age: 66
End: 2021-09-22
Payer: COMMERCIAL

## 2021-09-22 DIAGNOSIS — Z96.652 STATUS POST TOTAL LEFT KNEE REPLACEMENT: Primary | ICD-10-CM

## 2021-09-22 DIAGNOSIS — M17.12 PRIMARY OSTEOARTHRITIS OF LEFT KNEE: ICD-10-CM

## 2021-09-22 DIAGNOSIS — G89.29 CHRONIC PAIN OF LEFT KNEE: ICD-10-CM

## 2021-09-22 DIAGNOSIS — Z98.890 STATUS POST SURGICAL MANIPULATION OF KNEE JOINT: ICD-10-CM

## 2021-09-22 DIAGNOSIS — M25.562 CHRONIC PAIN OF LEFT KNEE: ICD-10-CM

## 2021-09-22 PROCEDURE — 97110 THERAPEUTIC EXERCISES: CPT | Performed by: PHYSICAL THERAPIST

## 2021-09-22 PROCEDURE — 97140 MANUAL THERAPY 1/> REGIONS: CPT | Performed by: PHYSICAL THERAPIST

## 2021-09-22 PROCEDURE — 97530 THERAPEUTIC ACTIVITIES: CPT | Performed by: PHYSICAL THERAPIST

## 2021-09-22 NOTE — PROGRESS NOTES
Precautions: S/p TKA, WINIFRED    [id number]  Manuals 9/1 9/2 9/3 9/7 9/8 9/9 9/10 9/13 9/15 9/17 9/20 9/22   PROM 10 min  10', belly breathing 10' 10' 10' 5' 5' 5' SD 5' IS 5'  TB 15 min   Calf stretch         sd 3'                                    Neuro Re-Ed               PNF techniques               SLR    2x10 2x10 2x10 2x10 2x10 2x10 2x10 3x10 3x10   Partial step up      2x10, 30s hold 2x10 30s hold 3x10 3x10 3x10 3x10    Step up           x10 2"                                                 Ther Ex               Bike Rocking 10' Rocking 10' Rocking 10' Rocking 10' Rocking  10' Rocking 10' Rocking 10' Rocking 10' Rocking 10' Rocking 10' Rocking 10' Rocking 10'   SAQ 1/2 foam 2x10 w/ tactile cueing 2x10 w/ TC  x10 2x10 2x10 2x10 2x10 3x10 3x10 3x10 3x10   Heel slide  2x10 w/ OP 2x10 assist x10 assist 2x10 2x10 2x10 2x10 2x10 5" 3x10 3x10 4x10 4x10   Knee flex assist 30x x30 2x10 2x10 f/e ea np x20 f/e x20 f/e x30 f np      Quad set 10x w/ posteior knee tactile cueing 2x10 3x10 x10 2x10 x10 2x10  30x x30 w/ cues x30 x30   LAQ 30x   x10 AAROM at end  2x10 AROM x10 with assist at end x10 2x10 3x10 2x10 1# x30 1#    Knee slide wall   10' HEP           Calf stretch       2x30s        HS/quad stretch   2x30s 2x30s ea 30"x3 ea HEP         Ther Activity               STS    2x10 2x10 2x10 3x10 2x10 foam, 1x0 3x10 foam 3x10 foam  3x12 foam  3x10; blue TB foam; cueing for anterior weight shift   Step down w/ reverse step up            3x10; 4in; RUE assist; tactile cueing for anterior knee translation                                 Gait Training               Ambulation no AD  5'  AD, VC  VC's [id number]   [id number] x100 ft   VCs x100 ft                   Modalities               Ice    10' 10' 10' 10' 8' 8' 10'                    Goals  In 4 weeks, patient will:  Be independent with home exercise program   MET  AROM/PROM 115 deg PROGRESSING  Good quad contraction PROGRESSING  Step up PROGRESSING    In 8 weeks, patient will:  Be independent in symptom management  5x STS within age norms PROGRESSING  Minimal edema  Ambulate without AD for 1000 ft  Increase FOTO score to 65 PROGRESSING

## 2021-09-23 DIAGNOSIS — M79.7 FIBROMYALGIA: ICD-10-CM

## 2021-09-23 DIAGNOSIS — F41.8 DEPRESSION WITH ANXIETY: ICD-10-CM

## 2021-09-23 RX ORDER — VENLAFAXINE HYDROCHLORIDE 75 MG/1
75 CAPSULE, EXTENDED RELEASE ORAL
Qty: 90 CAPSULE | Refills: 1 | Status: SHIPPED | OUTPATIENT
Start: 2021-09-23 | End: 2022-03-25 | Stop reason: SDUPTHER

## 2021-09-24 ENCOUNTER — OFFICE VISIT (OUTPATIENT)
Dept: PHYSICAL THERAPY | Facility: REHABILITATION | Age: 66
End: 2021-09-24
Payer: COMMERCIAL

## 2021-09-24 DIAGNOSIS — Z98.890 STATUS POST SURGICAL MANIPULATION OF KNEE JOINT: ICD-10-CM

## 2021-09-24 DIAGNOSIS — G89.29 CHRONIC PAIN OF LEFT KNEE: ICD-10-CM

## 2021-09-24 DIAGNOSIS — Z96.652 STATUS POST TOTAL LEFT KNEE REPLACEMENT: Primary | ICD-10-CM

## 2021-09-24 DIAGNOSIS — M25.562 CHRONIC PAIN OF LEFT KNEE: ICD-10-CM

## 2021-09-24 DIAGNOSIS — M17.12 PRIMARY OSTEOARTHRITIS OF LEFT KNEE: ICD-10-CM

## 2021-09-24 PROCEDURE — 97530 THERAPEUTIC ACTIVITIES: CPT

## 2021-09-24 PROCEDURE — 97140 MANUAL THERAPY 1/> REGIONS: CPT

## 2021-09-24 PROCEDURE — 97110 THERAPEUTIC EXERCISES: CPT

## 2021-09-24 PROCEDURE — 97010 HOT OR COLD PACKS THERAPY: CPT

## 2021-09-24 PROCEDURE — 97112 NEUROMUSCULAR REEDUCATION: CPT

## 2021-09-24 NOTE — PROGRESS NOTES
Daily Note     Today's date: 2021  Patient name: Myron Gallo  : 1955  MRN: [id number]  Referring provider: Alma Martinez  Dx:   Encounter Diagnosis     ICD-10-CM    1  Status post total left knee replacement  Z96 652    2  Primary osteoarthritis of left knee  M17 12    3  Chronic pain of left knee  M25 562     G89 29    4  Status post surgical manipulation of knee joint  Z98 890        Start Time:   Stop Time: 112  Total time in clinic (min): 55 minutes    Subjective: Patient reports that she is in a decent amount of pain in the posterior aspect of the leg since she pushed herself yesterday in her HEP  She feels she is walking poorer today secondary to the pain  Objective: See treatment diary below  Knee PROM flexion (self OP): 101 deg p! Knee PROM flexion (therapist OP): 105 deg p! Assessment: Continued manual OP discussion today  Patient is agreeable to therapist provided knee flexion OP today for 5 second bouts  Patient demonstrates fair tolerance to OP, reporting significant pain and required a lot of encouragement with her progress  Patient continues to responds well to cueing for anterior trunk lean to promote fwd weight shift to achieve greater eccentric control with STS transfers, she carried it over independently  Plan: Continue per plan of care        Precautions: S/p TKA, WINIFRED    [id number]  Manuals 9/7 9/8 9/9 9/10 9/13 9/15 9/17 9/20 9/22 9/24   PROM 10' 10' 5' 5' 5' SD 5' IS 5'  TB 15 min IS 10'   Calf stretch      sd 3'                                 Neuro Re-Ed             PNF techniques             SLR 2x10 2x10 2x10 2x10 2x10 2x10 2x10 3x10 3x10 2x15   Partial step up   2x10, 30s hold 2x10 30s hold 3x10 3x10 3x10 3x10     Step up        x10 2"  x10 4" 2x10 8"                                          Ther Ex             Bike Rocking 10' Rocking  10' Rocking 10' Rocking 10' Rocking 10' Rocking 10' Rocking 10' Rocking 10' Rocking 10' Rocking 10'   SAQ  foam x10 2x10 2x10 2x10 2x10 3x10 3x10 3x10 3x10 3x10   Heel slide  2x10 2x10 2x10 2x10 2x10 5" 3x10 3x10 4x10 4x10 4x10   Knee flex assist 2x10 f/e ea np x20 f/e x20 f/e x30 f np       Quad set x10 2x10 x10 2x10  30x x30 w/ cues x30 x30 HEP   LAQ x10 AAROM at end  2x10 AROM x10 with assist at end x10 2x10 3x10 2x10 1# x30 1#     Knee slide wall HEP            Calf stretch    2x30s         HS/quad stretch 2x30s ea 30"x3 ea HEP          Ther Activity             STS 2x10 2x10 2x10 3x10 2x10 foam, 1x0 3x10 foam 3x10 foam  3x12 foam  3x10; blue TB foam; cueing for anterior weight shift 3x10 blue TB foam; cueing for anterior weight shift   Step down w/ reverse step up         3x10; 4in; RUE assist; tactile cueing for anterior knee translation                              Gait Training             Ambulation no AD VC's [id number]   [id number] x100 ft   VCs x100 ft                  Modalities             Ice 10' 10' 10' 10' 8' 8' 10'   10'                Goals  In 4 weeks, patient will:  Be independent with home exercise program   MET  AROM/PROM 115 deg PROGRESSING  Good quad contraction PROGRESSING  Step up PROGRESSING    In 8 weeks, patient will:  Be independent in symptom management  5x STS within age norms PROGRESSING  Minimal edema  Ambulate without AD for 1000 ft  Increase FOTO score to 65 PROGRESSING

## 2021-09-27 ENCOUNTER — OFFICE VISIT (OUTPATIENT)
Dept: PHYSICAL THERAPY | Facility: REHABILITATION | Age: 66
End: 2021-09-27
Payer: COMMERCIAL

## 2021-09-27 DIAGNOSIS — G89.29 CHRONIC PAIN OF LEFT KNEE: ICD-10-CM

## 2021-09-27 DIAGNOSIS — M25.562 CHRONIC PAIN OF LEFT KNEE: ICD-10-CM

## 2021-09-27 DIAGNOSIS — Z96.652 STATUS POST TOTAL LEFT KNEE REPLACEMENT: Primary | ICD-10-CM

## 2021-09-27 DIAGNOSIS — Z98.890 STATUS POST SURGICAL MANIPULATION OF KNEE JOINT: ICD-10-CM

## 2021-09-27 DIAGNOSIS — M17.12 PRIMARY OSTEOARTHRITIS OF LEFT KNEE: ICD-10-CM

## 2021-09-27 PROCEDURE — 97112 NEUROMUSCULAR REEDUCATION: CPT

## 2021-09-27 PROCEDURE — 97010 HOT OR COLD PACKS THERAPY: CPT

## 2021-09-27 PROCEDURE — 97140 MANUAL THERAPY 1/> REGIONS: CPT

## 2021-09-27 PROCEDURE — 97110 THERAPEUTIC EXERCISES: CPT

## 2021-09-27 PROCEDURE — 97530 THERAPEUTIC ACTIVITIES: CPT

## 2021-09-27 NOTE — PROGRESS NOTES
Daily Note     Today's date: 2021  Patient name: Idris Salvador  : 1955  MRN: [id number]  Referring provider: Jonny Jeans  Dx:   Encounter Diagnosis     ICD-10-CM    1  Status post total left knee replacement  Z96 652    2  Primary osteoarthritis of left knee  M17 12    3  Chronic pain of left knee  M25 562     G89 29    4  Status post surgical manipulation of knee joint  Z98 890        Start Time: 0915  Stop Time: 1020  Total time in clinic (min): 65 minutes    Subjective: Patient feels stiff in the morning, wants to know if this is normal       Objective: See treatment diary below  Knee PROM flexion (self OP): 100 deg   Knee PROM flexion (therapist OP): 108 deg p! Assessment: Continued manual OP discussion today  Patient is agreeable to therapist provided knee flexion OP today for 5 second bouts  Patient demonstrates fair tolerance to OP, reporting significant pain and required a lot of encouragement with her progress  SLR has reduced quad lag, still very minor  1:1 with Porsha Bautista, PT, DPT for entirety of tx  Plan: Continue per plan of care        Precautions: S/p TKA, WINIFRED    [id number]  Manuals 9/8 9/9 9/10 9/13 9/15 9/17 9/20 9/22 9/24 9/27   PROM 10' 5' 5' 5' SD 5' IS 5'  TB 15 min IS 10' IS 10'   Calf stretch     sd 3'                                  Neuro Re-Ed             PNF techniques             SLR 2x10 2x10 2x10 2x10 2x10 2x10 3x10 3x10 2x15 3x10   Partial step up  2x10, 30s hold 2x10 30s hold 3x10 3x10 3x10 3x10      Step up       x10 2"  x10 4" 2x10 8" 3x10 6"                                          Ther Ex             Bike Rocking  10' Rocking 10' Rocking 10' Rocking 10' Rocking 10' Rocking 10' Rocking 10' Rocking 10' Rocking 10' Rocking 10'   SAQ 1/2 foam 2x10 2x10 2x10 2x10 3x10 3x10 3x10 3x10 3x10 3x10   Heel slide  2x10 2x10 2x10 2x10 5" 3x10 3x10 4x10 4x10 4x10 4x10   Knee flex assist np x20 f/e x20 f/e x30 f np        Quad set 2x10 x10 2x10  30x x30 w/ cues x30 x30 HEP    LAQ 2x10 AROM x10 with assist at end x10 2x10 3x10 2x10 1# x30 1#   x30 1#   Calf stretch   2x30s          HS/quad stretch 30"x3 ea HEP           Ther Activity             STS 2x10 2x10 3x10 2x10 foam, 1x0 3x10 foam 3x10 foam  3x12 foam  3x10; blue TB foam; VC for anterior weight shift 3x10 blue TB foam; VC for anterior weight shift 3x10 blue TB foam   Step down w/ reverse step up        3x10; 4in; RUE assist; TC  x10 6"                             Gait Training             Ambulation no AD   [id number] x100 ft   VCs x100 ft                   Modalities             Ice 10' 10' 10' 8' 8' 10'   10' 10'                Goals  In 4 weeks, patient will:  Be independent with home exercise program   MET  AROM/PROM 115 deg PROGRESSING  Good quad contraction PROGRESSING  Step up PROGRESSING    In 8 weeks, patient will:  Be independent in symptom management  5x STS within age norms PROGRESSING  Minimal edema  Ambulate without AD for 1000 ft  Increase FOTO score to 65 PROGRESSING

## 2021-09-28 ENCOUNTER — TELEPHONE (OUTPATIENT)
Dept: OBGYN CLINIC | Facility: HOSPITAL | Age: 66
End: 2021-09-28

## 2021-09-28 NOTE — TELEPHONE ENCOUNTER
Dr Sammy Tadeo    Patient called for refill   # [phone]    traMADol Valeriano Rang) 50 mg tablet [[id number]]     Order Details  [address] [address]   Phone:  [phone]  Fax:  [phone]

## 2021-09-29 ENCOUNTER — APPOINTMENT (OUTPATIENT)
Dept: PHYSICAL THERAPY | Facility: REHABILITATION | Age: 66
End: 2021-09-29
Payer: COMMERCIAL

## 2021-09-30 ENCOUNTER — OFFICE VISIT (OUTPATIENT)
Dept: PHYSICAL THERAPY | Facility: REHABILITATION | Age: 66
End: 2021-09-30
Payer: COMMERCIAL

## 2021-09-30 DIAGNOSIS — Z98.890 STATUS POST SURGICAL MANIPULATION OF KNEE JOINT: ICD-10-CM

## 2021-09-30 DIAGNOSIS — M17.12 PRIMARY OSTEOARTHRITIS OF LEFT KNEE: ICD-10-CM

## 2021-09-30 DIAGNOSIS — Z96.652 STATUS POST TOTAL LEFT KNEE REPLACEMENT: Primary | ICD-10-CM

## 2021-09-30 DIAGNOSIS — G89.29 CHRONIC PAIN OF LEFT KNEE: ICD-10-CM

## 2021-09-30 DIAGNOSIS — M25.562 CHRONIC PAIN OF LEFT KNEE: ICD-10-CM

## 2021-09-30 PROCEDURE — 97110 THERAPEUTIC EXERCISES: CPT

## 2021-09-30 PROCEDURE — 97530 THERAPEUTIC ACTIVITIES: CPT

## 2021-09-30 PROCEDURE — 97010 HOT OR COLD PACKS THERAPY: CPT

## 2021-09-30 PROCEDURE — 97116 GAIT TRAINING THERAPY: CPT

## 2021-09-30 PROCEDURE — 97112 NEUROMUSCULAR REEDUCATION: CPT

## 2021-09-30 NOTE — PROGRESS NOTES
Daily Note     Today's date: 2021  Patient name: Garrett Doty  : 1955  MRN: [id number]  Referring provider: Rayo Neumann  Dx:   Encounter Diagnosis     ICD-10-CM    1  Status post total left knee replacement  Z96 652    2  Primary osteoarthritis of left knee  M17 12    3  Chronic pain of left knee  M25 562     G89 29    4  Status post surgical manipulation of knee joint  Z98 890        Start Time:   Stop Time: 146  Total time in clinic (min): 55 minutes    Subjective: Patient reports today was lousy and she does not feel like she is walking well  Objective: See treatment diary below      Assessment: Tolerated treatment well  No lag with SLR, maintained ROM from previous session  Patient would benefit from continued PT   1:1 with Dileep Chahal, PT, DPT for entirety of tx  Plan: Continue per plan of care        Precautions: S/p TKA, WINIFRED    [id number]  Manuals 9/9 9/10 9/13 9/15 9/17 9/20 9/22 9/24 9/27 9/30   PROM 5' 5' 5' SD 5' IS 5'  TB 15 min IS 10' IS 10'    Calf stretch    sd 3'                                   Neuro Re-Ed             SLR 2x10 2x10 2x10 2x10 2x10 3x10 3x10 2x15 3x10 3x10   Partial step up 2x10, 30s hold 2x10 30s hold 3x10 3x10 3x10 3x10       Step up      x10 2"  x10 4" 2x10 8" 3x10 6" 3x10 6"   Hamstring curls                                       Ther Ex             Bike Rocking 10' Rocking 10' Rocking 10' Rocking 10' Rocking 10' Rocking 10' Rocking 10' Rocking 10' Rocking 10' Rocking 10'   SAQ 1/2 foam 2x10 2x10 2x10 3x10 3x10 3x10 3x10 3x10 3x10 3x10   Heel slide  2x10 2x10 2x10 5" 3x10 3x10 4x10 4x10 4x10 4x10 4x10   Knee flex assist x20 f/e x20 f/e x30 f np         Quad set x10 2x10  30x x30 w/ cues x30 x30 HEP     LAQ x10 with assist at end x10 2x10 3x10 2x10 1# x30 1#   x30 1# x30 1#   Calf stretch  2x30s           Ther Activity             STS 2x10 3x10 2x10 foam, 1x0 3x10 foam 3x10 foam  3x12 foam  3x10; blue TB foam; VC for anterior weight shift 3x10 blue TB foam; VC for anterior weight shift 3x10 blue TB foam 3x10 blue tb foam   Step down w/ reverse step up       3x10; 4in; RUE assist; TC  x10 6" x10 6"                             Gait Training             Ambulation no AD  [id number] x100 ft   VCs x100 ft                    Modalities             Ice 10' 10' 8' 8' 10'   10' 10' 5'                Goals  In 4 weeks, patient will:  Be independent with home exercise program   MET  AROM/PROM 115 deg PROGRESSING  Good quad contraction MET  Step up MET    In 8 weeks, patient will:  Be independent in symptom management  5x STS within age norms PROGRESSING  Minimal edema  Ambulate without AD for 1000 ft  Increase FOTO score to 65 PROGRESSING

## 2021-10-01 ENCOUNTER — OFFICE VISIT (OUTPATIENT)
Dept: PHYSICAL THERAPY | Facility: REHABILITATION | Age: 66
End: 2021-10-01
Payer: COMMERCIAL

## 2021-10-01 DIAGNOSIS — M25.562 CHRONIC PAIN OF LEFT KNEE: ICD-10-CM

## 2021-10-01 DIAGNOSIS — Z96.652 STATUS POST TOTAL LEFT KNEE REPLACEMENT: Primary | ICD-10-CM

## 2021-10-01 DIAGNOSIS — M17.12 PRIMARY OSTEOARTHRITIS OF LEFT KNEE: ICD-10-CM

## 2021-10-01 DIAGNOSIS — Z98.890 STATUS POST SURGICAL MANIPULATION OF KNEE JOINT: ICD-10-CM

## 2021-10-01 DIAGNOSIS — G89.29 CHRONIC PAIN OF LEFT KNEE: ICD-10-CM

## 2021-10-01 PROCEDURE — 97112 NEUROMUSCULAR REEDUCATION: CPT

## 2021-10-01 PROCEDURE — 97140 MANUAL THERAPY 1/> REGIONS: CPT

## 2021-10-01 PROCEDURE — 97010 HOT OR COLD PACKS THERAPY: CPT

## 2021-10-01 PROCEDURE — 97110 THERAPEUTIC EXERCISES: CPT

## 2021-10-01 PROCEDURE — 97116 GAIT TRAINING THERAPY: CPT

## 2021-10-01 PROCEDURE — 97530 THERAPEUTIC ACTIVITIES: CPT

## 2021-10-04 ENCOUNTER — OFFICE VISIT (OUTPATIENT)
Dept: PHYSICAL THERAPY | Facility: REHABILITATION | Age: 66
End: 2021-10-04
Payer: COMMERCIAL

## 2021-10-04 DIAGNOSIS — Z96.652 STATUS POST TOTAL LEFT KNEE REPLACEMENT: Primary | ICD-10-CM

## 2021-10-04 DIAGNOSIS — Z98.890 STATUS POST SURGICAL MANIPULATION OF KNEE JOINT: ICD-10-CM

## 2021-10-04 DIAGNOSIS — M25.562 CHRONIC PAIN OF LEFT KNEE: ICD-10-CM

## 2021-10-04 DIAGNOSIS — G89.29 CHRONIC PAIN OF LEFT KNEE: ICD-10-CM

## 2021-10-04 DIAGNOSIS — M17.12 PRIMARY OSTEOARTHRITIS OF LEFT KNEE: ICD-10-CM

## 2021-10-04 PROCEDURE — 97530 THERAPEUTIC ACTIVITIES: CPT

## 2021-10-04 PROCEDURE — 97110 THERAPEUTIC EXERCISES: CPT

## 2021-10-04 PROCEDURE — 97116 GAIT TRAINING THERAPY: CPT

## 2021-10-04 PROCEDURE — 97112 NEUROMUSCULAR REEDUCATION: CPT

## 2021-10-04 PROCEDURE — 97140 MANUAL THERAPY 1/> REGIONS: CPT

## 2021-10-05 ENCOUNTER — OFFICE VISIT (OUTPATIENT)
Dept: PHYSICAL THERAPY | Facility: REHABILITATION | Age: 66
End: 2021-10-05
Payer: COMMERCIAL

## 2021-10-05 DIAGNOSIS — M17.12 PRIMARY OSTEOARTHRITIS OF LEFT KNEE: ICD-10-CM

## 2021-10-05 DIAGNOSIS — Z96.652 STATUS POST TOTAL LEFT KNEE REPLACEMENT: Primary | ICD-10-CM

## 2021-10-05 DIAGNOSIS — M25.562 CHRONIC PAIN OF LEFT KNEE: ICD-10-CM

## 2021-10-05 DIAGNOSIS — G89.29 CHRONIC PAIN OF LEFT KNEE: ICD-10-CM

## 2021-10-05 DIAGNOSIS — Z98.890 STATUS POST SURGICAL MANIPULATION OF KNEE JOINT: ICD-10-CM

## 2021-10-05 PROCEDURE — 97110 THERAPEUTIC EXERCISES: CPT

## 2021-10-05 PROCEDURE — 97140 MANUAL THERAPY 1/> REGIONS: CPT

## 2021-10-05 PROCEDURE — 97530 THERAPEUTIC ACTIVITIES: CPT

## 2021-10-05 PROCEDURE — 97112 NEUROMUSCULAR REEDUCATION: CPT

## 2021-10-06 ENCOUNTER — APPOINTMENT (OUTPATIENT)
Dept: PHYSICAL THERAPY | Facility: REHABILITATION | Age: 66
End: 2021-10-06
Payer: COMMERCIAL

## 2021-10-07 ENCOUNTER — HOSPITAL ENCOUNTER (OUTPATIENT)
Dept: RADIOLOGY | Facility: HOSPITAL | Age: 66
Discharge: HOME | End: 2021-10-07
Attending: ORTHOPAEDIC SURGERY
Payer: COMMERCIAL

## 2021-10-07 ENCOUNTER — OFFICE VISIT (OUTPATIENT)
Dept: OBGYN CLINIC | Facility: HOSPITAL | Age: 66
End: 2021-10-07

## 2021-10-07 VITALS
HEART RATE: 64 BPM | BODY MASS INDEX: 26.75 KG/M2 | DIASTOLIC BLOOD PRESSURE: 83 MMHG | HEIGHT: 63 IN | SYSTOLIC BLOOD PRESSURE: 130 MMHG

## 2021-10-07 DIAGNOSIS — T84.82XS ARTHROFIBROSIS OF TOTAL KNEE REPLACEMENT, SEQUELA: ICD-10-CM

## 2021-10-07 DIAGNOSIS — Z47.1 AFTERCARE FOLLOWING LEFT KNEE JOINT REPLACEMENT SURGERY: Primary | ICD-10-CM

## 2021-10-07 DIAGNOSIS — Z96.652 AFTERCARE FOLLOWING LEFT KNEE JOINT REPLACEMENT SURGERY: ICD-10-CM

## 2021-10-07 DIAGNOSIS — Z47.1 AFTERCARE FOLLOWING LEFT KNEE JOINT REPLACEMENT SURGERY: ICD-10-CM

## 2021-10-07 DIAGNOSIS — Z96.652 AFTERCARE FOLLOWING LEFT KNEE JOINT REPLACEMENT SURGERY: Primary | ICD-10-CM

## 2021-10-07 PROCEDURE — 99024 POSTOP FOLLOW-UP VISIT: CPT | Performed by: ORTHOPAEDIC SURGERY

## 2021-10-07 PROCEDURE — 73560 X-RAY EXAM OF KNEE 1 OR 2: CPT

## 2021-10-08 ENCOUNTER — OFFICE VISIT (OUTPATIENT)
Dept: PHYSICAL THERAPY | Facility: REHABILITATION | Age: 66
End: 2021-10-08
Payer: COMMERCIAL

## 2021-10-08 DIAGNOSIS — Z98.890 STATUS POST SURGICAL MANIPULATION OF KNEE JOINT: ICD-10-CM

## 2021-10-08 DIAGNOSIS — Z96.652 STATUS POST TOTAL LEFT KNEE REPLACEMENT: Primary | ICD-10-CM

## 2021-10-08 DIAGNOSIS — G89.29 CHRONIC PAIN OF LEFT KNEE: ICD-10-CM

## 2021-10-08 DIAGNOSIS — M17.12 PRIMARY OSTEOARTHRITIS OF LEFT KNEE: ICD-10-CM

## 2021-10-08 DIAGNOSIS — M25.562 CHRONIC PAIN OF LEFT KNEE: ICD-10-CM

## 2021-10-08 PROCEDURE — 97110 THERAPEUTIC EXERCISES: CPT

## 2021-10-08 PROCEDURE — 97116 GAIT TRAINING THERAPY: CPT

## 2021-10-08 PROCEDURE — 97140 MANUAL THERAPY 1/> REGIONS: CPT

## 2021-10-08 PROCEDURE — 97112 NEUROMUSCULAR REEDUCATION: CPT

## 2021-10-08 PROCEDURE — 97530 THERAPEUTIC ACTIVITIES: CPT

## 2021-10-08 PROCEDURE — 97010 HOT OR COLD PACKS THERAPY: CPT

## 2021-10-11 ENCOUNTER — APPOINTMENT (OUTPATIENT)
Dept: PHYSICAL THERAPY | Facility: REHABILITATION | Age: 66
End: 2021-10-11
Payer: COMMERCIAL

## 2021-10-12 ENCOUNTER — EVALUATION (OUTPATIENT)
Dept: PHYSICAL THERAPY | Facility: REHABILITATION | Age: 66
End: 2021-10-12
Payer: COMMERCIAL

## 2021-10-12 DIAGNOSIS — Z98.890 STATUS POST SURGICAL MANIPULATION OF KNEE JOINT: ICD-10-CM

## 2021-10-12 DIAGNOSIS — M25.562 CHRONIC PAIN OF LEFT KNEE: ICD-10-CM

## 2021-10-12 DIAGNOSIS — G89.29 CHRONIC PAIN OF LEFT KNEE: ICD-10-CM

## 2021-10-12 DIAGNOSIS — Z96.652 STATUS POST TOTAL LEFT KNEE REPLACEMENT: Primary | ICD-10-CM

## 2021-10-12 DIAGNOSIS — M17.12 PRIMARY OSTEOARTHRITIS OF LEFT KNEE: ICD-10-CM

## 2021-10-12 PROCEDURE — 97110 THERAPEUTIC EXERCISES: CPT

## 2021-10-12 PROCEDURE — 97010 HOT OR COLD PACKS THERAPY: CPT

## 2021-10-12 PROCEDURE — 97112 NEUROMUSCULAR REEDUCATION: CPT

## 2021-10-12 PROCEDURE — 97140 MANUAL THERAPY 1/> REGIONS: CPT

## 2021-10-12 PROCEDURE — 97164 PT RE-EVAL EST PLAN CARE: CPT

## 2021-10-13 ENCOUNTER — APPOINTMENT (OUTPATIENT)
Dept: PHYSICAL THERAPY | Facility: REHABILITATION | Age: 66
End: 2021-10-13
Payer: COMMERCIAL

## 2021-10-15 ENCOUNTER — OFFICE VISIT (OUTPATIENT)
Dept: PHYSICAL THERAPY | Facility: REHABILITATION | Age: 66
End: 2021-10-15
Payer: COMMERCIAL

## 2021-10-15 DIAGNOSIS — Z98.890 STATUS POST SURGICAL MANIPULATION OF KNEE JOINT: ICD-10-CM

## 2021-10-15 DIAGNOSIS — M25.562 CHRONIC PAIN OF LEFT KNEE: ICD-10-CM

## 2021-10-15 DIAGNOSIS — Z96.652 STATUS POST TOTAL LEFT KNEE REPLACEMENT: Primary | ICD-10-CM

## 2021-10-15 DIAGNOSIS — G89.29 CHRONIC PAIN OF LEFT KNEE: ICD-10-CM

## 2021-10-15 DIAGNOSIS — M17.12 PRIMARY OSTEOARTHRITIS OF LEFT KNEE: ICD-10-CM

## 2021-10-15 PROCEDURE — 97140 MANUAL THERAPY 1/> REGIONS: CPT

## 2021-10-15 PROCEDURE — 97530 THERAPEUTIC ACTIVITIES: CPT

## 2021-10-15 PROCEDURE — 97110 THERAPEUTIC EXERCISES: CPT

## 2021-10-15 PROCEDURE — 97112 NEUROMUSCULAR REEDUCATION: CPT

## 2021-10-18 ENCOUNTER — OFFICE VISIT (OUTPATIENT)
Dept: PHYSICAL THERAPY | Facility: REHABILITATION | Age: 66
End: 2021-10-18
Payer: COMMERCIAL

## 2021-10-18 DIAGNOSIS — M25.562 CHRONIC PAIN OF LEFT KNEE: ICD-10-CM

## 2021-10-18 DIAGNOSIS — G89.29 CHRONIC PAIN OF LEFT KNEE: ICD-10-CM

## 2021-10-18 DIAGNOSIS — M17.12 PRIMARY OSTEOARTHRITIS OF LEFT KNEE: ICD-10-CM

## 2021-10-18 DIAGNOSIS — Z96.652 STATUS POST TOTAL LEFT KNEE REPLACEMENT: Primary | ICD-10-CM

## 2021-10-18 DIAGNOSIS — Z98.890 STATUS POST SURGICAL MANIPULATION OF KNEE JOINT: ICD-10-CM

## 2021-10-18 PROCEDURE — 97112 NEUROMUSCULAR REEDUCATION: CPT

## 2021-10-18 PROCEDURE — 97140 MANUAL THERAPY 1/> REGIONS: CPT

## 2021-10-18 PROCEDURE — 97530 THERAPEUTIC ACTIVITIES: CPT

## 2021-10-18 PROCEDURE — 97110 THERAPEUTIC EXERCISES: CPT

## 2021-10-19 ENCOUNTER — APPOINTMENT (OUTPATIENT)
Dept: PHYSICAL THERAPY | Facility: REHABILITATION | Age: 66
End: 2021-10-19
Payer: COMMERCIAL

## 2021-10-20 ENCOUNTER — APPOINTMENT (OUTPATIENT)
Dept: PHYSICAL THERAPY | Facility: REHABILITATION | Age: 66
End: 2021-10-20
Payer: COMMERCIAL

## 2021-10-22 ENCOUNTER — OFFICE VISIT (OUTPATIENT)
Dept: PHYSICAL THERAPY | Facility: REHABILITATION | Age: 66
End: 2021-10-22
Payer: COMMERCIAL

## 2021-10-22 DIAGNOSIS — M17.12 PRIMARY OSTEOARTHRITIS OF LEFT KNEE: ICD-10-CM

## 2021-10-22 DIAGNOSIS — Z98.890 STATUS POST SURGICAL MANIPULATION OF KNEE JOINT: ICD-10-CM

## 2021-10-22 DIAGNOSIS — G89.29 CHRONIC PAIN OF LEFT KNEE: ICD-10-CM

## 2021-10-22 DIAGNOSIS — M25.562 CHRONIC PAIN OF LEFT KNEE: ICD-10-CM

## 2021-10-22 DIAGNOSIS — Z96.652 STATUS POST TOTAL LEFT KNEE REPLACEMENT: Primary | ICD-10-CM

## 2021-10-22 PROCEDURE — 97110 THERAPEUTIC EXERCISES: CPT

## 2021-10-22 PROCEDURE — 97116 GAIT TRAINING THERAPY: CPT

## 2021-10-22 PROCEDURE — 97530 THERAPEUTIC ACTIVITIES: CPT

## 2021-10-22 PROCEDURE — 97112 NEUROMUSCULAR REEDUCATION: CPT

## 2021-10-25 ENCOUNTER — APPOINTMENT (OUTPATIENT)
Dept: PHYSICAL THERAPY | Facility: REHABILITATION | Age: 66
End: 2021-10-25
Payer: COMMERCIAL

## 2021-10-26 ENCOUNTER — APPOINTMENT (OUTPATIENT)
Dept: PHYSICAL THERAPY | Facility: REHABILITATION | Age: 66
End: 2021-10-26
Payer: COMMERCIAL

## 2021-10-27 ENCOUNTER — APPOINTMENT (OUTPATIENT)
Dept: PHYSICAL THERAPY | Facility: REHABILITATION | Age: 66
End: 2021-10-27
Payer: COMMERCIAL

## 2021-10-29 ENCOUNTER — OFFICE VISIT (OUTPATIENT)
Dept: PHYSICAL THERAPY | Facility: REHABILITATION | Age: 66
End: 2021-10-29
Payer: COMMERCIAL

## 2021-10-29 DIAGNOSIS — Z96.652 STATUS POST TOTAL LEFT KNEE REPLACEMENT: Primary | ICD-10-CM

## 2021-10-29 DIAGNOSIS — M25.562 CHRONIC PAIN OF LEFT KNEE: ICD-10-CM

## 2021-10-29 DIAGNOSIS — G89.29 CHRONIC PAIN OF LEFT KNEE: ICD-10-CM

## 2021-10-29 DIAGNOSIS — M17.12 PRIMARY OSTEOARTHRITIS OF LEFT KNEE: ICD-10-CM

## 2021-10-29 DIAGNOSIS — Z98.890 STATUS POST SURGICAL MANIPULATION OF KNEE JOINT: ICD-10-CM

## 2021-10-29 PROCEDURE — 97530 THERAPEUTIC ACTIVITIES: CPT

## 2021-10-29 PROCEDURE — 97112 NEUROMUSCULAR REEDUCATION: CPT

## 2021-10-29 PROCEDURE — 97116 GAIT TRAINING THERAPY: CPT

## 2021-10-29 PROCEDURE — 97110 THERAPEUTIC EXERCISES: CPT

## 2021-11-02 ENCOUNTER — OFFICE VISIT (OUTPATIENT)
Dept: PHYSICAL THERAPY | Facility: REHABILITATION | Age: 66
End: 2021-11-02
Payer: COMMERCIAL

## 2021-11-02 DIAGNOSIS — M17.12 PRIMARY OSTEOARTHRITIS OF LEFT KNEE: ICD-10-CM

## 2021-11-02 DIAGNOSIS — M25.562 CHRONIC PAIN OF LEFT KNEE: ICD-10-CM

## 2021-11-02 DIAGNOSIS — G89.29 CHRONIC PAIN OF LEFT KNEE: ICD-10-CM

## 2021-11-02 DIAGNOSIS — Z98.890 STATUS POST SURGICAL MANIPULATION OF KNEE JOINT: ICD-10-CM

## 2021-11-02 DIAGNOSIS — Z96.652 STATUS POST TOTAL LEFT KNEE REPLACEMENT: Primary | ICD-10-CM

## 2021-11-02 PROCEDURE — 97112 NEUROMUSCULAR REEDUCATION: CPT

## 2021-11-02 PROCEDURE — 97140 MANUAL THERAPY 1/> REGIONS: CPT

## 2021-11-02 PROCEDURE — 97530 THERAPEUTIC ACTIVITIES: CPT

## 2021-11-02 PROCEDURE — 97110 THERAPEUTIC EXERCISES: CPT

## 2021-11-04 ENCOUNTER — APPOINTMENT (OUTPATIENT)
Dept: PHYSICAL THERAPY | Facility: REHABILITATION | Age: 66
End: 2021-11-04
Payer: COMMERCIAL

## 2021-11-05 ENCOUNTER — OFFICE VISIT (OUTPATIENT)
Dept: PHYSICAL THERAPY | Facility: REHABILITATION | Age: 66
End: 2021-11-05
Payer: COMMERCIAL

## 2021-11-05 DIAGNOSIS — G89.29 CHRONIC PAIN OF LEFT KNEE: ICD-10-CM

## 2021-11-05 DIAGNOSIS — Z96.652 STATUS POST TOTAL LEFT KNEE REPLACEMENT: Primary | ICD-10-CM

## 2021-11-05 DIAGNOSIS — M25.562 CHRONIC PAIN OF LEFT KNEE: ICD-10-CM

## 2021-11-05 DIAGNOSIS — M17.12 PRIMARY OSTEOARTHRITIS OF LEFT KNEE: ICD-10-CM

## 2021-11-05 DIAGNOSIS — Z98.890 STATUS POST SURGICAL MANIPULATION OF KNEE JOINT: ICD-10-CM

## 2021-11-05 PROCEDURE — 97530 THERAPEUTIC ACTIVITIES: CPT

## 2021-11-05 PROCEDURE — 97110 THERAPEUTIC EXERCISES: CPT

## 2021-11-05 PROCEDURE — 97112 NEUROMUSCULAR REEDUCATION: CPT

## 2021-11-09 ENCOUNTER — OFFICE VISIT (OUTPATIENT)
Dept: PHYSICAL THERAPY | Facility: REHABILITATION | Age: 66
End: 2021-11-09
Payer: COMMERCIAL

## 2021-11-09 DIAGNOSIS — G89.29 CHRONIC PAIN OF LEFT KNEE: ICD-10-CM

## 2021-11-09 DIAGNOSIS — Z98.890 STATUS POST SURGICAL MANIPULATION OF KNEE JOINT: ICD-10-CM

## 2021-11-09 DIAGNOSIS — M17.12 PRIMARY OSTEOARTHRITIS OF LEFT KNEE: ICD-10-CM

## 2021-11-09 DIAGNOSIS — Z96.652 STATUS POST TOTAL LEFT KNEE REPLACEMENT: Primary | ICD-10-CM

## 2021-11-09 DIAGNOSIS — M25.562 CHRONIC PAIN OF LEFT KNEE: ICD-10-CM

## 2021-11-09 PROCEDURE — 97116 GAIT TRAINING THERAPY: CPT

## 2021-11-09 PROCEDURE — 97112 NEUROMUSCULAR REEDUCATION: CPT

## 2021-11-09 PROCEDURE — 97110 THERAPEUTIC EXERCISES: CPT

## 2021-11-09 PROCEDURE — 97530 THERAPEUTIC ACTIVITIES: CPT

## 2021-11-11 ENCOUNTER — OFFICE VISIT (OUTPATIENT)
Dept: OBGYN CLINIC | Facility: HOSPITAL | Age: 66
End: 2021-11-11
Payer: COMMERCIAL

## 2021-11-11 VITALS
BODY MASS INDEX: 26.75 KG/M2 | DIASTOLIC BLOOD PRESSURE: 83 MMHG | HEIGHT: 63 IN | HEART RATE: 68 BPM | SYSTOLIC BLOOD PRESSURE: 127 MMHG

## 2021-11-11 DIAGNOSIS — Z96.652 AFTERCARE FOLLOWING LEFT KNEE JOINT REPLACEMENT SURGERY: Primary | ICD-10-CM

## 2021-11-11 DIAGNOSIS — Z47.1 AFTERCARE FOLLOWING LEFT KNEE JOINT REPLACEMENT SURGERY: Primary | ICD-10-CM

## 2021-11-11 PROCEDURE — 99212 OFFICE O/P EST SF 10 MIN: CPT | Performed by: ORTHOPAEDIC SURGERY

## 2021-11-12 ENCOUNTER — OFFICE VISIT (OUTPATIENT)
Dept: PHYSICAL THERAPY | Facility: REHABILITATION | Age: 66
End: 2021-11-12
Payer: COMMERCIAL

## 2021-11-12 DIAGNOSIS — Z96.652 STATUS POST TOTAL LEFT KNEE REPLACEMENT: Primary | ICD-10-CM

## 2021-11-12 DIAGNOSIS — M25.562 CHRONIC PAIN OF LEFT KNEE: ICD-10-CM

## 2021-11-12 DIAGNOSIS — Z98.890 STATUS POST SURGICAL MANIPULATION OF KNEE JOINT: ICD-10-CM

## 2021-11-12 DIAGNOSIS — M17.12 PRIMARY OSTEOARTHRITIS OF LEFT KNEE: ICD-10-CM

## 2021-11-12 DIAGNOSIS — G89.29 CHRONIC PAIN OF LEFT KNEE: ICD-10-CM

## 2021-11-12 PROCEDURE — 97110 THERAPEUTIC EXERCISES: CPT

## 2021-11-12 PROCEDURE — 97116 GAIT TRAINING THERAPY: CPT

## 2021-11-12 PROCEDURE — 97530 THERAPEUTIC ACTIVITIES: CPT

## 2021-11-12 PROCEDURE — 97112 NEUROMUSCULAR REEDUCATION: CPT

## 2021-11-15 ENCOUNTER — VBI (OUTPATIENT)
Dept: ADMINISTRATIVE | Facility: OTHER | Age: 66
End: 2021-11-15

## 2021-11-16 ENCOUNTER — APPOINTMENT (OUTPATIENT)
Dept: PHYSICAL THERAPY | Facility: REHABILITATION | Age: 66
End: 2021-11-16
Payer: COMMERCIAL

## 2021-11-19 ENCOUNTER — OFFICE VISIT (OUTPATIENT)
Dept: PHYSICAL THERAPY | Facility: REHABILITATION | Age: 66
End: 2021-11-19
Payer: COMMERCIAL

## 2021-11-19 DIAGNOSIS — M25.562 CHRONIC PAIN OF LEFT KNEE: ICD-10-CM

## 2021-11-19 DIAGNOSIS — Z98.890 STATUS POST SURGICAL MANIPULATION OF KNEE JOINT: ICD-10-CM

## 2021-11-19 DIAGNOSIS — G89.29 CHRONIC PAIN OF LEFT KNEE: ICD-10-CM

## 2021-11-19 DIAGNOSIS — M17.12 PRIMARY OSTEOARTHRITIS OF LEFT KNEE: ICD-10-CM

## 2021-11-19 DIAGNOSIS — Z96.652 STATUS POST TOTAL LEFT KNEE REPLACEMENT: Primary | ICD-10-CM

## 2021-11-19 PROCEDURE — 97110 THERAPEUTIC EXERCISES: CPT

## 2021-11-19 PROCEDURE — 97112 NEUROMUSCULAR REEDUCATION: CPT

## 2021-11-19 PROCEDURE — 97530 THERAPEUTIC ACTIVITIES: CPT

## 2021-11-19 PROCEDURE — 97116 GAIT TRAINING THERAPY: CPT

## 2021-11-23 ENCOUNTER — OFFICE VISIT (OUTPATIENT)
Dept: PHYSICAL THERAPY | Facility: REHABILITATION | Age: 66
End: 2021-11-23
Payer: COMMERCIAL

## 2021-11-23 DIAGNOSIS — Z98.890 STATUS POST SURGICAL MANIPULATION OF KNEE JOINT: ICD-10-CM

## 2021-11-23 DIAGNOSIS — G89.29 CHRONIC PAIN OF LEFT KNEE: ICD-10-CM

## 2021-11-23 DIAGNOSIS — Z96.652 STATUS POST TOTAL LEFT KNEE REPLACEMENT: Primary | ICD-10-CM

## 2021-11-23 DIAGNOSIS — M17.12 PRIMARY OSTEOARTHRITIS OF LEFT KNEE: ICD-10-CM

## 2021-11-23 DIAGNOSIS — M25.562 CHRONIC PAIN OF LEFT KNEE: ICD-10-CM

## 2021-11-23 PROCEDURE — 97116 GAIT TRAINING THERAPY: CPT

## 2021-11-23 PROCEDURE — 97112 NEUROMUSCULAR REEDUCATION: CPT

## 2021-11-23 PROCEDURE — 97110 THERAPEUTIC EXERCISES: CPT

## 2021-11-23 PROCEDURE — 97530 THERAPEUTIC ACTIVITIES: CPT

## 2021-11-26 ENCOUNTER — APPOINTMENT (OUTPATIENT)
Dept: PHYSICAL THERAPY | Facility: REHABILITATION | Age: 66
End: 2021-11-26
Payer: COMMERCIAL

## 2021-11-29 ENCOUNTER — OFFICE VISIT (OUTPATIENT)
Dept: INTERNAL MEDICINE CLINIC | Facility: CLINIC | Age: 66
End: 2021-11-29
Payer: COMMERCIAL

## 2021-11-29 VITALS
HEART RATE: 75 BPM | DIASTOLIC BLOOD PRESSURE: 82 MMHG | BODY MASS INDEX: 27.57 KG/M2 | SYSTOLIC BLOOD PRESSURE: 124 MMHG | HEIGHT: 63 IN | WEIGHT: 155.6 LBS | TEMPERATURE: 97.5 F | OXYGEN SATURATION: 100 %

## 2021-11-29 DIAGNOSIS — R51.9 NONINTRACTABLE HEADACHE, UNSPECIFIED CHRONICITY PATTERN, UNSPECIFIED HEADACHE TYPE: ICD-10-CM

## 2021-11-29 DIAGNOSIS — Z00.00 LABORATORY EXAMINATION ORDERED AS PART OF A ROUTINE GENERAL MEDICAL EXAMINATION: ICD-10-CM

## 2021-11-29 DIAGNOSIS — K21.9 GASTROESOPHAGEAL REFLUX DISEASE: ICD-10-CM

## 2021-11-29 DIAGNOSIS — K59.04 CHRONIC IDIOPATHIC CONSTIPATION: ICD-10-CM

## 2021-11-29 DIAGNOSIS — Z23 NEED FOR IMMUNIZATION AGAINST INFLUENZA: ICD-10-CM

## 2021-11-29 DIAGNOSIS — Z12.31 ENCOUNTER FOR SCREENING MAMMOGRAM FOR BREAST CANCER: ICD-10-CM

## 2021-11-29 DIAGNOSIS — M17.12 PRIMARY OSTEOARTHRITIS OF LEFT KNEE: ICD-10-CM

## 2021-11-29 DIAGNOSIS — F41.8 DEPRESSION WITH ANXIETY: ICD-10-CM

## 2021-11-29 DIAGNOSIS — R47.89 WORD FINDING DIFFICULTY: ICD-10-CM

## 2021-11-29 DIAGNOSIS — Z00.00 HEALTH MAINTENANCE EXAMINATION: Primary | ICD-10-CM

## 2021-11-29 DIAGNOSIS — M79.7 FIBROMYALGIA: ICD-10-CM

## 2021-11-29 DIAGNOSIS — G45.9 TIA (TRANSIENT ISCHEMIC ATTACK): ICD-10-CM

## 2021-11-29 DIAGNOSIS — E78.49 OTHER HYPERLIPIDEMIA: ICD-10-CM

## 2021-11-29 DIAGNOSIS — E55.9 VITAMIN D DEFICIENCY: ICD-10-CM

## 2021-11-29 DIAGNOSIS — G50.0 TRIGEMINAL NEURALGIA: ICD-10-CM

## 2021-11-29 DIAGNOSIS — Z96.652 STATUS POST TOTAL KNEE REPLACEMENT, LEFT: ICD-10-CM

## 2021-11-29 PROBLEM — Z01.818 PREOP EXAM FOR INTERNAL MEDICINE: Status: RESOLVED | Noted: 2021-06-27 | Resolved: 2021-11-29

## 2021-11-29 PROCEDURE — 3008F BODY MASS INDEX DOCD: CPT | Performed by: INTERNAL MEDICINE

## 2021-11-29 PROCEDURE — 1160F RVW MEDS BY RX/DR IN RCRD: CPT | Performed by: INTERNAL MEDICINE

## 2021-11-29 PROCEDURE — 99397 PER PM REEVAL EST PAT 65+ YR: CPT | Performed by: INTERNAL MEDICINE

## 2021-11-29 PROCEDURE — 90471 IMMUNIZATION ADMIN: CPT | Performed by: INTERNAL MEDICINE

## 2021-11-29 PROCEDURE — 90662 IIV NO PRSV INCREASED AG IM: CPT | Performed by: INTERNAL MEDICINE

## 2021-11-29 PROCEDURE — 1036F TOBACCO NON-USER: CPT | Performed by: INTERNAL MEDICINE

## 2021-11-29 RX ORDER — PREGABALIN 100 MG/1
100 CAPSULE ORAL 2 TIMES DAILY
Qty: 180 CAPSULE | Refills: 1 | Status: CANCELLED | OUTPATIENT
Start: 2021-11-29

## 2021-11-29 RX ORDER — ATORVASTATIN CALCIUM 40 MG/1
40 TABLET, FILM COATED ORAL EVERY EVENING
Qty: 90 TABLET | Refills: 1 | Status: SHIPPED | OUTPATIENT
Start: 2021-11-29 | End: 2022-07-18

## 2021-11-29 RX ORDER — CARBAMAZEPINE 100 MG/1
100 CAPSULE, EXTENDED RELEASE ORAL 2 TIMES DAILY
Qty: 180 CAPSULE | Refills: 0 | Status: SHIPPED | OUTPATIENT
Start: 2021-11-29 | End: 2022-03-25 | Stop reason: SDUPTHER

## 2021-11-29 RX ORDER — PREGABALIN 100 MG/1
100 CAPSULE ORAL 2 TIMES DAILY
Qty: 180 CAPSULE | Refills: 1 | Status: SHIPPED | OUTPATIENT
Start: 2021-11-29 | End: 2022-02-24 | Stop reason: ALTCHOICE

## 2021-11-29 RX ORDER — PANTOPRAZOLE SODIUM 40 MG/1
40 TABLET, DELAYED RELEASE ORAL DAILY
Qty: 90 TABLET | Refills: 1 | Status: SHIPPED | OUTPATIENT
Start: 2021-11-29 | End: 2022-07-18

## 2021-11-30 ENCOUNTER — OFFICE VISIT (OUTPATIENT)
Dept: PHYSICAL THERAPY | Facility: REHABILITATION | Age: 66
End: 2021-11-30
Payer: COMMERCIAL

## 2021-11-30 DIAGNOSIS — G89.29 CHRONIC PAIN OF LEFT KNEE: ICD-10-CM

## 2021-11-30 DIAGNOSIS — Z96.652 STATUS POST TOTAL LEFT KNEE REPLACEMENT: Primary | ICD-10-CM

## 2021-11-30 DIAGNOSIS — M17.12 PRIMARY OSTEOARTHRITIS OF LEFT KNEE: ICD-10-CM

## 2021-11-30 DIAGNOSIS — Z98.890 STATUS POST SURGICAL MANIPULATION OF KNEE JOINT: ICD-10-CM

## 2021-11-30 DIAGNOSIS — M25.562 CHRONIC PAIN OF LEFT KNEE: ICD-10-CM

## 2021-11-30 PROCEDURE — 97116 GAIT TRAINING THERAPY: CPT

## 2021-11-30 PROCEDURE — 97112 NEUROMUSCULAR REEDUCATION: CPT

## 2021-11-30 PROCEDURE — 97110 THERAPEUTIC EXERCISES: CPT

## 2021-12-02 ENCOUNTER — VBI (OUTPATIENT)
Dept: ADMINISTRATIVE | Facility: OTHER | Age: 66
End: 2021-12-02

## 2021-12-07 ENCOUNTER — APPOINTMENT (OUTPATIENT)
Dept: PHYSICAL THERAPY | Facility: REHABILITATION | Age: 66
End: 2021-12-07
Payer: COMMERCIAL

## 2021-12-10 ENCOUNTER — OFFICE VISIT (OUTPATIENT)
Dept: PHYSICAL THERAPY | Facility: REHABILITATION | Age: 66
End: 2021-12-10
Payer: COMMERCIAL

## 2021-12-10 DIAGNOSIS — G89.29 CHRONIC PAIN OF LEFT KNEE: ICD-10-CM

## 2021-12-10 DIAGNOSIS — M25.562 CHRONIC PAIN OF LEFT KNEE: ICD-10-CM

## 2021-12-10 DIAGNOSIS — Z98.890 STATUS POST SURGICAL MANIPULATION OF KNEE JOINT: ICD-10-CM

## 2021-12-10 DIAGNOSIS — Z96.652 STATUS POST TOTAL LEFT KNEE REPLACEMENT: Primary | ICD-10-CM

## 2021-12-10 DIAGNOSIS — M17.12 PRIMARY OSTEOARTHRITIS OF LEFT KNEE: ICD-10-CM

## 2021-12-10 PROCEDURE — 97112 NEUROMUSCULAR REEDUCATION: CPT

## 2021-12-10 PROCEDURE — 97116 GAIT TRAINING THERAPY: CPT

## 2021-12-10 PROCEDURE — 97530 THERAPEUTIC ACTIVITIES: CPT

## 2021-12-10 PROCEDURE — 97140 MANUAL THERAPY 1/> REGIONS: CPT

## 2021-12-10 PROCEDURE — 97110 THERAPEUTIC EXERCISES: CPT

## 2022-01-11 ENCOUNTER — OFFICE VISIT (OUTPATIENT)
Dept: OBGYN CLINIC | Facility: HOSPITAL | Age: 67
End: 2022-01-11
Payer: COMMERCIAL

## 2022-01-11 ENCOUNTER — HOSPITAL ENCOUNTER (OUTPATIENT)
Dept: RADIOLOGY | Facility: HOSPITAL | Age: 67
Discharge: HOME | End: 2022-01-11
Attending: ORTHOPAEDIC SURGERY
Payer: COMMERCIAL

## 2022-01-11 VITALS
HEIGHT: 63 IN | DIASTOLIC BLOOD PRESSURE: 82 MMHG | SYSTOLIC BLOOD PRESSURE: 122 MMHG | HEART RATE: 67 BPM | BODY MASS INDEX: 27.56 KG/M2

## 2022-01-11 DIAGNOSIS — Z47.1 AFTERCARE FOLLOWING LEFT KNEE JOINT REPLACEMENT SURGERY: ICD-10-CM

## 2022-01-11 DIAGNOSIS — R29.898 LEFT LEG WEAKNESS: ICD-10-CM

## 2022-01-11 DIAGNOSIS — Z47.1 AFTERCARE FOLLOWING LEFT KNEE JOINT REPLACEMENT SURGERY: Primary | ICD-10-CM

## 2022-01-11 DIAGNOSIS — Z96.652 AFTERCARE FOLLOWING LEFT KNEE JOINT REPLACEMENT SURGERY: Primary | ICD-10-CM

## 2022-01-11 DIAGNOSIS — Z96.652 AFTERCARE FOLLOWING LEFT KNEE JOINT REPLACEMENT SURGERY: ICD-10-CM

## 2022-01-11 PROCEDURE — 99213 OFFICE O/P EST LOW 20 MIN: CPT | Performed by: ORTHOPAEDIC SURGERY

## 2022-01-11 PROCEDURE — 1036F TOBACCO NON-USER: CPT | Performed by: ORTHOPAEDIC SURGERY

## 2022-01-11 PROCEDURE — 73560 X-RAY EXAM OF KNEE 1 OR 2: CPT

## 2022-01-11 PROCEDURE — 1160F RVW MEDS BY RX/DR IN RCRD: CPT | Performed by: ORTHOPAEDIC SURGERY

## 2022-01-11 NOTE — PROGRESS NOTES
Assessment:   Diagnosis ICD-10-CM Associated Orders   1  Aftercare following left knee joint replacement surgery  Z47 1     Z96 652    2  Left leg weakness  R29 898 Ambulatory Referral to Physical Therapy       Plan: Marcela King is a pleasant 77year old female 4 months and 15 days status post MAGU and 6 months TKA of left knee  X-rays of the left knee were obtaining demonstrating stable and intact hardware  Script for PT was provided for LLE weakness to work on strengthening and conditioning  Advised with the winter weather underway to keep the cane with her while out of her house  We will see the patient back in 6 months for repeat evaluation  To do next visit:  Return in about 6 months (around 7/11/2022) for Recheck, x-ray's  The above stated was discussed in layman's terms and the patient expressed understanding  All questions were answered to the patient's satisfaction  Scribe Attestation    I,:  Kallie Wallace MA am acting as a scribe while in the presence of the attending physician :       I,:  Bear Moreno MD personally performed the services described in this documentation    as scribed in my presence :             Subjective: Floyd Locke is a 77 y o  female who presents status post 4 months 15 days  MUGA and 6 month TKA of left knee  Patient was last seen in the clinic on 11/11/21  Recommended completing PT to maximize her recovery  She was advised to work on her quad strength and follow total knee precautions with antibiotics until 2 years post op  Today, the patient continues to ambulate with a cane  She notes she had stiffness of the left knee  She states she feels as if she does not have real knee  She uses the cane for balance while out of the house  She has completed PT and wishes she can go back         Review of systems negative unless otherwise specified in HPI  Review of Systems   Constitutional: Negative for activity change, appetite change, chills, fever and unexpected weight change  HENT: Negative for congestion, hearing loss, nosebleeds, sore throat, trouble swallowing and voice change  Eyes: Negative for pain, discharge, redness, itching and visual disturbance  Respiratory: Negative for cough, shortness of breath and wheezing  Cardiovascular: Negative for chest pain, palpitations and leg swelling  Gastrointestinal: Negative for abdominal pain, diarrhea, nausea and vomiting  Endocrine: Negative for cold intolerance, heat intolerance, polydipsia and polyuria  Genitourinary: Negative for difficulty urinating, dysuria and hematuria  Musculoskeletal: Positive for arthralgias  Negative for back pain, gait problem and joint swelling  Skin: Negative for color change, pallor, rash and wound  Neurological: Negative for dizziness, weakness, numbness and headaches  Psychiatric/Behavioral: Negative for decreased concentration, dysphoric mood and suicidal ideas  The patient is not nervous/anxious  Past Medical History:   Diagnosis Date    Arthralgia     Atypical chest pain     Balance disorder     Cervical rib     last assessed 9/13/12    Depression     Fatigue     Fibromyalgia     Fibromyalgia     Fibromyalgia, primary     GERD (gastroesophageal reflux disease)     Hydronephrosis with renal and ureteral calculus obstruction     last assessed 5/1/17    [facility]     Leukopenia     last assessed 5/19/16    Memory loss     Restless leg syndrome     last assessed 11/8/13    Sebaceous cyst     last assessed 2/13/13    Skin tag     last assessed 2/13/13    Vitamin D deficiency        Past Surgical History:   Procedure Laterality Date    COLONOSCOPY  2011    fiberoptic    DENTAL SURGERY      wisdom teeth    KNEE SURGERY      right knee 2006 meniscal tear stage 04    OR COLONOSCOPY FLX DX W/COLLJ SPEC WHEN PFRMD N/A 3/16/2017    Procedure: COLONOSCOPY;  Surgeon: Patricia Lock MD;  Location: [facility] GI LAB;   Service: Gastroenterology  MI CYSTO/URETERO W/LITHOTRIPSY &INDWELL STENT INSRT Left 4/26/2017    Procedure: CYSTOSCOPY URETEROSCOPY; RETROGRADE PYELOGRAM; STONE EXTRACTION; INSERTION STENT URETERAL;  Surgeon: Fracisco Christensen MD;  Location: [facility];  Service: Urology    MI Ellei Jasso JT+ANESTHESIA Left 8/27/2021    Procedure: MANIPULATION JOINT KNEE;  Surgeon: Elder Escobar MD;  Location: [facility];  Service: Orthopedics    MI TOTAL KNEE ARTHROPLASTY Left 7/15/2021    Procedure: ARTHROPLASTY KNEE TOTAL - left;  Surgeon: Elder Escobar MD;  Location: [facility];  Service: Orthopedics       Family History   Problem Relation Age of Onset    Osteoporosis Mother     Heart failure Father     Coronary artery disease Father     Fibromyalgia Sister     Heart failure Family     Coronary artery disease Family     Osteoporosis Family     Alcohol abuse Neg Hx     Substance Abuse Neg Hx     Mental illness Neg Hx     Depression Neg Hx        Social History     Occupational History    Occupation:      Employer: [employer]   Tobacco Use    Smoking status: Former Smoker    Smokeless tobacco: Never Used    Tobacco comment: 23years old quit over 36 year ago   Vaping Use    Vaping Use: Never used   Substance and Sexual Activity    Alcohol use: Yes     Comment: rare    Drug use: No    Sexual activity: Not Currently         Current Outpatient Medications:     acetaminophen (TYLENOL) 650 mg CR tablet, Take 650 mg by mouth every 8 (eight) hours as needed for mild pain, Disp: , Rfl:     aspirin 81 mg chewable tablet, Chew 1 tablet (81 mg total) daily, Disp: 30 tablet, Rfl: 0    atorvastatin (LIPITOR) 40 mg tablet, Take 1 tablet (40 mg total) by mouth every evening, Disp: 90 tablet, Rfl: 1    carbamazepine (CARBATROL) 100 MG 12 hr capsule, Take 1 capsule (100 mg total) by mouth 2 (two) times a day, Disp: 180 capsule, Rfl: 0    docusate sodium (COLACE) 100 mg capsule, Take 100 mg by mouth 2 (two) times a day, Disp: , Rfl:     fluticasone (FLONASE) 50 mcg/act nasal spray, 1 spray into each nostril daily as needed for rhinitis (Patient not taking: Reported on 7/20/2021), Disp: 16 g, Rfl: 1    Multiple Vitamins-Minerals (MULTIVITAMIN WITH MINERALS) tablet, Take 1 tablet by mouth daily, Disp: , Rfl:     pantoprazole (PROTONIX) 40 mg tablet, Take 1 tablet (40 mg total) by mouth daily, Disp: 90 tablet, Rfl: 1    polyethylene glycol (GLYCOLAX) 17 GM/SCOOP powder, Take 17 g by mouth daily, Disp: , Rfl:     pregabalin (LYRICA) 100 mg capsule, Take 1 capsule (100 mg total) by mouth 2 (two) times a day, Disp: 180 capsule, Rfl: 1    venlafaxine (EFFEXOR-XR) 75 mg 24 hr capsule, Take 1 capsule (75 mg total) by mouth daily with breakfast, Disp: 90 capsule, Rfl: 1    Allergies   Allergen Reactions    Banana - Food Allergy GI Intolerance    Chocolate - Food Allergy GI Intolerance    Ciprofloxacin Hallucinations    Dramamine [Dimenhydrinate] Other (See Comments)     dizzy    Nuts - Food Allergy GI Intolerance    Oat - Food Allergy GI Intolerance    Peach [Prunus Persica] Other (See Comments)     GI intolerance     Strawberry (Diagnostic) - Food Allergy GI Intolerance    Sweet Potato - Food Allergy GI Intolerance            Vitals:    01/11/22 1328   BP: 122/82   Pulse: 67       Objective:                    Left Knee Exam     Tenderness   The patient is experiencing no tenderness       Range of Motion   Extension: normal   Flexion: normal     Tests   Varus: negative Valgus: negative    Other   Erythema: absent  Scars: present  Sensation: normal  Pulse: present  Swelling: none  Effusion: no effusion present    Comments:  Skin intact   No erythema or ecchymosis   No redness or warmth   No signs of infection   Calf is soft and compressible   Incision is well healed            Diagnostics, reviewed and taken today if performed as documented:    X-ray's Left Knee      The attending physician has personally reviewed the pertinent films in PACS and interpretation is as follows: stable and intact hardware without evidence of loosening or displacement  Procedures, if performed today:    Procedures    None performed      Portions of the record may have been created with voice recognition software  Occasional wrong word or "sound a like" substitutions may have occurred due to the inherent limitations of voice recognition software  Read the chart carefully and recognize, using context, where substitutions have occurred

## 2022-01-25 ENCOUNTER — EVALUATION (OUTPATIENT)
Dept: PHYSICAL THERAPY | Facility: REHABILITATION | Age: 67
End: 2022-01-25
Payer: COMMERCIAL

## 2022-01-25 DIAGNOSIS — R29.898 LEFT LEG WEAKNESS: ICD-10-CM

## 2022-01-25 PROCEDURE — 97112 NEUROMUSCULAR REEDUCATION: CPT

## 2022-01-25 PROCEDURE — 97162 PT EVAL MOD COMPLEX 30 MIN: CPT

## 2022-01-25 PROCEDURE — 97110 THERAPEUTIC EXERCISES: CPT

## 2022-01-25 NOTE — PROGRESS NOTES
PT Evaluation   Today's date: 2022  Patient name: [place] Forest  : 1955  MRN: [id number]  Referring provider: Renata Arroyo  Dx:   Encounter Diagnosis     ICD-10-CM    1  Left leg weakness  R29 898 Ambulatory Referral to Physical Therapy         Assessment  Assessment details: Patient is a 77 y o  Female who presents with referring diagnosis of left leg weakness  Patient's greatest concern is concern at no signs of improvement, fear of not being able to keep active and future ill health (and wanting to prevent it)  Primary movement impairment diagnosis of force production deficits and balance deficits resulting in pathoanatomical symptoms of pain with function, increased fall risk, balance and coordination deficits, difficulty ambulating and descending stairs  The aforementioned impairments have limited the patient's ability to walk, stairs, transfer, return to work, return to recreation  No further referral appears necessary at this time based upon examination results    Patient education performed during today's session included: prognosis and diagnosis, HEP and PT expectations    Primary movement impairments:  1) Force production deficits hip and knee  2) Balance deficits  3) Proprioception deficits  4) Fear avoidance     Etiological factors include: Lt TKA 2021        Impairments: Abnormal coordination, Abnormal gait, Abnormal or restricted ROM, Activity intolerance, Impaired balance, Impaired physical strength, Pain with function and Safety issue  Understanding of Dx/Px/POC: Good  Prognosis: Good   Positive prognostic factors: positive attitude toward recovery   Negative prognostic factors: chronicity of symptoms, hypertension, high symptom irritability and multiple concurrent orthopedic problems    Patient verbalized understanding of POC  Please contact me if you have any questions or recommendations  Thank you for the referral and the opportunity to share in Val Beverly's care  Plan  Plan details: Hip and knee strengthening, neuromotor reeducation for coordination, stair and gait training  Patient would benefit from: skilled physical therapy  Planned modality interventions: Cryotherapy  Planned therapy interventions: activity modification, joint mobilization, manual therapy, motor coordination training, neuromuscular re-education, patient education, postural training, self care, therapeutic activities, therapeutic exercise, home exercise program, balance, behavior modification and transfer training  Frequency: 2x/week  Duration in weeks: 8  Plan of Care beginning date: 2022  Plan of Care expiration date: 8 weeks - 3/22/2022  Treatment plan discussed with: Patient       Goals  Short Term Goals (4 weeks):    - Patient will be independent in basic HEP 2-3 weeks  - Patient will report >50% reduction in pain  - Patient will demonstrate >1/3 improvement in MMT grade as applicable  - Demonstrate consistent posture corrections without cueing during therapeutic exercise  - Improve SLS to 15 sec    Long Term Goals (8 weeks):  - Patient will be independent in a comprehensive home exercise program  - Patient FOTO score will improve to 59/100  - Patient will self-report >75% improvement in function  - Improve endurance to 30 minutes   - SLS to 30 sec      Subjective    History of Present Illness  - Mechanism of injury: Saw surgeon recently and feels like her balance is off and she needs more strength and endurance  Stamina  She wants to working on her bend   She went to campus approximately 20 min walk to and from campus   - Primary AD: carries SPC but does not use it  - Assist level at home: IND  - Prior level of function: IND      Pain  - Current pain rating: 3/10  - At best pain ratin/10  - At worst pain ratin/10  - Location: knee joint area, about 4 inches above and below  - Aggravating factors: descending stairs    Social Support  - Steps to enter house: 0  - Stairs in house: 2 flights   - Lives with: alone in a [address] a full load but virtually        Objective     Red Flag Screening  - Positive for: age >47 years old    - Negative for: history of cancer, fever, chills, night sweats, weight loss, recent infection and bladder dysfunction      Sensation  - Light touch: intact    LE MMT  LEFT RIGHT  -Hip Flexion:   3+ 3+  -Hip Extension:  3+ 3+   -Hip Abduction: 4 4  -Hip Adduction: NT NT  -Hip IR:  3+ 3+  -Hip ER:  3+ 3+    -Knee Flexion  4+4+  -Knee Extension 3+3+    -Ankle DF  55  -Ankle PF  55    Hip Range of Motion    LEFT   RIGHT  - Flexion WFL  WFL  - IR  WFL  WFL  - ER  WFL  WFL  - Extension WFL  WFL    Knee Range of Motion    LEFT   RIGHT  - Flexion 115  130  - Extension 0  0       Palpation  LEFT  - Positive: Lateral Patella and LCL     - Negative: Fibular Head, Inferior Patella, ITB, Lateral Joint Line, MCL, Medial Joint Line, Medial Patella, Patellar Tendon and Quad Tendon       RIGHT  NT    Functional Assessment  -Functional Squat: good, difficulty initiating from base when encouraged to go deeper, pain 2/10  -Forward Lunge: difficult balancing, motor control, pain 2/10  -Stair Negotiation: lacking eccentric quad control  -Gait Assessment: improved but slight trendelenburg like sway due to leg length discrepancy   -Balance SLS: Rt 8s Lt 6s                [id number]     Date 1/25        Visit Number IE        Manual                                             Neuro Re-Ed         Squat 3x10        Step down         Leg press ecc         SL total gym         Hip flexor lift over         Single leg stance 2x30s        Y balance                                    TherEx         Bike to TM         Bridge 3x10        Clamshells         Hip flexor isometric         HS curls                           TherAct         Patient education 5'                                            Gait Training Modalities         CP                    Precautions: Lt TKA July 15 2021, WINIFRED 6 weeks later  Past Medical History:   Diagnosis Date    Arthralgia     Atypical chest pain     Balance disorder     Cervical rib     last assessed 9/13/12    Depression     Fatigue     Fibromyalgia     Fibromyalgia     Fibromyalgia, primary     GERD (gastroesophageal reflux disease)     Hydronephrosis with renal and ureteral calculus obstruction     last assessed 5/1/17    [facility] ([facility] )     Leukopenia     last assessed 5/19/16    Memory loss     Restless leg syndrome     last assessed 11/8/13    Sebaceous cyst     last assessed 2/13/13    Skin tag     last assessed 2/13/13    Vitamin D deficiency

## 2022-02-01 ENCOUNTER — OFFICE VISIT (OUTPATIENT)
Dept: PHYSICAL THERAPY | Facility: REHABILITATION | Age: 67
End: 2022-02-01
Payer: COMMERCIAL

## 2022-02-01 DIAGNOSIS — R29.898 LEFT LEG WEAKNESS: Primary | ICD-10-CM

## 2022-02-01 PROCEDURE — 97110 THERAPEUTIC EXERCISES: CPT

## 2022-02-01 PROCEDURE — 97530 THERAPEUTIC ACTIVITIES: CPT

## 2022-02-01 PROCEDURE — 97112 NEUROMUSCULAR REEDUCATION: CPT

## 2022-02-01 NOTE — PROGRESS NOTES
Daily Note     Today's date: 2022  Patient name: Steve Otero  : 1955  MRN: [id number]  Referring provider: Kristina Morelos  Dx:   Encounter Diagnosis     ICD-10-CM    1  Left leg weakness  R29 898        Start Time: 1450  Stop Time: 1525  Total time in clinic (min): 35 minutes    Subjective: Patient reports she is tired and not feeling well with a stomach ache  She arrived 5 minutes late  Objective: See treatment diary below      Assessment: Tolerated treatment well  Patient demonstrated fatigue post treatment and would benefit from continued PT  1:1 with Jaida Black, PT, DPT for entirety of tx  Plan: Continue per plan of care                [id number]     Date        Visit Number IE        Manual                                             Neuro Re-Ed         Squat 3x10 3x10       Step down         Step up         Leg press ecc         SL total gym         Hip flexor lift over         Single leg stance 2x30s        Y balance                                    TherEx         Bike to TM  RB 8'       Bridge 3x10 3x10       Clamshells  3x10 otb       Hip flexor isometric         HS curls  3x10       Side step  x6 laps                TherAct         Patient education 5'                                            Gait Training                                    Modalities         CP                    Precautions: Lt TKA July 15 2021, WINIFRED 6 weeks later  Past Medical History:   Diagnosis Date    Arthralgia     Atypical chest pain     Balance disorder     Cervical rib     last assessed 12    Depression     Fatigue     Fibromyalgia     Fibromyalgia     Fibromyalgia, primary     GERD (gastroesophageal reflux disease)     Hydronephrosis with renal and ureteral calculus obstruction     last assessed 17    Lacunar stroke ([facility] [address] )     Leukopenia     last assessed 16    Memory loss     Restless leg syndrome     last assessed 13    Sebaceous cyst last assessed 2/13/13    Skin tag     last assessed 2/13/13    Vitamin D deficiency

## 2022-02-04 ENCOUNTER — OFFICE VISIT (OUTPATIENT)
Dept: PHYSICAL THERAPY | Facility: REHABILITATION | Age: 67
End: 2022-02-04
Payer: COMMERCIAL

## 2022-02-04 DIAGNOSIS — R29.898 LEFT LEG WEAKNESS: Primary | ICD-10-CM

## 2022-02-04 PROCEDURE — 97110 THERAPEUTIC EXERCISES: CPT

## 2022-02-04 PROCEDURE — 97530 THERAPEUTIC ACTIVITIES: CPT

## 2022-02-04 PROCEDURE — 97112 NEUROMUSCULAR REEDUCATION: CPT

## 2022-02-04 NOTE — PROGRESS NOTES
Daily Note     Today's date: 2022  Patient name: Chantal Owens  : 1955  MRN: [id number]  Referring provider: Shruti Bowman  Dx:   Encounter Diagnosis     ICD-10-CM    1  Left leg weakness  R29 898        Start Time: 1015  Stop Time: 1100  Total time in clinic (min): 45 minutes    Subjective: Patient reports she is doing well, feeling a bit stiff since she did not pedal this morning  Objective: See treatment diary below      Assessment: Tolerated treatment well  Patient demonstrated fatigue post treatment and would benefit from continued PT  Tolerated new TE well, difficulty with SLR due to fatigue  1:1 with Tawanda Kwok, PT, DPT for entirety of tx  Plan: Continue per plan of care    Progress TE NV              [id number]     Date       Visit Number IE 2/12 3/12      Manual                                             Neuro Re-Ed         Squat 3x10 3x10 3x10      Step down         Step up         Leg press ecc         SL total gym         Hip flexor lift over         Single leg stance 2x30s  2x30s      Y balance                                    TherEx         Bike to TM  RB 8' RB 10' L1      Bridge 3x10 3x10 x30 Add weight     Clamshells  3x10 otb x30 otb gtb     SLR   3x10 2#      HS curls  3x10 3x10 B      Side step  x6 laps x6 laps gtb      Heel raises   x30      prostretch   2x30s      TherAct         Patient education 5'  5'                                          Gait Training         TM   10'                        Modalities         CP                    Precautions: Lt TKA July 15 2021, WINIFRED 6 weeks later  Past Medical History:   Diagnosis Date    Arthralgia     Atypical chest pain     Balance disorder     Cervical rib     last assessed 12    Depression     Fatigue     Fibromyalgia     Fibromyalgia     Fibromyalgia, primary     GERD (gastroesophageal reflux disease)     Hydronephrosis with renal and ureteral calculus obstruction     last assessed 5/1/17    Lacunar stroke (HCC)     Leukopenia     last assessed 5/19/16    Memory loss     Restless leg syndrome     last assessed 11/8/13    Sebaceous cyst     last assessed 2/13/13    Skin tag     last assessed 2/13/13    Vitamin D deficiency

## 2022-02-08 ENCOUNTER — OFFICE VISIT (OUTPATIENT)
Dept: PHYSICAL THERAPY | Facility: REHABILITATION | Age: 67
End: 2022-02-08
Payer: COMMERCIAL

## 2022-02-08 DIAGNOSIS — R29.898 LEFT LEG WEAKNESS: Primary | ICD-10-CM

## 2022-02-08 PROCEDURE — 97112 NEUROMUSCULAR REEDUCATION: CPT

## 2022-02-08 PROCEDURE — 97530 THERAPEUTIC ACTIVITIES: CPT

## 2022-02-08 PROCEDURE — 97110 THERAPEUTIC EXERCISES: CPT

## 2022-02-08 NOTE — PROGRESS NOTES
Daily Note     Today's date: 2022  Patient name: Nikki Cadena  : 1955  MRN: [id number]  Referring provider: Raleigh Munoz  Dx:   Encounter Diagnosis     ICD-10-CM    1  Left leg weakness  R29 898                   Subjective: Patient reports no new complaints  Objective: See treatment diary below      Assessment: Tolerated treatment well  Patient demonstrated fatigue post treatment and would benefit from continued PT  Focused on standing interventions today with good tolerance  Continue to work on eccentric leg press for quad control and progress table TE NV   1:1 with Lin Fitzpatrick, PT, DPT for entirety of tx  Plan: Continue per plan of care    Progress TE NV              [id number]     Date      Visit Number IE 2/12 3/12 4/12     Manual                                             Neuro Re-Ed         Squat 3x10 3x10 3x10 3x10     Step down         Step up         Leg press ecc    2x10 40#     SL total gym         Hip flexor lift over         Single leg stance 2x30s  2x30s 2x30s B     Y balance                                    TherEx         Bike or TM warmup  RB 8' RB 10' L1 TM 10'     Bridge 3x10 3x10 x30  Add weight    Clamshells  3x10 otb x30 otb  gtb    SLR   3x10 2# NV     HS curls  3x10 3x10 B 3x10 B     Side step  x6 laps x6 laps gtb x5 laps gtb     Heel raises   x30 x30     prostretch   2x30s x30s     TherAct         Patient education 5'  5'                                          Gait Training         TM post   10'                        Modalities         CP                    Precautions: Lt TKA July 15 2021, WINIFRED 6 weeks later  Past Medical History:   Diagnosis Date    Arthralgia     Atypical chest pain     Balance disorder     Cervical rib     last assessed 12    Depression     Fatigue     Fibromyalgia     Fibromyalgia     Fibromyalgia, primary     GERD (gastroesophageal reflux disease)     Hydronephrosis with renal and ureteral calculus obstruction     last assessed 5/1/17    [facility] ([facility] [address] [facility]     Leukopenia     last assessed 5/19/16    Memory loss     Restless leg syndrome     last assessed 11/8/13    Sebaceous cyst     last assessed 2/13/13    Skin tag     last assessed 2/13/13    Vitamin D deficiency

## 2022-02-11 ENCOUNTER — OFFICE VISIT (OUTPATIENT)
Dept: PHYSICAL THERAPY | Facility: REHABILITATION | Age: 67
End: 2022-02-11
Payer: COMMERCIAL

## 2022-02-11 DIAGNOSIS — R29.898 LEFT LEG WEAKNESS: Primary | ICD-10-CM

## 2022-02-11 PROCEDURE — 97112 NEUROMUSCULAR REEDUCATION: CPT | Performed by: PHYSICAL MEDICINE & REHABILITATION

## 2022-02-11 PROCEDURE — 97110 THERAPEUTIC EXERCISES: CPT | Performed by: PHYSICAL MEDICINE & REHABILITATION

## 2022-02-11 PROCEDURE — 97530 THERAPEUTIC ACTIVITIES: CPT | Performed by: PHYSICAL MEDICINE & REHABILITATION

## 2022-02-11 NOTE — PROGRESS NOTES
Daily Note     Today's date: 2022  Patient name: Steff Hung  : 1955  MRN: [id number]  Referring provider: Karthikeyan Yañez  Dx:   Encounter Diagnosis     ICD-10-CM    1  Left leg weakness  R29 898                   Subjective: Patient offers no new complaints to begin session, no issues following last visit  Objective: See treatment diary below    Assessment: Tolerated treatment well, including progressions and new TE as charted  Patient demonstrated fatigue post treatment and would benefit from continued PT  Plan: Continue per plan of care    Progress TE NV      [id number]     Date     Visit Number IE 2/12 3/12 4/12 5/12    Manual                                             Neuro Re-Ed         Squat 3x10 3x10 3x10 3x10 3x10    Step down         Step up         Leg press ecc    2x10 40# 40# 2x10    SL total gym         Hip flexor lift over         Single leg stance 2x30s  2x30s 2x30s B 3x30"B    Y balance                                    TherEx         Bike or TM warmup  RB 8' RB 10' L1 TM 10' Bike 10'    Bridge 3x10 3x10 x30  20x5" Add weight   Clamshells  3x10 otb x30 otb  gtb 20x ea    SLR   3x10 2# NV 10x flex    HS curls  3x10 3x10 B 3x10 B 3x10 ea    Side step  x6 laps x6 laps gtb x5 laps gtb 5 laps GTB    Heel raises   x30 x30 20x 2 up/1 down    prostretch   2x30s x30s 30"    TherAct         Patient education 5'  5'                                          Gait Training         TM post   10'                        Modalities         CP                    Precautions: Lt TKA July 15 2021, WINIFRED 6 weeks later  Past Medical History:   Diagnosis Date    Arthralgia     Atypical chest pain     Balance disorder     Cervical rib     last assessed 12    Depression     Fatigue     Fibromyalgia     Fibromyalgia     Fibromyalgia, primary     GERD (gastroesophageal reflux disease)     Hydronephrosis with renal and ureteral calculus obstruction last assessed 5/1/17    Lacunar stroke (HCC)     Leukopenia     last assessed 5/19/16    Memory loss     Restless leg syndrome     last assessed 11/8/13    Sebaceous cyst     last assessed 2/13/13    Skin tag     last assessed 2/13/13    Vitamin D deficiency

## 2022-02-14 ENCOUNTER — TELEPHONE (OUTPATIENT)
Dept: NEUROLOGY | Facility: CLINIC | Age: 67
End: 2022-02-14

## 2022-02-14 NOTE — TELEPHONE ENCOUNTER
Patient of Dr Yvan Schmidt, last office visit Feb 2020    She called to report isolated incident of "intense " pain left center of head, only lasted for several seconds; she said she didn't sleep well last night and felt dehydrated, denies other symptoms including visual disturbance, extremity weakness, change of balance, confusion  I scheduled f/u with Claus Zuniga and recommended she call PCP for assessment in the meantime  She said strokes run in her family  Discussed if symptoms recur and increases in intensity or duration or other symptoms accompany as listed above, have someone drive to ED or call ambulance if needed

## 2022-02-15 ENCOUNTER — APPOINTMENT (OUTPATIENT)
Dept: PHYSICAL THERAPY | Facility: REHABILITATION | Age: 67
End: 2022-02-15
Payer: COMMERCIAL

## 2022-02-15 ENCOUNTER — TELEPHONE (OUTPATIENT)
Dept: NEUROLOGY | Facility: CLINIC | Age: 67
End: 2022-02-15

## 2022-02-15 NOTE — TELEPHONE ENCOUNTER
Called and spoke to patient - confirmed upcoming appointment with Andi Hodges on 02/22/22 8:45 am at the [facility] office  Provided patient with apt date, time and location  Informed patient that check in is at least 15 minutes prior to apt time  The patient is not  having any issues or concerns at this time

## 2022-02-18 ENCOUNTER — OFFICE VISIT (OUTPATIENT)
Dept: PHYSICAL THERAPY | Facility: REHABILITATION | Age: 67
End: 2022-02-18
Payer: COMMERCIAL

## 2022-02-18 DIAGNOSIS — R29.898 LEFT LEG WEAKNESS: Primary | ICD-10-CM

## 2022-02-18 PROCEDURE — 97530 THERAPEUTIC ACTIVITIES: CPT

## 2022-02-18 PROCEDURE — 97110 THERAPEUTIC EXERCISES: CPT

## 2022-02-18 PROCEDURE — 97112 NEUROMUSCULAR REEDUCATION: CPT

## 2022-02-18 NOTE — PROGRESS NOTES
Daily Note     Today's date: 2022  Patient name: Rhoda Israel  : 1955  MRN: [id number]  Referring provider: Kerri Has  Dx:   Encounter Diagnosis     ICD-10-CM    1  Left leg weakness  R29 898                   Subjective: Patient reports no new complaints  It has been sore  Started walking again a bit, and is sore after it  Objective: See treatment diary below    Assessment: Tolerated treatment well  Patient demonstrated fatigue post treatment and would benefit from continued PT  1:1 with Laisha Reagan PT, DPT for entirety of tx  Plan: Continue per plan of care         [id number]     Date    Visit Number IE 2/12 3/12 4/12 5/12 6/12   Manual                                             Neuro Re-Ed         Squat 3x10 3x10 3x10 3x10 3x10 3x10   Step down         Step up         Leg press ecc    2x10 40# 40# 2x10 40# 3x10   SL total gym      x15   Hip flexor lift over         Single leg stance 2x30s  2x30s 2x30s B 3x30"B 3x30s B   Y balance                                    TherEx         Bike or TM warmup  RB 8' RB 10' L1 TM 10' Bike 10' RB 10'   Bridge 3x10 3x10 x30  20x5" 3x10 8#   Clamshells  3x10 otb x30 otb  gtb 20x ea 3x10 gtb   SLR   3x10 2# NV 10x flex 3x10 2#   HS curls  3x10 3x10 B 3x10 B 3x10 ea 3x10 3#   Side step  x6 laps x6 laps gtb x5 laps gtb 5 laps GTB 6 laps GTB   Heel raises   x30 x30 20x 2 up/1 down HEP   prostretch   2x30s x30s 30" HEP   TherAct         Patient education 5'  5'                                          Gait Training         TM post   10'                        Modalities         CP             Precautions: Lt TKA July 15 2021, WINIFRED 6 weeks later  Past Medical History:   Diagnosis Date    Arthralgia     Atypical chest pain     Balance disorder     Cervical rib     last assessed 12    Depression     Fatigue     Fibromyalgia     Fibromyalgia     Fibromyalgia, primary     GERD (gastroesophageal reflux disease)     Hydronephrosis with renal and ureteral calculus obstruction     last assessed 5/1/17    Lacunar stroke (HCC)     Leukopenia     last assessed 5/19/16    Memory loss     Restless leg syndrome     last assessed 11/8/13    Sebaceous cyst     last assessed 2/13/13    Skin tag     last assessed 2/13/13    Vitamin D deficiency

## 2022-02-22 ENCOUNTER — OFFICE VISIT (OUTPATIENT)
Dept: PHYSICAL THERAPY | Facility: REHABILITATION | Age: 67
End: 2022-02-22
Payer: COMMERCIAL

## 2022-02-22 ENCOUNTER — OFFICE VISIT (OUTPATIENT)
Dept: NEUROLOGY | Facility: CLINIC | Age: 67
End: 2022-02-22
Payer: COMMERCIAL

## 2022-02-22 VITALS
HEIGHT: 64 IN | WEIGHT: 154 LBS | SYSTOLIC BLOOD PRESSURE: 117 MMHG | HEART RATE: 56 BPM | DIASTOLIC BLOOD PRESSURE: 67 MMHG | RESPIRATION RATE: 16 BRPM | BODY MASS INDEX: 26.29 KG/M2 | TEMPERATURE: 97.6 F

## 2022-02-22 DIAGNOSIS — R51.9 SCALP TENDERNESS: ICD-10-CM

## 2022-02-22 DIAGNOSIS — R29.898 LEFT LEG WEAKNESS: Primary | ICD-10-CM

## 2022-02-22 DIAGNOSIS — R51.9 NONINTRACTABLE EPISODIC HEADACHE, UNSPECIFIED HEADACHE TYPE: Primary | ICD-10-CM

## 2022-02-22 DIAGNOSIS — R42 EPISODIC LIGHTHEADEDNESS: ICD-10-CM

## 2022-02-22 DIAGNOSIS — I66.9 ASYMPTOMATIC STENOSIS OF INTRACRANIAL ARTERY: ICD-10-CM

## 2022-02-22 PROCEDURE — 97110 THERAPEUTIC EXERCISES: CPT

## 2022-02-22 PROCEDURE — 3008F BODY MASS INDEX DOCD: CPT | Performed by: ORTHOPAEDIC SURGERY

## 2022-02-22 PROCEDURE — 97112 NEUROMUSCULAR REEDUCATION: CPT

## 2022-02-22 PROCEDURE — 99213 OFFICE O/P EST LOW 20 MIN: CPT | Performed by: NURSE PRACTITIONER

## 2022-02-22 PROCEDURE — 97530 THERAPEUTIC ACTIVITIES: CPT

## 2022-02-22 NOTE — PATIENT INSTRUCTIONS
Plan to repeat CT angiogram of head  Continue aspirin, statin, carbamazepine and lyrica   Get labs before the test  Try to log the headaches to see if a pattern/trigger can be found  Make sure to stay well hydrated   Follow up in 2 months time after testing  Acute Headache   WHAT YOU NEED TO KNOW:   An acute headache is pain or discomfort that may start suddenly and get worse quickly  You may have an acute headache only when you feel stress or eat certain foods  Other acute headache pain can happen every day, and sometimes several times a day  DISCHARGE INSTRUCTIONS:   Return to the emergency department if:   · You have severe pain  · You have numbness or weakness on one side of your face or body  · You have a headache that occurs after a blow to the head, a fall, or other trauma  · You have a headache, are forgetful or confused, or have trouble speaking  · You have a headache, stiff neck, and a fever  Call your doctor if:   · You have a constant headache and are vomiting  · You have a headache each day that does not get better, even after treatment  · You have changes in your headaches, or new symptoms that occur when you have a headache  · You have questions or concerns about your condition or care  Medicines: You may need any of the following:  · Prescription pain medicine  may be given  The medicine your healthcare provider recommends will depend on the kind of headaches you have  You will need to take prescription headache medicines as directed to prevent a problem called rebound headache  These headaches happen with regular use of pain relievers for headache disorders  · NSAIDs , such as ibuprofen, help decrease swelling, pain, and fever  This medicine is available with or without a doctor's order  NSAIDs can cause stomach bleeding or kidney problems in certain people  If you take blood thinner medicine, always ask your healthcare provider if NSAIDs are safe for you   Always read the medicine label and follow directions  · Acetaminophen  decreases pain and fever  It is available without a doctor's order  Ask how much to take and how often to take it  Follow directions  Read the labels of all other medicines you are using to see if they also contain acetaminophen, or ask your doctor or pharmacist  Acetaminophen can cause liver damage if not taken correctly  Do not use more than 3 grams (3,000 milligrams) total of acetaminophen in one day  · Antidepressants  may be given for some kinds of headaches  · Take your medicine as directed  Contact your healthcare provider if you think your medicine is not helping or if you have side effects  Tell him or her if you are allergic to any medicine  Keep a list of the medicines, vitamins, and herbs you take  Include the amounts, and when and why you take them  Bring the list or the pill bottles to follow-up visits  Carry your medicine list with you in case of an emergency  Manage your symptoms:   · Apply heat or ice  on the headache area  Use a heat or ice pack  For an ice pack, you can also put crushed ice in a plastic bag  Cover the pack or bag with a towel before you apply it to your skin  Ice and heat both help decrease pain, and heat also helps decrease muscle spasms  Apply heat for 20 to 30 minutes every 2 hours  Apply ice for 15 to 20 minutes every hour  Apply heat or ice for as long and for as many days as directed  You may alternate heat and ice  · Relax your muscles  Lie down in a comfortable position and close your eyes  Relax your muscles slowly  Start at your toes and work your way up your body  · Keep a record of your headaches  Write down when your headaches start and stop  Include your symptoms and what you were doing when the headache began  Record what you ate or drank for 24 hours before the headache started  Describe the pain and where it hurts   Keep track of what you did to treat your headache and if it worked  Prevent an acute headache:   · Avoid anything that triggers an acute headache  Examples include exposure to chemicals, going to high altitude, or not getting enough sleep  Create a regular sleep routine  Go to sleep at the same time and wake up at the same time each day  Do not use electronic devices before bedtime  These may trigger a headache or prevent you from sleeping well  · Do not smoke  Nicotine and other chemicals in cigarettes and cigars can trigger an acute headache or make it worse  Ask your healthcare provider for information if you currently smoke and need help to quit  E-cigarettes or smokeless tobacco still contain nicotine  Talk to your healthcare provider before you use these products  · Limit alcohol as directed  Alcohol can trigger an acute headache or make it worse  If you have cluster headaches, do not drink alcohol during an episode  For other types of headaches, ask your healthcare provider if it is safe for you to drink alcohol  Ask how much is safe for you to drink, and how often  · Exercise as directed  Exercise can reduce tension and help with headache pain  Aim for 30 minutes of physical activity on most days of the week  Your healthcare provider can help you create an exercise plan  · Eat a variety of healthy foods  Healthy foods include fruits, vegetables, low-fat dairy products, lean meats, fish, whole grains, and cooked beans  Your healthcare provider or dietitian can help you create meals plans if you need to avoid foods that trigger headaches  Follow up with your healthcare provider as directed:  Bring your headache record with you when you see your healthcare provider  Write down your questions so you remember to ask them during your visits  © Copyright [employer] 2021 Information is for End User's use only and may not be sold, redistributed or otherwise used for commercial purposes   All illustrations and images included in CareNotes® are the copyrighted property of [employer]  or [facility] Jannette Javier   The above information is an  only  It is not intended as medical advice for individual conditions or treatments  Talk to your doctor, nurse or pharmacist before following any medical regimen to see if it is safe and effective for you

## 2022-02-22 NOTE — PROGRESS NOTES
Patient ID: Angela Cui is a 77 y o  female  Assessment/Plan:  Patient Instructions:  Plan to repeat CT angiogram of head  Continue aspirin, statin, carbamazepine and lyrica   Get labs before the test  Try to log the headaches to see if a pattern/trigger can be found  Make sure to stay well hydrated   Follow up in 2 months time after testing  Diagnoses and all orders for this visit:    Nonintractable episodic headache, unspecified headache type  Comments:  primary stabbing headache? Orders:  -     Basic metabolic panel; Future  -     CT angiogram head with and without contrast; Future  -     Carbamazepine level, total; Future    Asymptomatic stenosis of intracranial artery  -     CT angiogram head with and without contrast; Future    Episodic lightheadedness  -     Carbamazepine level, total; Future    Scalp tenderness  Comments:  intermittent with headaches  Orders:  -     Sedimentation rate, automated; Future         Subjective:    HPI  Mary Snyder is a 77year old female with pmh of fibromyalgia, short lasting headaches for years (thought to be trigeminal neuralgia on carbamazepine), episode of altered consciousness in 2019 without repeat episode, right pca stenosis, knee arthritis with recent left knee replacement, occasional word finding difficulty, GERD, anxiety who follows up today for a change in her headaches  She has family history of stroke on both sides of her family- her mother had stroke in her [de-identified] along with vascular dementia  She is worried she will have stroke  Recent labs were reviewed and A1c 5 3, LDL 40, GFR 99, B12/D levels are normal  She states Lyrica has been very helpful over the years for her fibromyalgia- 100mg BID  She states carbamazepine has not had much effect since starting in 2020 for headaches- takes 100mg BID; no recent level  She takes her aspirin/lipitor faithfully  She has had increased fatigue since the summer after surgery  - states she notices a not so busy day in the past feels very busy nowadays  PT is helping her gait/balance  She states cannot find common words at times; this has been an ongoing problem  She is independent in all ADL's/IADL's; she continues to work as a  at [employer]  She denies change in diet/sleep habits- occasionally she will wake earlier than she would like- states sometimes drinks caffeine in afternoon, but other times unsure what wakes her  She denies tobacco/drug use; she only occasionally drinks an alcoholic beverage  Usually she will get very brief 1-3 second pains in the frontal region of her head that radiate to behind the eyes  These do not happen daily  They are usually left sided  She does not take anything acutely for the pain as it is gone so soon  She does not lose consciousness with the episodes, the pain is intense at onset and feels sharp  There is no vision change with the episodes; there is also no eye tearing/nasal congestion etc  On 2/13 she had another episode of this but instead it was located in left parietal area, was a circular pain about 3-5cm in diameter and lasted longer, about 10 seconds  The area was tender while this occurred  She denies other symptoms such as limb weakness, numbness, speech/swallowing difficulty, balance difficulty (although has intermittent lightheadedness when standing at times)  This has not happened again since  Previous Testing:  Normal routine and sleep deprived EEG    MRI brain 9/25/2019:  IMPRESSION:  No acute infarction  Slight interval progression of the nonspecific cerebral white matter disease when comparing to 2011 examination  Findings are likely secondary to chronic microangiopathy  CTA 9/25/2019:  1  No evidence of acute vascular territorial infarction, intracranial hemorrhage or mass effect  2   No stenosis, dissection or occlusion of the carotid or vertebral arteries    3   Focal moderate to severe (60-80%) stenosis in the right posterior cerebral artery, likely secondary to atherosclerotic disease  The following portions of the patient's history were reviewed and updated as appropriate: allergies, current medications, past family history, past medical history, past social history, past surgical history and problem list          Objective:    Blood pressure 117/67, pulse 56, temperature 97 6 °F (36 4 °C), temperature source Temporal, resp  rate 16, height 5' 4" (1 626 m), weight 69 9 kg (154 lb)  Physical Exam  Constitutional:       General: She is not in acute distress  Appearance: She is normal weight  HENT:      Head: Normocephalic and atraumatic  Comments: No scalp tenderness on exam today     Right Ear: External ear normal       Left Ear: External ear normal       Nose: Nose normal       Mouth/Throat:      Mouth: Mucous membranes are moist       Pharynx: Oropharynx is clear  Eyes:      General:         Right eye: No discharge  Left eye: No discharge  Extraocular Movements: Extraocular movements intact  Pupils: Pupils are equal, round, and reactive to light  Cardiovascular:      Rate and Rhythm: Normal rate and regular rhythm  Pulses: Normal pulses  Heart sounds: Normal heart sounds  Pulmonary:      Effort: Pulmonary effort is normal       Breath sounds: Normal breath sounds  Abdominal:      General: There is no distension  Musculoskeletal:         General: Normal range of motion  Cervical back: Normal range of motion  No tenderness  Skin:     General: Skin is warm  Capillary Refill: Capillary refill takes less than 2 seconds  Neurological:      General: No focal deficit present  Mental Status: She is alert  Motor: No weakness  Coordination: Romberg sign negative  Deep Tendon Reflexes: Strength normal       Reflex Scores:       Tricep reflexes are 2+ on the right side and 2+ on the left side         Brachioradialis reflexes are 1+ on the right side and 1+ on the left side  Patellar reflexes are 1+ on the right side and 1+ on the left side  Psychiatric:         Mood and Affect: Mood normal          Speech: Speech normal          Behavior: Behavior normal          Neurological Exam  Mental Status  Alert  Oriented to person, place and time  Speech is normal   No dysarthria; language is fluent with occasional word finding difficulty  Cranial Nerves  CN II: Right visual acuity: counts fingers  Left visual acuity: counts fingers  CN III, IV, VI: Extraocular movements intact bilaterally  Pupils equal round and reactive to light bilaterally  CN V: Facial sensation is normal   CN VII: Full and symmetric facial movement  CN VIII: Hearing is normal   CN IX, X: Palate elevates symmetrically  CN XI: Shoulder shrug strength is normal   CN XII: Tongue midline without atrophy or fasciculations  Motor  Normal muscle bulk throughout  Strength is 5/5 throughout all four extremities  Sensory  Light touch is normal in upper and lower extremities  Vibration is normal in upper and lower extremities  Reflexes                                           Right                      Left  Brachioradialis                    1+                         1+  Triceps                                2+                         2+  Patellar                                1+                         1+    Coordination  Right: Finger-to-nose normal  Rapid alternating movement normal   Left: Finger-to-nose normal  Rapid alternating movement normal     Gait  Casual gait is normal including stance, stride, and arm swing  Romberg is absent  Able to rise from chair without using arms  Uses cane for stability  Slightly antalgic gait on left; mild imbalance with romberg test         ROS:    Review of Systems   Constitutional: Negative  Negative for appetite change and fever  HENT: Negative  Negative for hearing loss, tinnitus, trouble swallowing and voice change  Eyes: Negative  Negative for photophobia and pain  Respiratory: Negative  Negative for shortness of breath  Cardiovascular: Negative  Negative for palpitations  Gastrointestinal: Negative  Negative for nausea and vomiting  Endocrine: Negative  Negative for cold intolerance  Genitourinary: Negative  Negative for dysuria, frequency and urgency  Musculoskeletal: Positive for myalgias and neck pain  Skin: Negative  Negative for rash  Neurological: Positive for dizziness and headaches (Occipital pain)  Negative for tremors, seizures, syncope, facial asymmetry, speech difficulty, weakness, light-headedness and numbness  Hematological: Negative  Does not bruise/bleed easily  Psychiatric/Behavioral: Negative  Negative for confusion, hallucinations and sleep disturbance       ROS was reviewed and updated as appropriate

## 2022-02-22 NOTE — PROGRESS NOTES
Daily Note     Today's date: 2022  Patient name: Estephania Aquino  : 1955  MRN: [id number]  Referring provider: Brisa French  Dx:   Encounter Diagnosis     ICD-10-CM    1  Left leg weakness  R29 898                   Subjective: Patient reports no new complaints  Objective: See treatment diary below    Assessment: Tolerated treatment well  Patient demonstrated fatigue post treatment and would benefit from continued PT  Tolerated step up well  1:1 with Tiara Danielson, PT, DPT for entirety of tx  Plan: Continue per plan of care         [id number]     Date    Visit Number 2/12 3/12 4/12 5/12 6/12 7/12   Manual                                             Neuro Re-Ed         Squat 3x10 3x10 3x10 3x10 3x10 3x10   Step down         Step up      x10 4", 2x10 8"   Leg press ecc   2x10 40# 40# 2x10 40# 3x10 40# 4x10 SL   SL total gym     x15 x10   Hip flexor lift over         Single leg stance  2x30s 2x30s B 3x30"B 3x30s B    Y balance                                    TherEx         Bike or TM warmup RB 8' RB 10' L1 TM 10' Bike 10' RB 10' TM 10'   Bridge 3x10 x30  20x5" 3x10 8# 3x10 8#   Clamshells 3x10 otb x30 otb  gtb 20x ea 3x10 gtb    SLR  3x10 2# NV 10x flex 3x10 2# 3x10 2#   HS curls 3x10 3x10 B 3x10 B 3x10 ea 3x10 3# 3x10 3#   Side step x6 laps x6 laps gtb x5 laps gtb 5 laps GTB 6 laps GTB 6x gtb   Heel raises  x30 x30 20x 2 up/1 down HEP    prostretch  2x30s x30s 30" HEP    TherAct         Patient education  5'                                           Gait Training         TM post  10'                         Modalities         CP             Precautions: Lt TKA July 15 2021, WINIFRED 6 weeks later  Past Medical History:   Diagnosis Date    Arthralgia     Atypical chest pain     Balance disorder     Cervical rib     last assessed 12    Depression     Fatigue     Fibromyalgia     Fibromyalgia     Fibromyalgia, primary     GERD (gastroesophageal reflux disease)     Hydronephrosis with renal and ureteral calculus obstruction     last assessed 5/1/17    Lacunar stroke (HCC)     Leukopenia     last assessed 5/19/16    Memory loss     Restless leg syndrome     last assessed 11/8/13    Sebaceous cyst     last assessed 2/13/13    Skin tag     last assessed 2/13/13    Vitamin D deficiency

## 2022-02-23 ENCOUNTER — TELEPHONE (OUTPATIENT)
Dept: INTERNAL MEDICINE CLINIC | Facility: CLINIC | Age: 67
End: 2022-02-23

## 2022-02-23 DIAGNOSIS — M79.7 FIBROMYALGIA: Primary | ICD-10-CM

## 2022-02-23 NOTE — TELEPHONE ENCOUNTER
LM for pt to call back     Per insurance will no longer cover the Pregabalin 100 MG    They are suggested she witch to Gabapentin either 100, 300 or 400 mg      Is she ok with switching

## 2022-02-24 RX ORDER — GABAPENTIN 100 MG/1
100 CAPSULE ORAL 2 TIMES DAILY
Qty: 180 CAPSULE | Refills: 0 | Status: SHIPPED | OUTPATIENT
Start: 2022-02-24 | End: 2022-04-22 | Stop reason: SDUPTHER

## 2022-02-24 NOTE — TELEPHONE ENCOUNTER
Gabapentin sent to the pharmacy  Recommend to decrease Lyrica once a day in AM, start gabapentin in PM for 2 weeks or until Lyrica finished  After, increase gabapentin to twice a day

## 2022-02-25 ENCOUNTER — APPOINTMENT (OUTPATIENT)
Dept: PHYSICAL THERAPY | Facility: REHABILITATION | Age: 67
End: 2022-02-25
Payer: COMMERCIAL

## 2022-03-01 ENCOUNTER — OFFICE VISIT (OUTPATIENT)
Dept: PHYSICAL THERAPY | Facility: REHABILITATION | Age: 67
End: 2022-03-01
Payer: COMMERCIAL

## 2022-03-01 DIAGNOSIS — R29.898 LEFT LEG WEAKNESS: Primary | ICD-10-CM

## 2022-03-01 PROCEDURE — 97112 NEUROMUSCULAR REEDUCATION: CPT

## 2022-03-01 PROCEDURE — 97530 THERAPEUTIC ACTIVITIES: CPT

## 2022-03-01 PROCEDURE — 97110 THERAPEUTIC EXERCISES: CPT

## 2022-03-01 NOTE — PROGRESS NOTES
Daily Note     Today's date: 3/1/2022  Patient name: Salima Fraire  : 1955  MRN: [id number]  Referring provider: Christiano Wilkerson  Dx:   Encounter Diagnosis     ICD-10-CM    1  Left leg weakness  R29 898                   Subjective: Patient reports no new complaints  She went for a 20 min walk yesterday  Objective: See treatment diary below    Assessment: Tolerated treatment well  Patient demonstrated fatigue post treatment and would benefit from continued PT  Tolerated step up well, step down fair  Demonstrated much improved gait pre and post session compared to previous visits  1:1 with Bill Fry, PT, DPT for entirety of tx  Plan: Continue per plan of care         [id number]     Date 2/4 2/8 2/11 2/18 2/22 3/1   Visit Number 3/12 4/12 5/12 6/12 7/12 8/12   Manual                                             Neuro Re-Ed         Squat 3x10 3x10 3x10 3x10 3x10 NV   Step down      3x10 6"   Step up     x10 4", 2x10 8" 3x10 6"   Leg press ecc  2x10 40# 40# 2x10 40# 3x10 40# 4x10 SL 55# 3x10 SL   SL total gym    x15 x10 NV   Hip flexor lift over         Single leg stance 2x30s 2x30s B 3x30"B 3x30s B     Y balance                                    TherEx         Bike or TM warmup RB 10' L1 TM 10' Bike 10' RB 10' TM 10' TM 8'   Bridge x30  20x5" 3x10 8# 3x10 8# 3x10 8#   Clamshells x30 otb  gtb 20x ea 3x10 gtb     SLR 3x10 2# NV 10x flex 3x10 2# 3x10 2# 3x10 2#   HS curls 3x10 B 3x10 B 3x10 ea 3x10 3# 3x10 3# 3x10 2#   Side step x6 laps gtb x5 laps gtb 5 laps GTB 6 laps GTB 6x gtb    Sidelying hip abd      3x10   Heel raises x30 x30 20x 2 up/1 down HEP     prostretch 2x30s x30s 30" HEP     TherAct         Patient education 5'        HS stretch seated      x1'   Quad stretch stand      x1'                     Gait Training         TM post 10'                          Modalities         CP             Precautions: Lt TKA July 15 2021, WINIFRED 6 weeks later  Past Medical History:   Diagnosis Date  Arthralgia     Atypical chest pain     Balance disorder     Cervical rib     last assessed 9/13/12    Depression     Fatigue     Fibromyalgia     Fibromyalgia     Fibromyalgia, primary     GERD (gastroesophageal reflux disease)     Hydronephrosis with renal and ureteral calculus obstruction     last assessed 5/1/17    [facility]     Leukopenia     last assessed 5/19/16    Memory loss     Restless leg syndrome     last assessed 11/8/13    Sebaceous cyst     last assessed 2/13/13    Skin tag     last assessed 2/13/13    Vitamin D deficiency

## 2022-03-04 ENCOUNTER — OFFICE VISIT (OUTPATIENT)
Dept: PHYSICAL THERAPY | Facility: REHABILITATION | Age: 67
End: 2022-03-04
Payer: COMMERCIAL

## 2022-03-04 DIAGNOSIS — R29.898 LEFT LEG WEAKNESS: Primary | ICD-10-CM

## 2022-03-04 PROCEDURE — 97112 NEUROMUSCULAR REEDUCATION: CPT

## 2022-03-04 PROCEDURE — 97110 THERAPEUTIC EXERCISES: CPT

## 2022-03-04 PROCEDURE — 97530 THERAPEUTIC ACTIVITIES: CPT

## 2022-03-04 NOTE — PROGRESS NOTES
Daily Note     Today's date: 3/4/2022  Patient name: Nicole Qiu  : 1955  MRN: [id number]  Referring provider: Pedrito Zuniga  Dx:   Encounter Diagnosis     ICD-10-CM    1  Left leg weakness  R29 898                   Subjective: Patient reports no new complaints  Objective: See treatment diary below    Assessment: Tolerated treatment well  Patient demonstrated fatigue post treatment and would benefit from continued PT  Tolerated step up well, step down fair but improved with repetition  1:1 with Gucci Schultz, PT, DPT for entirety of tx  Plan: Continue per plan of care         [id number]     Date 2/8 2/11 2/18 2/22 3/1 3/4   Visit Number    Manual                                             Neuro Re-Ed         Squat 3x10 3x10 3x10 3x10 NV 3x10   Step down     3x10 6" 2x10 6"   Step up    x10 4", 2x10 8" 3x10 6" 2x10 6"   Leg press ecc 2x10 40# 40# 2x10 40# 3x10 40# 4x10 SL 55# 3x10 SL 70# 3x10 ecc   SL total gym   x15 x10 NV NV   Hip flexor lift over         Single leg stance 2x30s B 3x30"B 3x30s B      Y balance                                    TherEx         Bike or TM warmup TM 10' Bike 10' RB 10' TM 10' TM 8' TM 10'   Bridge  20x5" 3x10 8# 3x10 8# 3x10 8#    Clamshells  gtb 20x ea 3x10 gtb      SLR NV 10x flex 3x10 2# 3x10 2# 3x10 2# 2x10 3#   HS curls 3x10 B 3x10 ea 3x10 3# 3x10 3# 3x10 2# 3x10 3#   Side step x5 laps gtb 5 laps GTB 6 laps GTB 6x gtb     Sidelying hip abd     3x10    Heel raises x30 20x 2 up/1 down HEP      prostretch x30s 30" HEP      TherAct         Patient education         HS stretch seated     x1' Supine x1'   Quad stretch stand     x1' Prone x1'                     Gait Training         TM post                           Modalities         CP             Precautions: Lt TKA July 15 2021, WINIFRED 6 weeks later  Past Medical History:   Diagnosis Date    Arthralgia     Atypical chest pain     Balance disorder     Cervical rib last assessed 9/13/12    Depression     Fatigue     Fibromyalgia     Fibromyalgia     Fibromyalgia, primary     GERD (gastroesophageal reflux disease)     Hydronephrosis with renal and ureteral calculus obstruction     last assessed 5/1/17    [facility]     Leukopenia     last assessed 5/19/16    Memory loss     Restless leg syndrome     last assessed 11/8/13    Sebaceous cyst     last assessed 2/13/13    Skin tag     last assessed 2/13/13    Vitamin D deficiency

## 2022-03-08 ENCOUNTER — OFFICE VISIT (OUTPATIENT)
Dept: PHYSICAL THERAPY | Facility: REHABILITATION | Age: 67
End: 2022-03-08
Payer: COMMERCIAL

## 2022-03-08 DIAGNOSIS — R29.898 LEFT LEG WEAKNESS: Primary | ICD-10-CM

## 2022-03-08 PROCEDURE — 97110 THERAPEUTIC EXERCISES: CPT

## 2022-03-08 PROCEDURE — 97530 THERAPEUTIC ACTIVITIES: CPT

## 2022-03-08 PROCEDURE — 97112 NEUROMUSCULAR REEDUCATION: CPT

## 2022-03-08 NOTE — PROGRESS NOTES
Daily Note     Today's date: 3/8/2022  Patient name: Sheffield Lesches  : 1955  MRN: [id number]  Referring provider: Shala Cortez  Dx:   Encounter Diagnosis     ICD-10-CM    1  Left leg weakness  R29 898        Start Time: 1630  Stop Time: 1715  Total time in clinic (min): 45 minutes    Subjective: Patient reports she walked 0 5 mile yesterday and 0 3 today  She is a little tired because she is not sleeping well  Objective: See treatment diary below    Assessment: Tolerated treatment well  Patient demonstrated fatigue post treatment and would benefit from continued PT  Improved tolerance to step up/over  Tolerated TE progressions well  1:1 with Milagros Hernandez, PT, DPT for 30 min of tx, PTA for 15 min of treatment   Plan: Continue per plan of care         [id number]     Date 2/18 2/22 3/1 3/4 3/8   Visit Number 6/12 7/12 8/12 9/12 10/12   Manual                                        Neuro Re-Ed        Squat 3x10 3x10 NV 3x10 X10; 2x10 4#   Step down   3x10 6" 2x10 6" 2x15 6"   Step up  x10 4", 2x10 8" 3x10 6" 2x10 6" 2x15 6"   Leg press ecc 40# 3x10 40# 4x10 SL 55# 3x10 SL 70# 3x10 ecc 70# 2x15   SL total gym x15 x10 NV NV 2x10   Hip flexor lift over        Single leg stance 3x30s B       Y balance                                TherEx        Bike or TM warmup RB 10' TM 10' TM 8' TM 10' TM 8'   Bridge 3x10 8# 3x10 8# 3x10 8#  3x10 12#   Clamshells 3x10 gtb       SLR 3x10 2# 3x10 2# 3x10 2# 2x10 3# 2x10 3#   HS curls 3x10 3# 3x10 3# 3x10 2# 3x10 3# 3x15 3#   Side step 6 laps GTB 6x gtb   x6 gtb   Sidelying hip abd   3x10                     TherAct        Patient education        HS stretch supine   x1' seated Supine x1' Supine 1'   Quad stretch prone   x1' stand Prone x1' Prone x1'                   Gait Training        Tandem on foam        Step over cones        Step on TB foam        Modalities        CP            Precautions: Lt TKA July 15 2021, WINIFRED 6 weeks later  Past Medical History: Diagnosis Date    Arthralgia     Atypical chest pain     Balance disorder     Cervical rib     last assessed 9/13/12    Depression     Fatigue     Fibromyalgia     Fibromyalgia     Fibromyalgia, primary     GERD (gastroesophageal reflux disease)     Hydronephrosis with renal and ureteral calculus obstruction     last assessed 5/1/17    [facility]     Leukopenia     last assessed 5/19/16    Memory loss     Restless leg syndrome     last assessed 11/8/13    Sebaceous cyst     last assessed 2/13/13    Skin tag     last assessed 2/13/13    Vitamin D deficiency

## 2022-03-11 ENCOUNTER — OFFICE VISIT (OUTPATIENT)
Dept: PHYSICAL THERAPY | Facility: REHABILITATION | Age: 67
End: 2022-03-11
Payer: COMMERCIAL

## 2022-03-11 ENCOUNTER — APPOINTMENT (OUTPATIENT)
Dept: LAB | Age: 67
End: 2022-03-11
Payer: COMMERCIAL

## 2022-03-11 DIAGNOSIS — R42 EPISODIC LIGHTHEADEDNESS: ICD-10-CM

## 2022-03-11 DIAGNOSIS — R51.9 NONINTRACTABLE EPISODIC HEADACHE, UNSPECIFIED HEADACHE TYPE: ICD-10-CM

## 2022-03-11 DIAGNOSIS — R29.898 LEFT LEG WEAKNESS: Primary | ICD-10-CM

## 2022-03-11 DIAGNOSIS — R51.9 SCALP TENDERNESS: ICD-10-CM

## 2022-03-11 LAB
ANION GAP SERPL CALCULATED.3IONS-SCNC: 1 MMOL/L (ref 4–13)
BUN SERPL-MCNC: 16 MG/DL (ref 5–25)
CALCIUM SERPL-MCNC: 8.9 MG/DL (ref 8.3–10.1)
CARBAMAZEPINE SERPL-MCNC: 3 UG/ML (ref 4–12)
CHLORIDE SERPL-SCNC: 110 MMOL/L (ref 100–108)
CO2 SERPL-SCNC: 31 MMOL/L (ref 21–32)
CREAT SERPL-MCNC: 0.79 MG/DL (ref 0.6–1.3)
ERYTHROCYTE [SEDIMENTATION RATE] IN BLOOD: 2 MM/HOUR (ref 0–29)
GFR SERPL CREATININE-BSD FRML MDRD: 78 ML/MIN/1.73SQ M
GLUCOSE P FAST SERPL-MCNC: 87 MG/DL (ref 65–99)
POTASSIUM SERPL-SCNC: 4.1 MMOL/L (ref 3.5–5.3)
SODIUM SERPL-SCNC: 142 MMOL/L (ref 136–145)

## 2022-03-11 PROCEDURE — 80048 BASIC METABOLIC PNL TOTAL CA: CPT

## 2022-03-11 PROCEDURE — 85652 RBC SED RATE AUTOMATED: CPT

## 2022-03-11 PROCEDURE — 97530 THERAPEUTIC ACTIVITIES: CPT

## 2022-03-11 PROCEDURE — 97110 THERAPEUTIC EXERCISES: CPT

## 2022-03-11 PROCEDURE — 97112 NEUROMUSCULAR REEDUCATION: CPT

## 2022-03-11 PROCEDURE — 80156 ASSAY CARBAMAZEPINE TOTAL: CPT

## 2022-03-11 PROCEDURE — 36415 COLL VENOUS BLD VENIPUNCTURE: CPT

## 2022-03-11 NOTE — PROGRESS NOTES
Daily Note     Today's date: 3/11/2022  Patient name: Jack Peterson  : 1955  MRN: [id number]  Referring provider: Jose Urban  Dx:   Encounter Diagnosis     ICD-10-CM    1  Left leg weakness  R29 898                   Subjective: Patient came from blood work  She is tired today  Objective: See treatment diary below    Assessment: Tolerated treatment well  Patient demonstrated fatigue post treatment and would benefit from continued PT  Tolerated increased step height well, improved with repetition  Too fatigued for SL total gym  1:1 with Morena Candelario, PT, DPT for entirety of tx  Plan: Continue per plan of care         [id number]     Date 2/22 3/1 3/4 3/8 3/11   Visit Number 7/12 8/12 9/12 10/12 11/12   Manual                                        Neuro Re-Ed        Squat 3x10 NV 3x10 X10; 2x10 4# 3x10 4#   Step down  3x10 6" 2x10 6" 2x15 6" 3x10 8"   Step up x10 4", 2x10 8" 3x10 6" 2x10 6" 2x15 6" 3x10 8"   Leg press ecc 40# 4x10 SL 55# 3x10 SL 70# 3x10 ecc 70# 2x15 70# 3x10   SL total gym x10 NV NV 2x10    Hip flexor lift over     2x15   Single leg stance        Y balance                                TherEx        Bike or TM warmup TM 10' TM 8' TM 10' TM 8' TM 10'   Bridge 3x10 8# 3x10 8#  3x10 12# 2x15 10#; 2x10 pb   PB HS curl     2x15   SLR 3x10 2# 3x10 2# 2x10 3# 2x10 3# 2x10 3#   HS curls 3x10 3# 3x10 2# 3x10 3# 3x15 3# 2x15 3#   Side step 6x gtb   x6 gtb x6 gtb   Sidelying hip abd  3x10                      TherAct        Patient education        HS stretch supine  x1' seated Supine x1' Supine 1' NV   Quad stretch prone  x1' stand Prone x1' Prone x1' NV   Weight shift to target     x10 ea           Gait Training        Tandem on foam        Step over cones        Step on TB foam    x5 laps    Modalities        CP            Precautions: Lt TKA July 15 2021, WINIFRED 6 weeks later  Past Medical History:   Diagnosis Date    Arthralgia     Atypical chest pain     Balance disorder     Cervical rib     last assessed 9/13/12    Depression     Fatigue     Fibromyalgia     Fibromyalgia     Fibromyalgia, primary     GERD (gastroesophageal reflux disease)     Hydronephrosis with renal and ureteral calculus obstruction     last assessed 5/1/17    [facility]     Leukopenia     last assessed 5/19/16    Memory loss     Restless leg syndrome     last assessed 11/8/13    Sebaceous cyst     last assessed 2/13/13    Skin tag     last assessed 2/13/13    Vitamin D deficiency

## 2022-03-15 ENCOUNTER — EVALUATION (OUTPATIENT)
Dept: PHYSICAL THERAPY | Facility: REHABILITATION | Age: 67
End: 2022-03-15
Payer: COMMERCIAL

## 2022-03-15 DIAGNOSIS — R29.898 LEFT LEG WEAKNESS: Primary | ICD-10-CM

## 2022-03-15 PROCEDURE — 97116 GAIT TRAINING THERAPY: CPT

## 2022-03-15 PROCEDURE — 97164 PT RE-EVAL EST PLAN CARE: CPT

## 2022-03-15 PROCEDURE — 97110 THERAPEUTIC EXERCISES: CPT

## 2022-03-15 NOTE — PROGRESS NOTES
Daily Note     Today's date: 3/15/2022  Patient name: Steff Hung  : 1955  MRN: [id number]  Referring provider: Karthikeyan Yañez  Dx:   Encounter Diagnosis     ICD-10-CM    1  Left leg weakness  R29 898                   Subjective: Patient reports continued issues with her head pains and has a CTA tomorrow  Continues to reports limitations in endurance and balance  She reports limited compliance with HEP  She walked for 20 minutes which is an improvement from when she started but she has not been adding as much every week due to weather limitations  Objective: See treatment diary below    Pain 3/10     Knee ROM  Joint Motion Right Left   Flexion 120 115   Extension -5 2      Strength: MMT revealed the following findings  Knee  Joint Motion Right Left   Flexion 5/5 5/5   Extension 4-/5 4-/5     Hip  Joint Motion Right Left   Flexion 4-/5 3+/5   Abduction 4/5 4/5   Extension 4/5 4/5     FGA:       Assessment: Tolerated treatment well  Patient demonstrated fatigue post treatment and would benefit from continued PT  Patient demonstrates impairments in dynamic balance as apparent in the deficits of score on FGA and weakness throughout the hip musculature  These impairments continue to limited the patient's ability to confidently ambulate, return to full work duty and return to hiking for physical and mental wellbeing   short term goals have been met, LTG are progressing  They require 12 visits, 2x/week over 6 weeks to address these limitations  Steff Hung has demonstrated improvement in physical therapy and would continue to benefit from skilled PT to address the above impairments and return to PLOF  Niko Hubbard, SPT, supervised by Rollene Crigler, PT for duration of session  Plan: Continue per plan of care         [id number]     Date 3/1 3/4 3/8 3/11 3/15   Visit Number 8/12 9/12 10/12 11/12 REEVAL   Manual                                        Neuro Re-Ed        Squat NV 3x10 X10; 2x10 4# 3x10 4#    Step down 3x10 6" 2x10 6" 2x15 6" 3x10 8"    Step up 3x10 6" 2x10 6" 2x15 6" 3x10 8"    Leg press ecc 55# 3x10 SL 70# 3x10 ecc 70# 2x15 70# 3x10 70# 3x10   SL total gym NV NV 2x10     Hip flexor lift over    2x15    Single leg stance        Y balance     5x   Tandem + Foam EO/EC     2x30s   Foam Step over KB     2x10 blue foam           TherEx        Bike or TM warmup TM 8' TM 10' TM 8' TM 10'    Bridge 3x10 8#  3x10 12# 2x15 10#; 2x10 pb    PB HS curl    2x15    SLR 3x10 2# 2x10 3# 2x10 3# 2x10 3#    HS curls 3x10 2# 3x10 3# 3x15 3# 2x15 3#    Side step   x6 gtb x6 gtb    Sidelying hip abd 3x10                       TherAct        Patient education        HS stretch supine x1' seated Supine x1' Supine 1' NV    Quad stretch prone x1' stand Prone x1' Prone x1' NV    Weight shift to target    x10 ea            Gait Training        FGA     15'   Step over cones        Step on TB foam   x5 laps     Modalities        CP            Precautions: Lt TKA July 15 2021, WINIFRED 6 weeks later  Past Medical History:   Diagnosis Date    Arthralgia     Atypical chest pain     Balance disorder     Cervical rib     last assessed 9/13/12    Depression     Fatigue     Fibromyalgia     Fibromyalgia     Fibromyalgia, primary     GERD (gastroesophageal reflux disease)     Hydronephrosis with renal and ureteral calculus obstruction     last assessed 5/1/17    Lacunar stroke ([address] )     Leukopenia     last assessed 5/19/16    Memory loss     Restless leg syndrome     last assessed 11/8/13    Sebaceous cyst     last assessed 2/13/13    Skin tag     last assessed 2/13/13    Vitamin D deficiency        Goals  Short Term Goals (4 weeks):    - Patient will be independent in basic HEP 2-3 weeks MET  - Patient will report >50% reduction in pain   - Patient will demonstrate >1/3 improvement in MMT grade as applicable MET  - Demonstrate consistent posture corrections without cueing during therapeutic exercise MET  - Improve SLS to 15 sec MET     Long Term Goals (8 weeks):  - Patient will be independent in a comprehensive home exercise program  - Patient FOTO score will improve to 59/100  - Patient will self-report >75% improvement in function  - Improve endurance to 30 minutes   - SLS to 30 sec

## 2022-03-17 ENCOUNTER — HOSPITAL ENCOUNTER (OUTPATIENT)
Dept: RADIOLOGY | Facility: HOSPITAL | Age: 67
Discharge: HOME | End: 2022-03-17
Payer: COMMERCIAL

## 2022-03-17 DIAGNOSIS — R51.9 NONINTRACTABLE EPISODIC HEADACHE, UNSPECIFIED HEADACHE TYPE: ICD-10-CM

## 2022-03-17 DIAGNOSIS — I66.9 ASYMPTOMATIC STENOSIS OF INTRACRANIAL ARTERY: ICD-10-CM

## 2022-03-17 PROCEDURE — 70496 CT ANGIOGRAPHY HEAD: CPT

## 2022-03-17 RX ADMIN — IOHEXOL 100 ML: 350 INJECTION, SOLUTION INTRAVENOUS at 10:57

## 2022-03-18 ENCOUNTER — OFFICE VISIT (OUTPATIENT)
Dept: PHYSICAL THERAPY | Facility: REHABILITATION | Age: 67
End: 2022-03-18
Payer: COMMERCIAL

## 2022-03-18 DIAGNOSIS — R29.898 LEFT LEG WEAKNESS: Primary | ICD-10-CM

## 2022-03-18 PROCEDURE — 97110 THERAPEUTIC EXERCISES: CPT

## 2022-03-18 PROCEDURE — 97112 NEUROMUSCULAR REEDUCATION: CPT

## 2022-03-18 PROCEDURE — 97530 THERAPEUTIC ACTIVITIES: CPT

## 2022-03-18 NOTE — PROGRESS NOTES
Daily Note     Today's date: 3/18/2022  Patient name: Mallory Bush  : 1955  MRN: [id number]  Referring provider: Claudia Garcia  Dx:   Encounter Diagnosis     ICD-10-CM    1  Left leg weakness  R29 898                   Subjective: Patient reports headaches have resolved and patient reports feeling good  She walked 20 minutes Wednesday  Objective: See treatment diary below      Assessment: Patient demonstrated step up and over with weight well and reported no increase in pain  However, exercise was reported by the patient to be more energy consuming  Exercise will be a focus of next visit to help address endurance issues with functional tasks like carrying laundry up and down stairs  Patient tolerated theraputic exercise well with fatigue at the end of the session  Physical therapy remains necessary for patient to achieve functional goals towards independence and safety in the home  GIORGI Figueroa, supervised by Shawn Baum PT for duration of session  Plan: Continue per plan of care         [id number]     Date 3/1 3/4 3/8 3/11 3/15 3/18   Visit Number 8/12 9/12 10/12 11/12 REEVAL    Manual                                             Neuro Re-Ed         Squat NV 3x10 X10; 2x10 4# 3x10 4#     Step down 3x10 6" 2x10 6" 2x15 6" 3x10 8"     Step up 3x10 6" 2x10 6" 2x15 6" 3x10 8"  3x10 8"    Leg press ecc 55# 3x10 SL 70# 3x10 ecc 70# 2x15 70# 3x10 70# 3x10 70# 3x10   SL total gym NV NV 2x10      Hip flexor lift over    2x15     Single leg stance         Y balance     5x    Tandem + Foam EO/EC     2x30s    Foam Step over KB     2x10 blue foam             TherEx         Bike or TM warmup TM 8' TM 10' TM 8' TM 10'  TM 10'   Bridge 3x10 8#  3x10 12# 2x15 10#; 2x10 pb  2x15 10#; 2x10 pb   PB HS curl    2x15  2x15   SLR 3x10 2# 2x10 3# 2x10 3# 2x10 3#  3x10 3#   HS curls 3x10 2# 3x10 3# 3x15 3# 2x15 3#     Side step   x6 gtb x6 gtb     Sidelying hip abd 3x10        Standing Foam Marching                  TherAct         Patient education         HS stretch supine x1' seated Supine x1' Supine 1' NV  dc   Quad stretch prone x1' stand Prone x1' Prone x1' NV     Weight shift to target    x10 ea     Step up and over w/ weight      10x 5#            Gait Training         FGA     15'    Step over cones         Step on TB foam   x5 laps      Modalities         CP             Precautions: Lt TKA July 15 2021, WINIFRED 6 weeks later  Past Medical History:   Diagnosis Date    Arthralgia     Atypical chest pain     Balance disorder     Cervical rib     last assessed 9/13/12    Depression     Fatigue     Fibromyalgia     Fibromyalgia     Fibromyalgia, primary     GERD (gastroesophageal reflux disease)     Hydronephrosis with renal and ureteral calculus obstruction     last assessed 5/1/17    Lacunar stroke ([address] )     Leukopenia     last assessed 5/19/16    Memory loss     Restless leg syndrome     last assessed 11/8/13    Sebaceous cyst     last assessed 2/13/13    Skin tag     last assessed 2/13/13    Vitamin D deficiency        Goals  Short Term Goals (4 weeks):    - Patient will be independent in basic HEP 2-3 weeks MET  - Patient will report >50% reduction in pain   - Patient will demonstrate >1/3 improvement in MMT grade as applicable MET  - Demonstrate consistent posture corrections without cueing during therapeutic exercise MET  - Improve SLS to 15 sec MET     Long Term Goals (8 weeks):  - Patient will be independent in a comprehensive home exercise program  - Patient FOTO score will improve to 59/100  - Patient will self-report >75% improvement in function  - Improve endurance to 30 minutes   - SLS to 30 sec

## 2022-03-21 ENCOUNTER — OFFICE VISIT (OUTPATIENT)
Dept: PHYSICAL THERAPY | Facility: REHABILITATION | Age: 67
End: 2022-03-21
Payer: COMMERCIAL

## 2022-03-21 DIAGNOSIS — R29.898 LEFT LEG WEAKNESS: Primary | ICD-10-CM

## 2022-03-21 PROCEDURE — 97112 NEUROMUSCULAR REEDUCATION: CPT

## 2022-03-21 PROCEDURE — 97530 THERAPEUTIC ACTIVITIES: CPT

## 2022-03-21 PROCEDURE — 97110 THERAPEUTIC EXERCISES: CPT

## 2022-03-21 PROCEDURE — 97116 GAIT TRAINING THERAPY: CPT

## 2022-03-21 NOTE — PROGRESS NOTES
Daily Note     Today's date: 3/21/2022  Patient name: Jermaine Arambula  : 1955  MRN: [id number]  Referring provider: Melissa Ashton  Dx:   Encounter Diagnosis     ICD-10-CM    1  Left leg weakness  R29 898        Start Time: 915  Stop Time: 1000  Total time in clinic (min): 45 minutes    Subjective: Patient reports increased fatigue with all activity that is progressively worsening  Dizziness was reported by the patient while performing step ups  Patient reported that Lyrica was stopped around the same time that fatigue increased  Objective: See treatment diary below      Assessment: Patient demonstrated step up and over with weight poorly secondary to complaints of fatigue and loss of balance  Exercise was then modified by therapist to remove weight and form improved  Patient tolerated theraputic exercise well with fatigue throughout the session  Patient was educated to contact PCP about medication changes  Physical therapy remains necessary for patient to achieve functional goals towards independence and safety in the home  GIORGI Villanueva, supervised by Iverson Saint, PT for duration of session  Plan: Continue per plan of care         [id number]     Date 3/4 3/8 3/11 3/15 3/18 3/21   Visit Number 9/12 10/12 11/12 REEVAL    Manual                                             Neuro Re-Ed         Squat 3x10 X10; 2x10 4# 3x10 4#      Step down 2x10 6" 2x15 6" 3x10 8"      Step up 2x10 6" 2x15 6" 3x10 8"  3x10 8" ;5# 3x10 8"   Leg press ecc 70# 3x10 ecc 70# 2x15 70# 3x10 70# 3x10 70# 3x10 70# 3x10   SL total gym NV 2x10       Hip flexor lift over   2x15      Single leg stance         Y balance    5x     Tandem + Foam EO/EC    2x30s     Foam Step over KB    2x10 blue foam              TherEx         Bike or TM warmup TM 10' TM 8' TM 10'  TM 10' TM 10'   Bridge  3x10 12# 2x15 10#; 2x10 pb  2x15 10#; 2x10 pb 2x10 pb   PB HS curl   2x15  2x15 2x15   SLR 2x10 3# 2x10 3# 2x10 3#  3x10 3#    HS curls 3x10 3# 3x15 3# 2x15 3#      Side step  x6 gtb x6 gtb      Sidelying hip abd         Standing Foam Marching                  TherAct         Patient education         HS stretch supine Supine x1' Supine 1' NV  dc    Quad stretch prone Prone x1' Prone x1' NV      Weight shift to target   x10 ea               Gait Training         FGA    15'     Step over cones         Step on TB foam  x5 laps       Modalities         CP             Precautions: Lt TKA July 15 2021, WINIFRED 6 weeks later  Past Medical History:   Diagnosis Date    Arthralgia     Atypical chest pain     Balance disorder     Cervical rib     last assessed 9/13/12    Depression     Fatigue     Fibromyalgia     Fibromyalgia     Fibromyalgia, primary     GERD (gastroesophageal reflux disease)     Hydronephrosis with renal and ureteral calculus obstruction     last assessed 5/1/17    [address] [address]     Leukopenia     last assessed 5/19/16    Memory loss     Restless leg syndrome     last assessed 11/8/13    Sebaceous cyst     last assessed 2/13/13    Skin tag     last assessed 2/13/13    Vitamin D deficiency

## 2022-03-22 ENCOUNTER — APPOINTMENT (OUTPATIENT)
Dept: PHYSICAL THERAPY | Facility: REHABILITATION | Age: 67
End: 2022-03-22
Payer: COMMERCIAL

## 2022-03-25 ENCOUNTER — OFFICE VISIT (OUTPATIENT)
Dept: PHYSICAL THERAPY | Facility: REHABILITATION | Age: 67
End: 2022-03-25
Payer: COMMERCIAL

## 2022-03-25 DIAGNOSIS — R29.898 LEFT LEG WEAKNESS: Primary | ICD-10-CM

## 2022-03-25 DIAGNOSIS — F41.8 DEPRESSION WITH ANXIETY: ICD-10-CM

## 2022-03-25 DIAGNOSIS — R51.9 NONINTRACTABLE HEADACHE, UNSPECIFIED CHRONICITY PATTERN, UNSPECIFIED HEADACHE TYPE: ICD-10-CM

## 2022-03-25 DIAGNOSIS — M79.7 FIBROMYALGIA: ICD-10-CM

## 2022-03-25 PROCEDURE — 97110 THERAPEUTIC EXERCISES: CPT

## 2022-03-25 PROCEDURE — 97112 NEUROMUSCULAR REEDUCATION: CPT

## 2022-03-25 PROCEDURE — 97530 THERAPEUTIC ACTIVITIES: CPT

## 2022-03-25 PROCEDURE — 97116 GAIT TRAINING THERAPY: CPT

## 2022-03-25 RX ORDER — CARBAMAZEPINE 100 MG/1
100 CAPSULE, EXTENDED RELEASE ORAL 2 TIMES DAILY
Qty: 180 CAPSULE | Refills: 0 | Status: SHIPPED | OUTPATIENT
Start: 2022-03-25

## 2022-03-25 RX ORDER — VENLAFAXINE HYDROCHLORIDE 75 MG/1
75 CAPSULE, EXTENDED RELEASE ORAL
Qty: 90 CAPSULE | Refills: 0 | Status: SHIPPED | OUTPATIENT
Start: 2022-03-25 | End: 2022-06-01 | Stop reason: SDUPTHER

## 2022-03-25 NOTE — PROGRESS NOTES
Daily Note     Today's date: 3/25/2022  Patient name: Natalya Asher  : 1955  MRN: [id number]  Referring provider: Micael Ahumada  Dx:   Encounter Diagnosis     ICD-10-CM    1  Left leg weakness  R29 898                   Subjective: Patient reports continued fatigue and trouble sleeping  Objective: See treatment diary below      Assessment: Patient demonstrated step up and over with very improved form and holding no weight secondary to increased fatigue  Patient tolerated theraputic exercise well with fatigue throughout the session  Physical therapy remains necessary for patient to achieve functional goals towards independence and safety in the home  Mary Medicradha, SPT, supervised by Bhumika Milan PT for duration of session  Plan: Continue per plan of care         [id number]     Date 3/8 3/11 3/15 3/18 3/21 3/25   Visit Number 10/12 11/12 REEVAL 1/12 2/12 3/12   Manual                                             Neuro Re-Ed         Squat X10; 2x10 4# 3x10 4#       Step down 2x15 6" 3x10 8"       Step up 2x15 6" 3x10 8"  3x10 8" ;5# 3x10 8" 3x10 8"   Leg press ecc 70# 2x15 70# 3x10 70# 3x10 70# 3x10 70# 3x10 70# 3x10   SL total gym 2x10        Hip flexor lift over  2x15       Single leg stance         Y balance   5x      Tandem + Foam EO/EC   2x30s      Foam Step over KB   2x10 blue foam               TherEx         Bike or TM warmup TM 8' TM 10'  TM 10' TM 10' TM 10'   Bridge 3x10 12# 2x15 10#; 2x10 pb  2x15 10#; 2x10 pb 2x10 pb 2x10 pb   PB HS curl  2x15  2x15 2x15 60x   SLR 2x10 3# 2x10 3#  3x10 3#     HS curls 3x15 3# 2x15 3#       Side step x6 gtb x6 gtb       Sidelying hip abd         Standing Foam Marching      2x20            TherAct         Patient education         HS stretch supine Supine 1' NV  dc     Quad stretch prone Prone x1' NV       Weight shift to target  x10 ea                Gait Training         FGA   15'      Step over cones         Step on TB foam x5 laps Modalities         CP             Precautions: Lt TKA July 15 2021, WINIFRED 6 weeks later  Past Medical History:   Diagnosis Date    Arthralgia     Atypical chest pain     Balance disorder     Cervical rib     last assessed 9/13/12    Depression     Fatigue     Fibromyalgia     Fibromyalgia     Fibromyalgia, primary     GERD (gastroesophageal reflux disease)     Hydronephrosis with renal and ureteral calculus obstruction     last assessed 5/1/17    [address]ar [address] ([facility] [address]     Leukopenia     last assessed 5/19/16    Memory loss     Restless leg syndrome     last assessed 11/8/13    Sebaceous cyst     last assessed 2/13/13    Skin tag     last assessed 2/13/13    Vitamin D deficiency

## 2022-03-29 ENCOUNTER — OFFICE VISIT (OUTPATIENT)
Dept: PHYSICAL THERAPY | Facility: REHABILITATION | Age: 67
End: 2022-03-29
Payer: COMMERCIAL

## 2022-03-29 DIAGNOSIS — R29.898 LEFT LEG WEAKNESS: Primary | ICD-10-CM

## 2022-03-29 PROCEDURE — 97530 THERAPEUTIC ACTIVITIES: CPT

## 2022-03-29 PROCEDURE — 97112 NEUROMUSCULAR REEDUCATION: CPT

## 2022-03-29 PROCEDURE — 97110 THERAPEUTIC EXERCISES: CPT

## 2022-03-29 NOTE — PROGRESS NOTES
Daily Note     Today's date: 3/29/2022  Patient name: Jermaine Arambula  : 1955  MRN: [id number]  Referring provider: Melissa Ashton  Dx:   Encounter Diagnosis     ICD-10-CM    1  Left leg weakness  R29 898                   Subjective: Patient reports that she has started taking her tramadol for her pain from fibromyalgia and that has helped her sleep better  Objective: See treatment diary below    TUG Best of 3: 6 5 seconds    Assessment: Patient was instructed to contact her PCP about her issues with the Lyrica replacement not helping with the pain  Patient demonstrated great speed on the TUG placing her above average for her age group  Patient tolerated theraputic exercise well with fatigue throughout the session  GIORGI Villanueva, supervised by Iverson Saint, PT for duration of session  Plan: Continue per plan of care         [id number]     Date 3/11 3/15 3/18 3/21 3/25 3/29   Visit Number  REEVAL 1/12 2/12 3/12 4/12   Manual                                             Neuro Re-Ed         Squat 3x10 4#     2x15   Step down 3x10 8"        Step up 3x10 8"  3x10 8" ;5# 3x10 8" 3x10 8" 3x10 8"   Leg press ecc 70# 3x10 70# 3x10 70# 3x10 70# 3x10 70# 3x10 70# 3x10   SL total gym         Hip flexor lift over 2x15        Single leg stance         Y balance  5x       Tandem + Foam EO/EC  2x30s       Foam Step over KB  2x10 blue foam                TherEx         Bike or TM warmup TM 10'  TM 10' TM 10' TM 10' RB 10'   Bridge 2x15 10#; 2x10 pb  2x15 10#; 2x10 pb 2x10 pb 2x10 pb 2x15 pb   PB HS curl 2x15  2x15 2x15 60x    SLR 2x10 3#  3x10 3#      HS curls 2x15 3#        Side step x6 gtb        Sidelying hip abd         Standing Foam Marching     2x20 2x20   Prone Ham Curl                  TherAct         Patient education         HS stretch supine NV  dc      Quad stretch prone NV        Weight shift to target x10 ea                 Gait Training         FGA  15'       Step over cones Step on TB foam         TUG      5'   Modalities         CP             Precautions: Lt TKA July 15 2021, WINIFRED 6 weeks later  Past Medical History:   Diagnosis Date    Arthralgia     Atypical chest pain     Balance disorder     Cervical rib     last assessed 9/13/12    Depression     Fatigue     Fibromyalgia     Fibromyalgia     Fibromyalgia, primary     GERD (gastroesophageal reflux disease)     Hydronephrosis with renal and ureteral calculus obstruction     last assessed 5/1/17    [address] [address] [address]     Leukopenia     last assessed 5/19/16    Memory loss     Restless leg syndrome     last assessed 11/8/13    Sebaceous cyst     last assessed 2/13/13    Skin tag     last assessed 2/13/13    Vitamin D deficiency

## 2022-04-01 ENCOUNTER — OFFICE VISIT (OUTPATIENT)
Dept: PHYSICAL THERAPY | Facility: REHABILITATION | Age: 67
End: 2022-04-01
Payer: COMMERCIAL

## 2022-04-01 ENCOUNTER — TELEPHONE (OUTPATIENT)
Dept: INTERNAL MEDICINE CLINIC | Facility: CLINIC | Age: 67
End: 2022-04-01

## 2022-04-01 DIAGNOSIS — R29.898 LEFT LEG WEAKNESS: Primary | ICD-10-CM

## 2022-04-01 PROCEDURE — 97530 THERAPEUTIC ACTIVITIES: CPT

## 2022-04-01 PROCEDURE — 97110 THERAPEUTIC EXERCISES: CPT

## 2022-04-01 PROCEDURE — 97112 NEUROMUSCULAR REEDUCATION: CPT

## 2022-04-01 NOTE — PROGRESS NOTES
Daily Note     Today's date: 2022  Patient name: Amador Bryan  : 1955  MRN: [id number]  Referring provider: Martin Zaragoza  Dx:   Encounter Diagnosis     ICD-10-CM    1  Left leg weakness  R29 898                   Subjective: Patient reports that she is feeling sore all over  Objective: See treatment diary below      Assessment: Patient  demonstrated knees over toes during squatting and dead lifting  VC and TC were provided and her form improved and patient stated reduced knee pain with exercise  Patient tolerated theraputic exercise well with fatigue throughout the session  Eh Jose, SPT, supervised by Carlee Garrett PT for duration of session  Plan: Continue per plan of care         [id number]     Date 3/15 3/18 3/21 3/25 3/29 4/1   Visit Number REEVAL 1/12 2/12 3/12 4/12 5/12   Manual                                             Neuro Re-Ed         Squat     2x15 2x15   Step down         Step up  3x10 8" ;5# 3x10 8" 3x10 8" 3x10 8" 3x10 9"   Leg press ecc 70# 3x10 70# 3x10 70# 3x10 70# 3x10 70# 3x10 70# 3x10   SL total gym         Hip flexor lift over         Single leg stance         Y balance 5x        Tandem + Foam EO/EC 2x30s        Foam Step over KB 2x10 blue foam                                   TherEx         Bike or TM warmup  TM 10' TM 10' TM 10' RB 10' TM 10'   Bridge  2x15 10#; 2x10 pb 2x10 pb 2x10 pb 2x15 pb    PB HS curl  2x15 2x15 60x     SLR  3x10 3#       HS curls         Side step         Sidelying hip abd         Standing Foam Marching    2x20 2x20 2x20   Prone Ham Curl                  TherAct         Patient education         HS stretch supine  dc       Lunge     NV    Deadlift KB     NV x15 15#   Quad stretch prone         Weight shift to target                  Gait Training         FGA 15'        Step over cones         Step on TB foam         TUG     5'    Modalities         CP             Precautions: Lt TKA July 15 2021, WINIFRED 6 weeks later  Past Medical History:   Diagnosis Date    Arthralgia     Atypical chest pain     Balance disorder     Cervical rib     last assessed 9/13/12    Depression     Fatigue     Fibromyalgia     Fibromyalgia     Fibromyalgia, primary     GERD (gastroesophageal reflux disease)     Hydronephrosis with renal and ureteral calculus obstruction     last assessed 5/1/17    [facility]ar stroke ([facility] [address]     Leukopenia     last assessed 5/19/16    Memory loss     Restless leg syndrome     last assessed 11/8/13    Sebaceous cyst     last assessed 2/13/13    Skin tag     last assessed 2/13/13    Vitamin D deficiency

## 2022-04-01 NOTE — TELEPHONE ENCOUNTER
Pt  Has been taking Gabapentin one cap bid in place of Lyrica because her insurance stopped paying for Lyrica  Not helping her Fibromyalgia pain though  Has Tramadol 50mg left from last year  She has been taking one pill at night and it helps  Has 20 tabs left  Wants to go back on Lyrica and wants to know if we can appeal it with her ins

## 2022-04-01 NOTE — TELEPHONE ENCOUNTER
You are on a low dose of gabapentin  Gradually increase it to 300 mg twice a day  Start taking 2 tablets twice a day for a week then increase to 3  Limit use of tramadol  You can call your insurance and see what to appeal since Lyrica is just not covered

## 2022-04-07 ENCOUNTER — APPOINTMENT (OUTPATIENT)
Dept: PHYSICAL THERAPY | Facility: REHABILITATION | Age: 67
End: 2022-04-07
Payer: COMMERCIAL

## 2022-04-08 ENCOUNTER — TELEPHONE (OUTPATIENT)
Dept: NEUROLOGY | Facility: CLINIC | Age: 67
End: 2022-04-08

## 2022-04-08 ENCOUNTER — OFFICE VISIT (OUTPATIENT)
Dept: PHYSICAL THERAPY | Facility: REHABILITATION | Age: 67
End: 2022-04-08
Payer: COMMERCIAL

## 2022-04-08 DIAGNOSIS — R29.898 LEFT LEG WEAKNESS: Primary | ICD-10-CM

## 2022-04-08 PROCEDURE — 97110 THERAPEUTIC EXERCISES: CPT

## 2022-04-08 PROCEDURE — 97530 THERAPEUTIC ACTIVITIES: CPT

## 2022-04-08 PROCEDURE — 97112 NEUROMUSCULAR REEDUCATION: CPT

## 2022-04-08 NOTE — TELEPHONE ENCOUNTER
Called and left a voicemail for patient - Please call back to confirm upcoming appointment with [facility]  Provided patient with apt date, time and location  Informed patient that check in is at least 15 minutes prior to apt time

## 2022-04-08 NOTE — PROGRESS NOTES
Daily Note     Today's date: 2022  Patient name: Amador Bryan  : 1955  MRN: [id number]  Referring provider: Jessica Frazier  Dx:   Encounter Diagnosis     ICD-10-CM    1  Left leg weakness  R29 898                   Subjective: Patient reports that she is feeling more knee pain today than normal and that her dosage of gabapentin has been increased and it is helping some  Objective: See treatment diary below      Assessment: Patient demonstrated increased fatigue throughout the session, however, she maintained good form with all exercises  Patient can self correct form during squat 50% of the time  Patient tolerated theraputic exercise well with fatigue throughout the session  Eh Jose, SPT, supervised by Carlee Grarett, PT for duration of session  Plan: Continue per plan of care         [id number]     Date 3/18 3/21 3/25 3/29 4/1 4/8   Visit Number 1/12 2/12 3/12 4/12 5/12 6/12   Manual                                             Neuro Re-Ed         Squat    2x15 2x15 2x15   Step down         Step up 3x10 8" ;5# 3x10 8" 3x10 8" 3x10 8" 3x10 9" 3x10 9"   Leg press ecc 70# 3x10 70# 3x10 70# 3x10 70# 3x10 70# 3x10 70# 3x10   SL total gym         Hip flexor lift over         Single leg stance         Y balance         Tandem + Foam EO/EC         Foam Step over KB                                    TherEx         Bike or TM warmup TM 10' TM 10' TM 10' RB 10' TM 10' RB 10'   Bridge 2x15 10#; 2x10 pb 2x10 pb 2x10 pb 2x15 pb     PB HS curl 2x15 2x15 60x      SLR 3x10 3#        HS curls         Side step         Sidelying hip abd         Standing Foam Marching   2x20 2x20 2x20 2x20   Prone Ham Curl                  TherAct         Patient education         HS stretch supine dc        Lunge    NV     Deadlift KB    NV x15 15# x15 15#   Quad stretch prone         Weight shift to target                  Gait Training         FGA         Step over cones         Step on TB foam         TUG 5'     Modalities         CP             Precautions: Lt TKA July 15 2021, WINIFRED 6 weeks later  Past Medical History:   Diagnosis Date    Arthralgia     Atypical chest pain     Balance disorder     Cervical rib     last assessed 9/13/12    Depression     Fatigue     Fibromyalgia     Fibromyalgia     Fibromyalgia, primary     GERD (gastroesophageal reflux disease)     Hydronephrosis with renal and ureteral calculus obstruction     last assessed 5/1/17    [address] ([facility] [address]     Leukopenia     last assessed 5/19/16    Memory loss     Restless leg syndrome     last assessed 11/8/13    Sebaceous cyst     last assessed 2/13/13    Skin tag     last assessed 2/13/13    Vitamin D deficiency

## 2022-04-12 ENCOUNTER — OFFICE VISIT (OUTPATIENT)
Dept: PHYSICAL THERAPY | Facility: REHABILITATION | Age: 67
End: 2022-04-12
Payer: COMMERCIAL

## 2022-04-12 DIAGNOSIS — R29.898 LEFT LEG WEAKNESS: Primary | ICD-10-CM

## 2022-04-12 PROCEDURE — 97116 GAIT TRAINING THERAPY: CPT

## 2022-04-12 PROCEDURE — 97112 NEUROMUSCULAR REEDUCATION: CPT

## 2022-04-12 PROCEDURE — 97535 SELF CARE MNGMENT TRAINING: CPT

## 2022-04-12 PROCEDURE — 97530 THERAPEUTIC ACTIVITIES: CPT

## 2022-04-12 PROCEDURE — 97110 THERAPEUTIC EXERCISES: CPT

## 2022-04-12 NOTE — PROGRESS NOTES
Daily Note     Today's date: 2022  Patient name: Dajuan German  : 1955  MRN: [id number]  Referring provider: Gordon Aguirre  Dx:   Encounter Diagnosis     ICD-10-CM    1  Left leg weakness  R29 898                   Subjective: Patient reports that she is extra fatigued  Objective: See treatment diary below      Assessment: Patient demonstrated increased fatigue throughout the session, however, she maintained good form with all exercises  Patient demonstrated improved lunges and decreased pain after extension self OP  1:1 with Tyrese Oquendo, PT, DPT for entirety of tx  Magaly Avitia, SPT, present for duration of tx  Plan: Continue per plan of care         [id number]     Date 3/21 3/25 3/29 4/1 4/8 4/12   Visit Number 2/12 3/12 4/12 5/12 6/12 7/12   Manual                                             Neuro Re-Ed         Squat   2x15 2x15 2x15    Step down         Step up 3x10 8" 3x10 8" 3x10 8" 3x10 9" 3x10 9"    Leg press ecc 70# 3x10 70# 3x10 70# 3x10 70# 3x10 70# 3x10    SL total gym         Single leg stance         Y balance         Tandem + Foam EO/EC      2x30s ea   Foam Step over KB                                    TherEx         Bike or TM warmup TM 10' TM 10' RB 10' TM 10' RB 10' TM 10'   Bridge 2x10 pb 2x10 pb 2x15 pb      PB HS curl 2x15 60x       LAQ      x20   HS curls         Side step         Standing Foam Marching  2x20 2x20 2x20 2x20 2x20   Prone Ham Curl         Knee ext OP      x20   TherAct         Patient education      5'   HS stretch supine         Lunge   NV   x10 R, 2x10 L   Deadlift KB   NV x15 15# x15 15#    Quad stretch prone                  Gait Training         FGA         Step over cones         Foam tandem      x6 laps   HS treadmill      3x45s   TUG   5'      Modalities         CP             Precautions: Lt TKA July 15 2021, WINIFRED 6 weeks later  Past Medical History:   Diagnosis Date    Arthralgia     Atypical chest pain     Balance disorder  Cervical rib     last assessed 9/13/12    Depression     Fatigue     Fibromyalgia     Fibromyalgia     Fibromyalgia, primary     GERD (gastroesophageal reflux disease)     Hydronephrosis with renal and ureteral calculus obstruction     last assessed 5/1/17    [facility]     Leukopenia     last assessed 5/19/16    Memory loss     Restless leg syndrome     last assessed 11/8/13    Sebaceous cyst     last assessed 2/13/13    Skin tag     last assessed 2/13/13    Vitamin D deficiency

## 2022-04-15 ENCOUNTER — OFFICE VISIT (OUTPATIENT)
Dept: PHYSICAL THERAPY | Facility: REHABILITATION | Age: 67
End: 2022-04-15
Payer: COMMERCIAL

## 2022-04-15 DIAGNOSIS — R29.898 LEFT LEG WEAKNESS: Primary | ICD-10-CM

## 2022-04-15 PROCEDURE — 97530 THERAPEUTIC ACTIVITIES: CPT

## 2022-04-15 PROCEDURE — 97116 GAIT TRAINING THERAPY: CPT

## 2022-04-15 PROCEDURE — 97110 THERAPEUTIC EXERCISES: CPT

## 2022-04-15 PROCEDURE — 97112 NEUROMUSCULAR REEDUCATION: CPT

## 2022-04-15 NOTE — PROGRESS NOTES
Daily Note     Today's date: 4/15/2022  Patient name: Rae Moe  : 1955  MRN: [id number]  Referring provider: Diogenes Joseph  Dx:   Encounter Diagnosis     ICD-10-CM    1  Left leg weakness  R29 898                   Subjective: Patient reports no change in the knee and some leg pain yesterday but none today  Patient states that she was not compliant with HEP  Objective: See treatment diary below      Assessment: Patient demonstrated increased fatigue throughout the session, however, she maintained good form with all exercises  VC was provided during the lunges to hold onto the bar for balance and form improved  Mary Royal, SPT, supervised by Tomas Chou PT for duration of session  Plan: Continue per plan of care         [id number]     Date 3/25 3/29 4/1 4/8 4/12 4/15   Visit Number 3/12 4/12 5/12 6/12 7/12 8/12   Manual                                             Neuro Re-Ed         Squat  2x15 2x15 2x15     Step down         Step up 3x10 8" 3x10 8" 3x10 9" 3x10 9"     Leg press ecc 70# 3x10 70# 3x10 70# 3x10 70# 3x10     SL total gym         Single leg stance         Y balance         Tandem + Foam EO/EC     2x30s ea    Foam Step over KB                                    TherEx         Bike or TM warmup TM 10' RB 10' TM 10' RB 10' TM 10' TM 8'   Bridge 2x10 pb 2x15 pb       PB HS curl 60x        LAQ     x20    HS curls         Side step         Standing Foam Marching 2x20 2x20 2x20 2x20 2x20 2x20   Prone Ham Curl         Knee ext OP     x20 x20  x20   TherAct         Patient education     5'    HS stretch supine         Lunge  NV   x10 R, 2x10 L 2x10 R 2x10 L   Deadlift KB  NV x15 15# x15 15#     Quad stretch prone                  Gait Training         FGA         Step over cones         Foam tandem     x6 laps    HS treadmill     3x45s 3x45s   TUG  5'       Modalities         CP             Precautions: Lt TKA July 15 2021, WINIFRED 6 weeks later  Past Medical History: Diagnosis Date    Arthralgia     Atypical chest pain     Balance disorder     Cervical rib     last assessed 9/13/12    Depression     Fatigue     Fibromyalgia     Fibromyalgia     Fibromyalgia, primary     GERD (gastroesophageal reflux disease)     Hydronephrosis with renal and ureteral calculus obstruction     last assessed 5/1/17    [facility]     Leukopenia     last assessed 5/19/16    Memory loss     Restless leg syndrome     last assessed 11/8/13    Sebaceous cyst     last assessed 2/13/13    Skin tag     last assessed 2/13/13    Vitamin D deficiency

## 2022-04-18 NOTE — PROGRESS NOTES
Daily Note     Today's date: 2022  Patient name: Greta Schmitt  : 1955  MRN: [id number]  Referring provider: Kavin Denny,*  Dx:   No diagnosis found  Subjective: Patient reports no change in the knee and some leg pain yesterday but none today  Patient states that she was not compliant with HEP  Objective: See treatment diary below      Assessment: Patient demonstrated increased fatigue throughout the session, however, she maintained good form with all exercises  VC was provided during the lunges to hold onto the bar for balance and form improved  GIORGI Hogue, supervised by Brandee Briceno PT for duration of session  Plan: Continue per plan of care    Assess LE strength next visit     [facility]     Date 3/29 4/1 4/8 4/12 4/15    Visit Number     Manual                                             Neuro Re-Ed         Squat 2x15 2x15 2x15      Step down         Step up 3x10 8" 3x10 9" 3x10 9"      Leg press ecc 70# 3x10 70# 3x10 70# 3x10      SL total gym         Single leg stance         Y balance         Tandem + Foam EO/EC    2x30s ea     Foam Step over KB                                    TherEx         Bike or TM warmup RB 10' TM 10' RB 10' TM 10' TM 8'    Bridge 2x15 pb        PB HS curl         LAQ    x20     HS curls         Side step         Standing Foam Marching 2x20 2x20 2x20 2x20 2x20    Prone Ham Curl         Knee ext OP    x20 x20  x20    TherAct         Patient education    5'     HS stretch supine         Lunge NV   x10 R, 2x10 L 2x10 R 2x10 L    Deadlift KB NV x15 15# x15 15#      Quad stretch prone                  Gait Training         FGA         Step over cones         Foam tandem    x6 laps     HS treadmill    3x45s 3x45s    TUG 5'        Modalities         CP             Precautions: Lt TKA July 15 2021, WINIFRED 6 weeks later  Past Medical History:   Diagnosis Date    Arthralgia     Atypical chest pain     Balance disorder     Cervical rib     last assessed 9/13/12    Depression     Fatigue     Fibromyalgia     Fibromyalgia     Fibromyalgia, primary     GERD (gastroesophageal reflux disease)     Hydronephrosis with renal and ureteral calculus obstruction     last assessed 5/1/17    [facility]     Leukopenia     last assessed 5/19/16    Memory loss     Restless leg syndrome     last assessed 11/8/13    Sebaceous cyst     last assessed 2/13/13    Skin tag     last assessed 2/13/13    Vitamin D deficiency

## 2022-04-19 ENCOUNTER — APPOINTMENT (OUTPATIENT)
Dept: PHYSICAL THERAPY | Facility: REHABILITATION | Age: 67
End: 2022-04-19
Payer: COMMERCIAL

## 2022-04-21 NOTE — PROGRESS NOTES
Daily Note     Today's date: 2022  Patient name: Carrie Gardner  : 1955  MRN: [id number]  Referring provider: Yris Gaming  Dx:   Encounter Diagnosis     ICD-10-CM    1  Left leg weakness  R29 898        Start Time: 1430  Stop Time: 1515  Total time in clinic (min): 45 minutes    Subjective: Patient reports no change in the knee and she is feeling extra tired today  Objective: See treatment diary below      Assessment: Patient demonstrated increased fatigue throughout the session, however, she maintained good form with all exercises  VC was provided during the lunges to hold onto the bar for balance and form improved  Knee extension patient overpressure resulted in decreased knee pain being reported by the patient during lunges  GIORGI Connor, supervised by Eric Verma PT for duration of session  Plan: Continue per plan of care         [id number]     Date 4/1 4/8 4/12 4/15 4/22   Visit Number    Manual                                        Neuro Re-Ed        Squat 2x15 2x15      Step down        Step up 3x10 9" 3x10 9"      Leg press ecc 70# 3x10 70# 3x10   70# 3x10   SL total gym        Single leg stance        Y balance        Tandem + Foam EO/EC   2x30s ea     Foam Step over KB                                TherEx        Bike or TM warmup TM 10' RB 10' TM 10' TM 8' TM 8'   Bridge        PB HS curl        LAQ   x20     HS curls        Side step        Standing Foam Marching 2x20 2x20 2x20 2x20 2x20   Prone Ham Curl        Knee ext OP   x20 x20  x20 x20   TherAct        Patient education   5'     HS stretch supine        Lunge   x10 R, 2x10 L 2x10 R 2x10 L R 2x10 L x10   Deadlift KB x15 15# x15 15#   x5 15# 9"  x10 15# 6"   Quad stretch prone                Gait Training        FGA        Step over cones        Foam tandem   x6 laps     HS treadmill   3x45s 3x45s    TUG        Modalities        CP            Precautions: Lt TKA July 15 2021, WINIFRED 6 weeks later  Past Medical History:   Diagnosis Date    Arthralgia     Atypical chest pain     Balance disorder     Cervical rib     last assessed 9/13/12    Depression     Fatigue     Fibromyalgia     Fibromyalgia     Fibromyalgia, primary     GERD (gastroesophageal reflux disease)     Hydronephrosis with renal and ureteral calculus obstruction     last assessed 5/1/17    [facility]     Leukopenia     last assessed 5/19/16    Memory loss     Restless leg syndrome     last assessed 11/8/13    Sebaceous cyst     last assessed 2/13/13    Skin tag     last assessed 2/13/13    Vitamin D deficiency

## 2022-04-22 ENCOUNTER — OFFICE VISIT (OUTPATIENT)
Dept: PHYSICAL THERAPY | Facility: REHABILITATION | Age: 67
End: 2022-04-22
Payer: COMMERCIAL

## 2022-04-22 ENCOUNTER — OFFICE VISIT (OUTPATIENT)
Dept: NEUROLOGY | Facility: CLINIC | Age: 67
End: 2022-04-22
Payer: COMMERCIAL

## 2022-04-22 VITALS
BODY MASS INDEX: 27.11 KG/M2 | HEIGHT: 63 IN | TEMPERATURE: 97.6 F | RESPIRATION RATE: 16 BRPM | DIASTOLIC BLOOD PRESSURE: 79 MMHG | HEART RATE: 65 BPM | WEIGHT: 153 LBS | SYSTOLIC BLOOD PRESSURE: 120 MMHG

## 2022-04-22 DIAGNOSIS — R51.9 NONINTRACTABLE EPISODIC HEADACHE, UNSPECIFIED HEADACHE TYPE: ICD-10-CM

## 2022-04-22 DIAGNOSIS — M79.7 FIBROMYALGIA: ICD-10-CM

## 2022-04-22 DIAGNOSIS — R29.898 LEFT LEG WEAKNESS: Primary | ICD-10-CM

## 2022-04-22 DIAGNOSIS — R41.3 MEMORY DIFFICULTY: Primary | ICD-10-CM

## 2022-04-22 PROCEDURE — 97116 GAIT TRAINING THERAPY: CPT

## 2022-04-22 PROCEDURE — 97530 THERAPEUTIC ACTIVITIES: CPT

## 2022-04-22 PROCEDURE — 99213 OFFICE O/P EST LOW 20 MIN: CPT | Performed by: NURSE PRACTITIONER

## 2022-04-22 PROCEDURE — 97110 THERAPEUTIC EXERCISES: CPT

## 2022-04-22 PROCEDURE — 97112 NEUROMUSCULAR REEDUCATION: CPT

## 2022-04-22 RX ORDER — GABAPENTIN 300 MG/1
300 CAPSULE ORAL 3 TIMES DAILY
Qty: 90 CAPSULE | Refills: 1 | Status: SHIPPED | OUTPATIENT
Start: 2022-04-22 | End: 2022-06-24 | Stop reason: SDUPTHER

## 2022-04-22 NOTE — PATIENT INSTRUCTIONS
Increase gabapentin to the 300mg three times/day  Make sure to  the carbamazepine and start this again  Let the office know if any new symptoms- new changes in memory or continued stomach symptoms    Follow up in 4 months time with Dr Angel Machado in [facility]

## 2022-04-22 NOTE — PROGRESS NOTES
Patient ID: Saira Duke is a 77 y o  female  Assessment/Plan:  Patient Instruction:  Increase gabapentin to the 300mg three times/day  Make sure to  the carbamazepine and start this again  Let the office know if any new symptoms- new changes in memory or continued stomach symptoms  Follow up in 4 months time with Dr Meng Alcazar in [facility] and all orders for this visit:    Memory difficulty    Fibromyalgia  -     gabapentin (NEURONTIN) 300 mg capsule; Take 1 capsule (300 mg total) by mouth 3 (three) times a day    Nonintractable episodic headache, unspecified headache type    Subjective:    HPI  Ambrose Perkins is a 77year old female with pmh of fibromyalgia, short lasting headaches for years (thought to be trigeminal neuralgia on carbamazepine), episode of altered consciousness in 2019 (thought initially occipital seizure with nausea/stomach complaint as aura but was weaned off depakote without repeat episode), right pca stenosis, knee arthritis with recent left knee replacement, occasional word finding difficulty, GERD, anxiety who follows up today  She has family history of stroke on both sides of her family- her mother had stroke in her [de-identified] along with vascular dementia  She is independent in all ADL's/IADL's; she continues to work as a  at [employer]  Lives alone  She denies change in diet/sleep habits- occasionally she will wake earlier than she would like- states sometimes drinks caffeine in afternoon, but other times unsure what wakes her  She denies tobacco/drug use; she only occasionally drinks an alcoholic beverage  She tells me today she has recently increased her gabapentin to 300mg twice a day after her insurance stopped paying for lyrica which used to help her pain- she was on 100mg BID previously  She also tells me she has been out of her carbamazepine for the past 2 weeks- going to pick this up today   She stats recently (since being off carbatrol) has had a stomach "flipping" sensation which she remembers being similar to the nausea she had before her episode in 2019; she did not have slurred speech or visual symptoms she had back then  She states she has been more forgetful recently- had a hard time finding the office today- attributes this to putting in wrong address  Previous Headache history:  she will get very brief 1-3 second pains in the frontal region of her head that radiate to behind the eyes  These do not happen daily  They are usually left sided  She does not take anything acutely for the pain as it is gone so soon  She does not lose consciousness with the episodes, the pain is intense at onset and feels sharp  There is no vision change with the episodes; there is also no eye tearing/nasal congestion etc  On 2/13 she had another episode of this but instead it was located in left parietal area, was a circular pain about 3-5cm in diameter and lasted longer, about 10 seconds  The area was tender while this occurred  She denies other symptoms such as limb weakness, numbness, speech/swallowing difficulty, balance difficulty (although has intermittent lightheadedness when standing at times)  CTA since last visit:  FINDINGS:  NONCONTRAST BRAIN:    PARENCHYMA:  Nonspecific  decreased attenuation involving periventricular and subcortical white matter, most compatible with mild microangiopathic change  No intracranial mass, mass effect or midline shift  No CT signs of acute infarction  No acute parenchymal   hemorrhage  VENTRICLES AND EXTRA-AXIAL SPACES:  Normal for the patient's age  VISUALIZED ORBITS AND PARANASAL SINUSES:  Unremarkable  INTRACRANIAL VASCULATURE   INTERNAL CAROTID ARTERIES:  Normal enhancement of the intracranial portions of the internal carotid arteries  Normal ophthalmic artery origins  Normal ICA terminus  ANTERIOR CIRCULATION:  Right A1 segment is absent/hypoplastic    Anterior cerebral arteries demonstrate normal enhancement  Normal anterior communicating artery  MIDDLE CEREBRAL ARTERY CIRCULATION:  M1 segment and middle cerebral artery branches demonstrate normal enhancement bilaterally  DISTAL VERTEBRAL ARTERIES:  Normal distal vertebral arteries  Posterior inferior cerebellar artery origins are normal  Normal vertebral basilar junction  BASILAR ARTERY:  Basilar artery is normal in caliber  Normal superior cerebellar arteries  POSTERIOR CEREBRAL ARTERIES: Both posterior cerebral arteries arises from the basilar tip  Both arteries demonstrate normal enhancement  Stable mild to moderate focal atherosclerotic stenosis in the right P2 segment  Normal left posterior   communicating artery  Hypoplastic right posterior communicating artery  DURAL VENOUS SINUSES:  Normal   NON VASCULAR ANATOMY  BONY STRUCTURES:  No acute osseous abnormality  IMPRESSION:  1  No significant interval change  2   Patent major vasculature of [place] without high-grade stenosis  No aneurysm  The following portions of the patient's history were reviewed and updated as appropriate: allergies, current medications, past family history, past medical history, past social history, past surgical history and problem list          Objective:    Blood pressure 120/79, pulse 65, temperature 97 6 °F (36 4 °C), temperature source Temporal, resp  rate 16, height 5' 3" (1 6 m), weight 69 4 kg (153 lb)  Physical Exam  Vitals reviewed  Constitutional:       General: She is not in acute distress  HENT:      Head: Normocephalic and atraumatic  Right Ear: External ear normal       Left Ear: External ear normal       Mouth/Throat:      Pharynx: Oropharynx is clear  Eyes:      General: Lids are normal          Right eye: No discharge  Left eye: No discharge  Extraocular Movements: Extraocular movements intact  Pupils: Pupils are equal, round, and reactive to light     Cardiovascular:      Rate and Rhythm: Normal rate and regular rhythm  Pulses: Normal pulses  Heart sounds: Normal heart sounds  Pulmonary:      Effort: Pulmonary effort is normal    Abdominal:      General: Bowel sounds are normal  There is no distension  Musculoskeletal:      Cervical back: Normal range of motion  Right lower leg: No edema  Left lower leg: No edema  Neurological:      General: No focal deficit present  Mental Status: She is alert  Mental status is at baseline  Cranial Nerves: No dysarthria  Coordination: Romberg sign negative  Deep Tendon Reflexes: Strength normal       Reflex Scores:       Brachioradialis reflexes are 1+ on the right side and 1+ on the left side  Patellar reflexes are 1+ on the right side and 1+ on the left side  Psychiatric:         Mood and Affect: Mood normal          Neurological Exam  Mental Status  Alert  no dysarthria present  Language is fluent with no aphasia  MOCA 29/30  Cranial Nerves  CN II: Visual acuity is normal  Visual fields full to confrontation  CN III, IV, VI: Extraocular movements intact bilaterally  Normal lids and orbits bilaterally  Pupils equal round and reactive to light bilaterally  CN V: Facial sensation is normal   CN VII: Full and symmetric facial movement  CN VIII: Hearing is normal   CN IX, X: Palate elevates symmetrically  Normal gag reflex  CN XI: Shoulder shrug strength is normal   CN XII: Tongue midline without atrophy or fasciculations  Motor  Normal muscle bulk throughout  Strength is 5/5 throughout all four extremities  Sensory  Light touch is normal in upper and lower extremities       Reflexes                                           Right                      Left  Brachioradialis                    1+                         1+  Patellar                                1+                         1+    Coordination  Right: Finger-to-nose normal   Left: Finger-to-nose normal     Gait  Casual gait is normal including stance, stride, and arm swing  Romberg is absent  Able to rise from chair without using arms  Uses cane  ROS:    Review of Systems   Constitutional: Negative  Negative for appetite change and fever  HENT: Negative  Negative for hearing loss, tinnitus, trouble swallowing and voice change  Eyes: Negative  Negative for photophobia and pain  Respiratory: Negative  Negative for shortness of breath  Cardiovascular: Negative  Negative for palpitations  Gastrointestinal: Negative  Negative for nausea and vomiting  Endocrine: Negative  Negative for cold intolerance  Genitourinary: Negative  Negative for dysuria, frequency and urgency  Musculoskeletal: Negative  Negative for myalgias and neck pain  Skin: Negative  Negative for rash  Neurological: Positive for light-headedness (positional)  Negative for dizziness, tremors, seizures, syncope, facial asymmetry, speech difficulty, weakness, numbness and headaches  Stomach flipping   Hematological: Negative  Does not bruise/bleed easily  Psychiatric/Behavioral: Negative  Negative for confusion, hallucinations and sleep disturbance     ROS was reviewed and updated as appropriate

## 2022-04-24 NOTE — PROGRESS NOTES
Daily Note     Today's date: 2022  Patient name: Charles Alexis  : 1955  MRN: [id number]  Referring provider: Ebenezer Batres  Dx:   Encounter Diagnosis     ICD-10-CM    1  Left leg weakness  R29 898                   Subjective: Patient reports no change in the knee and she is feeling extra tired due to having trouble sleeping  Patient reports stairs becoming easier at home  Objective: See treatment diary below      Assessment: Patient demonstrated increased fatigue throughout the session, however, she maintained good form with all exercises  Patient was educated on prognosis and patient goals were discussed  Patient demonstrated good form and confidence with steps  GIORGI Blandon, supervised by Sera Cerrato PT for duration of session  Plan: Continue per plan of care     Focus on balance and endurance     [id number]     Date 4/8 4/12 4/15 4/22 4/26    Visit Number 6/12 7/12 8/12 9/12 10/12    Manual                                             Neuro Re-Ed         Squat 2x15        Step down         Step up 3x10 9"    2x15 9"    Leg press ecc 70# 3x10   70# 3x10 70# 5x10    SL total gym         Single leg stance         Y balance         Tandem + Foam EO/EC  2x30s ea       Foam Step over KB                                    TherEx         Bike or TM warmup RB 10' TM 10' TM 8' TM 8' TM 10'    Bridge         PB HS curl         LAQ  x20       HS curls         Side step         Standing Foam Marching 2x20 2x20 2x20 2x20     Prone Ham Curl         Knee ext OP  x20 x20  x20 x20     TherAct         Patient education  5'       HS stretch supine         Lunge  x10 R, 2x10 L 2x10 R 2x10 L R 2x10 L x10     Deadlift KB x15 15#   x5 15# 9"  x10 15# 6"     Quad stretch prone                  Gait Training         FGA         Step over cones         Walking with Head Turns     5'    Foam tandem  x6 laps   x6 laps    HS treadmill  3x45s 3x45s      TUG         Modalities         CP Precautions: Lt TKA July 15 2021, WINIFRED 6 weeks later  Past Medical History:   Diagnosis Date    Arthralgia     Atypical chest pain     Balance disorder     Cervical rib     last assessed 9/13/12    Depression     Fatigue     Fibromyalgia     Fibromyalgia     Fibromyalgia, primary     GERD (gastroesophageal reflux disease)     Hydronephrosis with renal and ureteral calculus obstruction     last assessed 5/1/17    [facility]     Leukopenia     last assessed 5/19/16    Memory loss     Restless leg syndrome     last assessed 11/8/13    Sebaceous cyst     last assessed 2/13/13    Skin tag     last assessed 2/13/13    Vitamin D deficiency

## 2022-04-26 ENCOUNTER — OFFICE VISIT (OUTPATIENT)
Dept: PHYSICAL THERAPY | Facility: REHABILITATION | Age: 67
End: 2022-04-26
Payer: COMMERCIAL

## 2022-04-26 DIAGNOSIS — R29.898 LEFT LEG WEAKNESS: Primary | ICD-10-CM

## 2022-04-26 PROCEDURE — 97110 THERAPEUTIC EXERCISES: CPT

## 2022-04-26 PROCEDURE — 97530 THERAPEUTIC ACTIVITIES: CPT

## 2022-04-26 PROCEDURE — 97116 GAIT TRAINING THERAPY: CPT

## 2022-04-28 NOTE — PROGRESS NOTES
Daily Note     Today's date: 2022  Patient name: Johanny Negron  : 1955  MRN: [id number]  Referring provider: Cecilia Kamara  Dx:   Encounter Diagnosis     ICD-10-CM    1  Left leg weakness  R29 898                   Subjective: Patient reports she slept better last night than she has in a long time  Objective: See treatment diary below      Assessment: Patient demonstrated increased fatigue throughout the session, however, she maintained good form with all exercises  Modified squat added to assist with step down strength  1:1 with Candida Nyhan, PT, DPT for entirety of tx  Plan: Continue per plan of care     Focus on balance and endurance     [id number]     Date 4/12 4/15 4/22 4/26 4/29   Visit Number 7/12 8/12 9/12 10/12 11/12   Manual                                        Neuro Re-Ed        Modified SL Squat     2x10; 6"   Step down     6" x10   Step up    2x15 9"    Leg press ecc   70# 3x10 70# 5x10 70# 4x10   SL total gym        Single leg stance foam     2x30 ea   Y balance        Foam Step over KB                                TherEx        Bike or TM warmup TM 10' TM 8' TM 8' TM 10' TM 8'   Bridge        PB HS curl        LAQ x20       HS curls        Side step        Standing Foam Marching 2x20 2x20 2x20     Prone Ham Curl        Knee ext OP x20 x20  x20 x20  x10   TherAct        Patient education 5'       HS stretch supine        Lunge x10 R, 2x10 L 2x10 R 2x10 L R 2x10 L x10 NV 2x10 ea   Deadlift KB   x5 15# 9"  x10 15# 6"     Quad stretch prone                Gait Training        FGA        Agility ladder with cog     x4 laps   Walking with Head Turns    5' x4 laps   Foam tandem x6 laps   x6 laps    HS treadmill 3x45s 3x45s  NV    Walking backwards    NV x4 laps   Walking with pivoting    NV 3'   TUG        Modalities        CP            Precautions: Lt TKA July 15 2021, WINIFRED 6 weeks later  Past Medical History:   Diagnosis Date    Arthralgia     Atypical chest pain  Balance disorder     Cervical rib     last assessed 9/13/12    Depression     Fatigue     Fibromyalgia     Fibromyalgia     Fibromyalgia, primary     GERD (gastroesophageal reflux disease)     Hydronephrosis with renal and ureteral calculus obstruction     last assessed 5/1/17    [facility]     Leukopenia     last assessed 5/19/16    Memory loss     Restless leg syndrome     last assessed 11/8/13    Sebaceous cyst     last assessed 2/13/13    Skin tag     last assessed 2/13/13    Vitamin D deficiency

## 2022-04-29 ENCOUNTER — OFFICE VISIT (OUTPATIENT)
Dept: PHYSICAL THERAPY | Facility: REHABILITATION | Age: 67
End: 2022-04-29
Payer: COMMERCIAL

## 2022-04-29 DIAGNOSIS — R29.898 LEFT LEG WEAKNESS: Primary | ICD-10-CM

## 2022-04-29 PROCEDURE — 97110 THERAPEUTIC EXERCISES: CPT

## 2022-04-29 PROCEDURE — 97112 NEUROMUSCULAR REEDUCATION: CPT

## 2022-04-29 PROCEDURE — 97530 THERAPEUTIC ACTIVITIES: CPT

## 2022-05-03 ENCOUNTER — EVALUATION (OUTPATIENT)
Dept: PHYSICAL THERAPY | Facility: REHABILITATION | Age: 67
End: 2022-05-03
Payer: COMMERCIAL

## 2022-05-03 DIAGNOSIS — R29.898 LEFT LEG WEAKNESS: Primary | ICD-10-CM

## 2022-05-03 PROCEDURE — 97530 THERAPEUTIC ACTIVITIES: CPT

## 2022-05-03 PROCEDURE — 97116 GAIT TRAINING THERAPY: CPT

## 2022-05-03 PROCEDURE — 97164 PT RE-EVAL EST PLAN CARE: CPT

## 2022-05-03 PROCEDURE — 97112 NEUROMUSCULAR REEDUCATION: CPT

## 2022-05-03 NOTE — PROGRESS NOTES
PT Reevaluation    Today's date: 5/3/2022  Patient name: Brittani Uriarte  : 1955  MRN: [id number]  Referring provider: Miriam Bullard  Dx:   Encounter Diagnosis     ICD-10-CM    1  Left leg weakness  R29 898                   Subjective: Patient reports that she is feeling tired today and went for a walk this weekend and got tired after 20 minutes  Objective: See treatment diary below  LE MMT  LEFT RIGHT  -Hip Flexion:   4+ 4  -Hip Extension:  4 4   -Hip Abduction: 4- 4  -Hip IR:  5 5  -Hip ER:  5 5    -Knee Flexion  5 5  -Knee Extension 4+ 4+    -Ankle DF  55  -Ankle PF  55    Functional Assessment  -Functional Squat: able to complete a full squat with no UE assistance   -Stair Negotiation: No UE Support needed and full eccentric control down  -Balance SLS: Rt 24 sec Lt 7 sec    TU 5    5xSTS: 7    Assessment: Patient demonstrated increased fatigue throughout the session, however, she maintained good form with all exercises  All goals met, except single leg balance  Patient demonstrates impairments in continued balance and endurance and has demonstrated improvement in strength  These impairments continue to limited the patient's ability to walk around the neighborhood, feel safe walking around at home, high fear of falls, 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  They require 6 visits, 1x/week over 6 weeks to address these limitations  Brittani Uriarte has demonstrated improvement in physical therapy and would continue to benefit from skilled PT to address the above impairments and return to PLOF  GIORGI Batista, supervised by Anu Villeda, PT for duration of session        Goals  Short Term Goals (4 weeks):    - Patient will be independent in basic HEP 2-3 weeks - met  - Patient will report >50% reduction in pain - met  - Patient will demonstrate >1/3 improvement in MMT grade as applicable - met  - Demonstrate consistent posture corrections without cueing during therapeutic exercise - met  - Improve SLS to 15 sec     Long Term Goals (8 weeks):  - Patient will be independent in a comprehensive home exercise program - met  - Patient FOTO score will improve to 59/100 - met 70  - Patient will self-report >75% improvement in function - met  - Improve endurance to 30 minutes - met  - SLS to 30 sec - not met  - 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     Plan: Patient will follow up in 2 weeks (5/19)       [id number]     Date 4/15 4/22 4/26 4/29 5/3   Visit Number 8/12 9/12 10/12 11/12 ReEval   Manual                                        Neuro Re-Ed        Modified SL Squat    2x10; 6"    Squat     x5   Step down    6" x10    Step up   2x15 9"     Leg press ecc  70# 3x10 70# 5x10 70# 4x10 70# 4x10   SL total gym        Single leg stance foam    2x30 ea    Y balance        Foam Step over KB                                TherEx        Bike or TM warmup TM 8' TM 8' TM 10' TM 8' TM 10'   Bridge        PB HS curl        LAQ        HS curls        Side step        Standing Foam Marching 2x20 2x20      Prone Ham Curl        Knee ext OP x20  x20 x20  x10    TherAct        Patient education        HS stretch supine        Lunge 2x10 R 2x10 L R 2x10 L x10 NV 2x10 ea    Deadlift KB  x5 15# 9"  x10 15# 6"      Quad stretch prone        Steps     x10   5xSTS     5'   Gait Training        FGA        Agility ladder with cog    x4 laps    Walking with Head Turns   5' x4 laps    Foam tandem   x6 laps     HS treadmill 3x45s  NV     Walking backwards   NV x4 laps    Walking with pivoting   NV 3'    TUG     5'   Modalities        Patient Education     10'   CP            Precautions: Lt TKA July 15 2021, WINIFRED 6 weeks later  Past Medical History:   Diagnosis Date    Arthralgia     Atypical chest pain     Balance disorder     Cervical rib     last assessed 9/13/12    Depression     Fatigue     Fibromyalgia     Fibromyalgia     Fibromyalgia, primary     GERD (gastroesophageal reflux disease)     Hydronephrosis with renal and ureteral calculus obstruction     last assessed 5/1/17    [facility]     Leukopenia     last assessed 5/19/16    Memory loss     Restless leg syndrome     last assessed 11/8/13    Sebaceous cyst     last assessed 2/13/13    Skin tag     last assessed 2/13/13    Vitamin D deficiency

## 2022-05-05 ENCOUNTER — APPOINTMENT (OUTPATIENT)
Dept: PHYSICAL THERAPY | Facility: REHABILITATION | Age: 67
End: 2022-05-05
Payer: COMMERCIAL

## 2022-05-10 ENCOUNTER — APPOINTMENT (OUTPATIENT)
Dept: PHYSICAL THERAPY | Facility: REHABILITATION | Age: 67
End: 2022-05-10
Payer: COMMERCIAL

## 2022-05-13 ENCOUNTER — APPOINTMENT (OUTPATIENT)
Dept: PHYSICAL THERAPY | Facility: REHABILITATION | Age: 67
End: 2022-05-13
Payer: COMMERCIAL

## 2022-05-16 ENCOUNTER — APPOINTMENT (OUTPATIENT)
Dept: PHYSICAL THERAPY | Facility: REHABILITATION | Age: 67
End: 2022-05-16
Payer: COMMERCIAL

## 2022-05-17 NOTE — PATIENT INSTRUCTIONS
1  Reduce lactulose to every other day and take in late afternoon, evening instead  2  Stretching/exercise regularly -> see below  3  Return in september or as needed    Core Strengthening Exercises   AMBULATORY CARE:   What you need to know about core strengthening exercises: Your core includes the muscles of your lower back, hip, pelvis, and abdomen  Core strengthening exercises help heal and strengthen these muscles  This helps prevent another injury, and keeps your pelvis, spine, and hips in the correct position  Contact your healthcare provider if:   · You have sharp or worsening pain during exercise or at rest     · You have questions or concerns about your shoulder exercises  Safety tips:  Talk to your healthcare provider before you start an exercise program  A physical therapist can teach you how to do core strengthening exercises safely  · Do the exercises on a mat or firm surface  A firm surface will support your spine and avoid low back pain  Do not do these exercises on a bed  · Move slowly and smoothly  Avoid fast or jerky motions  · Stop if you feel pain  Core exercises should not feel painful  Stop if you feel pain  · Breathe normally during core exercises  Do not hold your breath  This may cause an increase in blood pressure and prevent muscle strengthening  Your healthcare provider will tell you when to inhale and exhale during the exercise  · Begin all of your exercises with abdominal bracing  Abdominal bracing helps warm up your core muscles  You can also practice abdominal bracing throughout the day while you are sitting or standing  Lie on your back with your knees bent and feet flat on the floor  Place your arms in a relaxed position beside your body  Tighten your abdominal muscles  Pull your belly button in and up toward your spine  Hold for 5 seconds  Relax your muscles  Repeat 10 times  Core strengthening exercises:   Your healthcare provider will tell you how Patient notified of device findings. She reports that she was dancing at the time of the events. She was feeling palpitations and had a hard time catching her breath. She kept going outside to try to get some fresh air, but the humidity did not help much. She reports that she drink have 1 alcoholic beverage that night. Patient is not on any cardiac medications. Patient denies chest pain, dizziness, lightheadedness or fatigue. Patient actives to continue present management and we will call her if there are any changes.    often to do these exercises  The provider will also tell you how many repetitions of each exercise you should do  Hold each exercise for 5 seconds or as directed  As you get stronger, increase your hold to 10 to 15 seconds  You can do some of these exercises on a stability ball, or with a weight  Ask your healthcare provider how to use a stability ball or weight for these exercises:  · Bent leg side bridge:  Lie on one side with your legs, hips, and shoulders in a straight line  Prop yourself up onto your forearm so your elbow is directly below your shoulder  Bend your knees to 90°  Lift your hips off the floor  Balance yourself on your forearm and the side of your knee  Hold this position  Repeat on the other side  · Straight leg side bridge:  Lie on one side with your legs, hips, and shoulders in a straight line  Prop yourself up onto your forearm so your elbow is directly below your shoulder  Your top leg can rest in front of your bottom leg for support  Lift your hips off the floor  Balance yourself on your forearm and the outside of your flexed foot  Do not let your ankle bend sideways  Hold this position  Repeat on the other side  · Superman:  Lie on your stomach  Extend your arms forward on the floor  Tighten your abdominal muscles and lift your right hand and left leg off the floor  Hold this position  Slowly return to the starting position  Tighten your abdominal muscles and lift your left hand and right leg off the floor  Hold this position  Slowly return to the starting position  · Curl up:  Lie on your back with your knees bent and feet flat on the floor  Place your hands, palms down, underneath your lower back  Next, with your elbows on the floor, lift your shoulders and chest 2 to 3 inches off the floor  Keep your head in line with your shoulders  Hold this position  Slowly return to the starting position             · Straight leg raises:  Lie on your back with one leg straight  Bend the other knee and place your foot flat on the floor  Tighten your abdominal muscles  Keep your leg straight and slowly lift it straight up 6 to 12 inches off the floor  Hold this position  Lower your leg slowly  Do as many repetitions as directed on this side  Repeat with the other leg  · Plank:  Lie on your stomach  Bend your elbows and place your forearms flat on the floor  Lift your chest, stomach, and knees off the floor  Make sure your elbows are below your shoulders  Your body should be in a straight line  Do not let your hips or lower back sink to the ground  Squeeze your abdominal muscles together and hold for 15 seconds  To make this exercise harder, hold for 30 seconds or lift 1 leg at a time  · Bicycles:  Lie on your back  Bend both knees and bring them toward your chest  Your calves should be parallel to the floor  Place the palms of your hands on the back of your head  Straighten your right leg and keep it lifted 2 inches off the floor  Raise your head and shoulders off the floor and twist towards your left  Keep your head and shoulders lifted  Bend your right knee while you straighten your left leg  Keep your left leg 2 inches off the floor  Twist your head and chest towards the left leg  Continue to straighten 1 leg at a time and twist        Follow up with your healthcare provider as directed:  Write down your questions so you remember to ask them during your visits  © 2017 [employer] Information is for End User's use only and may not be sold, redistributed or otherwise used for commercial purposes  All illustrations and images included in CareNotes® are the copyrighted property of [employer]  or [employer]  The above information is an  only  It is not intended as medical advice for individual conditions or treatments   Talk to your doctor, nurse or pharmacist before following any medical regimen to see if it is safe and effective for you

## 2022-05-19 ENCOUNTER — OFFICE VISIT (OUTPATIENT)
Dept: PHYSICAL THERAPY | Facility: REHABILITATION | Age: 67
End: 2022-05-19
Payer: COMMERCIAL

## 2022-05-19 DIAGNOSIS — R29.898 LEFT LEG WEAKNESS: Primary | ICD-10-CM

## 2022-05-19 DIAGNOSIS — R26.89 BALANCE DISORDER: ICD-10-CM

## 2022-05-19 PROCEDURE — 97110 THERAPEUTIC EXERCISES: CPT

## 2022-05-19 PROCEDURE — 97530 THERAPEUTIC ACTIVITIES: CPT

## 2022-05-19 PROCEDURE — 97112 NEUROMUSCULAR REEDUCATION: CPT

## 2022-05-19 NOTE — PROGRESS NOTES
PT Reevaluation    Today's date: 2022  Patient name: Dajuan German  : 1955  MRN: [id number]  Referring provider: Gordon Aguirre  Dx:   Encounter Diagnosis     ICD-10-CM    1  Left leg weakness  R29 898    2  Balance disorder  R26 89                   Subjective: Patient reports that she is walking about 20 minutes every other day  She has not been consistent with her HEP and she feel unsteady at home  Objective: See treatment diary below      Assessment: Patient demonstrated increased fatigue throughout the session, however, she maintained good form with all exercises  Adequately challenged by current program, especially higher level balance  1:1 with Tyrese Oquendo, PT, DPT for entirety of tx  Plan: Continue per plan of care          [id number]     Date 4/22 4/26 4/29 5/3 5/19   Visit Number 9/12 10/12 11/12 ReEval    Manual                                        Neuro Re-Ed        Modified SL Squat   2x10; 6"     Squat    x5 x10 foam   Step down   6" x10  DC   Step up  2x15 9"   DC   Leg press ecc 70# 3x10 70# 5x10 70# 4x10 70# 4x10 70# 4x10   Single leg stance foam   2x30 ea  2x30s   Y balance     NV                                   TherEx        Bike or TM warmup TM 8' TM 10' TM 8' TM 10' TM 10'   Step over obstacles     X2, DC   Standing Foam Marching 2x20    x30 ea   Knee ext OP x20  x10     TherAct        Patient education        Lunge R 2x10 L x10 NV 2x10 ea  x10 ea   Deadlift KB x5 15# 9"  x10 15# 6"       Step up/over    x10 x20   5xSTS    5'    Gait Training        FGA        Agility ladder with cog   x4 laps     Walking with Head Turns  5' x4 laps     Foam tandem  x6 laps   x5 laps   Walking backwards  NV x4 laps     Walking with pivoting  NV 3'     TUG    5'    Modalities        Patient Education    10'    CP            Precautions: Lt TKA July 15 2021, WINIFRED 6 weeks later  Past Medical History:   Diagnosis Date    Arthralgia     Atypical chest pain     Balance disorder     Cervical rib     last assessed 9/13/12    Depression     Fatigue     Fibromyalgia     Fibromyalgia     Fibromyalgia, primary     GERD (gastroesophageal reflux disease)     Hydronephrosis with renal and ureteral calculus obstruction     last assessed 5/1/17    [facility]     Leukopenia     last assessed 5/19/16    Memory loss     Restless leg syndrome     last assessed 11/8/13    Sebaceous cyst     last assessed 2/13/13    Skin tag     last assessed 2/13/13    Vitamin D deficiency

## 2022-05-23 ENCOUNTER — OFFICE VISIT (OUTPATIENT)
Dept: PHYSICAL THERAPY | Facility: REHABILITATION | Age: 67
End: 2022-05-23
Payer: COMMERCIAL

## 2022-05-23 DIAGNOSIS — R26.89 BALANCE DISORDER: ICD-10-CM

## 2022-05-23 DIAGNOSIS — R29.898 LEFT LEG WEAKNESS: Primary | ICD-10-CM

## 2022-05-23 PROCEDURE — 97112 NEUROMUSCULAR REEDUCATION: CPT

## 2022-05-23 PROCEDURE — 97530 THERAPEUTIC ACTIVITIES: CPT

## 2022-05-23 PROCEDURE — 97110 THERAPEUTIC EXERCISES: CPT

## 2022-05-23 NOTE — PROGRESS NOTES
PT Reevaluation    Today's date: 2022  Patient name: Marifer Chase  : 1955  MRN: [id number]  Referring provider: Judge Greer  Dx:   Encounter Diagnosis     ICD-10-CM    1  Left leg weakness  R29 898    2  Balance disorder  R26 89                   Subjective: Patient reports no change in status  Objective: See treatment diary below      Assessment: Patient demonstrated increased fatigue throughout the session, however, she maintained good form with all exercises  Adequately challenged by current program, especially on foam  1:1 with Hay Ibrahim, PT, DPT for entirety of tx  Plan: Continue per plan of care          [id number]     Date 4/29 5/3 5/19 5/20    Visit Number  ReEval     Manual                                        Neuro Re-Ed        Modified SL Squat 2x10; 6"   2x10 6" ea    Squat  x5 x10 foam 2x10 foam    Step down 6" x10  DC     Step up   DC     Leg press ecc 70# 4x10 70# 4x10 70# 4x10 70# 4x10    Single leg stance foam 2x30 ea  2x30s 2x30s    Y balance   NV x10                                    TherEx        TM warmup 8' 10' 10'  10'    Step over obstacles   X2, DC     Standing Foam Marching   x30 ea x30 ea    Knee ext OP x10       TherAct        Patient education        Lunge 2x10 ea  x10 ea     Deadlift KB        Step up/over  x10 x20     5xSTS  5'      Gait Training        FGA        Agility ladder with cog x4 laps       Walking with Head Turns x4 laps   x2 laps    Foam tandem   x5 laps x2 laps no foam    Walking backwards x4 laps       Walking with pivoting 3'       TUG  5'      Modalities        Patient Education  10'      CP            Precautions: Lt TKA July 15 2021, WINIFRED 6 weeks later  Past Medical History:   Diagnosis Date    Arthralgia     Atypical chest pain     Balance disorder     Cervical rib     last assessed 12    Depression     Fatigue     Fibromyalgia     Fibromyalgia     Fibromyalgia, primary     GERD (gastroesophageal reflux disease)     Hydronephrosis with renal and ureteral calculus obstruction     last assessed 5/1/17    [facility]     Leukopenia     last assessed 5/19/16    Memory loss     Restless leg syndrome     last assessed 11/8/13    Sebaceous cyst     last assessed 2/13/13    Skin tag     last assessed 2/13/13    Vitamin D deficiency

## 2022-05-26 ENCOUNTER — APPOINTMENT (OUTPATIENT)
Dept: PHYSICAL THERAPY | Facility: REHABILITATION | Age: 67
End: 2022-05-26
Payer: COMMERCIAL

## 2022-05-26 ENCOUNTER — LAB (OUTPATIENT)
Dept: LAB | Age: 67
End: 2022-05-26
Payer: COMMERCIAL

## 2022-05-26 DIAGNOSIS — G45.9 TIA (TRANSIENT ISCHEMIC ATTACK): ICD-10-CM

## 2022-05-26 DIAGNOSIS — E55.9 VITAMIN D DEFICIENCY: ICD-10-CM

## 2022-05-26 DIAGNOSIS — E78.49 OTHER HYPERLIPIDEMIA: ICD-10-CM

## 2022-05-26 DIAGNOSIS — Z00.00 LABORATORY EXAMINATION ORDERED AS PART OF A ROUTINE GENERAL MEDICAL EXAMINATION: ICD-10-CM

## 2022-05-26 LAB
25(OH)D3 SERPL-MCNC: 40.3 NG/ML (ref 30–100)
ALBUMIN SERPL BCP-MCNC: 3.9 G/DL (ref 3.5–5)
ALP SERPL-CCNC: 82 U/L (ref 46–116)
ALT SERPL W P-5'-P-CCNC: 52 U/L (ref 12–78)
ANION GAP SERPL CALCULATED.3IONS-SCNC: 6 MMOL/L (ref 4–13)
AST SERPL W P-5'-P-CCNC: 35 U/L (ref 5–45)
BASOPHILS # BLD AUTO: 0.04 THOUSANDS/ΜL (ref 0–0.1)
BASOPHILS NFR BLD AUTO: 1 % (ref 0–1)
BILIRUB SERPL-MCNC: 0.47 MG/DL (ref 0.2–1)
BUN SERPL-MCNC: 15 MG/DL (ref 5–25)
CALCIUM SERPL-MCNC: 9.4 MG/DL (ref 8.3–10.1)
CHLORIDE SERPL-SCNC: 107 MMOL/L (ref 100–108)
CHOLEST SERPL-MCNC: 123 MG/DL
CO2 SERPL-SCNC: 27 MMOL/L (ref 21–32)
CREAT SERPL-MCNC: 0.88 MG/DL (ref 0.6–1.3)
EOSINOPHIL # BLD AUTO: 0.11 THOUSAND/ΜL (ref 0–0.61)
EOSINOPHIL NFR BLD AUTO: 3 % (ref 0–6)
ERYTHROCYTE [DISTWIDTH] IN BLOOD BY AUTOMATED COUNT: 12.7 % (ref 11.6–15.1)
GFR SERPL CREATININE-BSD FRML MDRD: 68 ML/MIN/1.73SQ M
GLUCOSE P FAST SERPL-MCNC: 93 MG/DL (ref 65–99)
HCT VFR BLD AUTO: 48.1 % (ref 34.8–46.1)
HDLC SERPL-MCNC: 52 MG/DL
HGB BLD-MCNC: 15.1 G/DL (ref 11.5–15.4)
IMM GRANULOCYTES # BLD AUTO: 0.01 THOUSAND/UL (ref 0–0.2)
IMM GRANULOCYTES NFR BLD AUTO: 0 % (ref 0–2)
LDLC SERPL CALC-MCNC: 53 MG/DL (ref 0–100)
LYMPHOCYTES # BLD AUTO: 1.25 THOUSANDS/ΜL (ref 0.6–4.47)
LYMPHOCYTES NFR BLD AUTO: 32 % (ref 14–44)
MCH RBC QN AUTO: 29.6 PG (ref 26.8–34.3)
MCHC RBC AUTO-ENTMCNC: 31.4 G/DL (ref 31.4–37.4)
MCV RBC AUTO: 94 FL (ref 82–98)
MONOCYTES # BLD AUTO: 0.29 THOUSAND/ΜL (ref 0.17–1.22)
MONOCYTES NFR BLD AUTO: 7 % (ref 4–12)
NEUTROPHILS # BLD AUTO: 2.2 THOUSANDS/ΜL (ref 1.85–7.62)
NEUTS SEG NFR BLD AUTO: 57 % (ref 43–75)
NONHDLC SERPL-MCNC: 71 MG/DL
NRBC BLD AUTO-RTO: 0 /100 WBCS
PLATELET # BLD AUTO: 168 THOUSANDS/UL (ref 149–390)
PMV BLD AUTO: 11.9 FL (ref 8.9–12.7)
POTASSIUM SERPL-SCNC: 4.3 MMOL/L (ref 3.5–5.3)
PROT SERPL-MCNC: 7.4 G/DL (ref 6.4–8.2)
RBC # BLD AUTO: 5.1 MILLION/UL (ref 3.81–5.12)
SODIUM SERPL-SCNC: 140 MMOL/L (ref 136–145)
TRIGL SERPL-MCNC: 91 MG/DL
TSH SERPL DL<=0.05 MIU/L-ACNC: 1.74 UIU/ML (ref 0.45–4.5)
WBC # BLD AUTO: 3.9 THOUSAND/UL (ref 4.31–10.16)

## 2022-05-26 PROCEDURE — 80061 LIPID PANEL: CPT

## 2022-05-26 PROCEDURE — 85025 COMPLETE CBC W/AUTO DIFF WBC: CPT

## 2022-05-26 PROCEDURE — 82306 VITAMIN D 25 HYDROXY: CPT

## 2022-05-26 PROCEDURE — 84443 ASSAY THYROID STIM HORMONE: CPT

## 2022-05-26 PROCEDURE — 80053 COMPREHEN METABOLIC PANEL: CPT

## 2022-05-26 PROCEDURE — 36415 COLL VENOUS BLD VENIPUNCTURE: CPT

## 2022-05-31 ENCOUNTER — OFFICE VISIT (OUTPATIENT)
Dept: PHYSICAL THERAPY | Facility: REHABILITATION | Age: 67
End: 2022-05-31
Payer: COMMERCIAL

## 2022-05-31 DIAGNOSIS — R26.89 BALANCE DISORDER: ICD-10-CM

## 2022-05-31 DIAGNOSIS — R29.898 LEFT LEG WEAKNESS: Primary | ICD-10-CM

## 2022-05-31 PROCEDURE — 97116 GAIT TRAINING THERAPY: CPT

## 2022-05-31 PROCEDURE — 97110 THERAPEUTIC EXERCISES: CPT

## 2022-05-31 PROCEDURE — 97112 NEUROMUSCULAR REEDUCATION: CPT

## 2022-05-31 PROCEDURE — 97530 THERAPEUTIC ACTIVITIES: CPT

## 2022-05-31 NOTE — PROGRESS NOTES
PT Daily Note    Today's date: 2022  Patient name: María Rojas  : 1955  MRN: [id number]  Referring provider: Royal Ohara  Dx:   Encounter Diagnosis     ICD-10-CM    1  Left leg weakness  R29 898    2  Balance disorder  R26 89                   Subjective: Patient reports walking 25 minutes on  and skipping yesterday because it was too hot  She noticed that when she was walking if she looked around sometimes she would stumble  Objective: See treatment diary below      Assessment: Patient demonstrated increased fatigue throughout the session, however, she maintained good form with all exercises  Adequately challenged by current program, especially on foam  1:1 with Laxmi Rick, PT, DPT for entirety of tx  Plan: Continue per plan of care          [id number]     Date 5/3 5/19 5/20 5/31    Visit Number ReEval 1/12 2/12 3/12    Manual                                        Neuro Re-Ed        Modified SL Squat   2x10 6" ea 2x10 6" ea    Squat x5 x10 foam 2x10 foam 2x10 foam    Step down  DC      Step up  DC      Leg press ecc 70# 4x10 70# 4x10 70# 4x10 85# 4x10    Single leg stance foam  2x30s 2x30s     Y balance  NV x10 x10                                    TherEx        TM warmup 10' 10'  10' 10'    Step over obstacles  X2, DC      Standing Foam Marching  x30 ea x30 ea x30  ea            TherAct        Patient education        Lunge  x10 ea      Deadlift KB        Step up/over x10 x20      5xSTS 5'       Gait Training        FGA        Agility ladder with cog        Walking with Head Turns   x2 laps x4 laps    Foam tandem  x5 laps x2 laps no foam x4 laps no foam    Walking backwards        Walking with pivoting        TUG 5'       Modalities        Patient Education 10'   5'    CP            Precautions: Lt TKA July 15 2021, WINIFRED 6 weeks later  Past Medical History:   Diagnosis Date    Arthralgia     Atypical chest pain     Balance disorder     Cervical rib     last assessed 9/13/12    Depression     Fatigue     Fibromyalgia     Fibromyalgia     Fibromyalgia, primary     GERD (gastroesophageal reflux disease)     Hydronephrosis with renal and ureteral calculus obstruction     last assessed 5/1/17    [facility]     Leukopenia     last assessed 5/19/16    Memory loss     Restless leg syndrome     last assessed 11/8/13    Sebaceous cyst     last assessed 2/13/13    Skin tag     last assessed 2/13/13    Vitamin D deficiency

## 2022-06-01 ENCOUNTER — OFFICE VISIT (OUTPATIENT)
Dept: INTERNAL MEDICINE CLINIC | Facility: CLINIC | Age: 67
End: 2022-06-01
Payer: COMMERCIAL

## 2022-06-01 VITALS
DIASTOLIC BLOOD PRESSURE: 76 MMHG | HEART RATE: 78 BPM | SYSTOLIC BLOOD PRESSURE: 116 MMHG | BODY MASS INDEX: 26.58 KG/M2 | TEMPERATURE: 97 F | OXYGEN SATURATION: 97 % | WEIGHT: 150 LBS | HEIGHT: 63 IN

## 2022-06-01 DIAGNOSIS — R53.83 OTHER FATIGUE: Primary | ICD-10-CM

## 2022-06-01 DIAGNOSIS — G50.0 TRIGEMINAL NEURALGIA: ICD-10-CM

## 2022-06-01 DIAGNOSIS — M72.2 PLANTAR FASCIITIS, LEFT: ICD-10-CM

## 2022-06-01 DIAGNOSIS — E78.49 OTHER HYPERLIPIDEMIA: ICD-10-CM

## 2022-06-01 DIAGNOSIS — Z78.0 POSTMENOPAUSAL: ICD-10-CM

## 2022-06-01 DIAGNOSIS — M79.7 FIBROMYALGIA: ICD-10-CM

## 2022-06-01 DIAGNOSIS — K59.04 CHRONIC IDIOPATHIC CONSTIPATION: ICD-10-CM

## 2022-06-01 DIAGNOSIS — D64.89 ANEMIA DUE TO OTHER CAUSE, NOT CLASSIFIED: ICD-10-CM

## 2022-06-01 DIAGNOSIS — R29.90 STROKE-LIKE SYMPTOMS: ICD-10-CM

## 2022-06-01 DIAGNOSIS — G25.81 RESTLESS LEGS SYNDROME: ICD-10-CM

## 2022-06-01 DIAGNOSIS — K21.9 GASTROESOPHAGEAL REFLUX DISEASE, UNSPECIFIED WHETHER ESOPHAGITIS PRESENT: ICD-10-CM

## 2022-06-01 DIAGNOSIS — Z71.9 HEALTH COUNSELING: ICD-10-CM

## 2022-06-01 DIAGNOSIS — F41.8 DEPRESSION WITH ANXIETY: ICD-10-CM

## 2022-06-01 DIAGNOSIS — Z12.31 ENCOUNTER FOR SCREENING MAMMOGRAM FOR BREAST CANCER: ICD-10-CM

## 2022-06-01 PROCEDURE — 99214 OFFICE O/P EST MOD 30 MIN: CPT | Performed by: INTERNAL MEDICINE

## 2022-06-01 PROCEDURE — 3008F BODY MASS INDEX DOCD: CPT | Performed by: INTERNAL MEDICINE

## 2022-06-01 PROCEDURE — 1036F TOBACCO NON-USER: CPT | Performed by: INTERNAL MEDICINE

## 2022-06-01 PROCEDURE — 1160F RVW MEDS BY RX/DR IN RCRD: CPT | Performed by: INTERNAL MEDICINE

## 2022-06-01 RX ORDER — VENLAFAXINE HYDROCHLORIDE 75 MG/1
75 CAPSULE, EXTENDED RELEASE ORAL
Qty: 90 CAPSULE | Refills: 1 | Status: SHIPPED | OUTPATIENT
Start: 2022-06-01

## 2022-06-01 NOTE — PROGRESS NOTES
Assessment/Plan:    Status post total knee replacement, left  Ongoing physical therapy due to weakness  Follow up with Ortho  Fibromyalgia  Now on gabapentin due to insurance  Taking 300 mg tid  Stroke-like symptoms  On daily ASA, statin  Trigeminal neuralgia  On carbamazepine bid  Depression with anxiety  On venlafaxine  GERD (gastroesophageal reflux disease)  May start taking PPI every other day  Word finding difficulty  Follow up with neurology as scheduled  Constipation  Taking Colace bid  Discussed high fiber diet  Restless legs syndrome  Taking iron daily  Recheck ferritin levels  Fatigue  Agrees to see sleep and do sleep study  Diagnoses and all orders for this visit:    Other fatigue  -     CBC and differential; Future  -     Ambulatory Referral to Sleep Medicine; Future  -     Diagnostic Sleep Study; Future    Plantar fasciitis, left  Comments:  Start stretching exercises  Depression with anxiety  -     venlafaxine (EFFEXOR-XR) 75 mg 24 hr capsule; Take 1 capsule (75 mg total) by mouth daily with breakfast    Fibromyalgia  -     venlafaxine (EFFEXOR-XR) 75 mg 24 hr capsule; Take 1 capsule (75 mg total) by mouth daily with breakfast    Restless legs syndrome  -     CBC and differential; Future  -     Ferritin; Future  -     Ambulatory Referral to Sleep Medicine; Future  -     Diagnostic Sleep Study; Future    Anemia due to other cause, not classified    Chronic idiopathic constipation    Other hyperlipidemia    Trigeminal neuralgia    Stroke-like symptoms    Encounter for screening mammogram for breast cancer    Postmenopausal  -     DXA bone density spine hip and pelvis; Future    Gastroesophageal reflux disease, unspecified whether esophagitis present    Health counseling  Comments:  Recommend Prevnar 20, Shingrix  Follow up in 6 months or as needed  Subjective:      Patient ID: Gissel Ryan is a 77 y o  female here for a follow up      She is still going to physical therapy to help with her strength  She feels her balance is not as good  She does not walk regularly, she does not do her exercises at home  She reports a pain behind her right eye which started about 2 weeks ago  It occurs intermittently, she correlates this to the amount of stress she has at work  She reports no blurring of vision, redness, itching or discharge  Has not seen an eye doctor for a few years  She continues to experience occasional right-sided headaches  When she ran out of carbamazepine, headaches were tolerable  She complains of left foot pain, worse when she is walking or when she stands up  She reports no swelling, has some soreness by her heel  Denies any falls or injury  She continues to feel very tired  She is willing to do the sleep study  She is still taking her iron for her restless legs  The following portions of the patient's history were reviewed and updated as appropriate: allergies, current medications, past medical history, past social history, past surgical history and problem list     Review of Systems   Constitutional: Positive for fatigue  Negative for appetite change  HENT: Negative for congestion, ear pain and postnasal drip  Eyes: Negative for visual disturbance  Respiratory: Negative for cough and shortness of breath  Cardiovascular: Negative for chest pain and leg swelling  Gastrointestinal: Positive for constipation  Negative for abdominal pain and diarrhea  Genitourinary: Negative for dysuria and frequency  Musculoskeletal: Negative for arthralgias, gait problem and myalgias  Skin: Negative for rash and wound  Neurological: Negative for dizziness and headaches  Hematological: Does not bruise/bleed easily  Psychiatric/Behavioral: Positive for sleep disturbance  Negative for confusion  The patient is not nervous/anxious            Objective:      /76   Pulse 78   Temp (!) 97 °F (36 1 °C)   Ht 5' 3" (1 6 m) Wt 68 kg (150 lb)   SpO2 97%   BMI 26 57 kg/m²          Physical Exam  Vitals and nursing note reviewed  Constitutional:       General: She is not in acute distress  Appearance: She is well-developed  HENT:      Head: Normocephalic and atraumatic  Right Ear: Tympanic membrane, ear canal and external ear normal       Left Ear: Tympanic membrane, ear canal and external ear normal    Eyes:      Pupils: Pupils are equal, round, and reactive to light  Cardiovascular:      Rate and Rhythm: Normal rate and regular rhythm  Heart sounds: Normal heart sounds  Pulmonary:      Effort: Pulmonary effort is normal       Breath sounds: Normal breath sounds  No wheezing  Abdominal:      General: Bowel sounds are normal       Palpations: Abdomen is soft  Musculoskeletal:         General: No swelling  Right lower leg: No edema  Left lower leg: No edema  Feet:    Feet:      Left foot:      Skin integrity: Skin integrity normal  No erythema  Skin:     General: Skin is warm  Findings: No rash  Neurological:      General: No focal deficit present  Mental Status: She is alert and oriented to person, place, and time  Psychiatric:         Mood and Affect: Mood and affect normal  Mood is not anxious or depressed  Behavior: Behavior normal            Labs & imaging results reviewed with patient

## 2022-06-01 NOTE — PATIENT INSTRUCTIONS
Plantar Fasciitis   AMBULATORY CARE:   Plantar fasciitis  PF is swelling of the plantar fascia  The plantar fascia is a thick band of tissue that connects your heel bone to your toes  This part of your foot helps support the arch of your foot and absorbs shock  Tension and stress can cause small tears on the thick band of tissue  These small tears can grow larger with repeated stretching and tearing  The band of tissue can become swollen and painful  Signs and symptoms:   Pain on the bottom of your foot near your heel    Pain that is worse right after you get out of bed or after a long period of rest    Pain after activity    Stiffness in your foot    Heel swelling    Call your doctor if:   Your pain or swelling suddenly increase  You develop knee, hip, or back pain  You have questions or concerns about your condition or care  Treatment  may include any of the following:  Medicines  may be given to decrease swelling and pain  Shoe inserts, splints, or tape  help support your foot and decrease stress on your plantar fascia  A night splint may help stretch your plantar fascia while you sleep  Stretches and exercises  can help decrease pain and swelling  They can also help strengthen the muscles that support your heel and foot  Surgery  may be needed when other treatments do not work  During surgery, the plantar fascia is  from your heel  Self-care:   Wear your splint or shoe inserts as directed  You may need to wear a splint at night to keep your foot stretched while you sleep  This will help prevent sharp pain first thing in the morning  Shoe inserts will help decrease stress on your plantar fascia when you walk or exercise  Apply ice on your plantar fascia  Ice helps prevent tissue damage and decreases swelling and pain  Fill a water bottle with water and freeze it  Wrap a towel around the bottle or cover it with a pillow case   Roll the water bottle under your foot for 10 minutes in the morning and after work  Massage your plantar fascia as directed  This may help decrease swelling and pain  Roll a golf ball under your foot for 10 minutes  Repeat 3 times each day  Go to physical therapy as directed  A physical therapist teaches you exercises to help improve movement and strength, and to decrease pain  Prevent plantar fasciitis:   Maintain a healthy weight  This will help decrease stress on your feet  Ask your healthcare provider how much you should weigh  Ask him to help you create a weight loss plan if you are overweight  Do low-impact exercises  Low-impact exercises decrease stress on your plantar fascia  Examples include swimming or bicycling  Start new activities slowly  Increase the intensity and time gradually  Wear shoes that fit well and support your arch  Replace your shoes before the padding or shock absorption wears out  Do not walk or  bare feet or sandals for long periods of time  Follow up with your doctor as directed:  Write down your questions so you remember to ask them during your visits  © [employer] 2022 Information is for End User's use only and may not be sold, redistributed or otherwise used for commercial purposes  All illustrations and images included in CareNotes® are the copyrighted property of A D A M , Inc  or [address] [place], [place] [id number] chandni   The above information is an  only  It is not intended as medical advice for individual conditions or treatments  Talk to your doctor, nurse or pharmacist before following any medical regimen to see if it is safe and effective for you

## 2022-06-03 ENCOUNTER — APPOINTMENT (OUTPATIENT)
Dept: PHYSICAL THERAPY | Facility: REHABILITATION | Age: 67
End: 2022-06-03
Payer: COMMERCIAL

## 2022-06-07 ENCOUNTER — OFFICE VISIT (OUTPATIENT)
Dept: PHYSICAL THERAPY | Facility: REHABILITATION | Age: 67
End: 2022-06-07
Payer: COMMERCIAL

## 2022-06-07 DIAGNOSIS — R29.898 LEFT LEG WEAKNESS: Primary | ICD-10-CM

## 2022-06-07 DIAGNOSIS — R26.89 BALANCE DISORDER: ICD-10-CM

## 2022-06-07 PROCEDURE — 97110 THERAPEUTIC EXERCISES: CPT

## 2022-06-07 PROCEDURE — 97116 GAIT TRAINING THERAPY: CPT

## 2022-06-07 PROCEDURE — 97112 NEUROMUSCULAR REEDUCATION: CPT

## 2022-06-07 PROCEDURE — 97530 THERAPEUTIC ACTIVITIES: CPT

## 2022-06-07 NOTE — PROGRESS NOTES
PT Daily Note    Today's date: 2022  Patient name: Dajuan German  : 1955  MRN: [id number]  Referring provider: Gordon Aguirre  Dx:   Encounter Diagnosis     ICD-10-CM    1  Left leg weakness  R29 898    2  Balance disorder  R26 89                   Subjective: Patient reports increased fatigue today and wants to focus on balance  Objective: See treatment diary below      Assessment: Patient demonstrated increased fatigue throughout the session, however, she maintained good form with all exercises  Patient struggled with balance today and was more challenged than usual       Plan: Continue per plan of care          [id number]     Date 5/3 5/19 5/20 5/31 6/7    Visit Number ReEval 1/12 2/12 3/12 4/12    Manual                                             Neuro Re-Ed         Modified SL Squat   2x10 6" ea 2x10 6" ea     Squat x5 x10 foam 2x10 foam 2x10 foam 3x10 foam    Step down  DC       Step up  DC       Leg press ecc 70# 4x10 70# 4x10 70# 4x10 85# 4x10     Single leg stance foam  2x30s 2x30s  2x30s     Y balance  NV x10 x10 x10    Tandem balance     2x30s                               TherEx         TM warmup 10' 10'  10' 10' 5'    Step over obstacles  X2, DC       Standing Foam Marching  x30 ea x30 ea x30  ea 3x10 ea             TherAct         Patient education         Lunge  x10 ea       Deadlift KB         Step up/over x10 x20       5xSTS 5'        Gait Training         FGA         Agility ladder with cog         Walking with Head Turns   x2 laps x4 laps x4 laps ea vert/horz    Foam tandem  x5 laps x2 laps no foam x4 laps no foam x4 laps no foam    Walking backwards         Walking with pivoting         TUG 5'        Modalities         Patient Education 10'   5'     CP             Precautions: Lt TKA July 15 2021, WINIFRED 6 weeks later  Past Medical History:   Diagnosis Date    Arthralgia     Atypical chest pain     Balance disorder     Cervical rib     last assessed 12    Depression     Fatigue     Fibromyalgia     Fibromyalgia     Fibromyalgia, primary     GERD (gastroesophageal reflux disease)     Hydronephrosis with renal and ureteral calculus obstruction     last assessed 5/1/17    [facility] [address] [facility]     Leukopenia     last assessed 5/19/16    Memory loss     Restless leg syndrome     last assessed 11/8/13    Sebaceous cyst     last assessed 2/13/13    Skin tag     last assessed 2/13/13    Vitamin D deficiency

## 2022-06-10 ENCOUNTER — OFFICE VISIT (OUTPATIENT)
Dept: PHYSICAL THERAPY | Facility: REHABILITATION | Age: 67
End: 2022-06-10
Payer: COMMERCIAL

## 2022-06-10 DIAGNOSIS — R26.89 BALANCE DISORDER: ICD-10-CM

## 2022-06-10 DIAGNOSIS — R29.898 LEFT LEG WEAKNESS: Primary | ICD-10-CM

## 2022-06-10 PROCEDURE — 97112 NEUROMUSCULAR REEDUCATION: CPT

## 2022-06-10 PROCEDURE — 97116 GAIT TRAINING THERAPY: CPT

## 2022-06-10 PROCEDURE — 97530 THERAPEUTIC ACTIVITIES: CPT

## 2022-06-10 PROCEDURE — 97110 THERAPEUTIC EXERCISES: CPT

## 2022-06-10 NOTE — PROGRESS NOTES
PT Daily Note    Today's date: 6/10/2022  Patient name: Judit Austin  : 1955  MRN: [id number]  Referring provider: Mercedes King  Dx:   Encounter Diagnosis     ICD-10-CM    1  Left leg weakness  R29 898    2  Balance disorder  R26 89                   Subjective: Patient reports she is doing well today and wants to focus on balance  Objective: See treatment diary below      Assessment: Patient demonstrated increased fatigue throughout the session, however, she maintained good form with all exercises  Patient struggled with balance today and was more challenged than usual       Plan: Continue per plan of care          [id number]     Date 5/19 5/20 5/31 6/7 6/10    Visit Number 1/12 2/12 3/12 4/12 5/12    Manual                                             Neuro Re-Ed         Modified SL Squat  2x10 6" ea 2x10 6" ea      Squat x10 foam 2x10 foam 2x10 foam 3x10 foam 3x10 foam    Step down DC        Step up DC        Leg press ecc 70# 4x10 70# 4x10 85# 4x10      Single leg stance foam 2x30s 2x30s  2x30s  2x30s    Y balance NV x10 x10 x10 x17    Tandem balance    2x30s 2x30s                               TherEx         TM warmup 10'  10' 10' 5' 7'    Step over obstacles X2, DC        Standing Foam Marching x30 ea x30 ea x30  ea 3x10 ea 2x20              TherAct         Patient education         Lunge x10 ea        Deadlift KB         Step up/over x20        5xSTS         Gait Training         FGA         Agility ladder with cog         Walking with Head Turns  x2 laps x4 laps x4 laps ea vert/horz x4 laps ea vert/horz    Foam tandem x5 laps x2 laps no foam x4 laps no foam x4 laps no foam x4 laps no foam    Walking backwards         Walking with pivoting         TUG         Modalities         Patient Education   5'      CP             Precautions: Lt TKA July 15 2021, WINIFRED 6 weeks later  Past Medical History:   Diagnosis Date    Arthralgia     Atypical chest pain     Balance disorder     Cervical rib     last assessed 9/13/12    Depression     Fatigue     Fibromyalgia     Fibromyalgia     Fibromyalgia, primary     GERD (gastroesophageal reflux disease)     Hydronephrosis with renal and ureteral calculus obstruction     last assessed 5/1/17    [facility] )     Leukopenia     last assessed 5/19/16    Memory loss     Restless leg syndrome     last assessed 11/8/13    Sebaceous cyst     last assessed 2/13/13    Skin tag     last assessed 2/13/13    Vitamin D deficiency

## 2022-06-14 ENCOUNTER — OFFICE VISIT (OUTPATIENT)
Dept: PHYSICAL THERAPY | Facility: REHABILITATION | Age: 67
End: 2022-06-14
Payer: COMMERCIAL

## 2022-06-14 DIAGNOSIS — R26.89 BALANCE DISORDER: ICD-10-CM

## 2022-06-14 DIAGNOSIS — R29.898 LEFT LEG WEAKNESS: Primary | ICD-10-CM

## 2022-06-14 PROCEDURE — 97110 THERAPEUTIC EXERCISES: CPT

## 2022-06-14 PROCEDURE — 97530 THERAPEUTIC ACTIVITIES: CPT

## 2022-06-14 PROCEDURE — 97112 NEUROMUSCULAR REEDUCATION: CPT

## 2022-06-14 NOTE — PROGRESS NOTES
PT Daily Note    Today's date: 2022  Patient name: Oliver Valencia  : 1955  MRN: [id number]  Referring provider: Juan Hannah  Dx:   Encounter Diagnosis     ICD-10-CM    1  Left leg weakness  R29 898    2  Balance disorder  R26 89        Start Time: 1125  Stop Time: 1210  Total time in clinic (min): 45 minutes    Subjective: Patient reports she is doing well today and wants to focus on balance  She was less fatigued after last visit  Objective: See treatment diary below      Assessment: Patient demonstrated increased fatigue throughout the session, however, she maintained good form with all exercises  Patient struggled with balance today and was more challenged than usual  Adequately challenged by current program       Plan: Continue per plan of care          [id number]     Date 5/20 5/31 6/7 6/10 6/14    Visit Number 2/12 3/12 4/12 5/12 6/12    Manual                                             Neuro Re-Ed         Modified SL Squat 2x10 6" ea 2x10 6" ea       Squat 2x10 foam 2x10 foam 3x10 foam 3x10 foam 3x10 foam     Leg press ecc 70# 4x10 85# 4x10   100# 3x6    Single leg stance foam 2x30s  2x30s  2x30s 2x30s    Y balance x10 x10 x10 x17 x10    Tandem balance   2x30s 2x30s 2x30s                               TherEx         TM warmup  10' 10' 5' 7' 10'    Standing Foam Marching x30 ea x30  ea 3x10 ea 2x20  2x20             TherAct         Patient education         Lunge         Step up/over         5xSTS         Gait Training         FGA         Agility ladder with cog         Walking with Head Turns x2 laps x4 laps x4 laps ea vert/horz x4 laps ea vert/horz     Foam tandem x2 laps no foam x4 laps no foam x4 laps no foam x4 laps no foam x4 laps foam    Walking backwards         Walking with pivoting         TUG         Modalities         Patient Education  5'       CP             Precautions: Lt TKA July 15 2021, WINIFRED 6 weeks later  Past Medical History:   Diagnosis Date    Arthralgia     Atypical chest pain     Balance disorder     Cervical rib     last assessed 9/13/12    Depression     Fatigue     Fibromyalgia     Fibromyalgia     Fibromyalgia, primary     GERD (gastroesophageal reflux disease)     Hydronephrosis with renal and ureteral calculus obstruction     last assessed 5/1/17    [facility] [address] [facility]     Leukopenia     last assessed 5/19/16    Memory loss     Restless leg syndrome     last assessed 11/8/13    Sebaceous cyst     last assessed 2/13/13    Skin tag     last assessed 2/13/13    Vitamin D deficiency

## 2022-06-16 ENCOUNTER — OFFICE VISIT (OUTPATIENT)
Dept: PHYSICAL THERAPY | Facility: REHABILITATION | Age: 67
End: 2022-06-16
Payer: COMMERCIAL

## 2022-06-16 DIAGNOSIS — R26.89 BALANCE DISORDER: ICD-10-CM

## 2022-06-16 DIAGNOSIS — R29.898 LEFT LEG WEAKNESS: Primary | ICD-10-CM

## 2022-06-16 PROCEDURE — 97112 NEUROMUSCULAR REEDUCATION: CPT

## 2022-06-16 PROCEDURE — 97530 THERAPEUTIC ACTIVITIES: CPT

## 2022-06-16 PROCEDURE — 97110 THERAPEUTIC EXERCISES: CPT

## 2022-06-16 NOTE — PROGRESS NOTES
PT Daily Note    Today's date: 2022  Patient name: Carrie Gardner  : 1955  MRN: [id number]  Referring provider: Abdulkadir Covarrubias  Dx:   Encounter Diagnosis     ICD-10-CM    1  Left leg weakness  R29 898    2  Balance disorder  R26 89                   Subjective: Patient reports she already did a lot of work today  Objective: See treatment diary below      Assessment: Patient demonstrated increased fatigue throughout the session, however, she maintained good form with all exercises  Adequately challenged by current program       Plan: Continue per plan of care          [id number]     Date 5/31 6/7 6/10 6/14 6/16    Visit Number 3/12 4/12 5/12 6/12 7/12    Manual                                             Neuro Re-Ed         Modified SL Squat 2x10 6" ea        Squat 2x10 foam 3x10 foam 3x10 foam 3x10 foam  x15 foam, x8 bosu    Leg press ecc 85# 4x10   100# 3x6 100# 2x10    Single leg stance foam  2x30s  2x30s 2x30s 2x30s    Y balance x10 x10 x17 x10 x10    Tandem balance  2x30s 2x30s 2x30s     HS PB     2x10                      TherEx         TM warmup 10' 5' 7' 10' 10'    Standing Foam Marching x30  ea 3x10 ea 2x20  2x20 x30             TherAct         Patient education         Lunge         Step up/over         5xSTS         Gait Training         FGA         Agility ladder with cog         Walking with Head Turns x4 laps x4 laps ea vert/horz x4 laps ea vert/horz      Foam tandem x4 laps no foam x4 laps no foam x4 laps no foam x4 laps foam x5 laps foam    Walking backwards         Walking with pivoting         TUG         Modalities         Patient Education 5'        CP             Precautions: Lt TKA July 15 2021, WINIFRED 6 weeks later  Past Medical History:   Diagnosis Date    Arthralgia     Atypical chest pain     Balance disorder     Cervical rib     last assessed 12    Depression     Fatigue     Fibromyalgia     Fibromyalgia     Fibromyalgia, primary     GERD (gastroesophageal reflux disease)     Hydronephrosis with renal and ureteral calculus obstruction     last assessed 5/1/17    [facility]     Leukopenia     last assessed 5/19/16    Memory loss     Restless leg syndrome     last assessed 11/8/13    Sebaceous cyst     last assessed 2/13/13    Skin tag     last assessed 2/13/13    Vitamin D deficiency

## 2022-06-21 ENCOUNTER — VBI (OUTPATIENT)
Dept: ADMINISTRATIVE | Facility: OTHER | Age: 67
End: 2022-06-21

## 2022-06-21 ENCOUNTER — OFFICE VISIT (OUTPATIENT)
Dept: PHYSICAL THERAPY | Facility: REHABILITATION | Age: 67
End: 2022-06-21
Payer: COMMERCIAL

## 2022-06-21 DIAGNOSIS — R26.89 BALANCE DISORDER: Primary | ICD-10-CM

## 2022-06-21 DIAGNOSIS — R29.898 LEFT LEG WEAKNESS: ICD-10-CM

## 2022-06-21 PROCEDURE — 97110 THERAPEUTIC EXERCISES: CPT

## 2022-06-21 PROCEDURE — 97112 NEUROMUSCULAR REEDUCATION: CPT

## 2022-06-21 PROCEDURE — 97530 THERAPEUTIC ACTIVITIES: CPT

## 2022-06-21 NOTE — PROGRESS NOTES
PT Daily Note    Today's date: 2022  Patient name: Rae Moe  : 1955  MRN: [id number]  Referring provider: Nurys Sheridan  Dx:   Encounter Diagnosis     ICD-10-CM    1  Balance disorder  R26 89    2  Left leg weakness  R29 898                   Subjective: Patient reports she is stiff after driving for 85 miles this morning  Objective: See treatment diary below      Assessment: Patient demonstrated increased fatigue throughout the session, however, she maintained good form with all exercises  Adequately challenged by current program       Plan: Continue per plan of care          [id number]     Date 6/7 6/10 6/14 6/16 6/21    Visit Number     Manual                                             Neuro Re-Ed         Modified SL Squat         Squat 3x10 foam 3x10 foam 3x10 foam  x15 foam, x8 bosu 3x10 foam    Leg press ecc   100# 3x6 100# 2x10 100# 3x8    Single leg stance foam 2x30s  2x30s 2x30s 2x30s 2x30s    Y balance x10 x17 x10 x10 x10    Tandem balance 2x30s 2x30s 2x30s      HS PB    2x10 2x10                      TherEx         TM warmup 5' 7' 10' 10' 10'    Standing Foam Marching 3x10 ea 2x20  2x20 x30 2x15             TherAct         Patient education         Lunge         Step up/over         5xSTS         Gait Training         FGA         Agility ladder with cog         Walking with Head Turns x4 laps ea vert/horz x4 laps ea vert/horz       Foam tandem x4 laps no foam x4 laps no foam x4 laps foam x5 laps foam x5 laps foam    Walking backwards         Walking with pivoting         TUG         Modalities         Patient Education         CP             Precautions: Lt TKA July 15 2021, WINIFRED 6 weeks later  Past Medical History:   Diagnosis Date    Arthralgia     Atypical chest pain     Balance disorder     Cervical rib     last assessed 12    Depression     Fatigue     Fibromyalgia     Fibromyalgia     Fibromyalgia, primary     GERD (gastroesophageal reflux disease)     Hydronephrosis with renal and ureteral calculus obstruction     last assessed 5/1/17    [facility]     Leukopenia     last assessed 5/19/16    Memory loss     Restless leg syndrome     last assessed 11/8/13    Sebaceous cyst     last assessed 2/13/13    Skin tag     last assessed 2/13/13    Vitamin D deficiency

## 2022-06-24 ENCOUNTER — OFFICE VISIT (OUTPATIENT)
Dept: PHYSICAL THERAPY | Facility: REHABILITATION | Age: 67
End: 2022-06-24
Payer: COMMERCIAL

## 2022-06-24 DIAGNOSIS — M79.7 FIBROMYALGIA: ICD-10-CM

## 2022-06-24 DIAGNOSIS — R29.898 LEFT LEG WEAKNESS: Primary | ICD-10-CM

## 2022-06-24 DIAGNOSIS — R26.89 BALANCE DISORDER: ICD-10-CM

## 2022-06-24 PROCEDURE — 97530 THERAPEUTIC ACTIVITIES: CPT

## 2022-06-24 PROCEDURE — 97112 NEUROMUSCULAR REEDUCATION: CPT

## 2022-06-24 PROCEDURE — 97110 THERAPEUTIC EXERCISES: CPT

## 2022-06-24 RX ORDER — GABAPENTIN 300 MG/1
300 CAPSULE ORAL 3 TIMES DAILY
Qty: 270 CAPSULE | Refills: 1 | Status: SHIPPED | OUTPATIENT
Start: 2022-06-24

## 2022-06-24 NOTE — TELEPHONE ENCOUNTER
Kimberly dyer from [facility] pharmacy requesting refill of gabapenin 300 mg, tid 90 day supply  Patient of AIDAN Acosta, last office visit 4/22; note documents: "Increase gabapentin to the 300mg three times/day"    Due for refill; please sign script if in agreement, thank you

## 2022-06-24 NOTE — PROGRESS NOTES
PT Daily Note    Today's date: 2022  Patient name: Gissel Ryan  : 1955  MRN: [id number]  Referring provider: Tucker Sigala  Dx:   Encounter Diagnosis     ICD-10-CM    1  Left leg weakness  R29 898    2  Balance disorder  R26 89                   Subjective: Patient reports she is fatigued after prepping for the move  Objective: See treatment diary below      Assessment: Patient demonstrated increased fatigue throughout the session, however, she maintained good form with all exercises  Adequately challenged by current program       Plan: Continue per plan of care          [id number]     Date 6/10 6/14 6/16 6/21 6/24    Visit Number     Manual                                             Neuro Re-Ed         Modified SL Squat         Squat 3x10 foam 3x10 foam  x15 foam, x8 bosu 3x10 foam 3x10 foam    Leg press ecc  100# 3x6 100# 2x10 100# 3x8 100# 3x6    Single leg stance foam 2x30s 2x30s 2x30s 2x30s 2x30s ea    Y balance x17 x10 x10 x10     Tandem balance 2x30s 2x30s       HS PB   2x10 2x10                       TherEx         TM warmup 7' 10' 10' 10' 10'    Standing Foam Marching 2x20  2x20 x30 2x15 3x10             TherAct         Patient education         Lunge         Step up/over         5xSTS         Gait Training         FGA         Ball toss/bounce     x5 laps incl cog    Walking with Head Turns x4 laps ea vert/horz        Foam tandem x4 laps no foam x4 laps foam x5 laps foam x5 laps foam x5 laps foam    Walking backwards         Walking with pivoting         TUG         Modalities         Patient Education         CP             Precautions: Lt TKA July 15 2021, WINIFRED 6 weeks later  Past Medical History:   Diagnosis Date    Arthralgia     Atypical chest pain     Balance disorder     Cervical rib     last assessed 12    Depression     Fatigue     Fibromyalgia     Fibromyalgia     Fibromyalgia, primary     GERD (gastroesophageal reflux disease)  Hydronephrosis with renal and ureteral calculus obstruction     last assessed 5/1/17    [facility] [address] [facility]     Leukopenia     last assessed 5/19/16    Memory loss     Restless leg syndrome     last assessed 11/8/13    Sebaceous cyst     last assessed 2/13/13    Skin tag     last assessed 2/13/13    Vitamin D deficiency

## 2022-06-27 ENCOUNTER — APPOINTMENT (OUTPATIENT)
Dept: PHYSICAL THERAPY | Facility: REHABILITATION | Age: 67
End: 2022-06-27
Payer: COMMERCIAL

## 2022-06-28 ENCOUNTER — OFFICE VISIT (OUTPATIENT)
Dept: PHYSICAL THERAPY | Facility: REHABILITATION | Age: 67
End: 2022-06-28
Payer: COMMERCIAL

## 2022-06-28 DIAGNOSIS — R26.89 BALANCE DISORDER: ICD-10-CM

## 2022-06-28 DIAGNOSIS — M25.561 ACUTE PAIN OF RIGHT KNEE: ICD-10-CM

## 2022-06-28 DIAGNOSIS — R29.898 LEFT LEG WEAKNESS: Primary | ICD-10-CM

## 2022-06-28 PROCEDURE — 97110 THERAPEUTIC EXERCISES: CPT

## 2022-06-28 PROCEDURE — 97140 MANUAL THERAPY 1/> REGIONS: CPT

## 2022-06-28 PROCEDURE — 97112 NEUROMUSCULAR REEDUCATION: CPT

## 2022-06-28 PROCEDURE — 97010 HOT OR COLD PACKS THERAPY: CPT

## 2022-06-28 PROCEDURE — 97530 THERAPEUTIC ACTIVITIES: CPT

## 2022-06-28 NOTE — PROGRESS NOTES
PT Daily Note    Today's date: 2022  Patient name: Rebecca Guerra  : 1955  MRN: [id number]  Referring provider: Ciro Harden  Dx:   Encounter Diagnosis     ICD-10-CM    1  Left leg weakness  R29 898    2  Balance disorder  R26 89    3  Acute pain of right knee  M25 561                   Subjective: Patient reports new pain in the right knee starting seemingly out of the blue  It has been increased achiness the past couple weeks but this weekend it became so bad that she had to use her cane and was unable to walk at times  Objective: See treatment diary below  Increased redness, warmth  TTP at medial joint line>lateral joint line  NTTP at quad/patella tendon  + Linda   Antalgic gait      Assessment: Patient demonstrated increased fatigue throughout the session, however, she maintained good form with all exercises  Brought back old strengthening exercises for the Rt knee pain, which is likely an arthritic flare due to increased activity levels over the past couple weeks  Plan: Continue per plan of care  [id number]     Date     Visit Number 6/12 7/12 8/12 9/12 10/12    Manual         LAD     IS                               Neuro Re-Ed         Modified SL Squat         Squat 3x10 foam  x15 foam, x8 bosu 3x10 foam 3x10 foam 3x10    Leg press ecc 100# 3x6 100# 2x10 100# 3x8 100# 3x6 70# x10 L, 55#-25# x10 R P!     Single leg stance foam 2x30s 2x30s 2x30s 2x30s ea     Y balance x10 x10 x10      Tandem balance 2x30s        HS PB  2x10 2x10      LAQ     x30             TherEx         TM warmup 10' 10' 10' 10' RB 5' L2    Standing Foam Marching 2x20 x30 2x15 3x10              TherAct         Patient education     5'    Lunge         Step up     x12 R    5xSTS         Gait Training         FGA         Ball toss/bounce    x5 laps incl cog     Walking with Head Turns         Foam tandem x4 laps foam x5 laps foam x5 laps foam x5 laps foam     Walking backwards Walking with pivoting         TUG         Modalities         Patient Education         CP     10'        Precautions: Lt TKA July 15 2021, WINIFRED 6 weeks later  Past Medical History:   Diagnosis Date    Arthralgia     Atypical chest pain     Balance disorder     Cervical rib     last assessed 9/13/12    Depression     Fatigue     Fibromyalgia     Fibromyalgia     Fibromyalgia, primary     GERD (gastroesophageal reflux disease)     Hydronephrosis with renal and ureteral calculus obstruction     last assessed 5/1/17    Lac[facility]ar stroke ([facility] [address]     Leukopenia     last assessed 5/19/16    Memory loss     Restless leg syndrome     last assessed 11/8/13    Sebaceous cyst     last assessed 2/13/13    Skin tag     last assessed 2/13/13    Vitamin D deficiency

## 2022-06-30 ENCOUNTER — OFFICE VISIT (OUTPATIENT)
Dept: PHYSICAL THERAPY | Facility: REHABILITATION | Age: 67
End: 2022-06-30
Payer: COMMERCIAL

## 2022-06-30 DIAGNOSIS — M25.561 ACUTE PAIN OF RIGHT KNEE: ICD-10-CM

## 2022-06-30 DIAGNOSIS — R26.89 BALANCE DISORDER: ICD-10-CM

## 2022-06-30 DIAGNOSIS — R29.898 LEFT LEG WEAKNESS: Primary | ICD-10-CM

## 2022-06-30 PROCEDURE — 97530 THERAPEUTIC ACTIVITIES: CPT

## 2022-06-30 PROCEDURE — 97110 THERAPEUTIC EXERCISES: CPT

## 2022-06-30 PROCEDURE — 97112 NEUROMUSCULAR REEDUCATION: CPT

## 2022-06-30 PROCEDURE — 97140 MANUAL THERAPY 1/> REGIONS: CPT

## 2022-06-30 NOTE — PROGRESS NOTES
PT Daily Note    Today's date: 2022  Patient name: Dajuan German  : 1955  MRN: [id number]  Referring provider: Gordon Aguirre  Dx:   Encounter Diagnosis     ICD-10-CM    1  Left leg weakness  R29 898    2  Balance disorder  R26 89    3  Acute pain of right knee  M25 561                   Subjective: Patient reports new right knee pain continues with same intensity, but she has yet to reach out to ortho  Objective: See treatment diary below      Assessment: Patient demonstrated increased fatigue throughout the session, however, she maintained good form with all exercises  Brought back old strengthening exercises for the Rt knee pain, which is likely an arthritic flare due to increased activity levels over the past couple weeks  Patellar mobs improved pain levels and gait  Plan: Continue per plan of care  [id number]     Date    Visit Number 6/12 7/12 8/12 9/12 10/12 11/12   Manual         LAD     IS    Patella plunger      IS                     Neuro Re-Ed         Modified SL Squat         Squat 3x10 foam  x15 foam, x8 bosu 3x10 foam 3x10 foam 3x10 3x10   Leg press ecc 100# 3x6 100# 2x10 100# 3x8 100# 3x6 70# x10 L, 55#-25# x10 R P!  B 115# 3x8   Single leg stance foam 2x30s 2x30s 2x30s 2x30s ea     Y balance x10 x10 x10      Tandem balance 2x30s        HS PB  2x10 2x10      LAQ     x30 x30   Captain chiqui      x15 ea   TherEx         TM warmup 10' 10' 10' 10' RB 5' L2 RB 7' L2   Standing Foam Marching 2x20 x30 2x15 3x10     Bridge      3x10   TherAct         Patient education     5' 5'   Lunge         Step up     x12 R    5xSTS         Gait Training         FGA         Ball toss/bounce    x5 laps incl cog     Walking with Head Turns         Foam tandem x4 laps foam x5 laps foam x5 laps foam x5 laps foam x5 laps foam    Walking backwards         Walking with pivoting         TUG         Modalities         Patient Education         CP     10' Precautions: Lt TKA July 15 2021, WINIFRED 6 weeks later  Past Medical History:   Diagnosis Date    Arthralgia     Atypical chest pain     Balance disorder     Cervical rib     last assessed 9/13/12    Depression     Fatigue     Fibromyalgia     Fibromyalgia     Fibromyalgia, primary     GERD (gastroesophageal reflux disease)     Hydronephrosis with renal and ureteral calculus obstruction     last assessed 5/1/17    Lacunar stroke ([facility] [address]     Leukopenia     last assessed 5/19/16    Memory loss     Restless leg syndrome     last assessed 11/8/13    Sebaceous cyst     last assessed 2/13/13    Skin tag     last assessed 2/13/13    Vitamin D deficiency

## 2022-07-05 DIAGNOSIS — Z47.1 AFTERCARE FOLLOWING LEFT KNEE JOINT REPLACEMENT SURGERY: Primary | ICD-10-CM

## 2022-07-05 DIAGNOSIS — Z96.652 AFTERCARE FOLLOWING LEFT KNEE JOINT REPLACEMENT SURGERY: Primary | ICD-10-CM

## 2022-07-06 ENCOUNTER — EVALUATION (OUTPATIENT)
Dept: PHYSICAL THERAPY | Facility: REHABILITATION | Age: 67
End: 2022-07-06
Payer: COMMERCIAL

## 2022-07-06 DIAGNOSIS — M25.561 ACUTE PAIN OF RIGHT KNEE: ICD-10-CM

## 2022-07-06 DIAGNOSIS — R26.89 BALANCE DISORDER: ICD-10-CM

## 2022-07-06 DIAGNOSIS — R29.898 LEFT LEG WEAKNESS: Primary | ICD-10-CM

## 2022-07-06 PROCEDURE — 97110 THERAPEUTIC EXERCISES: CPT | Performed by: PHYSICAL THERAPIST

## 2022-07-06 PROCEDURE — 97112 NEUROMUSCULAR REEDUCATION: CPT | Performed by: PHYSICAL THERAPIST

## 2022-07-06 PROCEDURE — 97164 PT RE-EVAL EST PLAN CARE: CPT | Performed by: PHYSICAL THERAPIST

## 2022-07-06 NOTE — PROGRESS NOTES
PT Reevaluation    Today's date: 2022  Patient name: Jack Peterson  : 1955  MRN: [id number]  Referring provider: Jose Urban  Dx:   Encounter Diagnosis     ICD-10-CM    1  Left leg weakness  R29 898    2  Acute pain of right knee  M25 561    3  Balance disorder  R26 89                    Subjective: Patient has a hx of R menisectomy in , after which she has had some "on and off" mild knee pain  1 year ago she had a L TKA a subsequent WINIFRED, from which she had made a good recovery  In the past 2 weeks she has been doing more physical activity around her house to prepare for her friend to move in with her  She had a flare up of "severe" R knee pain approx 2 weeks ago that has not gotten better  She is currently ambulating with a SPC; which she uses intermittently at this point  Objective: See treatment diary below  LE MMT  LEFT RIGHT  -Hip Flexion:   4+ 4+  -Hip Extension:  4+ 4+   -Hip Abduction: 4+ 4+  -Hip IR:  5 5  -Hip ER:  5 5    -Knee Flexion  5 5  -Knee Extension 5 5    Palpation Assessment  No TTP medial joint line/lateral joint line/pes ansirine bura/tibial tuberosity/ patellar tendon    Special Tests  Linda's: pos ER  Varus stress test: firm end feel @ 0 & 30  Valgus stress test: firm end feel @ 0 & 30  Anterior drawer: firm end feel  Posterior drawer: firm end feel     Functional Assessment  -Functional Squat: able to complete a full squat with no UE assistance   -Stair Negotiation: RLE poor eccentric control with forward step down    TU 5    5xSTS: 7    Assessment: Patient's R knee pain appears to be related to an acute irritation of chronic degenerative changes  Onset of symptoms is closely correlated with an increase in activity and has not progressed in the past week, now that her level of activity has stabilized  Physical exam reveals no indication of ligamentous stability issue   Location of pain is retro patellar, and was reproduced with a Linda's test, both of which are suggesting intra-articular source of pain  Patient educated to continue to modify activity to avoid provoking symptoms and to perform HEP to promote joint mobility, quad contraction, and hamstring contraction  Patient has a follow up with ortho surg in 1 week for her L TKA, and she will discuss the R knee with the surgeon at that time  She may return for treatment if her symptoms continue to persist, despite relative rest        Goals  Short Term Goals (4 weeks):    - Patient will be independent in basic HEP 2-3 weeks - met  - Patient will report >50% reduction in pain - met  - Patient will demonstrate >1/3 improvement in MMT grade as applicable - met  - Demonstrate consistent posture corrections without cueing during therapeutic exercise - met  - Improve SLS to 15 sec     Long Term Goals (8 weeks):  - Patient will be independent in a comprehensive home exercise program - met  - Patient FOTO score will improve to 59/100 - met 70  - Patient will self-report >75% improvement in function - met  - Improve endurance to 30 minutes - met  - SLS to 30 sec - in progress  - Ambulate 20 min on hills for getting to work on [employer] - in progress  - Negotiate full flight of stairs without pain -      Plan: 2x per week for 4 weeks  [id number]       Date 6/16 6/21 6/24 6/28 6/30 7/6   Visit Number 7/12 8/12 9/12 10/12 11/12    Manual         LAD    IS     Patella plunger     IS                      Neuro Re-Ed         Education      HEP   Squat x15 foam, x8 bosu 3x10 foam 3x10 foam 3x10 3x10    Leg press ecc 100# 2x10 100# 3x8 100# 3x6 70# x10 L, 55#-25# x10 R P!  B 115# 3x8    Single leg stance foam 2x30s 2x30s 2x30s ea      Y balance x10 x10       Tandem balance         HS PB 2x10 2x10       LAQ    x30 x30    Captain chiqui     x15 ea    TherEx         TM warmup 10' 10' 10' RB 5' L2 RB 7' L2 TB 8 min    Standing Foam Marching x30 2x15 3x10      Prone knee flexion to quad set      30x; HEP   LAQ      30x; HEP            Bridge     3x10    TherAct         Patient education    5' 5'    Lunge         Step up    x12 R     5xSTS         Gait Training         FGA         Ball toss/bounce   x5 laps incl cog      Walking with Head Turns         Foam tandem x5 laps foam x5 laps foam x5 laps foam x5 laps foam     Walking backwards         Walking with pivoting         TUG         Modalities         Patient Education         CP    10'         Precautions: Lt TKA July 15 2021, WINIFRED 6 weeks later  Past Medical History:   Diagnosis Date    Arthralgia     Atypical chest pain     Balance disorder     Cervical rib     last assessed 9/13/12    Depression     Fatigue     Fibromyalgia     Fibromyalgia     Fibromyalgia, primary     GERD (gastroesophageal reflux disease)     Hydronephrosis with renal and ureteral calculus obstruction     last assessed 5/1/17    Lacunar stroke ([facility] [address]     Leukopenia     last assessed 5/19/16    Memory loss     Restless leg syndrome     last assessed 11/8/13    Sebaceous cyst     last assessed 2/13/13    Skin tag     last assessed 2/13/13    Vitamin D deficiency

## 2022-07-12 ENCOUNTER — OFFICE VISIT (OUTPATIENT)
Dept: OBGYN CLINIC | Facility: HOSPITAL | Age: 67
End: 2022-07-12
Payer: COMMERCIAL

## 2022-07-12 ENCOUNTER — HOSPITAL ENCOUNTER (OUTPATIENT)
Dept: RADIOLOGY | Facility: HOSPITAL | Age: 67
Discharge: HOME | End: 2022-07-12
Attending: ORTHOPAEDIC SURGERY
Payer: COMMERCIAL

## 2022-07-12 VITALS
DIASTOLIC BLOOD PRESSURE: 81 MMHG | BODY MASS INDEX: 26.57 KG/M2 | HEART RATE: 71 BPM | HEIGHT: 63 IN | SYSTOLIC BLOOD PRESSURE: 125 MMHG

## 2022-07-12 DIAGNOSIS — Z96.652 AFTERCARE FOLLOWING LEFT KNEE JOINT REPLACEMENT SURGERY: Primary | ICD-10-CM

## 2022-07-12 DIAGNOSIS — Z47.1 AFTERCARE FOLLOWING LEFT KNEE JOINT REPLACEMENT SURGERY: ICD-10-CM

## 2022-07-12 DIAGNOSIS — M25.561 CHRONIC PAIN OF RIGHT KNEE: ICD-10-CM

## 2022-07-12 DIAGNOSIS — G89.29 CHRONIC PAIN OF RIGHT KNEE: ICD-10-CM

## 2022-07-12 DIAGNOSIS — Z47.1 AFTERCARE FOLLOWING LEFT KNEE JOINT REPLACEMENT SURGERY: Primary | ICD-10-CM

## 2022-07-12 DIAGNOSIS — Z96.652 AFTERCARE FOLLOWING LEFT KNEE JOINT REPLACEMENT SURGERY: ICD-10-CM

## 2022-07-12 DIAGNOSIS — R29.898 BILATERAL LEG WEAKNESS: ICD-10-CM

## 2022-07-12 DIAGNOSIS — M17.11 PRIMARY OSTEOARTHRITIS OF RIGHT KNEE: ICD-10-CM

## 2022-07-12 PROCEDURE — 99212 OFFICE O/P EST SF 10 MIN: CPT | Performed by: PHYSICIAN ASSISTANT

## 2022-07-12 PROCEDURE — 73560 X-RAY EXAM OF KNEE 1 OR 2: CPT

## 2022-07-12 PROCEDURE — 1160F RVW MEDS BY RX/DR IN RCRD: CPT | Performed by: PHYSICIAN ASSISTANT

## 2022-07-12 RX ORDER — MELOXICAM 7.5 MG/1
7.5 TABLET ORAL DAILY
Qty: 30 TABLET | Refills: 0 | Status: SHIPPED | OUTPATIENT
Start: 2022-07-12

## 2022-07-12 NOTE — PROGRESS NOTES
Assessment:   Diagnosis ICD-10-CM Associated Orders   1  Aftercare following left knee joint replacement surgery  Z47 1 Ambulatory Referral to Physical Therapy    Z96 652    2  Primary osteoarthritis of right knee  M17 11 Ambulatory Referral to Physical Therapy   3  Chronic pain of right knee  M25 561 Ambulatory Referral to Physical Therapy    G89 29    4  Bilateral leg weakness  R29 898 Ambulatory Referral to Physical Therapy       Plan:  X-rays taken and reviewed, physical exam performed  She is doing very well 1 year s/p left TKA with MUGA at her 6 week post-op time frame  She is to continue with PT  she will be prescribed 7 5 mg Mobic to take once a day with food  Weightbearing activities as tolerated  She was offered injection of cortisone to her right knee however due to her nausea she will call us if and when her right knee symptoms warrant an injection  To do next visit:  Return for re-check on an as needed basis (PRN)  The above stated was discussed in layman's terms and the patient expressed understanding  All questions were answered to the patient's satisfaction        ------------------------------------------------------------------------------------------------------------      Subjective: Elisabeth Andrea is a 77 y o  female who presents for repeat evaluation s/p left TKA from 7/15/21 with MUGA 8/27/21  She presents today with use of a SPC however it is used for her right knee  She had a right knee A/ in 2005  She had various injections over the years but however today is feeling nauseous and wishes to hold off on an injection  She does wish to continue PT         Review of systems negative unless otherwise specified in HPI  Review of Systems    Past Medical History:   Diagnosis Date    Arthralgia     Atypical chest pain     Balance disorder     Cervical rib     last assessed 9/13/12    Depression     Fatigue     Fibromyalgia     Fibromyalgia     Fibromyalgia, primary     GERD (gastroesophageal reflux disease)     Hydronephrosis with renal and ureteral calculus obstruction     last assessed 5/1/17    [facility]ar stroke ([facility] [address]     Leukopenia     last assessed 5/19/16    Memory loss     Restless leg syndrome     last assessed 11/8/13    Sebaceous cyst     last assessed 2/13/13    Skin tag     last assessed 2/13/13    Vitamin D deficiency        Past Surgical History:   Procedure Laterality Date    COLONOSCOPY  2011    fiberoptic    DENTAL SURGERY      wisdom teeth    KNEE SURGERY      right knee 2006 meniscal tear stage 04    AZ COLONOSCOPY FLX DX W/COLLJ SPEC WHEN PFRMD N/A 3/16/2017    Procedure: COLONOSCOPY;  Surgeon: Leopold Ewings, MD;  Location: [facility];   Service: Gastroenterology    AZ CYSTO/URETERO W/LITHOTRIPSY &INDWELL STENT INSRT Left 4/26/2017    Procedure: CYSTOSCOPY URETEROSCOPY; RETROGRADE PYELOGRAM; STONE EXTRACTION; INSERTION STENT URETERAL;  Surgeon: Shazia Johns MD;  [facility];  Service: Urology    AZ Kinsley Grate JT+ANESTHESIA Left 8/27/2021    Procedure: MANIPULATION JOINT KNEE;  Surgeon: Orestes Gayle MD;  [facility];  Service: Orthopedics    AZ TOTAL KNEE ARTHROPLASTY Left 7/15/2021    Procedure: ARTHROPLASTY KNEE TOTAL - left;  Surgeon: Orestes Gayle MD;  [facility];  Service: Orthopedics       Family History   Problem Relation Age of Onset    Osteoporosis Mother     Heart failure Father     Coronary artery disease Father     Fibromyalgia Sister     Heart failure Family     Coronary artery disease Family     Osteoporosis Family     Alcohol abuse Neg Hx     Substance Abuse Neg Hx     Mental illness Neg Hx     Depression Neg Hx        Social History     Occupational History    Occupation:      Employer: [employer]   Tobacco Use    Smoking status: Former Smoker    Smokeless tobacco: Never Used    Tobacco comment: 23years old quit over 36 year ago   Vaping Use  Vaping Use: Never used   Substance and Sexual Activity    Alcohol use: Yes     Comment: rare    Drug use: No    Sexual activity: Not Currently         Current Outpatient Medications:     acetaminophen (TYLENOL) 650 mg CR tablet, Take 650 mg by mouth every 8 (eight) hours as needed for mild pain, Disp: , Rfl:     aspirin 81 mg chewable tablet, Chew 1 tablet (81 mg total) daily, Disp: 30 tablet, Rfl: 0    atorvastatin (LIPITOR) 40 mg tablet, Take 1 tablet (40 mg total) by mouth every evening, Disp: 90 tablet, Rfl: 1    carbamazepine (CARBATROL) 100 MG 12 hr capsule, Take 1 capsule (100 mg total) by mouth 2 (two) times a day, Disp: 180 capsule, Rfl: 0    docusate sodium (COLACE) 100 mg capsule, Take 100 mg by mouth 2 (two) times a day, Disp: , Rfl:     fluticasone (FLONASE) 50 mcg/act nasal spray, 1 spray into each nostril daily as needed for rhinitis (Patient not taking: Reported on 4/22/2022 ), Disp: 16 g, Rfl: 1    gabapentin (NEURONTIN) 300 mg capsule, Take 1 capsule (300 mg total) by mouth 3 (three) times a day, Disp: 270 capsule, Rfl: 1    Multiple Vitamins-Minerals (MULTIVITAMIN WITH MINERALS) tablet, Take 1 tablet by mouth daily, Disp: , Rfl:     pantoprazole (PROTONIX) 40 mg tablet, Take 1 tablet (40 mg total) by mouth daily, Disp: 90 tablet, Rfl: 1    polyethylene glycol (GLYCOLAX) 17 GM/SCOOP powder, Take 17 g by mouth daily (Patient not taking: Reported on 2/22/2022 ), Disp: , Rfl:     venlafaxine (EFFEXOR-XR) 75 mg 24 hr capsule, Take 1 capsule (75 mg total) by mouth daily with breakfast, Disp: 90 capsule, Rfl: 1    Allergies   Allergen Reactions    Banana - Food Allergy GI Intolerance    Chocolate - Food Allergy GI Intolerance    Ciprofloxacin Hallucinations    Dramamine [Dimenhydrinate] Other (See Comments)     dizzy    Nuts - Food Allergy GI Intolerance    Oat - Food Allergy GI Intolerance    Peach [Prunus Persica] Other (See Comments)     GI intolerance     Bullard (Diagnostic) - Food Allergy GI Intolerance    Sweet Potato - Food Allergy GI Intolerance            Vitals:    07/12/22 1141   BP: 125/81   Pulse: 71       Objective:                    Right Knee Exam     Muscle Strength   The patient has normal right knee strength  Tenderness   The patient is experiencing tenderness in the medial joint line  Range of Motion   Extension: 0   Flexion: 120 (with crepitation and stiffness)     Other   Erythema: absent  Sensation: normal  Swelling: mild  Effusion: no effusion present    Comments:    Mild varus alignment with bony enlargement medially  Healed arthroscopy portals         Left Knee Exam     Muscle Strength   The patient has normal left knee strength  Tenderness   The patient is experiencing no tenderness  Range of Motion   Extension: 0   Left knee flexion: 125, patella tracks well  Tests   Varus: negative Valgus: negative  Drawer:  Anterior - negative     Posterior - negative    Other   Erythema: absent  Sensation: normal  Swelling: mild  Effusion: no effusion present    Comments:    Intact extensor mechanism  Healed anterior incision  No warmth  No signs of infection  Calf is soft and nontender, no signs of DVT            Diagnostics, reviewed and taken today if performed as documented: The attending physician has personally reviewed the pertinent films in PACS and interpretation is as follows:    Left knee x-rays taken and reviewed in the office today and show: well aligned, positioned, and bonded total knee prosthesis without signs of loosening      Procedures, if performed today:    Procedures    None performed      Portions of the record may have been created with voice recognition software  Occasional wrong word or "sound a like" substitutions may have occurred due to the inherent limitations of voice recognition software  Read the chart carefully and recognize, using context, where substitutions have occurred

## 2022-07-13 ENCOUNTER — TELEPHONE (OUTPATIENT)
Dept: INTERNAL MEDICINE CLINIC | Facility: CLINIC | Age: 67
End: 2022-07-13

## 2022-07-13 NOTE — TELEPHONE ENCOUNTER
Please call patient  Received fax from insurance  They will cover 200 mg of carbamazepine and not the 100 mg  Ok to send new prescription? Take 1/2 tablet twice a day

## 2022-07-14 ENCOUNTER — HOSPITAL ENCOUNTER (OUTPATIENT)
Dept: RADIOLOGY | Facility: IMAGING CENTER | Age: 67
Discharge: HOME | End: 2022-07-14
Payer: COMMERCIAL

## 2022-07-14 ENCOUNTER — TELEPHONE (OUTPATIENT)
Dept: INTERNAL MEDICINE CLINIC | Facility: CLINIC | Age: 67
End: 2022-07-14

## 2022-07-14 DIAGNOSIS — Z13.820 ENCOUNTER FOR SCREENING FOR OSTEOPOROSIS: ICD-10-CM

## 2022-07-14 DIAGNOSIS — Z78.0 POSTMENOPAUSAL: ICD-10-CM

## 2022-07-14 DIAGNOSIS — M81.0 AGE-RELATED OSTEOPOROSIS WITHOUT CURRENT PATHOLOGICAL FRACTURE: Primary | ICD-10-CM

## 2022-07-14 PROCEDURE — 77080 DXA BONE DENSITY AXIAL: CPT

## 2022-07-14 NOTE — TELEPHONE ENCOUNTER
Bone density showed osteoporosis  Recommend treatment with Fosamax, pill once a week  Please take calcium 1200 mg daily, vitamin D3 at least 1000 units daily  Try to walk daily  Let me know

## 2022-07-15 PROBLEM — M81.0 AGE-RELATED OSTEOPOROSIS WITHOUT CURRENT PATHOLOGICAL FRACTURE: Status: ACTIVE | Noted: 2022-07-15

## 2022-07-15 RX ORDER — ALENDRONATE SODIUM 70 MG/1
70 TABLET ORAL
Qty: 12 TABLET | Refills: 1 | Status: SHIPPED | OUTPATIENT
Start: 2022-07-15

## 2022-07-18 DIAGNOSIS — R51.9 NONINTRACTABLE HEADACHE, UNSPECIFIED CHRONICITY PATTERN, UNSPECIFIED HEADACHE TYPE: ICD-10-CM

## 2022-07-18 DIAGNOSIS — K21.9 GASTROESOPHAGEAL REFLUX DISEASE: ICD-10-CM

## 2022-07-18 DIAGNOSIS — G45.9 TIA (TRANSIENT ISCHEMIC ATTACK): ICD-10-CM

## 2022-07-18 RX ORDER — CARBAMAZEPINE 100 MG/1
100 CAPSULE, EXTENDED RELEASE ORAL 2 TIMES DAILY
Qty: 60 CAPSULE | Refills: 2 | Status: SHIPPED | OUTPATIENT
Start: 2022-07-18 | End: 2022-09-14 | Stop reason: SDUPTHER

## 2022-07-18 RX ORDER — CARBAMAZEPINE 200 MG/1
100 TABLET, EXTENDED RELEASE ORAL 2 TIMES DAILY
Qty: 90 TABLET | Refills: 0 | OUTPATIENT
Start: 2022-07-18

## 2022-07-18 RX ORDER — PANTOPRAZOLE SODIUM 40 MG/1
TABLET, DELAYED RELEASE ORAL
Qty: 90 TABLET | Refills: 1 | Status: SHIPPED | OUTPATIENT
Start: 2022-07-18

## 2022-07-18 RX ORDER — ATORVASTATIN CALCIUM 40 MG/1
TABLET, FILM COATED ORAL
Qty: 90 TABLET | Refills: 1 | Status: SHIPPED | OUTPATIENT
Start: 2022-07-18

## 2022-08-01 ENCOUNTER — OFFICE VISIT (OUTPATIENT)
Dept: PHYSICAL THERAPY | Facility: REHABILITATION | Age: 67
End: 2022-08-01
Payer: COMMERCIAL

## 2022-08-01 DIAGNOSIS — M25.561 ACUTE PAIN OF RIGHT KNEE: Primary | ICD-10-CM

## 2022-08-01 PROCEDURE — 97110 THERAPEUTIC EXERCISES: CPT | Performed by: PHYSICAL THERAPIST

## 2022-08-01 PROCEDURE — 97530 THERAPEUTIC ACTIVITIES: CPT | Performed by: PHYSICAL THERAPIST

## 2022-08-01 PROCEDURE — 97112 NEUROMUSCULAR REEDUCATION: CPT | Performed by: PHYSICAL THERAPIST

## 2022-08-01 NOTE — PROGRESS NOTES
Daily Note     Today's date: 2022  Patient name: Johanny Huertas  : 1955  MRN: [id number]  Referring provider: Magaly Moreno MD  Dx:   Encounter Diagnosis     ICD-10-CM    1  Acute pain of right knee  M25 561                   Subjective: Patient reports that she is continuing to experience R knee pain  It seems to be aggravated by heavy lifting and activities and is usually alleviated by rest        Objective: See treatment diary below      Assessment: Patient experienced an increase in medial R knee pain with TM walking  Patient tolerates eccentric SL leg press with some increase in knee pain, which is to be expected with the degenerative changes that have been documented with x-rays  Patient is challenged to perform hip hinge with appropriate posterior chain loading, compensating with trunk flexion  She responds well to using a dowel as a cue to maintain trunk extension and flex primarily at the hips  Discussion about how to perform this with her HEP to practice posterior chain loading  Attempt progression in functional LE strengthening exercises next visit  Plan: Continue per plan of care  [id number]       Date     Visit Number 9/12 10/12 11/12      Manual         LAD  IS       Patella plunger   IS                        Neuro Re-Ed         Education    HEP     Squat 3x10 foam 3x10 3x10      Leg press SL ecc 100# 3x6 70# x10 L, 55#-25# x10 R P!  B 115# 3x8  85#; 3x8 ea; 3s eccentric    Single leg stance foam 2x30s ea        Y balance         Tandem balance         HS PB         LAQ  x30 x30      Captain chiqui   x15 ea      TherEx         TM warmup 10' RB 5' L2 RB 7' L2 TB 8 min  8 min    Standing Foam Marching 3x10        Prone knee flexion to quad set    30x; HEP     LAQ    30x; HEP 2x30 ea; 3#             Bridge   3x10      TherAct         Reverse lunge      nv   Hip hinge     10 min; dowel    Deadlift      nv   Box carry                  Gait Training Modalities                               Precautions: Lt TKA July 15 2021, WINIFRED 6 weeks later  Past Medical History:   Diagnosis Date    Arthralgia     Atypical chest pain     Balance disorder     Cervical rib     last assessed 9/13/12    Depression     Fatigue     Fibromyalgia     Fibromyalgia     Fibromyalgia, primary     GERD (gastroesophageal reflux disease)     Hydronephrosis with renal and ureteral calculus obstruction     last assessed 5/1/17    Lacunar stroke ([facility] [address]     Leukopenia     last assessed 5/19/16    Memory loss     Restless leg syndrome     last assessed 11/8/13    Sebaceous cyst     last assessed 2/13/13    Skin tag     last assessed 2/13/13    Vitamin D deficiency

## 2022-08-03 ENCOUNTER — HOSPITAL ENCOUNTER (OUTPATIENT)
Dept: RADIOLOGY | Age: 67
Discharge: HOME | End: 2022-08-03
Payer: COMMERCIAL

## 2022-08-03 VITALS — HEIGHT: 63 IN | BODY MASS INDEX: 26.05 KG/M2 | WEIGHT: 147 LBS

## 2022-08-03 DIAGNOSIS — Z12.31 ENCOUNTER FOR SCREENING MAMMOGRAM FOR BREAST CANCER: ICD-10-CM

## 2022-08-03 PROCEDURE — 77063 BREAST TOMOSYNTHESIS BI: CPT

## 2022-08-03 PROCEDURE — 77067 SCR MAMMO BI INCL CAD: CPT

## 2022-08-04 ENCOUNTER — OFFICE VISIT (OUTPATIENT)
Dept: PHYSICAL THERAPY | Facility: REHABILITATION | Age: 67
End: 2022-08-04
Payer: COMMERCIAL

## 2022-08-04 DIAGNOSIS — R29.898 BILATERAL LEG WEAKNESS: ICD-10-CM

## 2022-08-04 DIAGNOSIS — Z47.1 AFTERCARE FOLLOWING LEFT KNEE JOINT REPLACEMENT SURGERY: ICD-10-CM

## 2022-08-04 DIAGNOSIS — G89.29 CHRONIC PAIN OF RIGHT KNEE: ICD-10-CM

## 2022-08-04 DIAGNOSIS — Z96.652 AFTERCARE FOLLOWING LEFT KNEE JOINT REPLACEMENT SURGERY: ICD-10-CM

## 2022-08-04 DIAGNOSIS — M17.11 PRIMARY OSTEOARTHRITIS OF RIGHT KNEE: Primary | ICD-10-CM

## 2022-08-04 DIAGNOSIS — M25.561 CHRONIC PAIN OF RIGHT KNEE: ICD-10-CM

## 2022-08-04 PROCEDURE — 97530 THERAPEUTIC ACTIVITIES: CPT | Performed by: PHYSICAL THERAPIST

## 2022-08-04 PROCEDURE — 97110 THERAPEUTIC EXERCISES: CPT | Performed by: PHYSICAL THERAPIST

## 2022-08-04 PROCEDURE — 97112 NEUROMUSCULAR REEDUCATION: CPT | Performed by: PHYSICAL THERAPIST

## 2022-08-04 NOTE — PROGRESS NOTES
Daily Note     Today's date: 2022  Patient name: Jackey Spatz  : 1955  MRN: [id number]  Referring provider: Clifton Cifuentes MD  Dx:   Encounter Diagnosis     ICD-10-CM    1  Primary osteoarthritis of right knee  M17 11 Ambulatory Referral to Physical Therapy   2  Chronic pain of right knee  M25 561 Ambulatory Referral to Physical Therapy    G89 29    3  Aftercare following left knee joint replacement surgery  Z47 1 Ambulatory Referral to Physical Therapy    Z96 652    4  Bilateral leg weakness  R29 898 Ambulatory Referral to Physical Therapy                  Subjective: Pt reports some R knee pain after LV  Pt notes pain upon waking this morning, decreased after warm shower  Objective: See treatment diary below      Assessment: Tolerated treatment well  Patient exhibited good technique with therapeutic exercises, noted improvement with hip hinge with use of dowel roll for feedback  Monitor response NV  Plan: Continue per plan of care  [id number]       Date    Visit Number 9/12 10/12 11/12      Manual         LAD  IS       Patella plunger   IS                        Neuro Re-Ed         Education    HEP     Squat 3x10 foam 3x10 3x10      Leg press SL ecc 100# 3x6 70# x10 L, 55#-25# x10 R P!  B 115# 3x8  85#; 3x8 ea; 3s eccentric 85# 3x8 ea 3" ecc   Single leg stance foam 2x30s ea        Y balance         Tandem balance         HS PB         LAQ  x30 x30      Captain chiqui   x15 ea      TherEx         TM warmup 10' RB 5' L2 RB 7' L2 TB 8 min  8 min 8 min   Standing Foam Marching 3x10        Prone knee flexion to quad set    30x; HEP     LAQ    30x; HEP 2x30 ea; 3# 2x30ea 3#            Bridge   3x10      TherAct         Reverse lunge      x15   Hip hinge     10 min; dowel 2x10 w/dowel   Deadlift      nv   Box carry                  Gait Training                                             Modalities                               Precautions: Lt TKA July 15 2021, WINIFRED 6 weeks later  Past Medical History:   Diagnosis Date    Arthralgia     Atypical chest pain     Balance disorder     Cervical rib     last assessed 9/13/12    Depression     Fatigue     Fibromyalgia     Fibromyalgia     Fibromyalgia, primary     GERD (gastroesophageal reflux disease)     Hydronephrosis with renal and ureteral calculus obstruction     last assessed 5/1/17    [facility]ar stroke ([facility] [address]     Leukopenia     last assessed 5/19/16    Memory loss     Restless leg syndrome     last assessed 11/8/13    Sebaceous cyst     last assessed 2/13/13    Skin tag     last assessed 2/13/13    Vitamin D deficiency

## 2022-08-11 ENCOUNTER — OFFICE VISIT (OUTPATIENT)
Dept: PHYSICAL THERAPY | Facility: REHABILITATION | Age: 67
End: 2022-08-11
Payer: COMMERCIAL

## 2022-08-11 DIAGNOSIS — Z96.652 AFTERCARE FOLLOWING LEFT KNEE JOINT REPLACEMENT SURGERY: ICD-10-CM

## 2022-08-11 DIAGNOSIS — M17.11 PRIMARY OSTEOARTHRITIS OF RIGHT KNEE: Primary | ICD-10-CM

## 2022-08-11 DIAGNOSIS — Z47.1 AFTERCARE FOLLOWING LEFT KNEE JOINT REPLACEMENT SURGERY: ICD-10-CM

## 2022-08-11 DIAGNOSIS — M25.561 ACUTE PAIN OF RIGHT KNEE: ICD-10-CM

## 2022-08-11 DIAGNOSIS — G89.29 CHRONIC PAIN OF RIGHT KNEE: ICD-10-CM

## 2022-08-11 DIAGNOSIS — M25.561 CHRONIC PAIN OF RIGHT KNEE: ICD-10-CM

## 2022-08-11 DIAGNOSIS — R29.898 BILATERAL LEG WEAKNESS: ICD-10-CM

## 2022-08-11 PROCEDURE — 97530 THERAPEUTIC ACTIVITIES: CPT | Performed by: PHYSICAL THERAPIST

## 2022-08-11 PROCEDURE — 97112 NEUROMUSCULAR REEDUCATION: CPT | Performed by: PHYSICAL THERAPIST

## 2022-08-11 PROCEDURE — 97110 THERAPEUTIC EXERCISES: CPT | Performed by: PHYSICAL THERAPIST

## 2022-08-11 NOTE — PROGRESS NOTES
Daily Note     Today's date: 2022  Patient name: Zonia Crenshaw  : 1955  MRN: [id number]  Referring provider: Tonny Marcano MD  Dx:   Encounter Diagnosis     ICD-10-CM    1  Primary osteoarthritis of right knee  M17 11    2  Acute pain of right knee  M25 561    3  Chronic pain of right knee  M25 561     G89 29    4  Aftercare following left knee joint replacement surgery  Z47 1     Z96 652    5  Bilateral leg weakness  R29 898                   Subjective: Patient reports some R knee pain after moving boxes around earlier this morning but is feeling good overall  Objective: See treatment diary below      Assessment: Patient tolerates treatment well but required cueing for hip hinge to maintain a constant angle of knee flexion throughout the movement  Next treatment should continue to address LE strengthening  Patient treated by GIORGI Franks under direct supervision of DPT TB  Plan: Continue per plan of care  [id number]       Date    Visit Number 9/12 10/12 11/12       Manual          LAD  IS        Patella plunger   IS                           Neuro Re-Ed          Education    HEP      Squat 3x10 foam 3x10 3x10       Leg press SL ecc 100# 3x6 70# x10 L, 55#-25# x10 R P!  B 115# 3x8  85#; 3x8 ea; 3s eccentric 85# 3x8 ea 3" ecc 85# 3x8 ea 3" ecc   Single leg stance foam 2x30s ea         Y balance          Tandem balance          HS PB          LAQ  x30 x30       Captain chiqui   x15 ea       TherEx          TM warmup 10' RB 5' L2 RB 7' L2 TB 8 min  8 min 8 min 8 min   Standing Foam Marching 3x10         Prone knee flexion to quad set    30x; HEP      LAQ    30x; HEP 2x30 ea; 3# 2x30ea 3# 2x30ea 3#             Bridge   3x10       TherAct          Reverse lunge      x15 15x   Hip hinge     10 min; dowel 2x10 w/dowel 2x10 w/dowel   Deadlift      nv 2x5 15#; 6 in   Box carry                    Gait Training Modalities                                  Precautions: Lt TKA July 15 2021, WINIFRED 6 weeks later  Past Medical History:   Diagnosis Date    Arthralgia     Atypical chest pain     Balance disorder     Cervical rib     last assessed 9/13/12    Depression     Fatigue     Fibromyalgia     Fibromyalgia     Fibromyalgia, primary     GERD (gastroesophageal reflux disease)     Hydronephrosis with renal and ureteral calculus obstruction     last assessed 5/1/17    [facility]ar stroke ([facility] [address]     Leukopenia     last assessed 5/19/16    Memory loss     Restless leg syndrome     last assessed 11/8/13    Sebaceous cyst     last assessed 2/13/13    Skin tag     last assessed 2/13/13    Vitamin D deficiency

## 2022-08-15 ENCOUNTER — OFFICE VISIT (OUTPATIENT)
Dept: PHYSICAL THERAPY | Facility: REHABILITATION | Age: 67
End: 2022-08-15
Payer: COMMERCIAL

## 2022-08-15 DIAGNOSIS — Z47.1 AFTERCARE FOLLOWING LEFT KNEE JOINT REPLACEMENT SURGERY: ICD-10-CM

## 2022-08-15 DIAGNOSIS — M17.11 PRIMARY OSTEOARTHRITIS OF RIGHT KNEE: Primary | ICD-10-CM

## 2022-08-15 DIAGNOSIS — Z96.652 AFTERCARE FOLLOWING LEFT KNEE JOINT REPLACEMENT SURGERY: ICD-10-CM

## 2022-08-15 DIAGNOSIS — M25.561 CHRONIC PAIN OF RIGHT KNEE: ICD-10-CM

## 2022-08-15 DIAGNOSIS — M25.561 ACUTE PAIN OF RIGHT KNEE: ICD-10-CM

## 2022-08-15 DIAGNOSIS — R29.898 BILATERAL LEG WEAKNESS: ICD-10-CM

## 2022-08-15 DIAGNOSIS — G89.29 CHRONIC PAIN OF RIGHT KNEE: ICD-10-CM

## 2022-08-15 PROCEDURE — 97112 NEUROMUSCULAR REEDUCATION: CPT

## 2022-08-15 PROCEDURE — 97110 THERAPEUTIC EXERCISES: CPT

## 2022-08-15 PROCEDURE — 97530 THERAPEUTIC ACTIVITIES: CPT

## 2022-08-15 NOTE — PROGRESS NOTES
Daily Note     Today's date: 8/15/2022  Patient name: Lee Guerra  : 1955  MRN: [id number]  Referring provider: Sanjuanita Moreno MD  Dx:   Encounter Diagnosis     ICD-10-CM    1  Primary osteoarthritis of right knee  M17 11    2  Acute pain of right knee  M25 561    3  Chronic pain of right knee  M25 561     G89 29    4  Aftercare following left knee joint replacement surgery  Z47 1     Z96 652    5  Bilateral leg weakness  R29 898                   Subjective: Pt  reports no change in status upon arrival   Pt notes some soreness after last visit that resolved within a day  Objective: See treatment diary below      Assessment: Tolerated treatment well  Patient would benefit from continued PT   Pt  able to complete all exercises with no increase in pain during or after session  Pt  1:1 with PTA for entirety  Plan: Continue per plan of care        [id number]       Date 7/6 8/1 8/4 8/11 8/15   Visit Number        Manual        LAD        Patella plunger                        Neuro Re-Ed        Education HEP       Squat        Leg press SL ecc  85#; 3x8 ea; 3s eccentric 85# 3x8 ea 3" ecc 85# 3x8 ea 3" ecc 3x8 85# 3"   Single leg stance foam        Y balance        Tandem balance        HS PB        LAQ        Captain chiqui        TherEx        TM warmup TB 8 min  8 min 8 min 8 min 8' L3 RB   Standing Foam Marching        Prone knee flexion to quad set 30x; HEP       LAQ 30x; HEP 2x30 ea; 3# 2x30ea 3# 2x30ea 3# 2x30 ea 3# b/l           Bridge        TherAct        Reverse lunge   x15 15x 15x b/l   Hip hinge  10 min; dowel 2x10 w/dowel 2x10 w/dowel 2x10 w/dowel   Deadlift   nv 2x5 15#; 6 in 3x5 15# 6"   Box carry                Gait Training                                        Modalities                            Precautions: Lt TKA July 15 2021, WINIFRED 6 weeks later  Past Medical History:   Diagnosis Date    Arthralgia     Atypical chest pain     Balance disorder     Cervical rib     last assessed 9/13/12    Depression     Fatigue     Fibromyalgia     Fibromyalgia     Fibromyalgia, primary     GERD (gastroesophageal reflux disease)     Hydronephrosis with renal and ureteral calculus obstruction     last assessed 5/1/17    [facility]     Leukopenia     last assessed 5/19/16    Memory loss     Restless leg syndrome     last assessed 11/8/13    Sebaceous cyst     last assessed 2/13/13    Skin tag     last assessed 2/13/13    Vitamin D deficiency

## 2022-08-18 ENCOUNTER — OFFICE VISIT (OUTPATIENT)
Dept: PHYSICAL THERAPY | Facility: REHABILITATION | Age: 67
End: 2022-08-18
Payer: COMMERCIAL

## 2022-08-18 DIAGNOSIS — Z96.652 AFTERCARE FOLLOWING LEFT KNEE JOINT REPLACEMENT SURGERY: ICD-10-CM

## 2022-08-18 DIAGNOSIS — G89.29 CHRONIC PAIN OF RIGHT KNEE: ICD-10-CM

## 2022-08-18 DIAGNOSIS — M17.11 PRIMARY OSTEOARTHRITIS OF RIGHT KNEE: Primary | ICD-10-CM

## 2022-08-18 DIAGNOSIS — M25.561 CHRONIC PAIN OF RIGHT KNEE: ICD-10-CM

## 2022-08-18 DIAGNOSIS — M25.561 ACUTE PAIN OF RIGHT KNEE: ICD-10-CM

## 2022-08-18 DIAGNOSIS — R29.898 BILATERAL LEG WEAKNESS: ICD-10-CM

## 2022-08-18 DIAGNOSIS — Z47.1 AFTERCARE FOLLOWING LEFT KNEE JOINT REPLACEMENT SURGERY: ICD-10-CM

## 2022-08-18 PROCEDURE — 97112 NEUROMUSCULAR REEDUCATION: CPT | Performed by: PHYSICAL THERAPIST

## 2022-08-18 PROCEDURE — 97110 THERAPEUTIC EXERCISES: CPT | Performed by: PHYSICAL THERAPIST

## 2022-08-18 PROCEDURE — 97530 THERAPEUTIC ACTIVITIES: CPT | Performed by: PHYSICAL THERAPIST

## 2022-08-18 NOTE — PROGRESS NOTES
Daily Note     Today's date: 2022  Patient name: Cici Chang  : 1955  MRN: [id number]  Referring provider: Isiah Acuna MD  Dx:   Encounter Diagnosis     ICD-10-CM    1  Primary osteoarthritis of right knee  M17 11    2  Acute pain of right knee  M25 561    3  Chronic pain of right knee  M25 561     G89 29    4  Aftercare following left knee joint replacement surgery  Z47 1     Z96 652    5  Bilateral leg weakness  R29 898                   Subjective: Patient reports increased knee stiffness and discomfort since last treatment  Objective: See treatment diary below      Assessment: Patient responds well to treatment, experiencing reduced soreness after treatment  Patient required cueing for reverse lunges regarding the sequencing of movement  Next session should continue to address LE strength and neuromuscular deficits  Patient treated by GIORGI Solo under direct supervision of DPT TB  Plan: Continue per plan of care        [id number]       Date 7/6 8/1 8/4 8/11 8/15 8/18   Visit Number         Manual         LAD         Patella plunger                           Neuro Re-Ed         Education HEP        Squat         Leg press SL ecc  85#; 3x8 ea; 3s eccentric 85# 3x8 ea 3" ecc 85# 3x8 ea 3" ecc 3x8 85# 3" 3x8 85# 3"   Single leg stance foam         Y balance         Tandem balance         HS PB         LAQ         Captain chiqui         TherEx         TM warmup TB 8 min  8 min 8 min 8 min 8' L3 RB 8 min   Standing Foam Marching         Prone knee flexion to quad set 30x; HEP        LAQ 30x; HEP 2x30 ea; 3# 2x30ea 3# 2x30ea 3# 2x30 ea 3# b/l 2x30 ea 3# b/l            Bridge         TherAct         Reverse lunge   x15 15x 15x b/l 15x b/l   Hip hinge  10 min; dowel 2x10 w/dowel 2x10 w/dowel 2x10 w/dowel 2x10 w/dowel   Deadlift   nv 2x5 15#; 6 in 3x5 15# 6" 3x5 15# 6"   Box carry                  Gait Training                                             Modalities Precautions: Lt TKA July 15 2021, WINIFRED 6 weeks later  Past Medical History:   Diagnosis Date    Arthralgia     Atypical chest pain     Balance disorder     Cervical rib     last assessed 9/13/12    Depression     Fatigue     Fibromyalgia     Fibromyalgia     Fibromyalgia, primary     GERD (gastroesophageal reflux disease)     Hydronephrosis with renal and ureteral calculus obstruction     last assessed 5/1/17    [facility] [address] [address]     Leukopenia     last assessed 5/19/16    Memory loss     Restless leg syndrome     last assessed 11/8/13    Sebaceous cyst     last assessed 2/13/13    Skin tag     last assessed 2/13/13    Vitamin D deficiency

## 2022-08-22 ENCOUNTER — OFFICE VISIT (OUTPATIENT)
Dept: PHYSICAL THERAPY | Facility: REHABILITATION | Age: 67
End: 2022-08-22
Payer: COMMERCIAL

## 2022-08-22 DIAGNOSIS — G89.29 CHRONIC PAIN OF RIGHT KNEE: ICD-10-CM

## 2022-08-22 DIAGNOSIS — M25.561 ACUTE PAIN OF RIGHT KNEE: ICD-10-CM

## 2022-08-22 DIAGNOSIS — M25.561 CHRONIC PAIN OF RIGHT KNEE: ICD-10-CM

## 2022-08-22 DIAGNOSIS — M17.11 PRIMARY OSTEOARTHRITIS OF RIGHT KNEE: Primary | ICD-10-CM

## 2022-08-22 PROCEDURE — 97112 NEUROMUSCULAR REEDUCATION: CPT | Performed by: PHYSICAL THERAPIST

## 2022-08-22 PROCEDURE — 97110 THERAPEUTIC EXERCISES: CPT | Performed by: PHYSICAL THERAPIST

## 2022-08-22 PROCEDURE — 97530 THERAPEUTIC ACTIVITIES: CPT | Performed by: PHYSICAL THERAPIST

## 2022-08-22 NOTE — PROGRESS NOTES
Daily Note     Today's date: 2022  Patient name: Rhoda Israel  : 1955  MRN: [id number]  Referring provider: Monica Salgado MD  Dx:   Encounter Diagnosis     ICD-10-CM    1  Primary osteoarthritis of right knee  M17 11    2  Acute pain of right knee  M25 561    3  Chronic pain of right knee  M25 561     G89 29                   Subjective: Patient reports that she continues to struggle with stiffness and pain on a daily basis  Objective: See treatment diary below      Assessment: Patient is challenged by reverse lunge, as she tends to excessively shift her weight forward  She requires cueing throughout this exercise, with a fair response  Patient would benefit from continued work on dynamic balance and motor planning with this movement  Patient is unable to perform leg press completely SL today; requires B/L LE for concentric portion of movement  Patient is fatigued at end of tx and would be challenged to progress volume next tx  Plan: Continue per plan of care        [id number]       Date 8/1 8/4 8/11 8/15 8/18 8/22   Visit Number         Manual         LAD         Patella plunger                           Neuro Re-Ed         Sidelying TG unilateral squat      30x ea   Leg press SL ecc 85#; 3x8 ea; 3s eccentric 85# 3x8 ea 3" ecc 85# 3x8 ea 3" ecc 3x8 85# 3" 3x8 85# 3" 3x8 ea 100# 3s   Single leg stance foam         Y balance         Tandem balance         HS PB         LAQ         Captain chiqui         TherEx         TM warmup 8 min 8 min 8 min 8' L3 RB 8 min 8 min   Standing Foam Marching         Prone knee flexion to quad set         LAQ 2x30 ea; 3# 2x30ea 3# 2x30ea 3# 2x30 ea 3# b/l 2x30 ea 3# b/l 30x ea 5# b/l            Bridge         TherAct         Reverse lunge  x15 15x 15x b/l 15x b/l 2x10 ea   Hip hinge 10 min; dowel 2x10 w/dowel 2x10 w/dowel 2x10 w/dowel 2x10 w/dowel    Deadlift  nv 2x5 15#; 6 in 3x5 15# 6" 3x5 15# 6" 5x5; 20#; 6in   Box carry                  Gait Training Modalities                               Precautions: Lt TKA July 15 2021, WINIFRED 6 weeks later  Past Medical History:   Diagnosis Date    Arthralgia     Atypical chest pain     Balance disorder     Cervical rib     last assessed 9/13/12    Depression     Fatigue     Fibromyalgia     Fibromyalgia     Fibromyalgia, primary     GERD (gastroesophageal reflux disease)     Hydronephrosis with renal and ureteral calculus obstruction     last assessed 5/1/17    [facility] ([facility] [address]     Leukopenia     last assessed 5/19/16    Memory loss     Restless leg syndrome     last assessed 11/8/13    Sebaceous cyst     last assessed 2/13/13    Skin tag     last assessed 2/13/13    Vitamin D deficiency

## 2022-08-24 ENCOUNTER — OFFICE VISIT (OUTPATIENT)
Dept: PHYSICAL THERAPY | Facility: REHABILITATION | Age: 67
End: 2022-08-24
Payer: COMMERCIAL

## 2022-08-24 DIAGNOSIS — Z96.652 AFTERCARE FOLLOWING LEFT KNEE JOINT REPLACEMENT SURGERY: ICD-10-CM

## 2022-08-24 DIAGNOSIS — Z47.1 AFTERCARE FOLLOWING LEFT KNEE JOINT REPLACEMENT SURGERY: ICD-10-CM

## 2022-08-24 DIAGNOSIS — M25.561 ACUTE PAIN OF RIGHT KNEE: ICD-10-CM

## 2022-08-24 DIAGNOSIS — M25.561 CHRONIC PAIN OF RIGHT KNEE: ICD-10-CM

## 2022-08-24 DIAGNOSIS — R29.898 BILATERAL LEG WEAKNESS: ICD-10-CM

## 2022-08-24 DIAGNOSIS — G89.29 CHRONIC PAIN OF RIGHT KNEE: ICD-10-CM

## 2022-08-24 DIAGNOSIS — M17.11 PRIMARY OSTEOARTHRITIS OF RIGHT KNEE: Primary | ICD-10-CM

## 2022-08-24 PROCEDURE — 97110 THERAPEUTIC EXERCISES: CPT | Performed by: PHYSICAL THERAPIST

## 2022-08-24 PROCEDURE — 97530 THERAPEUTIC ACTIVITIES: CPT | Performed by: PHYSICAL THERAPIST

## 2022-08-24 PROCEDURE — 97112 NEUROMUSCULAR REEDUCATION: CPT | Performed by: PHYSICAL THERAPIST

## 2022-08-24 NOTE — PROGRESS NOTES
Daily Note     Today's date: 2022  Patient name: Moriah Jc  : 1955  MRN: [id number]  Referring provider: Nolvia Farfan MD  Dx:   Encounter Diagnosis     ICD-10-CM    1  Primary osteoarthritis of right knee  M17 11    2  Bilateral leg weakness  R29 898    3  Acute pain of right knee  M25 561    4  Chronic pain of right knee  M25 561     G89 29    5  Aftercare following left knee joint replacement surgery  Z47 1     B49 592                   Subjective: Patient reports that she has been experiencing an increase in her R knee pain and stiffness since last tx  Reports pain along the medial aspect of the patella  Objective: See treatment diary below      Assessment: Exercise volume is reduced based on patient's response to last tx  Fair tolerance to tx today  Patient notes an increase in anterior-medial knee pain with deadlift and squat holds  Patient will not be attending physical therapy next week due to the school year starting and she will be too busy  Plan: Continue per plan of care        [id number]       Date 8/4 8/11 8/15 8/18 8/22 8/24   Visit Number         Manual         LAD         Patella plunger                           Neuro Re-Ed         Sidelying TG unilateral squat     30x ea 30x ea   Leg press SL ecc 85# 3x8 ea 3" ecc 85# 3x8 ea 3" ecc 3x8 85# 3" 3x8 85# 3" 3x8 ea 100# 3s 3x8 ea 100# 3s   Single leg stance foam         Y balance         Tandem balance         HS PB         LAQ         Captain chiqui         TherEx         TM warmup 8 min 8 min 8' L3 RB 8 min 8 min 8 min; RB   Standing Foam Marching         Prone knee flexion to quad set         LAQ 2x30ea 3# 2x30ea 3# 2x30 ea 3# b/l 2x30 ea 3# b/l 30x ea 5# b/l 2x30 ea 5#            Bridge         TherAct         Reverse lunge x15 15x 15x b/l 15x b/l 2x10 ea    Hip hinge 2x10 w/dowel 2x10 w/dowel 2x10 w/dowel 2x10 w/dowel     Deadlift nv 2x5 15#; 6 in 3x5 15# 6" 3x5 15# 6" 5x5; 20#; 6in 5x5; 20#; 6in   Box carry Mini squat iso      5x5 5s; cueing for posterior weight shift            Gait Training                                             Modalities                               Precautions: Lt TKA July 15 2021, WINIFRED 6 weeks later  Past Medical History:   Diagnosis Date    Arthralgia     Atypical chest pain     Balance disorder     Cervical rib     last assessed 9/13/12    Depression     Fatigue     Fibromyalgia     Fibromyalgia     Fibromyalgia, primary     GERD (gastroesophageal reflux disease)     Hydronephrosis with renal and ureteral calculus obstruction     last assessed 5/1/17    [facility]ar stroke ([facility] [address]     Leukopenia     last assessed 5/19/16    Memory loss     Restless leg syndrome     last assessed 11/8/13    Sebaceous cyst     last assessed 2/13/13    Skin tag     last assessed 2/13/13    Vitamin D deficiency

## 2022-08-31 ENCOUNTER — VBI (OUTPATIENT)
Dept: ADMINISTRATIVE | Facility: OTHER | Age: 67
End: 2022-08-31

## 2022-09-02 ENCOUNTER — TELEPHONE (OUTPATIENT)
Dept: NEUROLOGY | Facility: CLINIC | Age: 67
End: 2022-09-02

## 2022-09-14 ENCOUNTER — OFFICE VISIT (OUTPATIENT)
Dept: NEUROLOGY | Facility: CLINIC | Age: 67
End: 2022-09-14
Payer: COMMERCIAL

## 2022-09-14 VITALS
DIASTOLIC BLOOD PRESSURE: 78 MMHG | HEIGHT: 63 IN | BODY MASS INDEX: 26.05 KG/M2 | TEMPERATURE: 97 F | WEIGHT: 147 LBS | SYSTOLIC BLOOD PRESSURE: 122 MMHG | HEART RATE: 70 BPM | OXYGEN SATURATION: 98 %

## 2022-09-14 DIAGNOSIS — R51.9 NONINTRACTABLE HEADACHE, UNSPECIFIED CHRONICITY PATTERN, UNSPECIFIED HEADACHE TYPE: ICD-10-CM

## 2022-09-14 DIAGNOSIS — R47.89 WORD FINDING PROBLEM: ICD-10-CM

## 2022-09-14 DIAGNOSIS — R41.3 MEMORY DIFFICULTY: Primary | ICD-10-CM

## 2022-09-14 PROCEDURE — 99215 OFFICE O/P EST HI 40 MIN: CPT | Performed by: PSYCHIATRY & NEUROLOGY

## 2022-09-14 PROCEDURE — 1160F RVW MEDS BY RX/DR IN RCRD: CPT | Performed by: PSYCHIATRY & NEUROLOGY

## 2022-09-14 RX ORDER — CARBAMAZEPINE 100 MG/1
100 CAPSULE, EXTENDED RELEASE ORAL 2 TIMES DAILY
Qty: 180 CAPSULE | Refills: 3 | Status: SHIPPED | OUTPATIENT
Start: 2022-09-14

## 2022-09-14 NOTE — PROGRESS NOTES
[facility] [address] [facility]  Follow Up Visit    Impression/Plan    Ms Jian Keita is a 77 y o  female with event involving alteration of consciousness with visual disturbance and confusion in 9/2019 of unclear etiology  No additional events  REEG and SD EEG were normal in 2019  She has moderate left PCA stenosis  Continue asa and statin  There are word finding problems (subjective and noted on exam today) and other cognitive difficulties that have worsened over over the last few years and maybe over the last few months  She describes apparent apraxia - forgetting how to turn the shower off and how to get key out of her car  Her MoCA was 29/30 in the spring, but her cognitive baseline is high (has PhD) and changes may not be detected on a screening test until there is a significant decline in her case  Her mother developed symptomatic dementia in her late 76s  Referring for neuropsychological testing to characterize and quantify her deficits  Also updating brain MRI which was last completed in 2019, before her current cognitive symptoms became prominent  Patient Instructions   1  Schedule neuropsychological testing and brain MRI  2  Return in about 6 months  Diagnoses and all orders for this visit:    Memory difficulty  -     MRI brain NeuroQuant wo contrast; Future  -     Ambulatory referral to Neuropsychology; Future    Word finding problem  -     MRI brain NeuroQuant wo contrast; Future  -     Ambulatory referral to Neuropsychology; Future    Nonintractable headache, unspecified chronicity pattern, unspecified headache type  -     carbamazepine (CARBATROL) 100 MG 12 hr capsule; Take 1 capsule (100 mg total) by mouth 2 (two) times a day    Other orders  -     Cholecalciferol (D3 ADULT PO); Take by mouth in the morning        Mandy Gonsalezallison Arambula is returning to the [facility] for follow up       Interval Events:   Seizures since last visit: None  Hospitalizations: no    She returns to see me due to word finding and cognitive concerns  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[employer]  I last saw her in 2020 regarding a single episode in  that included abnormal abdominal sensation ,visual disturbance (depth perception abnormal, like fun house mirror) and confusion while driving lasting about 8 minutes with return to baseline when she reached her work  Plan at her last visit with me was for follow up as needed  The etiology of the event was unclear and consider to be cerebral hypoperfusion vs seizure  REEG and SD EEG were normal  MRI was unrevealing  CTA did reveal moderate to severe stenosis of the rigth PCA which was stable when CTA was repeated in 3/2022 (reported as mild/moderate)  She has been following with AIDAN Green and was last seen this spring  At that visit she noted word finding problems and had trouble finding the office that day  MoCA was 29/30 that day  Having word finding problems which bother her  Feels like it has gotten worse over the last couple years  Avoids conversation at times because she can't think of words  Hasn't happened in class so far this semester, but has in the past  Mother  at 80, had dementia which was first noted in late 76s  A few month ago she couldn't turn the water in the shower to turn off  She called the landlord to help and they just pushed the knob in  She tried turning it left instead of pushing it in  The control had not been changed or altered for the last 3 years  The other day she wasn't able to get the key out of her ignition and was unable to start the car  She called for help and it was discovered that she failed to put the car in park  Work performance not clearly impact, but may get the date of a meeting wrong  She has a history of trigeminal neuralgia   Had left head/face pain about a month ago when she had run out of carbamazepine maybe 4 days  Pain lasted seconds  Pain is controlled when she takes her carbamazepine  TSH and vitamin D normal in 5/2022  Carbamazepine less than 3 in 3/2022  B12 and HbA1c normal in 6/2021  She was on alendronate in the past, but she reported today that she is not taking it  Gabapentin replaced pregabalin due to cost      Prior Evaluation:  REEG and SD EEG normal in 2019  MRI brain w/o 9/2019: Slight interval progression of the nonspecific cerebral white matter disease when comparing to 2011 examination  History Reviewed: The following were reviewed and updated as appropriate: allergies, current medications, past family history, past medical history, past social history, past surgical history and problem list  History of nephrolithiasis and hydronephrosis, fibromyalgia, depression with anxiety, osteoarthritis, trigeminal neuralgia, vitamin-D deficiency    Psychiatric History:  Anxiety  Depression    Social History:   Driving: Yes  Occupation: teaches english at [employer]        Objective    /78 (BP Location: Left arm, Patient Position: Sitting, Cuff Size: Standard)   Pulse 70   Temp (!) 97 °F (36 1 °C) (Temporal)   Ht 5' 3" (1 6 m)   Wt 66 7 kg (147 lb)   SpO2 98%   BMI 26 04 kg/m²      General Exam  No acute distress  Neurologic Exam  Mental Status:  Alert and oriented x 3  No palmomental response  Multiple word finding problems while she was giving history  Not able to recall word on her own, event after a delay, and changed the direction of conversation in response  Language: normal fluency and comprehension  Spells house forward and in reverse  Cranial Nerves:  Pupils equal  VFFTC  EOMI, no nystagums  Face symmetric  No dysarthria  Motor:  No drift  Strength 5/5 throughout  Normal tone  Coordination: Finger to nose intact  No tremor  DTRs: Normal and symmetric (brachioradialis)  Sensation: normal LT distally in the uppers  Gait: Normal casual gait             ROS:    Review of Systems   Constitutional: Negative  Negative for appetite change and fever  HENT: Negative  Negative for hearing loss, tinnitus, trouble swallowing and voice change  Eyes: Negative  Negative for photophobia and pain  Respiratory: Negative  Negative for shortness of breath  Cardiovascular: Negative  Negative for palpitations  Gastrointestinal: Negative  Negative for nausea and vomiting  Endocrine: Negative  Negative for cold intolerance  Genitourinary: Negative  Negative for dysuria, frequency and urgency  Musculoskeletal: Negative  Negative for myalgias and neck pain  Skin: Negative  Negative for rash  Neurological: Negative  Negative for dizziness, tremors, seizures, syncope, facial asymmetry, speech difficulty, weakness, light-headedness, numbness and headaches  Hematological: Negative  Does not bruise/bleed easily  Psychiatric/Behavioral: Negative  Negative for confusion, hallucinations and sleep disturbance  ROS reviewed and updated as appropriate  Total time spent on day of encounter: 50 minutes  The time was spent reviewing testing, obtaining/reviewing history, performing an exam, counseling/educating, ordering medication/tests/procedures, referring/communicating with other healthcare providers, documenting clinical information in the EMR, independently interpreting EEG/imaging results, and/ or coordinating care

## 2022-10-04 ENCOUNTER — HOSPITAL ENCOUNTER (OUTPATIENT)
Dept: RADIOLOGY | Age: 67
Discharge: HOME | End: 2022-10-04
Payer: COMMERCIAL

## 2022-10-04 DIAGNOSIS — R47.89 WORD FINDING PROBLEM: ICD-10-CM

## 2022-10-04 DIAGNOSIS — R41.3 MEMORY DIFFICULTY: ICD-10-CM

## 2022-10-04 PROCEDURE — 70551 MRI BRAIN STEM W/O DYE: CPT

## 2022-10-04 PROCEDURE — G1004 CDSM NDSC: HCPCS

## 2022-10-10 ENCOUNTER — OFFICE VISIT (OUTPATIENT)
Dept: SLEEP CENTER | Facility: CLINIC | Age: 67
End: 2022-10-10
Payer: COMMERCIAL

## 2022-10-10 VITALS
OXYGEN SATURATION: 97 % | DIASTOLIC BLOOD PRESSURE: 80 MMHG | BODY MASS INDEX: 26.05 KG/M2 | HEIGHT: 63 IN | WEIGHT: 147 LBS | HEART RATE: 64 BPM | SYSTOLIC BLOOD PRESSURE: 124 MMHG

## 2022-10-10 DIAGNOSIS — G47.9 SLEEP DISTURBANCE: ICD-10-CM

## 2022-10-10 DIAGNOSIS — R41.3 MEMORY DIFFICULTY: ICD-10-CM

## 2022-10-10 DIAGNOSIS — M79.7 FIBROMYALGIA: ICD-10-CM

## 2022-10-10 DIAGNOSIS — K21.9 GASTROESOPHAGEAL REFLUX DISEASE, UNSPECIFIED WHETHER ESOPHAGITIS PRESENT: ICD-10-CM

## 2022-10-10 DIAGNOSIS — R40.0 DAYTIME SLEEPINESS: ICD-10-CM

## 2022-10-10 DIAGNOSIS — G25.81 RESTLESS LEGS SYNDROME: ICD-10-CM

## 2022-10-10 DIAGNOSIS — R53.83 OTHER FATIGUE: ICD-10-CM

## 2022-10-10 DIAGNOSIS — F41.8 DEPRESSION WITH ANXIETY: ICD-10-CM

## 2022-10-10 DIAGNOSIS — R06.83 SNORING: Primary | ICD-10-CM

## 2022-10-10 PROCEDURE — 99244 OFF/OP CNSLTJ NEW/EST MOD 40: CPT | Performed by: INTERNAL MEDICINE

## 2022-10-10 NOTE — PROGRESS NOTES
Consultation - Elva Kent 79  79 y o  female  :1955  [facility]:[id number]  DOS:10/10/2022    Physician Requesting Consult: Scooter Diego MD             Reason for Consult : At your kind request I saw Dinora Steve for initial sleep evaluation today  The patient is here for a complaint of Disrupted sleep      PFSH, Problem List, Medications & Allergies were reviewed in EMR  [facility]  has a past medical history of Arthralgia, Atypical chest pain, Balance disorder, Cervical rib, Depression, Fatigue, Fibromyalgia, Fibromyalgia, Fibromyalgia, primary, GERD (gastroesophageal reflux disease), Hydronephrosis with renal and ureteral calculus obstruction, Lacunar stroke (HCC), Leukopenia, Memory loss, Restless leg syndrome, Sebaceous cyst, Skin tag, and Vitamin D deficiency  She has a current medication list which includes the following prescription(s): acetaminophen, aspirin, atorvastatin, carbamazepine, cholecalciferol, docusate sodium, gabapentin, multivitamin with minerals, pantoprazole, venlafaxine, alendronate, fluticasone, meloxicam, and polyethylene glycol  HPI:  She reports has been experiencing trouble staying asleep of around 15 years duration  She attributes to pain involving lower extremities that she attributes to fibromyalgia  She was prescribed Lyrica that resolved the pain but had to be switched to gabapentin because of insurance constraints that is slightly less effective though is helping  Bed partner reports some snoring and she is unaware  There is no report of breathing difficulties during sleep  Other Complaints:  Fatigue and daytime drowsiness  Restless Leg Syndrome: has suggestive symptoms - “compulsion to move my legs when I am trying to sleep “but unsure whether this helps; she uses a compression device that gives her some relief  Parasomnia: no features reported    Sleep Routine (on average):   Typical Bedtime:  11:00 p m  Gets OOB:  6:30 a m  TIB:7 5 hrs  Sleep latency:<  30 minutes Sleep Interruptions:3-4/nite not always sure of the cause and able to fall back asleep  Awakens: spontaneously  Upon awakening: rarely feels refreshed    She estimates getting >6 hrs sleep  Irvin Motley reports Excessive Daytime Sleepiness  takes planned naps at least once a week or uses caffeine to stay alert  She rated herself at Total score: 8 /24 on the [place] Sleepiness Scale  Habits:  reports that she has quit smoking  She has never used smokeless tobacco  ;   E-Cigarette/Vaping   • E-Cigarette Use Never User     ;  reports no history of drug use ;  reports current alcohol use  ; Caffeine use:limited ; Exercise routine: sometimes   Family History: Negative for sleep disturbance  ROS - reviewed and as attached  Significant for recent weight reduction of a few lb  She reports no nasal or respiratory symptoms  Occasional random palpitations  She has musculoskeletal aches and pains but denied radicular symptoms or abnormal blood loss  She has a history of altered mental state and cause was not determined though TIA/seizure disorder were in the differential diagnosis  Recent increase difficulty with memory for which she is undergoing neurological workup  Keila Fernandez EXAM:  /80   Pulse 64   Ht 5' 3" (1 6 m)   Wt 66 7 kg (147 lb)   SpO2 97%   BMI 26 04 kg/m²    General: Well groomed female, well appearing, in no apparent distress  Neurological: Alert and orientated;  cooperative; Cranial nerves intact;    Psychiatric: Speech:clear and coherent; appears somewhat anxious, otherwise Normal mood, affect & thought   Skin: warm and dry; Color& Hydration good; no facial rashes or lesions   HEENT:  Craniofacial anatomy: retrognathia Sinuses: non- tender   TMJ: Normal    Eyes: EOM's intact;  conjunctiva/corneas clear   Ears: Externallyappear normal     Nasal Airway: narrow nares Septum:intact; Mucosa: normal; Turbinates: normal; Rhinorrhea: None   Mouth: Lips: normal posture; Dentition: normal   Mucosa:moist  ; Hard Palate:normal    Oropharryx: crowded and AP narrowing Tongue: Mallampati:Class IV and MobileSoft Palate:  redundant  Tonsils: absent  Neck:; Neck Circumference: 13 "; Supple; no abnormal masses; Thyroid:normal  Trachea:central     Lymph: No Cervical or Submandibular Lymhadenopathy  Heart: S1,S2 normal; RRR; no gallop; no murmurs   Lungs: Respiratory Effort:normal  Air entry good bilaterally  No wheezes  No rales  Abdomen: Obese, Soft & non-tender    Extremities: No pedal edema  No clubbing or cyanosis  Musculoskeletal:  Motor normal; Gait:normal        IMPRESSION: Primary/Secondary Sleep Diagnoses (to Medical or Psych conditions) & Comorbidities   1  Snoring  Diagnostic Sleep Study   2  Restless legs syndrome  Ambulatory Referral to Sleep Medicine    Diagnostic Sleep Study   3  Daytime sleepiness  Diagnostic Sleep Study   4  Memory difficulty     5  Sleep disturbance     6  Other fatigue  Ambulatory Referral to Sleep Medicine   7  Fibromyalgia     8  Depression with anxiety     9  Gastroesophageal reflux disease, unspecified whether esophagitis present          PLAN:   With respect to above conditions, comprehensive counseling provided on pathophysiology; effects on symptoms and comorbidities, diagnostic strategies & limitations; treatment options; risks or no treament; risks & benefits of the various therapeutic options; costs and insurance aspects  I advised weight reduction, avoiding sleeping supine, using alcohol or sedating medications close to bed time and on safe driving practices  Nocturnal polysomnography is indicated and a diagnostic study will be scheduled  Home sleep testing is contraindicated because Severe insomnia, RLS, History of stroke and suspect upper airway resistance that would not be demonstrated by Home sleep testing    In view of her neurological condition with differential of TIA versus seizure disorder, the study to be undertaken using an extended montage EEG   She is on gabapentin for lower extremity pain that also helps with restless leg symptoms  She is also using nonpharmacologic measures with some benefit  Consideration can be given to an alternate to effect so that may worsen restless leg symptoms  Patient is willing to try Positive airway pressure therapy and will be scheduled for a titration study if indicated  Follow-up to be scheduled after the studies to review results, further details of treatment options and to initiate/adjust therapy  Sincerely,      Authenticated electronically on 89/01/50   Board Certified Specialist     Portions of the record may have been created with voice recognition software  Occasional wrong word or "sound a like" substitutions may have occurred due to the inherent limitations of voice recognition software  There may also be notations and random deletions of words or characters from malfunctioning software  Read the chart carefully and recognize, using context, where substitutions/deletions have occurred

## 2022-10-10 NOTE — PROGRESS NOTES
Review of Systems      Genitourinary post menopausal (no peroids)   Cardiology palpitations/fluttering feeling in the chest   Gastrointestinal none   Neurology need to move extremities, forgetfulness, poor concentration or confusion, , difficulty with memory and balance problems   Constitutional fatigue   Integumentary none   Psychiatry none   Musculoskeletal joint pain, muscle aches, back pain, legs twitching/jerking and leg cramps   Pulmonary snoring   ENT none   Endocrine none   Hematological none

## 2022-10-10 NOTE — PATIENT INSTRUCTIONS
What is SHAWN? Obstructive sleep apnea is a common and serious sleep disorder that causes you to stop breathing during sleep  The airway repeatedly becomes blocked, limiting the amount of air that reaches your lungs  When this happens, you may snore loudly or making choking noises as you try to breathe  Your brain and body becomes oxygen deprived and you may wake up  This may happen a few times a night, or in more severe cases, several hundred times a night  Sleep apnea can make you wake up in the morning feeling tired or unrefreshed even though you have had a full night of sleep  During the day, you may feel fatigued, have difficulty concentrating or you may even unintentionally fall asleep  This is because your body is waking up numerous times throughout the night, even though you might not be conscious of each awakening  The lack of oxygen your body receives can have negative long-term consequences for your health  This includes:  High blood pressure  Heart disease  Irregular heart rhythms  Stroke  Pre-diabetes and diabetes  Depression    Testing  An objective evaluation of your sleep may be needed before your board certified sleep physician can make a diagnosis  Options include:   In-lab overnight sleep study  This type of sleep study requires you to stay overnight at a sleep center, in a bed that may resemble a hotel room  You will sleep with sensors hooked up to various parts of your body  These sensors record your brain waves, heartbeat, breathing and movement  An overnight sleep study also provides your doctor with the most complete information about your sleep  Learn more about an overnight sleep study  Home sleep apnea test  Some patients with high risk factors for obstructive sleep apnea and no other medical disorders may be candidates for a home sleep apnea test  The testing equipment differs in that it is less complicated than what is used in an overnight sleep study   As such, does not give all the data an in-lab will and if negative, may not mean you do not have the problem  Treatment for sleep apnea  includes using a continuous positive airway pressure (CPAP) machine to keep your airway open during sleep  A mask is placed over your nose and mouth, or just your nose  The mask is hooked to the CPAP machine that blows a gentle stream of air into the mask when you breathe  This helps keep your airway open so you can breathe more regularly  Extra oxygen may be given to you through the machine  You may be given a mouth device  It looks like a mouth guard or dental retainer and stops your tongue and mouth tissues from blocking your throat while you sleep  Surgery may be needed to remove extra tissues that block your mouth, throat, or nose  Manage sleep apnea:   Do not smoke  Nicotine and other chemicals in cigarettes and cigars can cause lung damage  Ask your healthcare provider for information if you currently smoke and need help to quit  E-cigarettes or smokeless tobacco still contain nicotine  Talk to your healthcare provider before you use these products  Do not drink alcohol or take sedative medicine before you go to sleep  Alcohol and sedatives can relax the muscles and tissues around your throat  This can block the airflow to your lungs  Maintain a healthy weight  Excess tissue around your throat may restrict your breathing  Ask your healthcare provider for information if you need to lose weight  Sleep on your side or use pillows designed to prevent sleep apnea  This prevents your tongue or other tissues from blocking your throat  You can also raise the head of your bed  Driving Safety  Refrain from driving when drowsy  Follow up with your healthcare provider as directed:  Write down your questions so you remember to ask them during your visits  Go to [employer] website for more information: Sleepeducation  org     What is SHAWN?    Obstructive sleep apnea is a common and serious sleep disorder that causes you to stop breathing during sleep  The airway repeatedly becomes blocked, limiting the amount of air that reaches your lungs  When this happens, you may snore loudly or making choking noises as you try to breathe  Your brain and body becomes oxygen deprived and you may wake up  This may happen a few times a night, or in more severe cases, several hundred times a night  Sleep apnea can make you wake up in the morning feeling tired or unrefreshed even though you have had a full night of sleep  During the day, you may feel fatigued, have difficulty concentrating or you may even unintentionally fall asleep  This is because your body is waking up numerous times throughout the night, even though you might not be conscious of each awakening  The lack of oxygen your body receives can have negative long-term consequences for your health  This includes:  High blood pressure  Heart disease  Irregular heart rhythms  Stroke  Pre-diabetes and diabetes  Depression    Testing  An objective evaluation of your sleep may be needed before your board certified sleep physician can make a diagnosis  Options include:   In-lab overnight sleep study  This type of sleep study requires you to stay overnight at a sleep center, in a bed that may resemble a hotel room  You will sleep with sensors hooked up to various parts of your body  These sensors record your brain waves, heartbeat, breathing and movement  An overnight sleep study also provides your doctor with the most complete information about your sleep  Learn more about an overnight sleep study  Home sleep apnea test  Some patients with high risk factors for obstructive sleep apnea and no other medical disorders may be candidates for a home sleep apnea test  The testing equipment differs in that it is less complicated than what is used in an overnight sleep study   As such, does not give all the data an in-lab will and if negative, may not mean you do not have the problem  Treatment for sleep apnea  includes using a continuous positive airway pressure (CPAP) machine to keep your airway open during sleep  A mask is placed over your nose and mouth, or just your nose  The mask is hooked to the CPAP machine that blows a gentle stream of air into the mask when you breathe  This helps keep your airway open so you can breathe more regularly  Extra oxygen may be given to you through the machine  You may be given a mouth device  It looks like a mouth guard or dental retainer and stops your tongue and mouth tissues from blocking your throat while you sleep  Surgery may be needed to remove extra tissues that block your mouth, throat, or nose  Manage sleep apnea:   Do not smoke  Nicotine and other chemicals in cigarettes and cigars can cause lung damage  Ask your healthcare provider for information if you currently smoke and need help to quit  E-cigarettes or smokeless tobacco still contain nicotine  Talk to your healthcare provider before you use these products  Do not drink alcohol or take sedative medicine before you go to sleep  Alcohol and sedatives can relax the muscles and tissues around your throat  This can block the airflow to your lungs  Maintain a healthy weight  Excess tissue around your throat may restrict your breathing  Ask your healthcare provider for information if you need to lose weight  Sleep on your side or use pillows designed to prevent sleep apnea  This prevents your tongue or other tissues from blocking your throat  You can also raise the head of your bed  Driving Safety  Refrain from driving when drowsy  Follow up with your healthcare provider as directed:  Write down your questions so you remember to ask them during your visits  Go to AAS website for more information: Sleepeducation  org             Nursing Support:  When: Monday through Friday 7A-5PM except holidays  Where: Our direct line is [phone]      If you are having a true emergency please call 911  In the event that the line is busy or it is after hours please leave a voice message and we will return your call  Please speak clearly, leaving your full name, birth date, best number to reach you and the reason for your call  Medication refills: We will need the name of the medication, the dosage, the ordering provider, whether you get a 30 or 90 day refill, and the pharmacy name and address  Medications will be ordered by the provider only  Nurses cannot call in prescriptions  Please allow 7 days for medication refills  Physician requested updates: If your provider requested that you call with an update after starting medication, please be ready to provide us the medication and dosage, what time you take your medication, the time you attempt to fall asleep, time you fall asleep, when you wake up, and what time you get out of bed  Sleep Study Results: We will contact you with sleep study results and/or next steps after the physician has reviewed your testing

## 2022-10-12 NOTE — PROGRESS NOTES
Current POC has  after greater than 4 weeks of inactivity  Patient may return for new evaluation in future as needed

## 2022-10-17 ENCOUNTER — TELEPHONE (OUTPATIENT)
Dept: SLEEP CENTER | Facility: CLINIC | Age: 67
End: 2022-10-17

## 2022-10-17 DIAGNOSIS — R06.83 SNORING: Primary | ICD-10-CM

## 2022-10-17 DIAGNOSIS — R40.0 DAYTIME SLEEPINESS: ICD-10-CM

## 2022-10-17 DIAGNOSIS — G25.81 RESTLESS LEGS SYNDROME: ICD-10-CM

## 2022-10-17 NOTE — TELEPHONE ENCOUNTER
Patient's insurance denied the authorization for the diagnostic sleep study  How would you like to proceed?

## 2022-10-21 ENCOUNTER — TELEPHONE (OUTPATIENT)
Dept: NEUROLOGY | Facility: CLINIC | Age: 67
End: 2022-10-21

## 2022-11-10 ENCOUNTER — TELEPHONE (OUTPATIENT)
Dept: NEUROLOGY | Facility: CLINIC | Age: 67
End: 2022-11-10

## 2022-11-15 ENCOUNTER — TELEPHONE (OUTPATIENT)
Dept: NEUROLOGY | Facility: CLINIC | Age: 67
End: 2022-11-15

## 2022-12-02 ENCOUNTER — OFFICE VISIT (OUTPATIENT)
Dept: INTERNAL MEDICINE CLINIC | Facility: CLINIC | Age: 67
End: 2022-12-02

## 2022-12-02 VITALS
DIASTOLIC BLOOD PRESSURE: 74 MMHG | SYSTOLIC BLOOD PRESSURE: 108 MMHG | BODY MASS INDEX: 26.4 KG/M2 | HEIGHT: 63 IN | WEIGHT: 149 LBS | TEMPERATURE: 97.1 F | OXYGEN SATURATION: 99 % | HEART RATE: 66 BPM

## 2022-12-02 DIAGNOSIS — M79.604 LEG PAIN, BILATERAL: ICD-10-CM

## 2022-12-02 DIAGNOSIS — F41.8 DEPRESSION WITH ANXIETY: ICD-10-CM

## 2022-12-02 DIAGNOSIS — M25.552 HIP PAIN, BILATERAL: ICD-10-CM

## 2022-12-02 DIAGNOSIS — Z23 NEED FOR INFLUENZA VACCINATION: ICD-10-CM

## 2022-12-02 DIAGNOSIS — Z00.00 HEALTH MAINTENANCE EXAMINATION: Primary | ICD-10-CM

## 2022-12-02 DIAGNOSIS — Z79.899 ENCOUNTER FOR LONG-TERM CURRENT USE OF MEDICATION: ICD-10-CM

## 2022-12-02 DIAGNOSIS — M25.551 HIP PAIN, BILATERAL: ICD-10-CM

## 2022-12-02 DIAGNOSIS — R47.89 WORD FINDING DIFFICULTY: ICD-10-CM

## 2022-12-02 DIAGNOSIS — E55.9 VITAMIN D DEFICIENCY: ICD-10-CM

## 2022-12-02 DIAGNOSIS — M79.605 LEG PAIN, BILATERAL: ICD-10-CM

## 2022-12-02 DIAGNOSIS — E78.49 OTHER HYPERLIPIDEMIA: ICD-10-CM

## 2022-12-02 DIAGNOSIS — G50.0 TRIGEMINAL NEURALGIA: ICD-10-CM

## 2022-12-02 DIAGNOSIS — M81.0 AGE-RELATED OSTEOPOROSIS WITHOUT CURRENT PATHOLOGICAL FRACTURE: ICD-10-CM

## 2022-12-02 DIAGNOSIS — K21.9 GASTROESOPHAGEAL REFLUX DISEASE, UNSPECIFIED WHETHER ESOPHAGITIS PRESENT: ICD-10-CM

## 2022-12-02 DIAGNOSIS — G25.81 RESTLESS LEGS SYNDROME: ICD-10-CM

## 2022-12-02 DIAGNOSIS — M79.7 FIBROMYALGIA: ICD-10-CM

## 2022-12-02 PROBLEM — R56.9 UNSPECIFIED CONVULSIONS (HCC): Status: RESOLVED | Noted: 2020-11-20 | Resolved: 2022-12-02

## 2022-12-02 RX ORDER — GABAPENTIN 300 MG/1
CAPSULE ORAL
Qty: 270 CAPSULE | Refills: 1
Start: 2022-12-02

## 2022-12-02 NOTE — PROGRESS NOTES
Assessment/Plan:    Fatigue  Sleep study scheduled, saw sleep  Word finding difficulty  MRI normal   Saw neurology  Depression with anxiety  Stable, on venlafaxine  Other hyperlipidemia  Continue statin  Restless legs syndrome  On daily iron, gabapentin  Trigeminal neuralgia  Stable, taking carbamazepine 100 mg bid  Age-related osteoporosis without current pathological fracture  Recommend to take alendronate  Continue daily walks and supplements  Fibromyalgia  On gabapentin 300 mg tid  May take increase gabapentin to 600 mg qHS, discussed increasing dose during the day  Constipation  No change  Primary osteoarthritis of right knee  She would like to restart physical therapy  Diagnoses and all orders for this visit:    Health maintenance examination  Comments:  Flu vaccine today  Fibromyalgia  -     gabapentin (NEURONTIN) 300 mg capsule; Take 1 tab AM and noon, 2 tabs in PM  -     CBC and differential; Future    Leg pain, bilateral  Comments:  Explained may be referred pain from her back, agrees to do x-rays  Orders:  -     XR spine lumbar minimum 4 views non injury; Future    Hip pain, bilateral  -     XR hips bilateral 2 vw w pelvis if performed; Future    Trigeminal neuralgia    Restless legs syndrome    Depression with anxiety    Gastroesophageal reflux disease, unspecified whether esophagitis present    Other hyperlipidemia  -     TSH, 3rd generation with Free T4 reflex; Future  -     Comprehensive metabolic panel; Future  -     Lipid panel; Future    Age-related osteoporosis without current pathological fracture  -     Vitamin D 25 hydroxy; Future    Word finding difficulty  -     Vitamin B12; Future    Encounter for long-term current use of medication  -     Vitamin B12; Future    Vitamin D deficiency  -     Vitamin D 25 hydroxy;  Future    Need for influenza vaccination  -     influenza vaccine, high-dose, PF 0 7 mL (FLUZONE HIGH-DOSE)    Follow up in 6 months or as needed  Subjective:      Patient ID: Ernesto Sanchez is a 79 y o  female here for a physical     She is asking if she truly has fibromyalgia, diagnosed years ago  She has occasional pain in her arms and shoulders but mostly feels it on her thighs and legs  She describes a know from the side of her left hip radiating down her left leg  She has occasional hip pain, denies any back pain  She continues to have issues with both knees  She went to physical therapy for her right knee  She would like to see another therapist   She feels her left knee is not as good as it should be even after surgery  She is asking if she can adjust gabapentin, does take an extra all Advil or Tylenol as needed for pain, especially at night  She reports occasional sharp left-sided headaches, this occurs less than once a month  She did see Sleep and neurology  The following portions of the patient's history were reviewed and updated as appropriate: allergies, current medications, past medical history, past social history and problem list     Review of Systems   Constitutional: Negative for appetite change and fatigue  HENT: Negative for congestion, ear pain and postnasal drip  Eyes: Negative for visual disturbance  Respiratory: Negative for cough and shortness of breath  Cardiovascular: Negative for chest pain and leg swelling  Gastrointestinal: Negative for abdominal pain, constipation and diarrhea  Genitourinary: Negative for dysuria and frequency  Musculoskeletal: Positive for arthralgias  Negative for joint swelling and myalgias  Skin: Negative for rash and wound  Neurological: Negative for dizziness, numbness and headaches  Hematological: Does not bruise/bleed easily  Psychiatric/Behavioral: Negative for confusion and dysphoric mood  The patient is not nervous/anxious            Objective:      /74   Pulse 66   Temp (!) 97 1 °F (36 2 °C)   Ht 5' 3" (1 6 m)   Wt 67 6 kg (149 lb)   SpO2 99% BMI 26 39 kg/m²          Physical Exam  Vitals and nursing note reviewed  Constitutional:       General: She is not in acute distress  Appearance: She is well-developed  HENT:      Head: Normocephalic and atraumatic  Eyes:      Pupils: Pupils are equal, round, and reactive to light  Cardiovascular:      Rate and Rhythm: Normal rate and regular rhythm  Heart sounds: Normal heart sounds  Pulmonary:      Effort: Pulmonary effort is normal       Breath sounds: Normal breath sounds  No wheezing  Abdominal:      General: Bowel sounds are normal       Palpations: Abdomen is soft  Musculoskeletal:         General: No swelling  Thoracic back: No spasms or tenderness  Lumbar back: No spasms or tenderness  Right hip: No tenderness  Left hip: No tenderness  Right lower leg: No edema  Left lower leg: No edema  Skin:     General: Skin is warm  Findings: No rash  Neurological:      General: No focal deficit present  Mental Status: She is alert and oriented to person, place, and time  Psychiatric:         Mood and Affect: Mood and affect normal  Mood is not anxious or depressed  Behavior: Behavior normal            Labs & imaging results reviewed with patient

## 2022-12-02 NOTE — ASSESSMENT & PLAN NOTE
On gabapentin 300 mg tid  May take increase gabapentin to 600 mg qHS, discussed increasing dose during the day

## 2022-12-05 ENCOUNTER — TELEPHONE (OUTPATIENT)
Dept: NEUROLOGY | Facility: CLINIC | Age: 67
End: 2022-12-05

## 2022-12-05 NOTE — TELEPHONE ENCOUNTER
Scheduled intake and testing appointment       Intake : 2/10/23 at 8:30 am  Testing : 2/10/23 at 9:30 am

## 2022-12-16 DIAGNOSIS — M79.7 FIBROMYALGIA: ICD-10-CM

## 2022-12-16 RX ORDER — GABAPENTIN 300 MG/1
CAPSULE ORAL
Qty: 270 CAPSULE | Refills: 1 | OUTPATIENT
Start: 2022-12-16

## 2022-12-17 DIAGNOSIS — F41.8 DEPRESSION WITH ANXIETY: ICD-10-CM

## 2022-12-17 DIAGNOSIS — M79.7 FIBROMYALGIA: ICD-10-CM

## 2022-12-19 RX ORDER — VENLAFAXINE HYDROCHLORIDE 75 MG/1
CAPSULE, EXTENDED RELEASE ORAL
Qty: 90 CAPSULE | Refills: 1 | Status: SHIPPED | OUTPATIENT
Start: 2022-12-19

## 2022-12-20 DIAGNOSIS — M79.7 FIBROMYALGIA: ICD-10-CM

## 2022-12-20 RX ORDER — GABAPENTIN 300 MG/1
CAPSULE ORAL
Qty: 270 CAPSULE | Refills: 1 | Status: SHIPPED | OUTPATIENT
Start: 2022-12-20

## 2023-01-31 ENCOUNTER — OFFICE VISIT (OUTPATIENT)
Dept: INTERNAL MEDICINE CLINIC | Facility: CLINIC | Age: 68
End: 2023-01-31

## 2023-01-31 VITALS
OXYGEN SATURATION: 99 % | HEIGHT: 63 IN | DIASTOLIC BLOOD PRESSURE: 78 MMHG | WEIGHT: 147 LBS | HEART RATE: 58 BPM | SYSTOLIC BLOOD PRESSURE: 116 MMHG | BODY MASS INDEX: 26.05 KG/M2 | TEMPERATURE: 96.3 F

## 2023-01-31 DIAGNOSIS — M81.0 AGE-RELATED OSTEOPOROSIS WITHOUT CURRENT PATHOLOGICAL FRACTURE: ICD-10-CM

## 2023-01-31 DIAGNOSIS — H66.001 NON-RECURRENT ACUTE SUPPURATIVE OTITIS MEDIA OF RIGHT EAR WITHOUT SPONTANEOUS RUPTURE OF TYMPANIC MEMBRANE: Primary | ICD-10-CM

## 2023-01-31 RX ORDER — AMOXICILLIN AND CLAVULANATE POTASSIUM 875; 125 MG/1; MG/1
1 TABLET, FILM COATED ORAL EVERY 12 HOURS SCHEDULED
Qty: 14 TABLET | Refills: 0 | Status: SHIPPED | OUTPATIENT
Start: 2023-01-31 | End: 2023-02-07

## 2023-01-31 RX ORDER — ALENDRONATE SODIUM 70 MG/1
70 TABLET ORAL
Qty: 12 TABLET | Refills: 1 | Status: SHIPPED | OUTPATIENT
Start: 2023-01-31

## 2023-01-31 NOTE — PROGRESS NOTES
Name: Jeffrey Jimenez      : 1955      MRN: [id number]  Encounter Provider: AIDAN Solomon  Encounter Date: 2023   Encounter department: [facility] [address]     1  Non-recurrent acute suppurative otitis media of right ear without spontaneous rupture of tympanic membrane  -     amoxicillin-clavulanate (Augmentin) 875-125 mg per tablet; Take 1 tablet by mouth every 12 (twelve) hours for 7 days    2  Age-related osteoporosis without current pathological fracture  -     alendronate (Fosamax) 70 mg tablet; Take 1 tablet (70 mg total) by mouth every 7 days           Subjective      Massiel Moura is here today with complaints of right ear pain  She had a stabbing pain in her ear on Friday  As the days went on the pain has improved  She has no pain today  However she does feel off balance  She had a yellow drainage on her qtip  No hearing loss  She does have associated rhinorrhea  Review of Systems   Constitutional: Negative for activity change and fever  HENT: Positive for ear discharge, ear pain and rhinorrhea  Negative for congestion, facial swelling, hearing loss and sore throat  Respiratory: Negative for cough  Neurological: Positive for dizziness  Negative for light-headedness and headaches         Current Outpatient Medications on File Prior to Visit   Medication Sig   • acetaminophen (TYLENOL) 650 mg CR tablet Take 650 mg by mouth every 8 (eight) hours as needed for mild pain   • aspirin 81 mg chewable tablet Chew 1 tablet (81 mg total) daily   • atorvastatin (LIPITOR) 40 mg tablet TAKE 1 TABLET BY MOUTH EVERY EVENING   • carbamazepine (CARBATROL) 100 MG 12 hr capsule Take 1 capsule (100 mg total) by mouth 2 (two) times a day   • Cholecalciferol (D3 ADULT PO) Take by mouth in the morning   • docusate sodium (COLACE) 100 mg capsule Take 100 mg by mouth 2 (two) times a day   • fluticasone (FLONASE) 50 mcg/act nasal spray 1 spray into each nostril daily as needed for rhinitis (Patient not taking: No sig reported)   • gabapentin (NEURONTIN) 300 mg capsule Take 1 tab AM and noon, 2 tabs in PM   • meloxicam (Mobic) 7 5 mg tablet Take 1 tablet (7 5 mg total) by mouth daily (Patient not taking: No sig reported)   • Multiple Vitamins-Minerals (MULTIVITAMIN WITH MINERALS) tablet Take 1 tablet by mouth daily   • pantoprazole (PROTONIX) 40 mg tablet TAKE 1 TABLET(40 MG) BY MOUTH DAILY   • polyethylene glycol (GLYCOLAX) 17 GM/SCOOP powder Take 17 g by mouth daily (Patient not taking: No sig reported)   • venlafaxine (EFFEXOR-XR) 75 mg 24 hr capsule TAKE 1 CAPSULE(75 MG) BY MOUTH DAILY WITH BREAKFAST   • [DISCONTINUED] alendronate (Fosamax) 70 mg tablet Take 1 tablet (70 mg total) by mouth every 7 days (Patient not taking: No sig reported)       Objective     /78   Pulse 58   Temp (!) 96 3 °F (35 7 °C)   Ht 5' 3" (1 6 m)   Wt 66 7 kg (147 lb)   SpO2 99%   BMI 26 04 kg/m²     Physical Exam  Vitals reviewed  Constitutional:       Appearance: Normal appearance  HENT:      Head: Normocephalic and atraumatic  Right Ear: Ear canal and external ear normal  No swelling or tenderness  A middle ear effusion is present  Left Ear: Tympanic membrane, ear canal and external ear normal       Mouth/Throat:      Pharynx: No posterior oropharyngeal erythema  Cardiovascular:      Rate and Rhythm: Normal rate and regular rhythm  Heart sounds: Normal heart sounds  Pulmonary:      Effort: Pulmonary effort is normal       Breath sounds: Normal breath sounds  Neurological:      Mental Status: She is alert and oriented to person, place, and time     Psychiatric:         Mood and Affect: Mood normal          Behavior: Behavior normal        Earma Her, CRNP

## 2023-02-06 DIAGNOSIS — G45.9 TIA (TRANSIENT ISCHEMIC ATTACK): ICD-10-CM

## 2023-02-06 RX ORDER — ATORVASTATIN CALCIUM 40 MG/1
40 TABLET, FILM COATED ORAL EVERY EVENING
Qty: 90 TABLET | Refills: 1 | Status: SHIPPED | OUTPATIENT
Start: 2023-02-06

## 2023-02-08 ENCOUNTER — TELEPHONE (OUTPATIENT)
Dept: INTERNAL MEDICINE CLINIC | Facility: CLINIC | Age: 68
End: 2023-02-08

## 2023-02-08 DIAGNOSIS — B37.31 VAGINAL YEAST INFECTION: Primary | ICD-10-CM

## 2023-02-08 RX ORDER — FLUCONAZOLE 150 MG/1
150 TABLET ORAL ONCE
Qty: 1 TABLET | Refills: 1 | Status: SHIPPED | OUTPATIENT
Start: 2023-02-08 | End: 2023-02-08

## 2023-02-08 NOTE — TELEPHONE ENCOUNTER
Pt has been taking antibiotic for right ear infection since 2/1/23  Ear pain has subsided, but now she says she has a yeast infection  States she has terrible itching  She did use Monistat on Monday without relief  Pt wants to know if she can try using Monistat again and should she stop the antibiotics?

## 2023-02-08 NOTE — TELEPHONE ENCOUNTER
Medication sent to the pharmacy, take for one dose  You can repeat in 3 days if still with symptoms  Recommend to finish remaining antibiotics

## 2023-02-10 ENCOUNTER — OFFICE VISIT (OUTPATIENT)
Dept: PSYCHIATRY | Facility: CLINIC | Age: 68
End: 2023-02-10

## 2023-02-10 DIAGNOSIS — E78.49 OTHER HYPERLIPIDEMIA: ICD-10-CM

## 2023-02-10 DIAGNOSIS — F41.8 DEPRESSION WITH ANXIETY: ICD-10-CM

## 2023-02-10 DIAGNOSIS — R47.89 WORD FINDING DIFFICULTY: Primary | ICD-10-CM

## 2023-02-10 DIAGNOSIS — R47.89 WORD FINDING DIFFICULTY: ICD-10-CM

## 2023-02-10 DIAGNOSIS — R29.90 STROKE-LIKE SYMPTOMS: ICD-10-CM

## 2023-02-10 DIAGNOSIS — R41.3 MEMORY DIFFICULTY: Primary | ICD-10-CM

## 2023-02-10 NOTE — PSYCH
Testing was completed today by this provider and a trained/supervised testing technician; formal neuropsychological report to follow under the intake visit

## 2023-02-22 ENCOUNTER — OFFICE VISIT (OUTPATIENT)
Dept: PSYCHIATRY | Facility: CLINIC | Age: 68
End: 2023-02-22

## 2023-02-22 ENCOUNTER — TELEPHONE (OUTPATIENT)
Dept: NEUROLOGY | Facility: CLINIC | Age: 68
End: 2023-02-22

## 2023-02-22 DIAGNOSIS — E78.49 OTHER HYPERLIPIDEMIA: ICD-10-CM

## 2023-02-22 DIAGNOSIS — F41.8 DEPRESSION WITH ANXIETY: ICD-10-CM

## 2023-02-22 DIAGNOSIS — R47.89 WORD FINDING PROBLEM: Primary | ICD-10-CM

## 2023-02-22 DIAGNOSIS — R47.89 WORD FINDING DIFFICULTY: Primary | ICD-10-CM

## 2023-02-22 DIAGNOSIS — R29.90 STROKE-LIKE SYMPTOMS: ICD-10-CM

## 2023-02-22 DIAGNOSIS — R13.10 SWALLOWING DIFFICULTY: ICD-10-CM

## 2023-02-22 DIAGNOSIS — R41.3 MEMORY DIFFICULTY: ICD-10-CM

## 2023-02-22 NOTE — TELEPHONE ENCOUNTER
The recent neuropsychological testing shows significant impairment in language function  Dr Guille Huang recommended considering additional testing including a possible PET scan to evaluate for a possible cause of the impairment  If I order this test now it could potentially be done before she returns to the office in a month  Alternatively, we could discuss the possibility of additional testing at the next visit  I am placing a referral to speech therapy for both language and swallowing
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
## 2023-02-22 NOTE — PSYCH
Neuropsychological Evaluation  [facility] Neuropsychology  [address] [facility]  P: [phone] F: [phone]     Feedback from Neuropsychological Evaluation     Dr Luz Grimaldo returned for a feedback appointment to review the findings and recommendations from a neuropsychological evaluation conducted on 02/10/2023  Findings from the evaluation were discussed and the patient expressed understanding  Recommendations were reviewed (see evaluation report from 02/10/2023 for full details)  The patient was given the opportunity to ask questions and expressed satisfaction with answers provided  Contact information for this provider was given to the patient and she was encouraged to contact the office with any further questions or concerns  The neuropsychological evaluation report was routed to the referring provider, Ty Michaud MD, for review and follow-up as needed  Rosalia Locke PsyD  Clinical Neuropsychologist  [place] Licensed Psychologist  [facility]  #[id number]     Tasks Conducted Total Time Spent Associated CPT Codes   Clinical Interview 76 min  24409 x 1 unit  96121 x 3 units   Report Writing 173 min  Neuropsychological Test Administration (PhD/PsyD) 57 min  85093 x 1 unit  96137 x 2 units   Scoring (PhD/PsyD) 37 min  Neuropsychological Test Administration [facility] ) 129 min  45970 x 1 unit  96139 x 5 units   Scoring (Tech ) 42 min  Chart Review 27 min  60269 x 1 unit  96133 x 0 units   Interpretation of Test Data 24 min  Feedback to Patient/Family Members 34 min

## 2023-02-22 NOTE — TELEPHONE ENCOUNTER
----- Message from Bean Sharma sent at 2/22/2023  2:34 PM EST -----  Regarding: neuropsychology report  Hello,    The neuropsychology report has been attached to the next appt with you and will also be under media       Thank you,  Bean Sharma

## 2023-02-23 NOTE — TELEPHONE ENCOUNTER
Spoke to patient  Agreeable to have PETscan done now prior to visit  Please enter order and she will schedule

## 2023-02-24 NOTE — TELEPHONE ENCOUNTER
Pt left VM inquiring if an order had been placed for a PET Scan yet  Transcribed VM:   Hi, this is Vic Solano, I'm Dr Jose Luis To patient  I received a call from your office yesterday and I was wondering if Dr Jose Luis To had put in the order for a pet scan for me  I really want to make an appointment as soon as possible  I'm calling at about 8:45 on February 24th  My number is [phone]  Thank you  Called back and spoke with pt and she verbalized an understanding  Provided # for central scheduling

## 2023-02-26 ENCOUNTER — HOSPITAL ENCOUNTER (OUTPATIENT)
Dept: SLEEP CENTER | Facility: CLINIC | Age: 68
Discharge: HOME | End: 2023-02-26

## 2023-02-26 DIAGNOSIS — R40.0 DAYTIME SLEEPINESS: ICD-10-CM

## 2023-02-26 DIAGNOSIS — G25.81 RESTLESS LEGS SYNDROME: ICD-10-CM

## 2023-02-26 DIAGNOSIS — R06.83 SNORING: ICD-10-CM

## 2023-02-27 NOTE — PROGRESS NOTES
Home Sleep Study Documentation    HOME STUDY DEVICE: Noxturnal yes                                           Chely G3 no      Pre-Sleep Home Study:    Set-up and instructions performed by: [facility]     Technician performed demonstration for Patient: yes    Return demonstration performed by Patient: yes    Written instructions provided to Patient: yes    Patient signed consent form: yes        Post-Sleep Home Study:    Additional comments by Patient: None    Home Sleep Study Failed:no:    Failure reason: N/A    Reported or Detected: N/A    Scored by: FELIX Herrmann

## 2023-02-28 DIAGNOSIS — G47.33 OSA (OBSTRUCTIVE SLEEP APNEA): ICD-10-CM

## 2023-02-28 DIAGNOSIS — G47.34 SLEEP RELATED HYPOXIA: Primary | ICD-10-CM

## 2023-03-02 ENCOUNTER — TELEPHONE (OUTPATIENT)
Dept: NEUROLOGY | Facility: CLINIC | Age: 68
End: 2023-03-02

## 2023-03-02 DIAGNOSIS — R47.89 WORD FINDING DIFFICULTY: Primary | ICD-10-CM

## 2023-03-02 PROBLEM — G31.84 MCI (MILD COGNITIVE IMPAIRMENT): Status: ACTIVE | Noted: 2023-03-02

## 2023-03-02 PROBLEM — R47.01 APHASIA: Status: ACTIVE | Noted: 2023-03-02

## 2023-03-06 ENCOUNTER — TELEPHONE (OUTPATIENT)
Dept: SLEEP CENTER | Facility: CLINIC | Age: 68
End: 2023-03-06

## 2023-03-06 NOTE — TELEPHONE ENCOUNTER
Sleep study shows mild to moderate SHAWN with hypoxia and Dr Adolfo Mccord ordered CPAP study     Left call back message

## 2023-03-06 NOTE — PROGRESS NOTES
Speech-Language Pathology Initial Evaluation    Today's date: 3/7/2023   Patient’s name: Raymond Thibodeaux  : 1955  MRN: [id number]  Safety measures: FOOD AND ENVIRONMENTAL ALLERGIES  Referring provider: Randee Husain, *    Encounter Diagnosis     ICD-10-CM    1  Other symbolic dysfunctions  I91 7       2  Word finding problem  R47 89 Ambulatory Referral to Speech Therapy      3  Memory difficulty  R41  3 Ambulatory Referral to Speech Therapy      4  Swallowing difficulty  R13 10 Ambulatory Referral to Speech Therapy      5  Dysphagia, unspecified type  R13 10         Visit tracking:  -Referring provider: Epic  -Billing guidelines: AMA  -Visit #1 (Rita Cordova)  -Insurance: CBC  -RE due 23    Subjective comments: "I'm doing fine "    How did the patient hear about us? Physician    Patient's goal(s): Not Recorded    Reason for referral: Change in cognitive status, Decreased language skills and Difficulty swallowing solids or liquids  Prior functional status: Communication effective and appropriate in all situations  Clinically complex situations: N/A    History: Patient is a 79 y o  female who was referred to outpatient skilled Speech Therapy services for a cognitive-linguistic evaluation  Patient has a prior medical history of the following: depression, fatigue, fibromyalgia, GERD, lacunar stroke, memory loss and SHAWN (see history for full list)  10/4/22 - MRI of brain Impressions -   "1  No acute infarction, intracranial hemorrhage or mass effect  2   Mild, chronic microangiopathy  3   NeuroQuant analysis was performed: Normal hippocampal volume and enlarged ventricular system: Findings do not support hippocampal degeneration  Possible expansion of ventricular system without medial temporal lobe focused ex-vacuo process "    Neuropsych report found significant impairment in language function per Dr Milan Love (Neurology) - 23      During today's evaluation, the patient reports that in  she had an event where she felt she was lost when driving  The doctors at first thought she had a TIA but that was ultimately ruled out  Currently, she reports having difficulty retrieving words  "I just can't remember it, until hours or days later  Sometimes, I'll remember the first sound or the first letter " She couldn't turn the water off in the shower - forgetting how to do occassional common functions  She has had difficulty with word finding for the past 15 years  Over the past couple of years this has increased in frequency/severity  She finds that she will have trouble with very common words  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, 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  Her students will help her come up with the word  Her neuropsychologist believes that could could possibly have Primary Progressive Aphasia (PPA) due to missing naming two common objects in testing  She is supposed to have a PET scan tomorrow  She was just diagnosed with obstructive sleep apnea and has not begun treatment (e g  CPAP) yet  Throughout today's evaluation, the patient demonstrated significant throat clearing  She reports that when she is swallowing her throat feels "narrower"  She often feels like she might choke, however, she doesn't  Symptoms are primarily throat clearing  She also reports that she talks when she eats and she is working on that  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, 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 and has to stop and sip water  Hearing: WFL  Vision: Cataracts in both eyes    Home environment/lifestyle: She lives with Manny Gibbs, her partner  Highest level of education: Doctoral degree  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[address], poetry, the honors program, etc  Hobbies include movies, reading, and taking walks      Mental status: Alert  Behavior status: Cooperative  Patient reported symptoms of: None reported  Communication modalities: Verbal  Rehabilitation prognosis: Good rehab potential to reach and maintain prior level of function    Assessments    The Test of Adult Language - Fourth Edition (TOAL-4) is a standardized, norm-referenced assessment measuring important communicative abilities in spoken and written language  The test in normed on individuals 12:0-24:11  Due to these age restrictions, these standardized scores should not be compared to standard or percentile rank  Objective measures were taken to complete a diagnostic impression and prognosis for this patient  The TOAL-4 has 6 subtests; their results are reported as percentile ranks and scaled scores  The results of the TOAL-4 are generally used for three purposes; (a) to identify adolescents and adults who score significantly below their peers and therefore might need help improving their language proficiency, (b) to determine areas of relative strength and weakness among language abilities, and (c) to serve as a research tool in studies investigating language problems in adolescents and adults  Due to time constraints, only portions of the standardized assessment were administered on this date of service  Subtest: Raw Score: Percentile:  Scaled Score: Descriptive Ratin  Word Opposites 33/34 98%tile 16 Superior   3  Spoken Analogies 17/26 37%tile 9 Average   4  Word Similarities 32/40 95%tile 15 Superior       The Brief Cognitive Assessment Test (BCAT) is a multi-domain cognitive tool that assesses a patient’s orientation, verbal recall, visual recognition, visual recall, attention, abstraction, language, executive functions, and visuospatial processing  BCAT is sensitive to the full spectrum of cognitive functioning (normal, MCI, mild dementia, moderate-severe dementia) and can predict basic and instrumental activities of daily living (ADL, IADL)  During today’s administration of the test, patient achieved a total score of 46/50 points       Using the [facility] Table as a reference, patient’s score falls within the range and may be suggestive of "Normal"  The [place] Table suggests the following:      Cognitive Stage: Normal (BCAT Range: 46-50 // MMSE: 28-30 // GDS: 1-2)  Cognitive & Functional Issues: No functional deficit; independent living; may be subjective memory complaints but little to no objective evidence  Goals    Short-term goals: To Be Determined    Long-term goals: To Be Determined    Impressions/Recommendations    Impressions:   Due to the nature of the patient's complaints of both word finding difficulties and occasional difficulty recalling steps for common ADLs (e g  turning on the shower), the clinician assessed both language and cognition  Utilizing the TOAL-4 the patient's language ability in the areas of Word Opposites (Superior rating), Spoken Analogies (Average rating) and Word Similarities (Superior rating) was tested  As the patient tested between the Average and Superior ratings on all three domains, language skills appear significantly intact  Additionally, the patient conversed well with the clinician throughout the evaluation session  She demonstrated only one moment of word finding difficulty when recalling an obscure diagnosis  To test cognition, the BCAT cognitive assessment was administered  The patient completed the assessment with a score of 46/50, placing her within the "Normal" range  She lost one point for arithmetic reasoning and visuospatial  She lost an additional 2 points in the category of Delayed Visual Memory  Based upon today's evaluation results, all findings are normal in both language and cognition  Recommend further language testing, focusing on naming of objects due to concerns by Neuropsychology  Additionally, would recommend a brief swallow evaluation   However, suspect that symptoms are related to existing GERD diagnosis (e g  throat feeling narrow, tickle in throat throughout the day and excess throat clearing) and may require a referral to GI  Patient will be further evaluated by speech therapy to determine if there is any underlying language impairment  Suspect that perhaps pre-existing medical conditions (e g  SHAWN, depression, fatigue, fibromyalgia) may play a role due to difficulty with sleep and lack of oxygen to the brain at night  The plan of care has been discussed with the patient and she is in agreement at this time  Recommendations:  -Patient would benefit from outpatient skilled Speech Therapy services: Further language and dysphagia testing    -Frequency: TBD  -Duration: TBD    -Intervention certification from: 4/0/3474  -Intervention certification to: 8/3/4259    -Intervention comments:   55 minutes of sound language comprehension evaluation time

## 2023-03-07 ENCOUNTER — EVALUATION (OUTPATIENT)
Dept: SPEECH THERAPY | Facility: CLINIC | Age: 68
End: 2023-03-07

## 2023-03-07 ENCOUNTER — TELEPHONE (OUTPATIENT)
Dept: NEUROLOGY | Facility: CLINIC | Age: 68
End: 2023-03-07

## 2023-03-07 DIAGNOSIS — R48.8 OTHER SYMBOLIC DYSFUNCTIONS: Primary | ICD-10-CM

## 2023-03-07 DIAGNOSIS — R13.10 SWALLOWING DIFFICULTY: ICD-10-CM

## 2023-03-07 DIAGNOSIS — R41.3 MEMORY DIFFICULTY: ICD-10-CM

## 2023-03-07 DIAGNOSIS — R47.89 WORD FINDING PROBLEM: ICD-10-CM

## 2023-03-07 DIAGNOSIS — R13.10 DYSPHAGIA, UNSPECIFIED TYPE: ICD-10-CM

## 2023-03-07 NOTE — TELEPHONE ENCOUNTER
received [facility], this is Srinivasa Montes De Oca, patient of Dr Carlos Randall  I was scheduled  I am scheduled to have a pet scan tomorrow morning at 7:30  But the pre authorization, apparently has not happened because of the insurance company not receiving all the notes  Paperwork including the report from the neuropsychologist  So I really want to have that pet scan done as soon as possible  So I wondered if perhaps Your office could get that paperwork electronically  I suppose over to them today  So please do call me back at [phone]  Again, the name is Srinivasa Montes De Oca and my doctor is Dr Carlos Randall   Thank you   ---------------------------------------------  per chart, PET scan has been approved    Called pt and made her aware of above

## 2023-03-08 ENCOUNTER — HOSPITAL ENCOUNTER (OUTPATIENT)
Dept: RADIOLOGY | Age: 68
Discharge: HOME | End: 2023-03-08

## 2023-03-08 DIAGNOSIS — R47.89 WORD FINDING PROBLEM: ICD-10-CM

## 2023-03-08 DIAGNOSIS — R13.10 SWALLOWING DIFFICULTY: ICD-10-CM

## 2023-03-08 DIAGNOSIS — R41.3 MEMORY DIFFICULTY: ICD-10-CM

## 2023-03-08 LAB — GLUCOSE SERPL-MCNC: 80 MG/DL (ref 65–140)

## 2023-03-09 ENCOUNTER — TELEPHONE (OUTPATIENT)
Dept: OBGYN CLINIC | Facility: HOSPITAL | Age: 68
End: 2023-03-09

## 2023-03-09 NOTE — TELEPHONE ENCOUNTER
Patient advised that she needs to take it until 7/15/23  Patient will require refill on her antibiotic sent to pharmacy on file  Thanks

## 2023-03-09 NOTE — TELEPHONE ENCOUNTER
Caller: Patient     Doctor: Pastor Joseph    Reason for call: Patient is calling to see if she still requires antibiotics for dental procedures  Her TKA was 7/15/21    If so, she needs a prescription sent to her pharmacy on file    Call back#: [phone]

## 2023-03-10 NOTE — TELEPHONE ENCOUNTER
Returned the patient's call  Advised sleep study results and scheduled CPAP study   Patient placed on wait list

## 2023-03-10 NOTE — PROGRESS NOTES
Speech-Language Pathology Re-Evaluation    Today's date: 3/14/2023   Patient’s name: Freddy Swartz  : 1955  MRN: [id number]  Safety measures: FOOD AND ENVIRONMENTAL ALLERGIES  Referring provider: Zee Quispe, *    Encounter Diagnosis     ICD-10-CM    1  Other symbolic dysfunctions  X14 7       2  Word finding problem  R47 89       3  Memory difficulty  R41 3       4  Dysphagia, unspecified type  R13 10         Visit tracking:  -Referring provider: Epic  -Billing guidelines: AMA  -Visit #2 (Geraldine Reddy)  -Insurance: CBC  -RE due 23    Subjective comments: She reports that she had a busy day today  No reported difficulty with word finding today  Patient's goal(s): "To get a correct diagnosis  Secondarily, to get assistance in my problems, especially word finding "    Assessments    The [employer] Edition (BNT-2) is a confrontational naming assessment that asks patients to provide the best name for a given picture  It was designed to detect word-finding impairments  The BNT-2 consists of 60 pictures, ordered from easiest to most difficult  Stimulus cues, including semantic, phonemic, and written information, are provided as necessary  The following results were obtained during the administration of the assessment:     Summary of Scores: Score:   1  Number of spontaneously given correct response: 54/60       2  Number of stimulus cues given:  9 (3 words 2 cues were given for each)   3  Number of correct responses following a stimulus cue:  4       *TOTAL SCORE: 54/60   Percentile Rank:  85%ile       Additional Cuein  Number of phonemic cues: 3   5  Number of correct responses following the phonemic cue: 0       6  Number of multiple choices given:  6   7   Number of correct choices: 4       DYSPHAGIA EVALUATION:    -Reason for referral: Signs/symptoms of dysphagia    -Subjective report of swallowing difficulty: Coughing and Choking - "It feels like it's trap " She usually coughs  She also reports that she thinks this may be because she talks at the same time  It feels like the throat is getting more narrow      -Eating Assessment Tool (EAT-10) Swallowing Screening Tool is a patient self-assessment tool that rates the percieved impairment a swallowing disorder has on the Functional, Physical, and Emotional aspects of one's life  EAT-10 score:     -Difficulty swallowing: Solids    -Current diet (solids): Regular  -Current diet (liquids): Thin  -Current pill intake method: with water  -Alternative Feeding Method?: No    -Facial appearance Left side of face is more hollowed in than the right side which is more full    -Dentition Adequate   -Labial function Decreased strength (bilateral) and Decreased coordination   -Lingual function Decreased strength (bilateral)   -Velar function Symmetrical       LIQUID CONSISTENCY TESTIN  Liquid Consistency (Thin) - water   • Administered by: Self-fed and Water bottle  • Strategies, attempts, and responses: None    CLINICAL FINDINGS:  • Oral phase impairments: WFL  • Pharyngeal Phase Impairments: WFL    *Laryngeal excursion upon palpation: Appeared WFL      SOLID CONSISTENCY TESTIN  Solid Consistency (Regular, Mixed and Puree) - pretzels, mandarin oranges in thin liquid juice and applesauce  • Administered by: Spoon and Self-fed  • Strategies, attempts, and responses: None    CLINICAL FINDINGS:  • Oral phase impairments: WFL  • Pharyngeal Phase Impairments: Throat clear and Other (Patient demonstrated throat clear on applesauce  Reported that she would cough when eating the pretzels because she always has that reaction to salt   Patient did produce a small cough when eating the pretzel  )    *Laryngeal excursion upon palpation: Appeared WFL      -Factors affecting performance: None    -Safety concerns: Other Possible risk for aspiration but also suspect possible esophageal component      -Risk factors: Complex medical history      SWALLOWING SAFETY PRECAUTIONS:  -Recommended solids: Regular    -Recommended liquids: Thin    -Recommended medication form: as tolerated    -Frequent/thorough oral care     -Aspiration precautions   *Monitor for signs/symptoms concerning for aspiration (e g , low grade fever, increase in WBC, change in chest x-ray, increased congestion, increased coughing with P O  intake)    -Reflux precautions     -Strategies: Avoid talking when eating, small bites/sips, alternate solids and liquids    -Positioning: Upright position during meals and Upright position at least 30 minutes after P O  intake    -Compensatory strategies: Effortful swallow    -Supervision: Independent     -Referrals: Videofluroscopic Swallow Study and Gastroenterology      Goals    To Be Determined Following Video Swallow Study Results      Impressions/Recommendations    Impressions:   - During today's swallow evaluation, the patient demonstrated reduced lingual strength, as well as a reduction in labial coordination and strength  Patient has a history of trigeminal neuralgia and reports jaw pain  As trigeminal neuralgia typically involves sudden pain and not muscle weakness, it is unclear the origin of labial and lingual weakness  During food/liquid trials, patient demonstrated mild throat clearing on pureed consistency  Recommend participating in a video swallow study to rule out dysfunction in swallow  Based on patient's intermittent throat clearing (without food/beverage) during last week's evaluation and reported symptoms of a narrowing sensation in her throat, suspect patient may be experiencing esophageal symptoms, which may warrant a further referral to GI  Patient was further evaluated for language skills, utilizing the [employer] (BNT)  The patient scored an 85% (54 out of 60 words correct)  This demonstrates only a mild deficit in object/word recognition       Results of 3/8/23 PET scan of the brain reported the following: "IMPRESSION:  1  There is only subtle diminished activity in the bilateral frontal, temporal and anterior parietal lobes  Findings are nonspecific  It is also uncertain at this time if these findings would remain stable or progress to a more advanced dementia, such   as fronto-temporal dementia  12 month follow-up may be considered to evaluate for any change, if clinically warranted  "    Due to the patient's overall normal scores on cognitive and language assessments across the past two evaluation sessions, cognitive-linguistic therapy is not warranted at this time  Suspect that patient's intermittent cognitive-linguistic challenges may be indicative of a functional neurological disorder, considering multi-factorial medical history  However, results of PET scan warrant further interpretation  It is unclear at this time whether dysphagia symptoms may be related to neurological complaints  Further investigation is required  Patient will be placed on a 30 day hold, pending results of her video swallow study  Plan of care was discussed with the patient and she is in agreement at this time  Recommendations:  -Patient would benefit from outpatient skilled Speech Therapy services: TBD    -Frequency: As needed  -Duration: TBD    -Intervention certification from: 6/08/9347  -Intervention certification to: 1/68/9243    -Intervention comments:   40 minutes of dysphagia evaluation time and 20 minutes of sound language comprehension evaluation time

## 2023-03-14 ENCOUNTER — EVALUATION (OUTPATIENT)
Dept: SPEECH THERAPY | Facility: CLINIC | Age: 68
End: 2023-03-14

## 2023-03-14 DIAGNOSIS — R47.89 WORD FINDING PROBLEM: ICD-10-CM

## 2023-03-14 DIAGNOSIS — R41.3 MEMORY DIFFICULTY: ICD-10-CM

## 2023-03-14 DIAGNOSIS — R13.10 DYSPHAGIA, UNSPECIFIED TYPE: ICD-10-CM

## 2023-03-14 DIAGNOSIS — R48.8 OTHER SYMBOLIC DYSFUNCTIONS: Primary | ICD-10-CM

## 2023-03-22 ENCOUNTER — HOSPITAL ENCOUNTER (OUTPATIENT)
Dept: RADIOLOGY | Facility: HOSPITAL | Age: 68
Discharge: HOME | End: 2023-03-22
Attending: PSYCHIATRY & NEUROLOGY

## 2023-03-22 ENCOUNTER — OFFICE VISIT (OUTPATIENT)
Dept: NEUROLOGY | Facility: CLINIC | Age: 68
End: 2023-03-22

## 2023-03-22 VITALS
WEIGHT: 145.4 LBS | DIASTOLIC BLOOD PRESSURE: 74 MMHG | SYSTOLIC BLOOD PRESSURE: 130 MMHG | HEART RATE: 63 BPM | BODY MASS INDEX: 25.76 KG/M2

## 2023-03-22 DIAGNOSIS — R47.89 WORD FINDING PROBLEM: ICD-10-CM

## 2023-03-22 DIAGNOSIS — R41.3 MEMORY DIFFICULTY: ICD-10-CM

## 2023-03-22 DIAGNOSIS — R47.89 WORD FINDING DIFFICULTY: Primary | ICD-10-CM

## 2023-03-22 DIAGNOSIS — R48.8 OTHER SYMBOLIC DYSFUNCTIONS: ICD-10-CM

## 2023-03-22 DIAGNOSIS — R13.10 DYSPHAGIA, UNSPECIFIED TYPE: ICD-10-CM

## 2023-03-22 RX ORDER — CEPHALEXIN 500 MG/1
CAPSULE ORAL
COMMUNITY
Start: 2023-03-17

## 2023-03-22 NOTE — PROGRESS NOTES
[place] [address] [facility]      Impression/Plan    Ms Jackelin Villanueva is a 79 y o  female that I initially saw for and event involving alteration of consciousness with visual disturbance and confusion in 9/2019 of unclear etiology  No additional events  REEG and SD EEG were normal in 2019  She has moderate left PCA stenosis  Continue asa and statin       Word finding details noted subjectively over the last few years, but worsened over the last 6 months or so have been likely stable since last visit  Neuropsychological testing confirms word finding difficulties and problems with confrontational naming that 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 neurology today  Lab work ordered at last visit was not completed  This includes TSH B12, vitamin D CBC, CMP and lipid panel  Encouraged her to get blood work done  Patient Instructions   1  Have blood work done  [address] [place] [address] Neurology  3  Return in about 6 months  Diagnoses and all orders for this visit:    Word finding difficulty  -     Ambulatory Referral to Neurology; Future    Other orders  -     cephalexin (KEFLEX) 500 mg capsule; TAKE 4 CAPSULES BY MOUTH 1 HOURS BEFORE DENTAL VISIT        Subjective Sheffield Lesches is a 79 y o   female returning to the [place] [address] [facility] for evaluation of cognitive impairment  She arrives with her significant other    Neuropsychological testing was completed after last visit was reviewed in detail with the patient  PET scan completed as well and and the report was reviewed today  Cognition and headache are likely stable  Memory may be a little worse, but significant other feels things are likely stable  Headache is relatively mild and brief, bifrontal      Prior Evaluation:  REEG and SD EEG normal in 2019  MRI brain w/o 9/2019: Slight interval progression of the nonspecific cerebral white matter disease when comparing to 2011 examination  MRI brain with neuro quant 10/4/2022:  1  No acute infarction, intracranial hemorrhage or mass effect  2   Mild, chronic microangiopathy  3   NeuroQuant analysis was performed: Normal hippocampal volume and enlarged ventricular system: Findings do not support hippocampal degeneration  Possible expansion of ventricular system without medial temporal lobe focused ex-vacuo process  PET brain 3/8/2023:  There is only subtle diminished activity in the bilateral frontal, temporal and anterior parietal lobes  Findings are nonspecific  It is also uncertain at this time if these findings would remain stable or progress to a more advanced dementia, such   as fronto-temporal dementia  12 month follow-up may be considered to evaluate for any change, if clinically warranted  Neuropsychological testing completed by Dr Dave King on February 10, 2023:  "Objective neuropsychological assessment revealed primary difficulties in confrontation naming, inductive reasoning, sustained speed and initial acquisition of unstructured verbal information and a large amount of visual information, with intact consolidation, retention and retrieval   Word finding difficulties and problems with confrontation naming were the most significant findings and, with the addition of reported swallowing difficulties, or 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), 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, which may be exacerbated by poor sleep and mild depression symptoms    However, Dr Angel Meneses performance on verbal fluency and responsive naming measures were intact to above average, which is unusual for PPA  Although her above average intelligence and doctorate in [place] may be helping to mask the effects of a PPA process, further testing may be warranted to best determine etiology of her difficulties "  (Scanned neuropsychological report attached to 3/22/2023 encounter)      History Reviewed: The following were reviewed and updated as appropriate: allergies, current medications, past family history, past medical history, past social history, past surgical history and problem list  History of nephrolithiasis and hydronephrosis, fibromyalgia, depression with anxiety, osteoarthritis, trigeminal neuralgia, vitamin-D deficiency     Psychiatric History:  Anxiety  Depression     Social History:   Driving: Yes  Occupation: teaches english at [employer]    Objective    /74 (BP Location: Right arm)   Pulse 63   Wt 66 kg (145 lb 6 4 oz)   BMI 25 76 kg/m²      General Exam  General: well developed, no acute distress  HEENT: mucous membranes moist, anicteric sclera  Neurological Exam  Mental Status: awake, alert, and fully oriented to person, place, time, and situation  Attention intact  Fund of knowledge is appropriate for age and education  Language: fluency, comprehension normal        Cranial Nerves: Gaze conjugate, face symmetric, no dysarthria  Motor: No involuntary movements  Gait: Steady  Braulio Friday, MD   [facility]  Diplomate, [address] [facility]rology [facility] Epilepsy          ROS:    Review of Systems   Constitutional: Negative  Negative for appetite change and fever  HENT: Negative  Negative for hearing loss, tinnitus, trouble swallowing and voice change  Eyes: Negative  Negative for photophobia, pain and visual disturbance  Respiratory: Negative  Negative for shortness of breath  Cardiovascular: Negative  Negative for palpitations  Gastrointestinal: Negative  Negative for nausea and vomiting  Endocrine: Negative  Negative for cold intolerance  Genitourinary: Negative  Negative for dysuria, frequency and urgency  Musculoskeletal: Negative  Negative for gait problem, myalgias and neck pain  Skin: Negative  Negative for rash  Allergic/Immunologic: Negative  Neurological: Positive for headaches  Negative for dizziness, tremors, seizures, syncope, facial asymmetry, speech difficulty, weakness, light-headedness and numbness  Hematological: Negative  Does not bruise/bleed easily  Psychiatric/Behavioral: Negative  Negative for confusion, hallucinations and sleep disturbance  Memory issues   All other systems reviewed and are negative  ROS reviewed and updated as appropriate  I have spent a total time of 40 minutes on 03/22/23 in caring for this patient including Diagnostic results, Prognosis, Patient and family education, Impressions, Counseling / Coordination of care, Documenting in the medical record, Reviewing / ordering tests, medicine, procedures   and Obtaining or reviewing history

## 2023-03-22 NOTE — PATIENT INSTRUCTIONS
Have blood work done  [address] [facility]  Return in about 6 months

## 2023-03-22 NOTE — PROCEDURES
Video Swallow Study      Patient Name: Graciela Dus  LNTNS'E Date: 3/22/2023        Past Medical History  Past Medical History:   Diagnosis Date   • Arthralgia    • Atypical chest pain    • Balance disorder    • Cervical rib     last assessed 9/13/12   • Depression    • Fatigue    • Fibromyalgia    • Fibromyalgia    • Fibromyalgia, primary    • GERD (gastroesophageal reflux disease)    • Hydronephrosis with renal and ureteral calculus obstruction     last assessed 5/1/17   • Lacunar stroke ([address] )    • Leukopenia     last assessed 5/19/16   • Memory loss    • SHAWN (obstructive sleep apnea)    • Restless leg syndrome     last assessed 11/8/13   • Sebaceous cyst     last assessed 2/13/13   • Skin tag     last assessed 2/13/13   • Vitamin D deficiency         Past Surgical History  Past Surgical History:   Procedure Laterality Date   • BREAST BIOPSY Bilateral 07/26/2007    u/s core bx   • COLONOSCOPY  2011    fiberoptic   • DENTAL SURGERY      wisdom teeth   • KNEE SURGERY      right knee 2006 meniscal tear stage 04   • ND ARTHRP KNE CONDYLE&PLATU MEDIAL&LAT COMPARTMENTS Left 07/15/2021    Procedure: ARTHROPLASTY KNEE TOTAL - left;  Surgeon: Bette Chopra MD;  [facility];  Service: Orthopedics   • ND COLONOSCOPY FLX DX W/COLLJ SPEC WHEN PFRMD N/A 03/16/2017    Procedure: COLONOSCOPY;  Surgeon: Tobias Pettit MD;  Location: [facility] GI [facility];   Service: Gastroenterology   • ND CYSTO/URETERO W/LITHOTRIPSY &INDWELL STENT INSRT Left 04/26/2017    Procedure: CYSTOSCOPY URETEROSCOPY; RETROGRADE PYELOGRAM; STONE EXTRACTION; INSERTION STENT URETERAL;  Surgeon: Michaela Hoover MD;  [facility];  Service: Urology   • ND MANIPULATION KNEE JOINT UNDER GENERAL ANESTHESIA Left 08/27/2021    Procedure: MANIPULATION JOINT KNEE;  Surgeon: Bette Chopra MD;  [facility];  Service: Orthopedics     Modified (Video) Barium Swallow Study    Summary:  Images are on PACS for review  Pt presents w/ a functional oropharyngeal swallow  Noted some minimal lingual residue that she is able to clear w/ mult swallows  Pill stasis also noted in the lateral position mid esophagus  It was cleared w/ sip thin, pt did not have sensation to this but states she usually would take more liquids anyway to take pills  Education provided on pills w/ extra water, no talking while food is in the oral cavity ( to which she states she is working on at meals), as well as oral care during the day  Recommendations:  Diet: regular   Liquids: thin   Meds: whole w/ liquid, extra liquids   Strategies: reduced distractions w/ intake/meals, slow intake  Mult swallows as needed   Frequent oral care  Upright position    Results reviewed with: pt  Repeat MBS as necessary  If a dedicated assessment of the esophagus is desired, consider esophagram/barium swallow or EGD  H&P/pertinent provider notes: (PMH noted above)  Pt is a 78 y/o f referred for a VBS by her 1* SLP  She was receiving outpt speech for cog language treatment, to the therapist she reported that when she is swallowing her throat feels "narrower"  She often feels like she might choke, however, she doesn't  Symptoms are primarily throat clearing  She also reports that she talks when she eats and she is working on that  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, 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 and has to stop and sip water  No recent PNA, fully mobile for study  Lives at home        Special Studies:      Previous VBS:  -  Swallow Mechanism Exam  Facial: symmetrical  Labial: WFL  Lingual: WFL  Velum: symmetrical  Mandible: adequate ROM  Dentition: adequate  Vocal quality:clear/adequate   Volitional Cough: strong/productive   Respiratory Status: on RA        Swallow Information   Current Risks for Dysphagia & Aspiration: subjective difficulty swallowing w/ some underlying cog deficit  Current Symptoms/Concerns: coughing with po-mostly solids, states she talks w/ po  Current Diet: regular diet and thin liquids   Baseline Diet: regular diet and thin liquids      Consistencies Administered:  Pt was viewed sitting upright in the lateral and AP positions  Trials administered were consistent with [facility] Protocol: 5-mL thin liquid x2, 20-mL cup sip thin, 40-mL sequential swallow thin, 5-mL nectar thick, 20-mL cup sip nectar thick, 40-mL sequential swallow nectar thick, 5-mL Honey thick, 5-mL pudding, ½ cookie coated with 3-mL pudding, 5-mL nectar thick in the AP position, and 5-mL pudding in the AP position  Pt was also given thin liquids by straw, as well as a barium tablet with thin liquid  Oral Impairment:  Lip Closure: wfl   Tongue Control During Bolus Hold:wfl   Bolus Preparation/Mastication: wfl, timely  Bolus Transport/Lingual Motion:fair   Oral Residue: minimal, clears w/ mult swallows  Initiation of the Pharyngeal Swallow: mild delay     Pharyngeal Impairment:  Soft Palate Elevation: wfl  Laryngeal Elevation: fair/functional   Anterior Hyoid Excursion: fair   Epiglottic Movement: complete  Laryngeal Vestibular Closure: wfl  Pharyngeal Stripping Wave: wfl   Pharyngeal Contraction: wfl   PES Opening: mildly tight, no gross bolus retropulsion or regurgitation  Tongue Base Retraction: minimally reduced   Pharyngeal Residue: mild coating w/ material but able to clear w/ a 2* swallow  No gross residue    Screening of Esophageal Impairment   Esophageal Clearance: pill stasis on lateral view, mild regurgitation on AP view      Penetration/Aspiration:  Thin: 1  Nectar:1  Honey:1  Puree: 1  Solid: 1  Response to Aspiration: no aspiration noted  Strategies/Efficacy: mult swallows for minimal lingual coating        8-Point Penetration-Aspiration Scale   1 Material does not enter the airway   2 Material enters the airway, remains above the vocal folds, and is ejected  from the  airway    3 Material enters the airway, remains above the vocal folds, and is not ejected from the airway   4 Material enters the airway, contacts the vocal folds, and is ejected from the airway   5 Material enters the airway, contacts the vocal folds, and is not ejected from the airway    6 Material enters the airway, passes below the vocal folds and is ejected into the larynx or out of the airway    7 Material enters the airway, passes below the vocal folds, and is not ejected from the trachea despite effort    8 Material enters the airway, passes below the vocal folds, and no effort is made to eject

## 2023-03-28 ENCOUNTER — TELEPHONE (OUTPATIENT)
Dept: SPEECH THERAPY | Facility: CLINIC | Age: 68
End: 2023-03-28

## 2023-03-28 NOTE — TELEPHONE ENCOUNTER
Left message on the patient's voicemail to follow-up with her post - MBSS (swallow study)  Provided the Physical Therapy at [facility] ([address] [place] [address]) phone number for a return call to discuss findings and POC

## 2023-05-03 DIAGNOSIS — M79.7 FIBROMYALGIA: ICD-10-CM

## 2023-05-03 RX ORDER — GABAPENTIN 300 MG/1
CAPSULE ORAL
Qty: 270 CAPSULE | Refills: 1 | Status: SHIPPED | OUTPATIENT
Start: 2023-05-03

## 2023-06-12 ENCOUNTER — RA CDI HCC (OUTPATIENT)
Dept: OTHER | Facility: HOSPITAL | Age: 68
End: 2023-06-12

## 2023-06-12 NOTE — PROGRESS NOTES
[facility]  coding opportunities       Chart reviewed, no opportunity found: CHART REVIEWED, NO OPPORTUNITY FOUND        Patients Insurance        Commercial Insurance: [address] [place] [address]

## 2023-06-15 DIAGNOSIS — F41.8 DEPRESSION WITH ANXIETY: ICD-10-CM

## 2023-06-15 DIAGNOSIS — M79.7 FIBROMYALGIA: ICD-10-CM

## 2023-06-15 RX ORDER — VENLAFAXINE HYDROCHLORIDE 75 MG/1
CAPSULE, EXTENDED RELEASE ORAL
Qty: 90 CAPSULE | Refills: 1 | Status: SHIPPED | OUTPATIENT
Start: 2023-06-15

## 2023-06-19 ENCOUNTER — HOSPITAL ENCOUNTER (OUTPATIENT)
Dept: RADIOLOGY | Facility: HOSPITAL | Age: 68
Discharge: HOME | End: 2023-06-19
Payer: COMMERCIAL

## 2023-06-19 ENCOUNTER — OFFICE VISIT (OUTPATIENT)
Dept: INTERNAL MEDICINE CLINIC | Facility: CLINIC | Age: 68
End: 2023-06-19
Payer: COMMERCIAL

## 2023-06-19 VITALS
DIASTOLIC BLOOD PRESSURE: 84 MMHG | BODY MASS INDEX: 25.16 KG/M2 | HEART RATE: 70 BPM | WEIGHT: 142 LBS | SYSTOLIC BLOOD PRESSURE: 120 MMHG | OXYGEN SATURATION: 98 % | TEMPERATURE: 96.7 F | HEIGHT: 63 IN

## 2023-06-19 DIAGNOSIS — M79.7 FIBROMYALGIA: ICD-10-CM

## 2023-06-19 DIAGNOSIS — K21.9 GASTROESOPHAGEAL REFLUX DISEASE, UNSPECIFIED WHETHER ESOPHAGITIS PRESENT: ICD-10-CM

## 2023-06-19 DIAGNOSIS — M81.0 AGE-RELATED OSTEOPOROSIS WITHOUT CURRENT PATHOLOGICAL FRACTURE: ICD-10-CM

## 2023-06-19 DIAGNOSIS — M79.605 LEG PAIN, BILATERAL: ICD-10-CM

## 2023-06-19 DIAGNOSIS — R26.89 BALANCE DISORDER: ICD-10-CM

## 2023-06-19 DIAGNOSIS — M79.604 LEG PAIN, BILATERAL: ICD-10-CM

## 2023-06-19 DIAGNOSIS — R47.89 WORD FINDING DIFFICULTY: ICD-10-CM

## 2023-06-19 DIAGNOSIS — F41.8 DEPRESSION WITH ANXIETY: ICD-10-CM

## 2023-06-19 DIAGNOSIS — K59.04 CHRONIC IDIOPATHIC CONSTIPATION: ICD-10-CM

## 2023-06-19 DIAGNOSIS — M79.604 LEG PAIN, BILATERAL: Primary | ICD-10-CM

## 2023-06-19 DIAGNOSIS — G25.81 RESTLESS LEGS SYNDROME: ICD-10-CM

## 2023-06-19 DIAGNOSIS — G50.0 TRIGEMINAL NEURALGIA: ICD-10-CM

## 2023-06-19 DIAGNOSIS — M25.551 HIP PAIN, BILATERAL: ICD-10-CM

## 2023-06-19 DIAGNOSIS — M25.552 HIP PAIN, BILATERAL: ICD-10-CM

## 2023-06-19 DIAGNOSIS — Z12.31 ENCOUNTER FOR SCREENING MAMMOGRAM FOR BREAST CANCER: ICD-10-CM

## 2023-06-19 DIAGNOSIS — G47.33 OSA (OBSTRUCTIVE SLEEP APNEA): ICD-10-CM

## 2023-06-19 DIAGNOSIS — M79.605 LEG PAIN, BILATERAL: Primary | ICD-10-CM

## 2023-06-19 PROBLEM — M79.643 HAND PAIN: Status: RESOLVED | Noted: 2019-02-13 | Resolved: 2023-06-19

## 2023-06-19 PROCEDURE — 72110 X-RAY EXAM L-2 SPINE 4/>VWS: CPT

## 2023-06-19 PROCEDURE — 99214 OFFICE O/P EST MOD 30 MIN: CPT | Performed by: INTERNAL MEDICINE

## 2023-06-19 PROCEDURE — 73521 X-RAY EXAM HIPS BI 2 VIEWS: CPT

## 2023-06-19 NOTE — ASSESSMENT & PLAN NOTE
Recently with episodes of loose stools and frequent bleching  Suspect component of IBS  Instructed to change Colace to Metamucil, take bid  May continue taking Miralax prn  Start food diary    Discussed GI referral

## 2023-06-19 NOTE — ASSESSMENT & PLAN NOTE
MRI, PET scan normal   Saw neurology, referred to [facility]  Symptoms interittent

## 2023-06-19 NOTE — PROGRESS NOTES
Assessment/Plan:    Word finding difficulty  MRI, PET scan normal   Saw neurology, referred to [facility]  Symptoms interittent  SHAWN (obstructive sleep apnea)  CPAP study scheduled  Depression with anxiety  Stable, on venlafaxine  Other hyperlipidemia  Repeat lipids due, on atorvastatin  Age-related osteoporosis without current pathological fracture  Tolerating alendronate  Continue daily walks and supplements  GERD (gastroesophageal reflux disease)  Instructed to take PPI daily again  Restless legs syndrome  On gabapentin, takes iron daily  Fibromyalgia  On gabapentin  Constipation  Recently with episodes of loose stools and frequent bleching  Suspect component of IBS  Instructed to change Colace to Metamucil, take bid  May continue taking Miralax prn  Start food diary  Discussed GI referral     Primary osteoarthritis of both knees  Stable  Trigeminal neuralgia  No issues  Taking carbamazepine  Diagnoses and all orders for this visit:    Leg pain, bilateral  Comments:  Encouraegd to do x-rays, suspect lumbar pathology  May increase gabapentin to 600 mg tid  Orders:  -     Ambulatory Referral to Physical Therapy; Future    Balance disorder  Comments:  Restart physical therapy  Orders:  -     Ambulatory Referral to Physical Therapy; Future    Restless legs syndrome    Trigeminal neuralgia    Word finding difficulty    SHAWN (obstructive sleep apnea)    Gastroesophageal reflux disease, unspecified whether esophagitis present    Fibromyalgia    Depression with anxiety    Age-related osteoporosis without current pathological fracture    Chronic idiopathic constipation    Encounter for screening mammogram for breast cancer  -     Mammo screening bilateral w 3d & cad; Future      Follow up in 6 months or as needed  Subjective:      Patient ID: Mulu Jerez is a 79 y o  female here for a follow up  She is complaining of a lot of GI problems recently    She still experiences "constipation, has been taking Colace  She noticed occasional explosive diarrhea with abdomina cramping  She also complains of a lot of belching recently, no abdominal pain, nausea or vomiting  She is trying to drink more water daily, averages about 2 to 3 glasses a day only  She is asking if she should see GI  She complains of ongoing bilateral leg pain  She feels there are knots on her thighs and legs all the time  She has occasional low back pain  No recent groin pain, does experience chronic bilateral knee pain  She is not ready for any surgery yet  No recent falls  She is concerned about a spot on her back, asking to see dermatology  She saw neurology  She reports memory and word finding issues does not always occur  The following portions of the patient's history were reviewed and updated as appropriate: allergies, current medications, past medical history, past social history and problem list     Review of Systems   Constitutional: Negative for activity change, appetite change and fatigue  HENT: Negative for congestion, ear pain and postnasal drip  Eyes: Negative for visual disturbance  Respiratory: Negative for cough and shortness of breath  Cardiovascular: Negative for chest pain and leg swelling  Gastrointestinal: Negative for abdominal pain, constipation and diarrhea  Genitourinary: Negative for dysuria, frequency and urgency  Musculoskeletal: Positive for arthralgias and back pain  Negative for myalgias  Skin: Negative for rash and wound  Neurological: Negative for dizziness, numbness and headaches  Hematological: Does not bruise/bleed easily  Psychiatric/Behavioral: Negative for confusion  The patient is not nervous/anxious  Objective:      /84   Pulse 70   Temp (!) 96 7 °F (35 9 °C)   Ht 5' 3\" (1 6 m)   Wt 64 4 kg (142 lb)   SpO2 98%   BMI 25 15 kg/m²          Physical Exam  Vitals and nursing note reviewed     Constitutional:       General: " She is not in acute distress  Appearance: She is well-developed  HENT:      Head: Normocephalic and atraumatic  Eyes:      Pupils: Pupils are equal, round, and reactive to light  Cardiovascular:      Rate and Rhythm: Normal rate and regular rhythm  Heart sounds: Normal heart sounds  Pulmonary:      Effort: Pulmonary effort is normal       Breath sounds: Normal breath sounds  No wheezing  Chest:      Chest wall: No tenderness  Abdominal:      General: Bowel sounds are normal       Palpations: Abdomen is soft  Musculoskeletal:         General: No swelling  Thoracic back: No spasms or tenderness  Lumbar back: No spasms or tenderness  Right lower leg: No edema  Left lower leg: No edema  Skin:     General: Skin is warm  Findings: No rash  Neurological:      General: No focal deficit present  Mental Status: She is alert and oriented to person, place, and time  Psychiatric:         Mood and Affect: Mood and affect normal  Mood is not anxious or depressed  Behavior: Behavior normal            Labs & imaging results reviewed with patient  BMI Counseling: Body mass index is 25 15 kg/m²  The BMI is above normal  Nutrition recommendations include 3-5 servings of fruits/vegetables daily  Exercise recommendations include exercising 3-5 times per week

## 2023-06-21 ENCOUNTER — TELEPHONE (OUTPATIENT)
Dept: INTERNAL MEDICINE CLINIC | Facility: CLINIC | Age: 68
End: 2023-06-21

## 2023-06-21 NOTE — TELEPHONE ENCOUNTER
LM for patient to call back  Xrays done on 06/19 have not been read yet, can take up to 7 days  We will call as soon as we have the results

## 2023-06-23 ENCOUNTER — TELEPHONE (OUTPATIENT)
Dept: INTERNAL MEDICINE CLINIC | Facility: CLINIC | Age: 68
End: 2023-06-23

## 2023-06-23 DIAGNOSIS — M41.9 SCOLIOSIS OF THORACOLUMBAR SPINE, UNSPECIFIED SCOLIOSIS TYPE: ICD-10-CM

## 2023-06-23 DIAGNOSIS — M51.36 LUMBAR DEGENERATIVE DISC DISEASE: Primary | ICD-10-CM

## 2023-06-23 PROBLEM — M51.369 LUMBAR DEGENERATIVE DISC DISEASE: Status: ACTIVE | Noted: 2023-06-23

## 2023-06-23 NOTE — TELEPHONE ENCOUNTER
Spoke w/ pt  And adv'd  Can put in referral for [facility]  She will call to schedule  No need to call pt  back

## 2023-06-23 NOTE — TELEPHONE ENCOUNTER
X-ray results:    Back x-ray showed some arthritis, also showed mild scoliosis  Hip x-ray showed mild arthritis  Recommend to try formal physical therapy, may help with the leg pain   Let me know so I can send referral

## 2023-06-27 ENCOUNTER — EVALUATION (OUTPATIENT)
Dept: PHYSICAL THERAPY | Facility: REHABILITATION | Age: 68
End: 2023-06-27
Payer: COMMERCIAL

## 2023-06-27 DIAGNOSIS — M79.605 LEG PAIN, BILATERAL: ICD-10-CM

## 2023-06-27 DIAGNOSIS — M79.604 LEG PAIN, BILATERAL: ICD-10-CM

## 2023-06-27 DIAGNOSIS — R26.89 BALANCE DISORDER: Primary | ICD-10-CM

## 2023-06-27 DIAGNOSIS — M41.9 SCOLIOSIS OF THORACOLUMBAR SPINE, UNSPECIFIED SCOLIOSIS TYPE: ICD-10-CM

## 2023-06-27 DIAGNOSIS — M51.36 LUMBAR DEGENERATIVE DISC DISEASE: ICD-10-CM

## 2023-06-27 PROCEDURE — 97161 PT EVAL LOW COMPLEX 20 MIN: CPT

## 2023-06-27 PROCEDURE — 97530 THERAPEUTIC ACTIVITIES: CPT

## 2023-06-27 PROCEDURE — 97110 THERAPEUTIC EXERCISES: CPT

## 2023-06-27 NOTE — PROGRESS NOTES
PT Evaluation     Today's date: 2023  Patient name: Devin Tafoya  : 1955  MRN: [id number]  Referring provider: Payton Queen MD  Dx:   Encounter Diagnosis     ICD-10-CM    1  Balance disorder  R26 89 Ambulatory Referral to Physical Therapy    Restart physical therapy  2  Leg pain, bilateral  M79 604 Ambulatory Referral to Physical Therapy    M79 605     Encouraegd to do x-rays, suspect lumbar pathology  May increase gabapentin to 600 mg tid  3  Lumbar degenerative disc disease  M51 36       4  Scoliosis of thoracolumbar spine, unspecified scoliosis type  M41 9           Start Time: 1530  Stop Time: 1620  Total time in clinic (min): 50 minutes    Assessment  Assessment details: Devin Tafoya is a 79 y o  female presenting with worsening of chronic LBP and B knee pain (R knee pain worse) as well as balance and gait dysfunction which has been worsening  Primary impairments include R knee and lumbar pain with functional activities, RLE weakness, lumbar AROM dysfunction, paraspinal motor control dysfunction, static/dynamic balance and gait problems  Educated pt on anatomy and physiology of diagnosis  Will benefit from skilled PT interventions for community reintegration, ADL management/independence, return to work/sport/hobbies  Provided pt with written home exercise program to be completed independently  Impairments: abnormal coordination, abnormal gait, abnormal muscle firing, abnormal muscle tone, abnormal or restricted ROM, activity intolerance, impaired balance, impaired physical strength, lacks appropriate home exercise program, pain with function, safety issue, poor posture  and poor body mechanics  Functional limitations: lifting heavy objects, ADLs, stooping, prolonged walking  Symptom irritability: moderateBarriers to therapy: none  Understanding of Dx/Px/POC: good   Prognosis: good    Goals    Short Term Goals: In 4 weeks, the patient will:  1   Improve R hip flexion and "extension strength to at least 3+/5 MMT  2  Improve R knee extension strength to at least 3+/5 MMT  3  Supervision with HEP for self-care    Long Term Goals: In 8 weeks, the patient will:  1  Improve FGA score to at least 24/30 to decrease fall risk  2  FOTO to greater than predicted value  3   Independent with HEP for self-care      Plan  Patient would benefit from: skilled physical therapy  Planned modality interventions: manual electrical stimulation  Planned therapy interventions: abdominal trunk stabilization, activity modification, balance, balance/weight bearing training, behavior modification, body mechanics training, community reintegration, coordination, fine motor coordination training, flexibility, functional ROM exercises, gait training, graded activity, graded exercise, graded motor, home exercise program, work reintegration, therapeutic training, therapeutic exercise, therapeutic activities, stretching, strengthening, self care, postural training, patient education, neuromuscular re-education, motor coordination training, massage, manual therapy, joint mobilization and ADL training  Frequency: 2x week  Duration in weeks: 8  Plan of Care beginning date: 6/27/2023  Plan of Care expiration date: 8/22/2023  Treatment plan discussed with: patient        Subjective    Pain Location: LBP - bilateral; R knee - medial and inferior to patella, L knee pain as well but not as serious  Pain Intensity: LBP - dull; R knee - sharp  AMADA: pains are gradually worsening  DOI: chronic pain  Aggravating Factors: forward bending (doing the dishes, brushing teeth, sweeping), stair negotiation (descending worse), picking up heavy objects, prolonged walking 10+ minutes  Alleviating Factors: walking, gabapentin - recently increased dose, Tylenol prn  Living Situation: independent with ADLs but challenging  Constitutional S/S: denies paresthesias   Goals: \"improvement in all three areas (LBP, leg pain, balance)\" \"I would like to " "go hiking or go for a trip\"  PLOF: goes for walks 2-3x per week; \"balance is pretty bad, I tend to fall backwards\", no history of falls, \"stumbling around\", \"knots all over my legs\" - rub them to assist in loosening      Objective    Flowsheet Rows    Flowsheet Row Most Recent Value   PT/OT G-Codes    Current Score 49   Projected Score 54                Range of Motion: Goniometric revealed the following findings (in degrees)  Joint Motion Right: 2023 Left: 2023   Lumbar flexion 100%    Lumbar extension 25%    Lumbar lateral flexion 25% 25%   Lumbar rotation 50% 50%     Strength: MMT revealed the following findings  Joint Motion Right: 2023 Left: 2023   Hip Flexion 3/5 4-/5   Hip Abduction 4/5 4/5   Hip Adduction 4/5 4/5   Hip Extension 3/5 3+/5   Knee Extension 3/5 4/5   Knee Flexion 4/5 4+/5   Ankle Plantarflexion 4+/5 4+/5   Ankle Dorsiflexion 4+/5 4+/5     Additional Assessments:  Palpation: TTP L3-L5  Observation: scoliosis  Gait Pattern: mildly antalgic  Balance: impaired  Joint Mobility: crepitus R knee       Special Tests:  Lumbar Specific and Neural Tension                                                                            Test / Measure  2023   Straight Leg raise +   Prone instability test +       Balance:  SLS: L - 14 74s (1st try); R - 25 68s (2nd try)  TU 13s  5xSTS:  10 95s  FGA:      Outcome Measures:   FOTO: 49         Precautions: osteoporosis, fibroyalgia, hx TKA,     Pertinent Findings and Outcome Measures:                                                                                                                                                                         Test / Measure  2023      FOTO 49      R knee ext MMT 3/5      R hip flex MMT 3/5      R hip ext MMT 3/5      FGA           Manuals                                                                 Neuro Re-Ed             SLS 15\" B                                             " Ther Ex             HEP review 5'            NuStep 5' L5                                                                                          Ther Activity             STS 5x            TUG 1x            Gait Training                                       Modalities

## 2023-06-28 ENCOUNTER — OFFICE VISIT (OUTPATIENT)
Dept: PHYSICAL THERAPY | Facility: REHABILITATION | Age: 68
End: 2023-06-28
Payer: COMMERCIAL

## 2023-06-28 DIAGNOSIS — M79.604 LEG PAIN, BILATERAL: Primary | ICD-10-CM

## 2023-06-28 DIAGNOSIS — R26.89 BALANCE DISORDER: ICD-10-CM

## 2023-06-28 DIAGNOSIS — M79.605 LEG PAIN, BILATERAL: Primary | ICD-10-CM

## 2023-06-28 DIAGNOSIS — M41.9 SCOLIOSIS OF THORACOLUMBAR SPINE, UNSPECIFIED SCOLIOSIS TYPE: ICD-10-CM

## 2023-06-28 DIAGNOSIS — M51.36 LUMBAR DEGENERATIVE DISC DISEASE: ICD-10-CM

## 2023-06-28 PROCEDURE — 97110 THERAPEUTIC EXERCISES: CPT

## 2023-06-28 PROCEDURE — 97112 NEUROMUSCULAR REEDUCATION: CPT

## 2023-06-28 PROCEDURE — 97530 THERAPEUTIC ACTIVITIES: CPT

## 2023-06-28 NOTE — PROGRESS NOTES
"Daily Note     Today's date: 2023  Patient name: Caterina Almanza  : 1955  MRN: [id number]  Referring provider: River Trejo MD  Dx:   Encounter Diagnosis     ICD-10-CM    1  Leg pain, bilateral  M79 604     M79 605       2  Balance disorder  R26 89       3  Lumbar degenerative disc disease  M51 36 Ambulatory Referral to Physical Therapy      4  Scoliosis of thoracolumbar spine, unspecified scoliosis type  M41 9 Ambulatory Referral to Physical Therapy          Start Time: 1023  Stop Time: 1102  Total time in clinic (min): 39 minutes    Subjective: Pt reports mild R knee pain aggravation after initial evaluation yesterday  Objective: See treatment diary below      Assessment: Tolerated treatment well  Patient would benefit from continued PT  Pt able to tolerate progression of POC this tx session for the first tx visit following the initial evaluation  Mild fatigue noted post-session  Good form noted with all exercises - minimal VCs provided this visit  Slight knee discomfort with increased physical activity  1:1 with Callie Fountain DPT entirety of tx  Plan: Progress treatment as tolerated         Precautions: osteoporosis, fibroyalgia, hx TKA,     Pertinent Findings and Outcome Measures:                                                                                                                                                                         Test / Measure  2023      FOTO 49      R knee ext MMT 3/5      R hip flex MMT 3/5      R hip ext MMT 3/5      FGA 19/30          Manuals                                                      Neuro Re-Ed           SLS 15\" B 3x30\" B         Lane TKE           QS + SLR  2x10 B         Bridges  2x10         Tandem gait - fwd/bwd  4 laps foam beam         LTRs  10x ea         Hooklying clamshells  2x10 btb         Ther Ex           HEP review 5'          NuStep 5' L5 8' L6         Leg press - DL  2x10 45#         Leg press - SL " 2x10 B 10#                                                     Ther Activity           STS 5x 2x10         TUG 1x          Gait Training                                 Modalities

## 2023-07-05 ENCOUNTER — OFFICE VISIT (OUTPATIENT)
Dept: PHYSICAL THERAPY | Facility: REHABILITATION | Age: 68
End: 2023-07-05
Payer: COMMERCIAL

## 2023-07-05 DIAGNOSIS — R26.89 BALANCE DISORDER: ICD-10-CM

## 2023-07-05 DIAGNOSIS — M41.9 SCOLIOSIS OF THORACOLUMBAR SPINE, UNSPECIFIED SCOLIOSIS TYPE: ICD-10-CM

## 2023-07-05 DIAGNOSIS — M51.36 LUMBAR DEGENERATIVE DISC DISEASE: ICD-10-CM

## 2023-07-05 DIAGNOSIS — M79.604 LEG PAIN, BILATERAL: Primary | ICD-10-CM

## 2023-07-05 DIAGNOSIS — M79.605 LEG PAIN, BILATERAL: Primary | ICD-10-CM

## 2023-07-05 PROCEDURE — 97112 NEUROMUSCULAR REEDUCATION: CPT

## 2023-07-05 PROCEDURE — 97110 THERAPEUTIC EXERCISES: CPT

## 2023-07-05 PROCEDURE — 97530 THERAPEUTIC ACTIVITIES: CPT

## 2023-07-05 NOTE — PROGRESS NOTES
Daily Note     Today's date: 2023  Patient name: Malika Persaud  : 1955  MRN: [id number]  Referring provider: Jun Bangura MD  Dx:   Encounter Diagnosis     ICD-10-CM    1. Leg pain, bilateral  M79.604     M79.605       2. Balance disorder  R26.89       3. Lumbar degenerative disc disease  M51.36       4. Scoliosis of thoracolumbar spine, unspecified scoliosis type  M41.9           Start Time: 1015  Stop Time: 1100  Total time in clinic (min): 45 minutes    Subjective: Pt reports aggravation of R knee pain recently. States mild lumbar discomfort at start of tx session. Objective: See treatment diary below      Assessment: Tolerated treatment well. Patient would benefit from continued PT. Tx session focused on R LE discomfort - will continue with balance interventions next visit. Pain appears to be due to patellofemoral dysfunction. Mild fatigue and soreness post-session. 1:1 with Hussein Daily, DPT entirety of tx. Plan: Progress treatment as tolerated.        Precautions: osteoporosis, fibroyalgia, hx TKA,     Pertinent Findings and Outcome Measures:                                                                                                                                                                         Test / Measure  2023      FOTO 49      R knee ext MMT 3/5      R hip flex MMT 3/5      R hip ext MMT 3/5      FGA 19/30          Manuals                                                     Neuro Re-Ed           SLS 15" B 3x30" B         Bi TKE   3x10 R 9#        QS + SLR  2x10 B 2x10 B 1#        Bridges  2x10 3x10 + hip add iso        Tandem gait - fwd/bwd  4 laps foam beam         LTRs  10x ea 10x ea        Hooklying clamshells  2x10 btb         Ther Ex           HEP review 5'          NuStep 5' L5 8' L6 8' L6        Leg press - DL  2x10 40# 3x10 55#        Leg press - SL  2x10 B 10# 2x10 B 25#        Prone quad S   3x30" R        LAQ   2x10 B 3# Ther Activity           STS 5x 2x10         TUG 1x          DL / rack pull   2x10 5# from 9" step        Gait Training                                 Modalities

## 2023-07-07 ENCOUNTER — OFFICE VISIT (OUTPATIENT)
Dept: PHYSICAL THERAPY | Facility: REHABILITATION | Age: 68
End: 2023-07-07
Payer: COMMERCIAL

## 2023-07-07 DIAGNOSIS — M79.605 LEG PAIN, BILATERAL: Primary | ICD-10-CM

## 2023-07-07 DIAGNOSIS — M41.9 SCOLIOSIS OF THORACOLUMBAR SPINE, UNSPECIFIED SCOLIOSIS TYPE: ICD-10-CM

## 2023-07-07 DIAGNOSIS — R26.89 BALANCE DISORDER: ICD-10-CM

## 2023-07-07 DIAGNOSIS — M51.36 LUMBAR DEGENERATIVE DISC DISEASE: ICD-10-CM

## 2023-07-07 DIAGNOSIS — M79.604 LEG PAIN, BILATERAL: Primary | ICD-10-CM

## 2023-07-07 PROCEDURE — 97530 THERAPEUTIC ACTIVITIES: CPT

## 2023-07-07 PROCEDURE — 97112 NEUROMUSCULAR REEDUCATION: CPT

## 2023-07-07 PROCEDURE — 97110 THERAPEUTIC EXERCISES: CPT

## 2023-07-07 NOTE — PROGRESS NOTES
Daily Note     Today's date: 2023  Patient name: Gela Hernandez  : 1955  MRN: [id number]  Referring provider: Kita Wheeler MD  Dx:   Encounter Diagnosis     ICD-10-CM    1. Leg pain, bilateral  M79.604     M79.605       2. Balance disorder  R26.89       3. Lumbar degenerative disc disease  M51.36       4. Scoliosis of thoracolumbar spine, unspecified scoliosis type  M41.9           Start Time: 1045  Stop Time: 1124  Total time in clinic (min): 39 minutes    Subjective: Pt reports no significant aggravation of pain prior to start of tx session. Objective: See treatment diary below      Assessment: Tolerated treatment well. Patient would benefit from continued PT. Tx session focused on functional strengthening and various static and dynamic balance tasks. Pt required intermittent use of parallel bar this tx session to complete more advanced balance tasks. Narrow SHAYY and uneven surfaces are currently challenging. Mild fatigue noted post-session. 1:1 with Geoffery Bottoms, DPT entirety of tx. Plan: Progress treatment as tolerated.        Precautions: osteoporosis, fibroyalgia, hx TKA,     Pertinent Findings and Outcome Measures:                                                                                                                                                                         Test / Measure  2023      FOTO 49      R knee ext MMT 3/5      R hip flex MMT 3/5      R hip ext MMT 3/5      FGA /30          Manuals                                Neuro Re-Ed       SLS 15" B 3x30" B     Tandem stance    3x30" B   Bi TKE   3x10 R 9#    QS + SLR  2x10 B 2x10 B 1#    Bridges  2x10 3x10 + hip add iso    Tandem gait - fwd/bwd  4 laps foam beam  5 laps foam beam   LTRs  10x ea 10x ea    Hooklying clamshells  2x10 btb     Lateral step over objects    7 laps foam beam (3 hurdles)   Step taps    2x15 9" step, 2# aw   Mini squats on bosu    2x10   Ther Ex HEP review 5'      NuStep 5' L5 8' L6 8' L6 8' L6   Leg press - DL  2x10 40# 3x10 55# 3x10 55#   Leg press - SL  2x10 B 10# 2x10 B 25# 2x10 B 25#   Prone quad S   3x30" R    LAQ   2x10 B 3#                  Ther Activity       STS 5x 2x10  2x10 feet on airex   TUG 1x      DL / rack pull   2x10 5# from 9" step 2x10 8# from 9" step   Gait Training                     Modalities

## 2023-07-11 ENCOUNTER — OFFICE VISIT (OUTPATIENT)
Dept: PHYSICAL THERAPY | Facility: REHABILITATION | Age: 68
End: 2023-07-11
Payer: COMMERCIAL

## 2023-07-11 DIAGNOSIS — R26.89 BALANCE DISORDER: ICD-10-CM

## 2023-07-11 DIAGNOSIS — M79.605 LEG PAIN, BILATERAL: Primary | ICD-10-CM

## 2023-07-11 DIAGNOSIS — M79.604 LEG PAIN, BILATERAL: Primary | ICD-10-CM

## 2023-07-11 DIAGNOSIS — M51.36 LUMBAR DEGENERATIVE DISC DISEASE: ICD-10-CM

## 2023-07-11 DIAGNOSIS — M41.9 SCOLIOSIS OF THORACOLUMBAR SPINE, UNSPECIFIED SCOLIOSIS TYPE: ICD-10-CM

## 2023-07-11 PROCEDURE — 97112 NEUROMUSCULAR REEDUCATION: CPT

## 2023-07-11 PROCEDURE — 97110 THERAPEUTIC EXERCISES: CPT

## 2023-07-11 PROCEDURE — 97530 THERAPEUTIC ACTIVITIES: CPT

## 2023-07-11 NOTE — PROGRESS NOTES
Daily Note     Today's date: 2023  Patient name: Hugo Pederson  : 1955  MRN: [id number]  Referring provider: Danuta Sifuentes MD  Dx:   Encounter Diagnosis     ICD-10-CM    1. Leg pain, bilateral  M79.604     M79.605       2. Balance disorder  R26.89       3. Lumbar degenerative disc disease  M51.36       4. Scoliosis of thoracolumbar spine, unspecified scoliosis type  M41.9           Start Time: 1005  Stop Time: 1105  Total time in clinic (min): 60 minutes    Subjective: Pt reports R sided lower lumbar and piriformis/gluteal discomfort at start of tx session. Unsteadiness and shakiness following last tx session. Objective: See treatment diary below      Assessment: Tolerated treatment well. Patient would benefit from continued PT. Pt able to tolerate continued progression of POC to further work on dynamic and static balance tasks. Intermittent UE assistance on the parallel bar when completing more advanced balance tasks. Uneven surfaces remains most challenging aspect when addressing balance dysfunction. Work in more strengthening based exercises next visit. Mild BLE fatigue post-session. 1:1 with Swapna Shetty DPT for intial 8 minutes of tx session ([id number]) then with Anila Lira DPT for remaining 52 minutes of tx session [id number]). Plan: Progress treatment as tolerated.        Precautions: osteoporosis, fibroyalgia, hx TKA,     Pertinent Findings and Outcome Measures:                                                                                                                                                                         Test / Measure  2023      FOTO 49      R knee ext MMT 3/5      R hip flex MMT 3/5      R hip ext MMT 3/5      FGA /30          Manuals                                    Neuro Re-Ed        SLS 15" B 3x30" B      Tandem stance    3x30" B    Bi TKE   3x10 R 9#     QS + SLR  2x10 B 2x10 B 1#     Bridges  2x10 3x10 + hip add iso     Tandem gait - fwd/bwd  4 laps foam beam  5 laps foam beam 5 laps foam beam   LTRs  10x ea 10x ea     Hooklying clamshells  2x10 btb      Lateral step over objects    7 laps foam beam (3 hurdles) 5 laps foam beam (3 hurdles)   Step taps    2x15 9" step, 2# aw    Mini squats on bosu    2x10 2x10   Romberg stance on bosu     3x30"   PB 3 way roll outs     8x ea           Ther Ex        HEP review 5'       NuStep 5' L5 8' L6 8' L6 8' L6 8' L6   Leg press - DL  2x10 40# 3x10 55# 3x10 55# 3x12 55#   Leg press - SL  2x10 B 10# 2x10 B 25# 2x10 B 25# 2x12 B 25#   Prone quad S   3x30" R     LAQ   2x10 B 3#     R piriformis S     3x30" seated           Ther Activity        STS 5x 2x10  2x10 feet on airex    TUG 1x       DL / rack pull   2x10 5# from 9" step 2x10 8# from 9" step    FSU     15x B 4" step + airex   Gait Training                        Modalities

## 2023-07-13 ENCOUNTER — HOSPITAL ENCOUNTER (OUTPATIENT)
Dept: SLEEP CENTER | Facility: CLINIC | Age: 68
Discharge: HOME | End: 2023-07-13
Payer: COMMERCIAL

## 2023-07-13 DIAGNOSIS — G47.33 OSA (OBSTRUCTIVE SLEEP APNEA): ICD-10-CM

## 2023-07-13 DIAGNOSIS — G47.34 SLEEP RELATED HYPOXIA: ICD-10-CM

## 2023-07-13 PROCEDURE — 95811 POLYSOM 6/>YRS CPAP 4/> PARM: CPT

## 2023-07-14 ENCOUNTER — OFFICE VISIT (OUTPATIENT)
Dept: PHYSICAL THERAPY | Facility: REHABILITATION | Age: 68
End: 2023-07-14
Payer: COMMERCIAL

## 2023-07-14 DIAGNOSIS — G47.34 SLEEP RELATED HYPOXIA: Primary | ICD-10-CM

## 2023-07-14 DIAGNOSIS — M51.36 LUMBAR DEGENERATIVE DISC DISEASE: ICD-10-CM

## 2023-07-14 DIAGNOSIS — M79.604 LEG PAIN, BILATERAL: Primary | ICD-10-CM

## 2023-07-14 DIAGNOSIS — M79.605 LEG PAIN, BILATERAL: Primary | ICD-10-CM

## 2023-07-14 DIAGNOSIS — G47.33 OSA (OBSTRUCTIVE SLEEP APNEA): ICD-10-CM

## 2023-07-14 DIAGNOSIS — R26.89 BALANCE DISORDER: ICD-10-CM

## 2023-07-14 DIAGNOSIS — M41.9 SCOLIOSIS OF THORACOLUMBAR SPINE, UNSPECIFIED SCOLIOSIS TYPE: ICD-10-CM

## 2023-07-14 PROCEDURE — 97112 NEUROMUSCULAR REEDUCATION: CPT

## 2023-07-14 PROCEDURE — 97110 THERAPEUTIC EXERCISES: CPT

## 2023-07-14 NOTE — PROGRESS NOTES
Sleep Study Documentation    Pre-Sleep Study       Sleep testing procedure explained to patient:YES    Patient napped prior to study:YES- more than 30 minutes. Napped after 2PM: yes    Caffeine:Dayshift worker after 12PM.  Caffeine use:NO    Alcohol:Dayshift workers after 5PM: Alcohol use:NO    Typical day for patient:YES       Study Documentation    Sleep Study Indications:  SHAWN-diagnosed home study at [address] [place] [address]. Sleep Study: Treatment   Optimal PAP pressure:  4 cm H2O  Leak:None  Snore:Eliminated  REM Obtained:yes  Supplemental O2: no    Minimum SaO2  88.0 %  Baseline SaO2  93.6 %  PAP mask tried (list all) Small ResMed N20 AirFit Nasal Mask with no added humidity. PAP mask choice (final) Same. PAP mask type:nasal  PAP pressure at which snoring was eliminated  4 cm H2O  Minimum SaO2 at final PAP pressure  88.0 %  Mode of Therapy:CPAP  ETCO2:No  CPAP changed to BiPAP:No    Mode of Therapy:CPAP    EKG abnormalities: no     EEG abnormalities: no    Sleep Study Recorded < 2 hours: N/A    Sleep Study Recorded > 2 hours but incomplete study: N/A    Sleep Study Recorded 6 hours but no sleep obtained: NO        Post-Sleep Study    Medication used at bedtime or during sleep study:YES other prescription medications    Patient reports time it took to fall asleep:30 to 60 minutes    Patient reports waking up during study:3 or more times. Patient reports returning to sleep without difficulty. Patient reports sleeping 4 to 6 hours without dreaming. Patient reports sleep during study:worse than usual    Patient rated sleepiness: Somewhat sleepy or tired    PAP treatment:yes: Post PAP treatment patient reports feeling unsure if a change is noted and  would wear PAP mask at home.

## 2023-07-14 NOTE — PROGRESS NOTES
Daily Note     Today's date: 2023  Patient name: Hue Melgar  : 1955  MRN: [id number]  Referring provider: Sidney Ruth MD  Dx:   Encounter Diagnosis     ICD-10-CM    1. Leg pain, bilateral  M79.604     M79.605       2. Balance disorder  R26.89       3. Lumbar degenerative disc disease  M51.36       4. Scoliosis of thoracolumbar spine, unspecified scoliosis type  M41.9           Start Time: 958  Stop Time: 102  Total time in clinic (min): 31 minutes    Subjective: Pt reports lumbar pain is most limiting at this time. She states that walking up and down a ramp without UE assistance is difficult. Objective: See treatment diary below      Assessment: Tolerated treatment well. Patient would benefit from continued PT. Able to tolerate progression of existing therapeutic interventions with increased resistances. Tx session focused on weight training and building strength and global motor control since last tx session focused on balance concerns. Random discomfort noted in different positions which caused a change in POC such as scapular pain when completing reverse hypers, so  Unilateral rows were added. 1:1 with Yon Ly DPT entirety of tx. Plan: Progress treatment as tolerated.        Precautions: osteoporosis, fibroyalgia, hx TKA,     Pertinent Findings and Outcome Measures:                                                                                                                                                                         Test / Measure  2023     FOTO 49 56     R knee ext MMT 3/5      R hip flex MMT 3/5      R hip ext MMT 3/5      FGA           Manuals                                        Neuro Re-Ed         SLS 15" B 3x30" B       Tandem stance    3x30" B     Shattuck TKE   3x10 R 9#      QS + SLR  2x10 B 2x10 B 1#   2x10 B 2#   Bridges  2x10 3x10 + hip add iso   3x10 10#   Tandem gait - fwd/bwd  4 laps foam beam  5 laps foam beam 5 laps foam beam    LTRs  10x ea 10x ea   10x ea   Open books      10x B   Hooklying clamshells  2x10 btb       Lateral step over objects    7 laps foam beam (3 hurdles) 5 laps foam beam (3 hurdles)    Step taps    2x15 9" step, 2# aw     Mini squats on bosu    2x10 2x10    Romberg stance on bosu     3x30"    PB 3 way roll outs     8x ea    Reverse hypers      2x10 alt LE   Pallof press      2x10 B dbl btb   Ther Ex         HEP review 5'        NuStep 5' L5 8' L6 8' L6 8' L6 8' L6    Rec bike      8' L1   Leg press - DL  2x10 40# 3x10 55# 3x10 55# 3x12 55# 3x8 85#   Leg press - SL  2x10 B 10# 2x10 B 25# 2x10 B 25# 2x12 B 25# 2x8 B 40#   Prone quad S   3x30" R      LAQ   2x10 B 3#      R piriformis S     3x30" seated    Unilat rows      3x10 B mtb   Ther Activity         STS 5x 2x10  2x10 feet on airex     TUG 1x        DL / rack pull   2x10 5# from 9" step 2x10 8# from 9" step     FSU     15x B 4" step + airex    Gait Training                           Modalities

## 2023-07-18 ENCOUNTER — OFFICE VISIT (OUTPATIENT)
Dept: PHYSICAL THERAPY | Facility: REHABILITATION | Age: 68
End: 2023-07-18
Payer: COMMERCIAL

## 2023-07-18 DIAGNOSIS — G45.9 TIA (TRANSIENT ISCHEMIC ATTACK): ICD-10-CM

## 2023-07-18 DIAGNOSIS — M41.9 SCOLIOSIS OF THORACOLUMBAR SPINE, UNSPECIFIED SCOLIOSIS TYPE: ICD-10-CM

## 2023-07-18 DIAGNOSIS — M51.36 LUMBAR DEGENERATIVE DISC DISEASE: ICD-10-CM

## 2023-07-18 DIAGNOSIS — K21.9 GASTROESOPHAGEAL REFLUX DISEASE: ICD-10-CM

## 2023-07-18 DIAGNOSIS — M79.605 LEG PAIN, BILATERAL: Primary | ICD-10-CM

## 2023-07-18 DIAGNOSIS — M79.604 LEG PAIN, BILATERAL: Primary | ICD-10-CM

## 2023-07-18 DIAGNOSIS — R26.89 BALANCE DISORDER: ICD-10-CM

## 2023-07-18 PROCEDURE — 97110 THERAPEUTIC EXERCISES: CPT

## 2023-07-18 PROCEDURE — 97112 NEUROMUSCULAR REEDUCATION: CPT

## 2023-07-18 RX ORDER — PANTOPRAZOLE SODIUM 40 MG/1
TABLET, DELAYED RELEASE ORAL
Qty: 90 TABLET | Refills: 0 | Status: SHIPPED | OUTPATIENT
Start: 2023-07-18

## 2023-07-18 NOTE — PROGRESS NOTES
Daily Note     Today's date: 2023  Patient name: Nikki Pickering  : 1955  MRN: [id number]  Referring provider: Martinez Stevens MD  Dx:   Encounter Diagnosis     ICD-10-CM    1. Leg pain, bilateral  M79.604     M79.605       2. Balance disorder  R26.89       3. Lumbar degenerative disc disease  M51.36       4. Scoliosis of thoracolumbar spine, unspecified scoliosis type  M41.9           Start Time: 946  Stop Time:   Total time in clinic (min): 46 minutes    Subjective: Pt reports mild L knee discomfort at start of tx session. Objective: See treatment diary below      Assessment: Tolerated treatment well. Patient would benefit from continued PT. Pt able to tolerate continued progression of POC this tx session. Focused on lumbar and BLE strengthening as well as incorporating static and dynamic balance tasks. Mild VCs provided to complete interventions correctly. Challenged with bosu incorporated exercises, especially bosu forward lunges. Mild fatigue noted post-session. Improvement of symptoms following completion of all therapeutic interventions. 1:1 with Howie Balbuena DPT entirety of tx. Plan: Progress treatment as tolerated.        Precautions: osteoporosis, fibroyalgia, hx TKA,     Pertinent Findings and Outcome Measures:                                                                                                                                                                         Test / Measure  2023     FOTO 49 56     R knee ext MMT 3/5      R hip flex MMT 3/5      R hip ext MMT 3/5      FGA /30          Manuals                                            Neuro Re-Ed          SLS 15" B 3x30" B        Tandem stance    3x30" B   2x30" B   Bi TKE   3x10 R 9#       QS + SLR  2x10 B 2x10 B 1#   2x10 B 2#    Bridges  2x10 3x10 + hip add iso   3x10 10#    Tandem gait - fwd/bwd  4 laps foam beam  5 laps foam beam 5 laps foam beam     LTRs  10x ea 10x ea   10x ea    Open books      10x B    Hooklying clamshells  2x10 btb        Lateral step over objects    7 laps foam beam (3 hurdles) 5 laps foam beam (3 hurdles)     Step taps    2x15 9" step, 2# aw      Mini squats on bosu    2x10 2x10  2x10   Romberg stance on bosu     3x30"  3x30"   Bosu fwd lunges       2x8 B alt LE   PB 3 way roll outs     8x ea     Reverse hypers      2x10 alt LE    Pallof press      2x10 B dbl btb 2x10 B 7#   Ther Ex          HEP review 5'         NuStep 5' L5 8' L6 8' L6 8' L6 8' L6     Rec bike      8' L1 8' L2   Leg press - DL  2x10 40# 3x10 55# 3x10 55# 3x12 55# 3x8 85# 3x8 85#   Leg press - SL  2x10 B 10# 2x10 B 25# 2x10 B 25# 2x12 B 25# 2x8 B 40# 2x8 B 40#   Prone quad S   3x30" R       LAQ   2x10 B 3#       R piriformis S     3x30" seated     Unilat rows      3x10 B mtb 2x10 B 8#   Ther Activity          STS 5x 2x10  2x10 feet on airex      TUG 1x         DL / rack pull   2x10 5# from 9" step 2x10 8# from 9" step      FSU     15x B 4" step + airex     Gait Training                              Modalities

## 2023-07-19 ENCOUNTER — TELEPHONE (OUTPATIENT)
Dept: SLEEP CENTER | Facility: CLINIC | Age: 68
End: 2023-07-19

## 2023-07-19 RX ORDER — ATORVASTATIN CALCIUM 40 MG/1
TABLET, FILM COATED ORAL
Qty: 90 TABLET | Refills: 1 | Status: SHIPPED | OUTPATIENT
Start: 2023-07-19

## 2023-07-19 NOTE — TELEPHONE ENCOUNTER
Patient with mild to moderate SHAWN completed CPAP study and Dr. Oriana North ordered APAP   Left call back message and a MyChart message   Patient needs to be scheduled for DME set up and compliance appointments

## 2023-07-21 ENCOUNTER — OFFICE VISIT (OUTPATIENT)
Dept: PHYSICAL THERAPY | Facility: REHABILITATION | Age: 68
End: 2023-07-21
Payer: COMMERCIAL

## 2023-07-21 DIAGNOSIS — M51.36 LUMBAR DEGENERATIVE DISC DISEASE: ICD-10-CM

## 2023-07-21 DIAGNOSIS — M41.9 SCOLIOSIS OF THORACOLUMBAR SPINE, UNSPECIFIED SCOLIOSIS TYPE: ICD-10-CM

## 2023-07-21 DIAGNOSIS — M79.604 LEG PAIN, BILATERAL: Primary | ICD-10-CM

## 2023-07-21 DIAGNOSIS — M79.605 LEG PAIN, BILATERAL: Primary | ICD-10-CM

## 2023-07-21 DIAGNOSIS — R26.89 BALANCE DISORDER: ICD-10-CM

## 2023-07-21 PROCEDURE — 97110 THERAPEUTIC EXERCISES: CPT

## 2023-07-21 PROCEDURE — 97140 MANUAL THERAPY 1/> REGIONS: CPT

## 2023-07-21 PROCEDURE — 97112 NEUROMUSCULAR REEDUCATION: CPT

## 2023-07-21 NOTE — PROGRESS NOTES
Daily Note     Today's date: 2023  Patient name: Mirian Newton  : 1955  MRN: [id number]  Referring provider: Maik Miguel MD  Dx:   Encounter Diagnosis     ICD-10-CM    1. Leg pain, bilateral  M79.604     M79.605       2. Balance disorder  R26.89       3. Lumbar degenerative disc disease  M51.36       4. Scoliosis of thoracolumbar spine, unspecified scoliosis type  M41.9           Start Time: 1001  Stop Time: 1045  Total time in clinic (min): 44 minutes    Subjective: Pt reports aggravation of R knee symptoms after going for about a 3/4 mile walk yesterday. Pain is slightly improved prior to start of tx session but remians bothersome. Objective: See treatment diary below      Assessment: Tolerated treatment well. Patient would benefit from continued PT. Spent some time assessing R knee - positive meniscal testing, but R knee flexion in general caused increase in pain as well. Meniscus testing unable to completely rule out meniscus injury but at this time we cannot fully rule that in due to global pain with mobility. Tx session focused on RLE to address acute exacerbation of pain. Significant alleviation of R knee pain post-session. 1:1 with Liliana Michelle DPT entirety of tx. Plan: Progress treatment as tolerated.        Precautions: osteoporosis, fibroyalgia, hx TKA,     Pertinent Findings and Outcome Measures:                                                                                                                                                                         Test / Measure  2023     FOTO 49 56     R knee ext MMT 3/5      R hip flex MMT 3/5      R hip ext MMT 3/5      FGA 19/30          Manuals    R knee PROM, stretching, assessment        MM 8'                                    Neuro Re-Ed           SLS 15" B 3x30" B         Tandem stance    3x30" B   2x30" B    Bi TKE   3x10 R 9#        QS + SLR  2x10 B 2x10 B 1#   2x10 B 2#  2x10 R   Bridges  2x10 3x10 + hip add iso   3x10 10#  2x10 on PB   Tandem gait - fwd/bwd  4 laps foam beam  5 laps foam beam 5 laps foam beam      LTRs  10x ea 10x ea   10x ea  15x on PB   Open books      10x B     Hooklying clamshells  2x10 btb         Lateral step over objects    7 laps foam beam (3 hurdles) 5 laps foam beam (3 hurdles)      Step taps    2x15 9" step, 2# aw       Mini squats on bosu    2x10 2x10  2x10    Romberg stance on bosu     3x30"  3x30"    Bosu fwd lunges       2x8 B alt LE    PB 3 way roll outs     8x ea      Reverse hypers      2x10 alt LE     Pallof press      2x10 B dbl btb 2x10 B 7#    R active heel slides        2x15   PB HS curl        15x   Ther Ex           HEP review 5'          NuStep 5' L5 8' L6 8' L6 8' L6 8' L6      Rec bike      8' L1 8' L2 8' L2   Leg press - DL  2x10 40# 3x10 55# 3x10 55# 3x12 55# 3x8 85# 3x8 85#    Leg press - SL  2x10 B 10# 2x10 B 25# 2x10 B 25# 2x12 B 25# 2x8 B 40# 2x8 B 40#    Prone quad S   3x30" R        LAQ   2x10 B 3#        R piriformis S     3x30" seated      Unilat rows      3x10 B mtb 2x10 B 8#    R hip abd, ext, add on table        2x10 ea   Ther Activity           STS 5x 2x10  2x10 feet on airex       TUG 1x          DL / rack pull   2x10 5# from 9" step 2x10 8# from 9" step       FSU     15x B 4" step + airex      Gait Training                                 Modalities

## 2023-07-25 ENCOUNTER — OFFICE VISIT (OUTPATIENT)
Dept: PHYSICAL THERAPY | Facility: REHABILITATION | Age: 68
End: 2023-07-25
Payer: COMMERCIAL

## 2023-07-25 DIAGNOSIS — M41.9 SCOLIOSIS OF THORACOLUMBAR SPINE, UNSPECIFIED SCOLIOSIS TYPE: ICD-10-CM

## 2023-07-25 DIAGNOSIS — M81.0 AGE-RELATED OSTEOPOROSIS WITHOUT CURRENT PATHOLOGICAL FRACTURE: ICD-10-CM

## 2023-07-25 DIAGNOSIS — M51.36 LUMBAR DEGENERATIVE DISC DISEASE: ICD-10-CM

## 2023-07-25 DIAGNOSIS — R26.89 BALANCE DISORDER: ICD-10-CM

## 2023-07-25 DIAGNOSIS — M79.605 LEG PAIN, BILATERAL: Primary | ICD-10-CM

## 2023-07-25 DIAGNOSIS — M79.604 LEG PAIN, BILATERAL: Primary | ICD-10-CM

## 2023-07-25 PROCEDURE — 97140 MANUAL THERAPY 1/> REGIONS: CPT

## 2023-07-25 PROCEDURE — 97112 NEUROMUSCULAR REEDUCATION: CPT

## 2023-07-25 PROCEDURE — 97110 THERAPEUTIC EXERCISES: CPT

## 2023-07-25 RX ORDER — ALENDRONATE SODIUM 70 MG/1
TABLET ORAL
Qty: 12 TABLET | Refills: 0 | Status: SHIPPED | OUTPATIENT
Start: 2023-07-25

## 2023-07-25 NOTE — PROGRESS NOTES
Daily Note     Today's date: 2023  Patient name: Hue Melgar  : 1955  MRN: [id number]  Referring provider: Sidney Ruth MD  Dx:   Encounter Diagnosis     ICD-10-CM    1. Leg pain, bilateral  M79.604     M79.605       2. Balance disorder  R26.89       3. Lumbar degenerative disc disease  M51.36       4. Scoliosis of thoracolumbar spine, unspecified scoliosis type  M41.9           Start Time: 1000  Stop Time: 1045  Total time in clinic (min): 45 minutes    Subjective: Pt reports R knee pain is much worse recently. States that she was unable to complete exercises due to pain. Only used heating pad and increased use of Tylenol. Objective: See treatment diary below      Assessment: Tolerated treatment well. Patient would benefit from continued PT. Pt pain indicative of patellofemoral dysfunction, cannot rule out meniscus njury at this time due to pain aggravation during testing. Able to tolerate slight progression of POC this tx session focusing on R knee pain and dysfunction. Mild VCs provided to complete exercises correctly and safely. 1:1 with Yon Ly DPT entirety of tx. Plan: Progress treatment as tolerated.        Precautions: osteoporosis, fibroyalgia, hx TKA,     Pertinent Findings and Outcome Measures:                                                                                                                                                                         Test / Measure  2023     FOTO 49 56     R knee ext MMT 3/5      R hip flex MMT 3/5      R hip ext MMT 3/5      FGA /          Manuals    R knee PROM, stretching, assessment       MM 8' MM 8'                                    Neuro Re-Ed           SLS 3x30" B          Tandem stance   3x30" B   2x30" B     Bi TKE  3x10 R 9#         QS + SLR 2x10 B 2x10 B 1#   2x10 B 2#  2x10 R 2x10 R   Bridges 2x10 3x10 + hip add iso   3x10 10#  2x10 on PB Tandem gait - fwd/bwd 4 laps foam beam  5 laps foam beam 5 laps foam beam       LTRs 10x ea 10x ea   10x ea  15x on PB    Open books     10x B      Hooklying clamshells 2x10 btb          Lateral step over objects   7 laps foam beam (3 hurdles) 5 laps foam beam (3 hurdles)       Step taps   2x15 9" step, 2# aw        Mini squats on bosu   2x10 2x10  2x10  2x10 no bosu   Romberg stance on bosu    3x30"  3x30"     Bosu fwd lunges      2x8 B alt LE     PB 3 way roll outs    8x ea       Reverse hypers     2x10 alt LE      Pallof press     2x10 B dbl btb 2x10 B 7#     R active heel slides       2x15 20x   PB HS curl       15x    Ther Ex           HEP review           NuStep 8' L6 8' L6 8' L6 8' L6       Rec bike     8' L1 8' L2 8' L2 8' L1   Leg press - DL 2x10 40# 3x10 55# 3x10 55# 3x12 55# 3x8 85# 3x8 85#  10x 25#  10x 40#  10x 55#   Leg press - SL 2x10 B 10# 2x10 B 25# 2x10 B 25# 2x12 B 25# 2x8 B 40# 2x8 B 40#     Prone quad S  3x30" R         LAQ  2x10 B 3#      2x10 B 3#   R piriformis S    3x30" seated       Unilat rows     3x10 B mtb 2x10 B 8#     R hip abd, ext, add on table       2x10 ea    Ther Activity           STS 2x10  2x10 feet on airex        TUG           DL / rack pull  2x10 5# from 9" step 2x10 8# from 9" step        FSU    15x B 4" step + airex    R 10x 4"  R 10x 6"   Gait Training                                 Modalities

## 2023-07-28 ENCOUNTER — OFFICE VISIT (OUTPATIENT)
Dept: PHYSICAL THERAPY | Facility: REHABILITATION | Age: 68
End: 2023-07-28
Payer: COMMERCIAL

## 2023-07-28 DIAGNOSIS — M79.604 LEG PAIN, BILATERAL: Primary | ICD-10-CM

## 2023-07-28 DIAGNOSIS — M51.36 LUMBAR DEGENERATIVE DISC DISEASE: ICD-10-CM

## 2023-07-28 DIAGNOSIS — M41.9 SCOLIOSIS OF THORACOLUMBAR SPINE, UNSPECIFIED SCOLIOSIS TYPE: ICD-10-CM

## 2023-07-28 DIAGNOSIS — R26.89 BALANCE DISORDER: ICD-10-CM

## 2023-07-28 DIAGNOSIS — M79.605 LEG PAIN, BILATERAL: Primary | ICD-10-CM

## 2023-07-28 PROCEDURE — 97530 THERAPEUTIC ACTIVITIES: CPT

## 2023-07-28 PROCEDURE — 97112 NEUROMUSCULAR REEDUCATION: CPT

## 2023-07-28 PROCEDURE — 97110 THERAPEUTIC EXERCISES: CPT

## 2023-07-28 NOTE — PROGRESS NOTES
Daily Note     Today's date: 2023  Patient name: Lynne Calderon  : 1955  MRN: [id number]  Referring provider: Jose Alfredo Lemus MD  Dx:   Encounter Diagnosis     ICD-10-CM    1. Leg pain, bilateral  M79.604     M79.605       2. Balance disorder  R26.89       3. Lumbar degenerative disc disease  M51.36       4. Scoliosis of thoracolumbar spine, unspecified scoliosis type  M41.9           Start Time: 1000  Stop Time: 1045  Total time in clinic (min): 45 minutes    Subjective: Pt reports R knee status is much improved - "back to normal."  "My balance has still been terrible."  Pt also notes typical knee pain and back pain. Pt noted for the first time today that she occasional get lightheaded when she does not eat for a while which she states is due to low blood sugar. Objective: See treatment diary below      Assessment: Tolerated treatment well. Patient would benefit from continued PT. Pt able to tolerate re-introduction of exercises due to improvement of R knee symptoms. Balance deficits persist, especially with narrow SHAYY and single leg tasks. 1:1 with Arian Mujica DPT entirety of tx. Plan: Progress treatment as tolerated.        Precautions: osteoporosis, fibroyalgia, hx TKA,     Pertinent Findings and Outcome Measures:                                                                                                                                                                         Test / Measure  2023     FOTO 49 56     R knee ext MMT 3/5      R hip flex MMT 3/5      R hip ext MMT 3/5      FGA 30          Manuals    R knee PROM, stretching, assessment       MM 8' MM 8'                                        Neuro Re-Ed            SLS 3x30" B        4x15" B   Tandem stance   3x30" B   2x30" B      [place] TKE  3x10 R 9#          QS + SLR 2x10 B 2x10 B 1#   2x10 B 2#  2x10 R 2x10 R    Bridges 2x10 3x10 + hip add iso   3x10 10#  2x10 on PB     Tandem gait - fwd/bwd 4 laps foam beam  5 laps foam beam 5 laps foam beam     5 laps foam beam   LTRs 10x ea 10x ea   10x ea  15x on PB     Open books     10x B       Hooklying clamshells 2x10 btb           Lateral step over objects   7 laps foam beam (3 hurdles) 5 laps foam beam (3 hurdles)        Step taps   2x15 9" step, 2# aw         Mini squats on bosu   2x10 2x10  2x10  2x10 no bosu 2x10   Romberg stance on bosu    3x30"  3x30"   3x30"   Bosu fwd lunges      2x8 B alt LE      PB 3 way roll outs    8x ea        Reverse hypers     2x10 alt LE       Pallof press     2x10 B dbl btb 2x10 B 7#   2x10 B 7#   R active heel slides       2x15 20x    PB HS curl       15x                 Ther Ex            HEP review            NuStep 8' L6 8' L6 8' L6 8' L6     8' L6   Rec bike     8' L1 8' L2 8' L2 8' L1    Leg press - DL 2x10 40# 3x10 55# 3x10 55# 3x12 55# 3x8 85# 3x8 85#  10x 25#  10x 40#  10x 55# 3x8 85#   Leg press - SL 2x10 B 10# 2x10 B 25# 2x10 B 25# 2x12 B 25# 2x8 B 40# 2x8 B 40#   2x8 B 40#   Prone quad S  3x30" R          LAQ  2x10 B 3#      2x10 B 3#    R piriformis S    3x30" seated        Unilat rows     3x10 B mtb 2x10 B 8#   2x10 B 8#   R hip abd, ext, add on table       2x10 ea     Ther Activity            STS 2x10  2x10 feet on airex         TUG            DL / rack pull  2x10 5# from 9" step 2x10 8# from 9" step         FSU    15x B 4" step + airex    R 10x 4"  R 10x 6"    Split squats         2x8 B onto bosu   Gait Training                                    Modalities

## 2023-08-01 ENCOUNTER — OFFICE VISIT (OUTPATIENT)
Dept: PHYSICAL THERAPY | Facility: REHABILITATION | Age: 68
End: 2023-08-01
Payer: COMMERCIAL

## 2023-08-01 DIAGNOSIS — M79.604 LEG PAIN, BILATERAL: Primary | ICD-10-CM

## 2023-08-01 DIAGNOSIS — M51.36 LUMBAR DEGENERATIVE DISC DISEASE: ICD-10-CM

## 2023-08-01 DIAGNOSIS — R26.89 BALANCE DISORDER: ICD-10-CM

## 2023-08-01 DIAGNOSIS — M79.605 LEG PAIN, BILATERAL: Primary | ICD-10-CM

## 2023-08-01 DIAGNOSIS — M41.9 SCOLIOSIS OF THORACOLUMBAR SPINE, UNSPECIFIED SCOLIOSIS TYPE: ICD-10-CM

## 2023-08-01 PROCEDURE — 97530 THERAPEUTIC ACTIVITIES: CPT

## 2023-08-01 PROCEDURE — 97112 NEUROMUSCULAR REEDUCATION: CPT

## 2023-08-01 PROCEDURE — 97110 THERAPEUTIC EXERCISES: CPT

## 2023-08-01 NOTE — PROGRESS NOTES
Daily Note     Today's date: 2023  Patient name: Christiano Bailey  : 1955  MRN: [id number]  Referring provider: Marleni Pinto MD  Dx:   Encounter Diagnosis     ICD-10-CM    1. Leg pain, bilateral  M79.604     M79.605       2. Balance disorder  R26.89       3. Lumbar degenerative disc disease  M51.36       4. Scoliosis of thoracolumbar spine, unspecified scoliosis type  M41.9           Start Time: 1020  Stop Time: 1045  Total time in clinic (min): 25 minutes    Subjective: Pt reports R knee was bothersome this morning but has since loosened up some. Objective: See treatment diary below      Assessment: Tolerated treatment well. Patient would benefit from continued PT. Static balance status is gradually improving - requires intermittent UE assistance on the parallel bar to complete single leg balance. Attempt to incorporate dual task balance tasks to further progress balance status. No aggravation of R knee pain during tx session. 1:1 with Josiah Godinez DPT entirety of tx. Plan: Progress treatment as tolerated. Re-Evaluation next visit.      Precautions: osteoporosis, fibroyalgia, hx TKA,     Pertinent Findings and Outcome Measures:                                                                                                                                                                         Test / Measure  2023     FOTO 49 56     R knee ext MMT 3/5      R hip flex MMT 3/5      R hip ext MMT 3/5      FGA 19/30          Manuals  7 8/   R knee PROM, stretching, assessment      MM 8' MM 8'                                         Neuro Re-Ed            SLS        4x15" B 3x30" B   Tandem stance  3x30" B   2x30" B       [place] TKE 3x10 R 9#           QS + SLR 2x10 B 1#   2x10 B 2#  2x10 R 2x10 R     Bridges 3x10 + hip add iso   3x10 10#  2x10 on PB      Tandem gait - fwd/bwd  5 laps foam beam 5 laps foam beam     5 laps foam beam LTRs 10x ea   10x ea  15x on PB      Open books    10x B        Hooklying clamshells            Lateral step over objects  7 laps foam beam (3 hurdles) 5 laps foam beam (3 hurdles)         Step taps  2x15 9" step, 2# aw          Mini squats on bosu  2x10 2x10  2x10  2x10 no bosu 2x10 2x10   Romberg stance on bosu   3x30"  3x30"   3x30"    Semi-tandem stance on bosu         2x30" B   Bosu fwd lunges     2x8 B alt LE       PB 3 way roll outs   8x ea         Reverse hypers    2x10 alt LE        Pallof press    2x10 B dbl btb 2x10 B 7#   2x10 B 7# 2x10 B 8#   R active heel slides      2x15 20x     PB HS curl      15x                  Ther Ex            HEP review            NuStep 8' L6 8' L6 8' L6     8' L6 8' L8   Rec bike    8' L1 8' L2 8' L2 8' L1     Leg press - DL 3x10 55# 3x10 55# 3x12 55# 3x8 85# 3x8 85#  10x 25#  10x 40#  10x 55# 3x8 85# 3x10 85#   Leg press - SL 2x10 B 25# 2x10 B 25# 2x12 B 25# 2x8 B 40# 2x8 B 40#   2x8 B 40# 2x10 B 40#   Prone quad S 3x30" R           LAQ 2x10 B 3#      2x10 B 3#     R piriformis S   3x30" seated         Unilat rows    3x10 B mtb 2x10 B 8#   2x10 B 8# 2x10 B 9#   R hip abd, ext, add on table      2x10 ea      Ther Activity            STS  2x10 feet on airex       2x10 5# feet on airex   TUG            DL / rack pull 2x10 5# from 9" step 2x10 8# from 9" step          FSU   15x B 4" step + airex    R 10x 4"  R 10x 6"     Split squats        2x8 B onto bosu 2x8 B onto bosu   Gait Training                                    Modalities

## 2023-08-04 ENCOUNTER — APPOINTMENT (OUTPATIENT)
Dept: LAB | Age: 68
End: 2023-08-04
Payer: COMMERCIAL

## 2023-08-04 ENCOUNTER — OFFICE VISIT (OUTPATIENT)
Dept: PHYSICAL THERAPY | Facility: REHABILITATION | Age: 68
End: 2023-08-04
Payer: COMMERCIAL

## 2023-08-04 DIAGNOSIS — R26.89 BALANCE DISORDER: Primary | ICD-10-CM

## 2023-08-04 DIAGNOSIS — M81.0 AGE-RELATED OSTEOPOROSIS WITHOUT CURRENT PATHOLOGICAL FRACTURE: ICD-10-CM

## 2023-08-04 DIAGNOSIS — Z79.899 ENCOUNTER FOR LONG-TERM CURRENT USE OF MEDICATION: ICD-10-CM

## 2023-08-04 DIAGNOSIS — M41.9 SCOLIOSIS OF THORACOLUMBAR SPINE, UNSPECIFIED SCOLIOSIS TYPE: ICD-10-CM

## 2023-08-04 DIAGNOSIS — M51.36 LUMBAR DEGENERATIVE DISC DISEASE: ICD-10-CM

## 2023-08-04 DIAGNOSIS — M79.604 LEG PAIN, BILATERAL: ICD-10-CM

## 2023-08-04 DIAGNOSIS — E78.49 OTHER HYPERLIPIDEMIA: ICD-10-CM

## 2023-08-04 DIAGNOSIS — M79.7 FIBROMYALGIA: ICD-10-CM

## 2023-08-04 DIAGNOSIS — R47.89 WORD FINDING DIFFICULTY: ICD-10-CM

## 2023-08-04 DIAGNOSIS — E55.9 VITAMIN D DEFICIENCY: ICD-10-CM

## 2023-08-04 DIAGNOSIS — M79.605 LEG PAIN, BILATERAL: ICD-10-CM

## 2023-08-04 LAB
25(OH)D3 SERPL-MCNC: 31.2 NG/ML (ref 30–100)
ALBUMIN SERPL BCP-MCNC: 4 G/DL (ref 3.5–5)
ALP SERPL-CCNC: 54 U/L (ref 46–116)
ALT SERPL W P-5'-P-CCNC: 60 U/L (ref 12–78)
ANION GAP SERPL CALCULATED.3IONS-SCNC: 2 MMOL/L
AST SERPL W P-5'-P-CCNC: 33 U/L (ref 5–45)
BASOPHILS # BLD AUTO: 0.04 THOUSANDS/ÂΜL (ref 0–0.1)
BASOPHILS NFR BLD AUTO: 1 % (ref 0–1)
BILIRUB SERPL-MCNC: 0.4 MG/DL (ref 0.2–1)
BUN SERPL-MCNC: 17 MG/DL (ref 5–25)
CALCIUM SERPL-MCNC: 9.3 MG/DL (ref 8.3–10.1)
CHLORIDE SERPL-SCNC: 111 MMOL/L (ref 96–108)
CHOLEST SERPL-MCNC: 131 MG/DL
CO2 SERPL-SCNC: 32 MMOL/L (ref 21–32)
CREAT SERPL-MCNC: 0.91 MG/DL (ref 0.6–1.3)
EOSINOPHIL # BLD AUTO: 0.17 THOUSAND/ÂΜL (ref 0–0.61)
EOSINOPHIL NFR BLD AUTO: 4 % (ref 0–6)
ERYTHROCYTE [DISTWIDTH] IN BLOOD BY AUTOMATED COUNT: 12.9 % (ref 11.6–15.1)
GFR SERPL CREATININE-BSD FRML MDRD: 65 ML/MIN/1.73SQ M
GLUCOSE P FAST SERPL-MCNC: 87 MG/DL (ref 65–99)
HCT VFR BLD AUTO: 47.1 % (ref 34.8–46.1)
HDLC SERPL-MCNC: 56 MG/DL
HGB BLD-MCNC: 15 G/DL (ref 11.5–15.4)
IMM GRANULOCYTES # BLD AUTO: 0.01 THOUSAND/UL (ref 0–0.2)
IMM GRANULOCYTES NFR BLD AUTO: 0 % (ref 0–2)
LDLC SERPL CALC-MCNC: 55 MG/DL (ref 0–100)
LYMPHOCYTES # BLD AUTO: 1.75 THOUSANDS/ÂΜL (ref 0.6–4.47)
LYMPHOCYTES NFR BLD AUTO: 41 % (ref 14–44)
MCH RBC QN AUTO: 29.8 PG (ref 26.8–34.3)
MCHC RBC AUTO-ENTMCNC: 31.8 G/DL (ref 31.4–37.4)
MCV RBC AUTO: 94 FL (ref 82–98)
MONOCYTES # BLD AUTO: 0.37 THOUSAND/ÂΜL (ref 0.17–1.22)
MONOCYTES NFR BLD AUTO: 9 % (ref 4–12)
NEUTROPHILS # BLD AUTO: 1.9 THOUSANDS/ÂΜL (ref 1.85–7.62)
NEUTS SEG NFR BLD AUTO: 45 % (ref 43–75)
NONHDLC SERPL-MCNC: 75 MG/DL
NRBC BLD AUTO-RTO: 0 /100 WBCS
PLATELET # BLD AUTO: 166 THOUSANDS/UL (ref 149–390)
PMV BLD AUTO: 12.4 FL (ref 8.9–12.7)
POTASSIUM SERPL-SCNC: 4.1 MMOL/L (ref 3.5–5.3)
PROT SERPL-MCNC: 7.4 G/DL (ref 6.4–8.4)
RBC # BLD AUTO: 5.03 MILLION/UL (ref 3.81–5.12)
SODIUM SERPL-SCNC: 145 MMOL/L (ref 135–147)
TRIGL SERPL-MCNC: 101 MG/DL
TSH SERPL DL<=0.05 MIU/L-ACNC: 2.37 UIU/ML (ref 0.45–4.5)
VIT B12 SERPL-MCNC: 461 PG/ML (ref 180–914)
WBC # BLD AUTO: 4.24 THOUSAND/UL (ref 4.31–10.16)

## 2023-08-04 PROCEDURE — 82306 VITAMIN D 25 HYDROXY: CPT

## 2023-08-04 PROCEDURE — 36415 COLL VENOUS BLD VENIPUNCTURE: CPT

## 2023-08-04 PROCEDURE — 80061 LIPID PANEL: CPT

## 2023-08-04 PROCEDURE — 97112 NEUROMUSCULAR REEDUCATION: CPT

## 2023-08-04 PROCEDURE — 97110 THERAPEUTIC EXERCISES: CPT

## 2023-08-04 PROCEDURE — 84443 ASSAY THYROID STIM HORMONE: CPT

## 2023-08-04 PROCEDURE — 82607 VITAMIN B-12: CPT

## 2023-08-04 PROCEDURE — 85025 COMPLETE CBC W/AUTO DIFF WBC: CPT

## 2023-08-04 PROCEDURE — 97164 PT RE-EVAL EST PLAN CARE: CPT

## 2023-08-04 PROCEDURE — 80053 COMPREHEN METABOLIC PANEL: CPT

## 2023-08-04 NOTE — PROGRESS NOTES
PT Re-Evaluation     Today's date: 2023  Patient name: Zohra Oquendo  : 1955  MRN: [id number]  Referring provider: Compa Solomon MD  Dx:   Encounter Diagnosis     ICD-10-CM    1. Leg pain, bilateral  M79.604     M79.605       2. Balance disorder  R26.89       3. Lumbar degenerative disc disease  M51.36       4. Scoliosis of thoracolumbar spine, unspecified scoliosis type  M41.9           Start Time: 1030  Stop Time: 1115  Total time in clinic (min): 45 minutes    Subjective: Pt reports current rehabilitation status is at 70%. Balance is biggest limitation with functionality. "I'm still having quite a bit of trouble at home with balance."  Pt states that she feels like she falls backwards at times. She walks into walls and the corners when turning. R knee status is improved but still bothersome. Medial and inferior patellar pain at 3-5/10. Squatting, bending/stooping, sweeping the floor, and descending stairs are most difficult tasks. Objective: See treatment diary below    Range of Motion: Goniometric revealed the following findings (in degrees). Joint Motion Right:  Left:    Lumbar flexion 100%    Lumbar extension 25%*    Lumbar lateral flexion 50% 50%   Lumbar rotation 75% 75%     Strength: MMT revealed the following findings. Joint Motion Right:  Left:    Hip Flexion 4/5 4/5   Hip Abduction 4/5 4/5   Hip Adduction 4/5 4/5   Hip Extension 4/5 4/5   Knee Extension 4/5 4+/5   Knee Flexion 4/5 4+/5   Ankle Plantarflexion 4+/5 4+/5   Ankle Dorsiflexion 4+/5 4+/5        Special Tests:  Lumbar Specific and Neural Tension                                                                            Test / Measure     Straight Leg raise -   Prone instability test +       Balance:  SLS: L - 12.07s; R - 19.05s  TU.64s  5xSTS:  8.94s  FGA:      Outcome Measures: FOTO: 62      Assessment: Tolerated treatment well.  Patient would benefit from continued PT.  1:1 with Alessandra Love DPT entirety of tx. Overall, BLE strength has improved and most balance outcome measures have improved as well, but B SLS and FGA score remain lacking. Pt has difficulty with SLS bilaterally for an extended period of time. Pt may have been limited this visit due to lightheadedness after giving blood earlier this morning and not eating a sufficient meal prior to tx session. Static and dynamic balance are her biggest limitations currently. Will continue to further address balance deficits to improve functionality. Assessment details: Lynne Calderon is a 79 y.o. female presenting with worsening of chronic LBP and B knee pain (R knee pain worse) as well as balance and gait dysfunction which has been worsening. Primary impairments include R knee and lumbar pain with functional activities, RLE weakness, lumbar AROM dysfunction, paraspinal motor control dysfunction, static/dynamic balance and gait problems. Educated pt on anatomy and physiology of diagnosis. Will benefit from skilled PT interventions for community reintegration, ADL management/independence, return to work/sport/hobbies. Provided pt with written home exercise program to be completed independently. Impairments: abnormal coordination, abnormal gait, abnormal muscle firing, abnormal muscle tone, abnormal or restricted ROM, activity intolerance, impaired balance, impaired physical strength, lacks appropriate home exercise program, pain with function, safety issue, poor posture  and poor body mechanics  Functional limitations: lifting heavy objects, ADLs, stooping, prolonged walking  Symptom irritability: moderateBarriers to therapy: none  Understanding of Dx/Px/POC: good   Prognosis: good    Goals    Short Term Goals:  1. Improve R hip flexion and extension strength to at least 3+/5 MMT - MET  2. Improve R knee extension strength to at least 3+/5 MMT - MET  3. Supervision with HEP for self-care - MET    Long Term Goals:  1.  Improve FGA score to at least 24/30 to decrease fall risk - ONGOING  2. FOTO to greater than predicted value - MET  3. Independent with HEP for self-care - ONGOING  4. Improve B SLS to at least 25 seconds - NEW      Plan: Progress treatment as tolerated.        Patient would benefit from: skilled physical therapy  Planned modality interventions: manual electrical stimulation  Planned therapy interventions: abdominal trunk stabilization, activity modification, balance, balance/weight bearing training, behavior modification, body mechanics training, community reintegration, coordination, fine motor coordination training, flexibility, functional ROM exercises, gait training, graded activity, graded exercise, graded motor, home exercise program, work reintegration, therapeutic training, therapeutic exercise, therapeutic activities, stretching, strengthening, self care, postural training, patient education, neuromuscular re-education, motor coordination training, massage, manual therapy, joint mobilization and ADL training  Frequency: 2x week  Duration in weeks: 8  Plan of Care beginning date: 8/4/2023  Plan of Care expiration date: 9/29/2023  Treatment plan discussed with: patient     Precautions: osteoporosis, fibroyalgia, hx TKA,     Pertinent Findings and Outcome Measures:                                                                                                                                                                         Test / Measure  6/27/2023 7/14/2023 8/4/2023    FOTO 49 56 62    R knee ext MMT 3/5  4/5    R hip flex MMT 3/5  4/5    R hip ext MMT 3/5  4/5    FGA 19/30 23/30        Manuals 7/7 7/11 7/14 7/18 7/21 7/25 7/28 8/1 8/4   R knee PROM, stretching, assessment     MM 8' MM 8'                                          Neuro Re-Ed            FGA         Assess   SLS       4x15" B 3x30" B Assess   Tandem stance 3x30" B   2x30" B        Bi TKE            QS + SLR   2x10 B 2#  2x10 R 2x10 R      Bridges   3x10 10#  2x10 on PB       Tandem gait - fwd/bwd 5 laps foam beam 5 laps foam beam     5 laps foam beam     LTRs   10x ea  15x on PB       Open books   10x B         Hooklying clamshells            Lateral step over objects 7 laps foam beam (3 hurdles) 5 laps foam beam (3 hurdles)          Step taps 2x15 9" step, 2# aw           Mini squats on bosu 2x10 2x10  2x10  2x10 no bosu 2x10 2x10    Romberg stance on bosu  3x30"  3x30"   3x30"     Semi-tandem stance on bosu        2x30" B    Bosu fwd lunges    2x8 B alt LE        PB 3 way roll outs  8x ea          Reverse hypers   2x10 alt LE         Pallof press   2x10 B dbl btb 2x10 B 7#   2x10 B 7# 2x10 B 8#    R active heel slides     2x15 20x      PB HS curl     15x                   Ther Ex            HEP review            NuStep 8' L6 8' L6     8' L6 8' L8 8' L8   Rec bike   8' L1 8' L2 8' L2 8' L1      Leg press - DL 3x10 55# 3x12 55# 3x8 85# 3x8 85#  10x 25#  10x 40#  10x 55# 3x8 85# 3x10 85#    Leg press - SL 2x10 B 25# 2x12 B 25# 2x8 B 40# 2x8 B 40#   2x8 B 40# 2x10 B 40#    Prone quad S            LAQ      2x10 B 3#      R piriformis S  3x30" seated          Unilat rows   3x10 B mtb 2x10 B 8#   2x10 B 8# 2x10 B 9#    R hip abd, ext, add on table     2x10 ea       Stand hip abd - no UE         2x10 B   Stand hip ext - no UE            Ther Activity            STS 2x10 feet on airex       2x10 5# feet on airex Assess 5xSTS   TUG         Assess   DL / rack pull 2x10 8# from 9" step           FSU  15x B 4" step + airex    R 10x 4"  R 10x 6"      Split squats       2x8 B onto bosu 2x8 B onto bosu    Gait Training                                    Modalities

## 2023-08-08 ENCOUNTER — OFFICE VISIT (OUTPATIENT)
Dept: PHYSICAL THERAPY | Facility: REHABILITATION | Age: 68
End: 2023-08-08
Payer: COMMERCIAL

## 2023-08-08 DIAGNOSIS — M79.604 LEG PAIN, BILATERAL: Primary | ICD-10-CM

## 2023-08-08 DIAGNOSIS — M79.605 LEG PAIN, BILATERAL: Primary | ICD-10-CM

## 2023-08-08 DIAGNOSIS — M41.9 SCOLIOSIS OF THORACOLUMBAR SPINE, UNSPECIFIED SCOLIOSIS TYPE: ICD-10-CM

## 2023-08-08 DIAGNOSIS — M51.36 LUMBAR DEGENERATIVE DISC DISEASE: ICD-10-CM

## 2023-08-08 DIAGNOSIS — R26.89 BALANCE DISORDER: ICD-10-CM

## 2023-08-08 PROCEDURE — 97112 NEUROMUSCULAR REEDUCATION: CPT

## 2023-08-08 PROCEDURE — 97530 THERAPEUTIC ACTIVITIES: CPT

## 2023-08-08 PROCEDURE — 97110 THERAPEUTIC EXERCISES: CPT

## 2023-08-08 NOTE — PROGRESS NOTES
Daily Note     Today's date: 2023  Patient name: Jamila Corley  : 1955  MRN: [id number]  Referring provider: Nghia Fraga MD  Dx:   Encounter Diagnosis     ICD-10-CM    1. Leg pain, bilateral  M79.604     M79.605       2. Balance disorder  R26.89       3. Lumbar degenerative disc disease  M51.36       4. Scoliosis of thoracolumbar spine, unspecified scoliosis type  M41.9           Start Time: 1645  Stop Time: 1735  Total time in clinic (min): 50 minutes    Subjective: Pt reports lower lumbar pain persists with forward bending. No significant aggravation of knee pain prior to start of tx session. Pt states that her biggest limitation is her balance. Objective: See treatment diary below      Assessment: Tolerated treatment well. Patient would benefit from continued PT. Tx session focused on addressing single leg stance deficit. Significant difficulty with holding single leg stance with dual tasking. Mild LOB's during tx session - pt able to self-correct with step pattern. No aggravation of pains throughout tx session. 1:1 with Ramakrishna Anne DPT entirety of tx. Plan: Progress treatment as tolerated.        Precautions: osteoporosis, fibroyalgia, hx TKA,     Pertinent Findings and Outcome Measures:                                                                                                                                                                         Test / Measure  2023    FOTO 49 56 62    R knee ext MMT 3/5  4/5    R hip flex MMT 3/5  4/5    R hip ext MMT 3/5  4/5    FGA   23/30        Manuals  88   R knee PROM, stretching, assessment    MM 8' MM 8'                                           Neuro Re-Ed            FGA        Assess    SLS      4x15" B 3x30" B Assess 3# DB around body 10x ea cw/ccw B   Tandem stance   2x30" B         [place] TKE            QS + SLR  2x10 B 2#  2x10 R 2x10 R       Bridges 3x10 10#  2x10 on PB        Tandem gait - fwd/bwd 5 laps foam beam     5 laps foam beam      LTRs  10x ea  15x on PB        Open books  10x B          Hooklying clamshells            Lateral step over objects 5 laps foam beam (3 hurdles)           Step taps         2x15 B 9" step 4# aw's   Mini squats on bosu 2x10  2x10  2x10 no bosu 2x10 2x10     Romberg stance on bosu 3x30"  3x30"   3x30"      Semi-tandem stance on bosu       2x30" B     Bosu fwd lunges   2x8 B alt LE         PB 3 way roll outs 8x ea           Reverse hypers  2x10 alt LE          Pallof press  2x10 B dbl btb 2x10 B 7#   2x10 B 7# 2x10 B 8#     R active heel slides    2x15 20x       PB HS curl    15x        Carioca         50ft ea   Uneven surface gait         8 laps (2 gray, 2 blue, 2 green)   Ther Ex            HEP review            NuStep 8' L6     8' L6 8' L8 8' L8 8' L8   Rec bike  8' L1 8' L2 8' L2 8' L1       Leg press - DL 3x12 55# 3x8 85# 3x8 85#  10x 25#  10x 40#  10x 55# 3x8 85# 3x10 85#  2x10 115#   Leg press - SL 2x12 B 25# 2x8 B 40# 2x8 B 40#   2x8 B 40# 2x10 B 40#     Prone quad S            LAQ     2x10 B 3#       R piriformis S 3x30" seated           Unilat rows  3x10 B mtb 2x10 B 8#   2x10 B 8# 2x10 B 9#     R hip abd, ext, add on table    2x10 ea        Stand hip abd - no UE        2x10 B 2x10 B   Stand hip ext - no UE            Ther Activity            STS       2x10 5# feet on airex Assess 5xSTS    TUG        Assess    DL / rack pull            FSU 15x B 4" step + airex    R 10x 4"  R 10x 6"    Runner's 2x10 B 6" step   Split squats      2x8 B onto bosu 2x8 B onto bosu     Suitcase carry         2x50ft B 15# + march   Gait Training                                    Modalities

## 2023-08-09 NOTE — RESULT ENCOUNTER NOTE
Lab results: Kidney function stable. Avoid NSAIDs such as ibuprofen, naproxen, Advil, Aleve or Motrin. Take Tylenol for pain. White count stable. Recommend to start taking vitamin B12 500 mcg daily, if you are not yet taking it. May help with memory.   The rest of your labs were within normal.

## 2023-08-10 ENCOUNTER — OFFICE VISIT (OUTPATIENT)
Dept: PHYSICAL THERAPY | Facility: REHABILITATION | Age: 68
End: 2023-08-10
Payer: COMMERCIAL

## 2023-08-10 DIAGNOSIS — M79.604 LEG PAIN, BILATERAL: Primary | ICD-10-CM

## 2023-08-10 DIAGNOSIS — M51.36 LUMBAR DEGENERATIVE DISC DISEASE: ICD-10-CM

## 2023-08-10 DIAGNOSIS — M79.605 LEG PAIN, BILATERAL: Primary | ICD-10-CM

## 2023-08-10 DIAGNOSIS — R26.89 BALANCE DISORDER: ICD-10-CM

## 2023-08-10 DIAGNOSIS — M41.9 SCOLIOSIS OF THORACOLUMBAR SPINE, UNSPECIFIED SCOLIOSIS TYPE: ICD-10-CM

## 2023-08-10 PROCEDURE — 97110 THERAPEUTIC EXERCISES: CPT

## 2023-08-10 PROCEDURE — 97112 NEUROMUSCULAR REEDUCATION: CPT

## 2023-08-10 PROCEDURE — 97530 THERAPEUTIC ACTIVITIES: CPT

## 2023-08-10 NOTE — PROGRESS NOTES
Daily Note     Today's date: 8/10/2023  Patient name: Paulo Daniel  : 1955  MRN: [id number]  Referring provider: Yesika Birmingham MD  Dx:   Encounter Diagnosis     ICD-10-CM    1. Leg pain, bilateral  M79.604     M79.605       2. Balance disorder  R26.89       3. Lumbar degenerative disc disease  M51.36       4. Scoliosis of thoracolumbar spine, unspecified scoliosis type  M41.9           Start Time: 1650  Stop Time: 1734  Total time in clinic (min): 44 minutes    Subjective: Pt reports several instances of R sided sharp lumbar pain yesterday and today. She attributes the pain to potentially sitting in hard chair for prolonged period of time. Objective: See treatment diary below      Assessment: Tolerated treatment well. Patient would benefit from continued PT. Pt able to tolerate tx session focused on static and dynamic balance tasks. Continues to have difficulty with SLS and narrow SHAYY on uneven surface. No instances of sharp pain during tx session. Close supervision provided throughout tx session. 1:1 with Alix Nielsen DPT entirety of tx. Plan: Progress treatment as tolerated.        Precautions: osteoporosis, fibroyalgia, hx TKA,     Pertinent Findings and Outcome Measures:                                                                                                                                                                         Test / Measure  2023    FOTO 49 56 62    R knee ext MMT 3/5  4/5    R hip flex MMT 3/5  4/5    R hip ext MMT 3/5  4/5    FGA   23/30        Manuals  8 8 8 8/10   R knee PROM, stretching, assessment   MM 8' MM 8'                                            Neuro Re-Ed            FGA       Assess     SLS     4x15" B 3x30" B Assess 3# DB around body 10x ea cw/ccw B Self ball toss 50x B   Tandem stance  2x30" B       3x30" B airex   Bi TKE            QS + SLR 2x10 B 2#  2x10 R 2x10 R Bridges 3x10 10#  2x10 on PB         Tandem gait - fwd/bwd     5 laps foam beam       LTRs 10x ea  15x on PB         Open books 10x B           Hooklying clamshells            Lateral step over objects            Step taps        2x15 B 9" step 4# aw's 2x15 B 9" step 4# aw's   Mini squats on bosu  2x10  2x10 no bosu 2x10 2x10      Romberg stance on bosu  3x30"   3x30"       Semi-tandem stance on bosu      2x30" B      Bosu fwd lunges  2x8 B alt LE          PB 3 way roll outs            Reverse hypers 2x10 alt LE           Pallof press 2x10 B dbl btb 2x10 B 7#   2x10 B 7# 2x10 B 8#      R active heel slides   2x15 20x        PB HS curl   15x         Carioca        50ft ea    Uneven surface gait        8 laps (2 gray, 2 blue, 2 green)    Ther Ex            HEP review            NuStep     8' L6 8' L8 8' L8 8' L8 8' L8   Rec bike 8' L1 8' L2 8' L2 8' L1        Leg press - DL 3x8 85# 3x8 85#  10x 25#  10x 40#  10x 55# 3x8 85# 3x10 85#  2x10 115# 2x10 115#   Leg press - SL 2x8 B 40# 2x8 B 40#   2x8 B 40# 2x10 B 40#      Prone quad S            LAQ    2x10 B 3#        R piriformis S            Unilat rows 3x10 B mtb 2x10 B 8#   2x10 B 8# 2x10 B 9#      R hip abd, ext, add on table   2x10 ea         Stand hip abd - no UE       2x10 B 2x10 B 2x10 B   Stand hip ext - no UE            Ther Activity            STS      2x10 5# feet on airex Assess 5xSTS     TUG       Assess     DL / rack pull            FSU    R 10x 4"  R 10x 6"    Runner's 2x10 B 6" step    Split squats     2x8 B onto bosu 2x8 B onto bosu      Suitcase carry        2x50ft B 15# + march 2x50ft B 15# + march   High windmill         2x5 B 2#   Gait Training                                    Modalities

## 2023-08-15 ENCOUNTER — OFFICE VISIT (OUTPATIENT)
Dept: PHYSICAL THERAPY | Facility: REHABILITATION | Age: 68
End: 2023-08-15
Payer: COMMERCIAL

## 2023-08-15 DIAGNOSIS — M79.604 LEG PAIN, BILATERAL: Primary | ICD-10-CM

## 2023-08-15 DIAGNOSIS — M41.9 SCOLIOSIS OF THORACOLUMBAR SPINE, UNSPECIFIED SCOLIOSIS TYPE: ICD-10-CM

## 2023-08-15 DIAGNOSIS — R26.89 BALANCE DISORDER: ICD-10-CM

## 2023-08-15 DIAGNOSIS — M79.605 LEG PAIN, BILATERAL: Primary | ICD-10-CM

## 2023-08-15 DIAGNOSIS — M51.36 LUMBAR DEGENERATIVE DISC DISEASE: ICD-10-CM

## 2023-08-15 PROCEDURE — 97530 THERAPEUTIC ACTIVITIES: CPT

## 2023-08-15 PROCEDURE — 97110 THERAPEUTIC EXERCISES: CPT

## 2023-08-15 PROCEDURE — 97112 NEUROMUSCULAR REEDUCATION: CPT

## 2023-08-15 NOTE — PROGRESS NOTES
Daily Note     Today's date: 8/15/2023  Patient name: Jourdan Helms  : 1955  MRN: [id number]  Referring provider: iMtul Thomas MD  Dx:   Encounter Diagnosis     ICD-10-CM    1. Leg pain, bilateral  M79.604     M79.605       2. Balance disorder  R26.89       3. Lumbar degenerative disc disease  M51.36       4. Scoliosis of thoracolumbar spine, unspecified scoliosis type  M41.9           Start Time: 1650  Stop Time: 1733  Total time in clinic (min): 43 minutes    Subjective: Pt reports slight improvement of R knee pain since last visit. No significant soreness following last tx session. Objective: See treatment diary below      Assessment: Tolerated treatment well. Patient would benefit from continued PT. Pt able to tolerate continuation of POC this tx session. Mild fatigue noted post-session. Occasional slight LOB during tx session - pt able to self-correct and regain balance independently. 1:1 with Junie Crenshaw, DPT entirety of tx. Plan: Progress treatment as tolerated.        Precautions: osteoporosis, fibroyalgia, hx TKA,     Pertinent Findings and Outcome Measures:                                                                                                                                                                         Test / Measure  2023    FOTO 49 56 62    R knee ext MMT 3/5  4/5    R hip flex MMT 3/5  4/5    R hip ext MMT 3/5  4/5    FGA 30        Manuals  8/ 8/4 8/8 8/10 8/15   R knee PROM, stretching, assessment  MM 8' MM 8'                                             Neuro Re-Ed            FGA      Assess      SLS    4x15" B 3x30" B Assess 3# DB around body 10x ea cw/ccw B Self ball toss 50x B    Tandem stance 2x30" B       3x30" B airex    Bi TKE            QS + SLR  2x10 R 2x10 R         Bridges  2x10 on PB          Tandem gait - fwd/bwd    5 laps foam beam        LTRs  15x on PB          Open books Hooklying clamshells            Lateral step over objects            Step taps       2x15 B 9" step 4# aw's 2x15 B 9" step 4# aw's    Mini squats on bosu 2x10  2x10 no bosu 2x10 2x10       Romberg stance on bosu 3x30"   3x30"        Semi-tandem stance on bosu     2x30" B       Bosu fwd lunges 2x8 B alt LE           PB 3 way roll outs            Reverse hypers            Pallof press 2x10 B 7#   2x10 B 7# 2x10 B 8#       R active heel slides  2x15 20x         PB HS curl  15x          Carioca       50ft ea     Uneven surface gait       8 laps (2 gray, 2 blue, 2 green)     TB sidestepping         4 laps gtb   TB monster walk         4 laps gtb   Ther Ex            HEP review            NuStep    8' L6 8' L8 8' L8 8' L8 8' L8 8' L8   Rec bike 8' L2 8' L2 8' L1         Leg press - DL 3x8 85#  10x 25#  10x 40#  10x 55# 3x8 85# 3x10 85#  2x10 115# 2x10 115#    Leg press - SL 2x8 B 40#   2x8 B 40# 2x10 B 40#       Prone quad S            LAQ   2x10 B 3#         R piriformis S            Unilat rows 2x10 B 8#   2x10 B 8# 2x10 B 9#       R hip abd, ext, add on table  2x10 ea          Stand hip abd - no UE      2x10 B 2x10 B 2x10 B    [place] hip 4 way         15x ea B 4#   Ther Activity            STS     2x10 5# feet on airex Assess 5xSTS      TUG      Assess      DL / rack pull            FSU   R 10x 4"  R 10x 6"    Runner's 2x10 B 6" step  Runner's 2x10 B 6" step   Split squats    2x8 B onto bosu 2x8 B onto bosu       Suitcase carry       2x50ft B 15# + march 2x50ft B 15# + march    High windmill        2x5 B 2# 2x5 B 2#               Gait Training                                    Modalities

## 2023-08-17 ENCOUNTER — OFFICE VISIT (OUTPATIENT)
Dept: PHYSICAL THERAPY | Facility: REHABILITATION | Age: 68
End: 2023-08-17
Payer: COMMERCIAL

## 2023-08-17 DIAGNOSIS — M79.605 LEG PAIN, BILATERAL: Primary | ICD-10-CM

## 2023-08-17 DIAGNOSIS — M79.604 LEG PAIN, BILATERAL: Primary | ICD-10-CM

## 2023-08-17 DIAGNOSIS — M41.9 SCOLIOSIS OF THORACOLUMBAR SPINE, UNSPECIFIED SCOLIOSIS TYPE: ICD-10-CM

## 2023-08-17 DIAGNOSIS — R26.89 BALANCE DISORDER: ICD-10-CM

## 2023-08-17 DIAGNOSIS — M51.36 LUMBAR DEGENERATIVE DISC DISEASE: ICD-10-CM

## 2023-08-17 PROCEDURE — 97530 THERAPEUTIC ACTIVITIES: CPT

## 2023-08-17 PROCEDURE — 97110 THERAPEUTIC EXERCISES: CPT

## 2023-08-17 PROCEDURE — 97112 NEUROMUSCULAR REEDUCATION: CPT

## 2023-08-17 NOTE — PROGRESS NOTES
Daily Note     Today's date: 2023  Patient name: Otis Durbin  : 1955  MRN: [id number]  Referring provider: Heraclio Spicer MD  Dx:   Encounter Diagnosis     ICD-10-CM    1. Leg pain, bilateral  M79.604     M79.605       2. Balance disorder  R26.89       3. Lumbar degenerative disc disease  M51.36       4. Scoliosis of thoracolumbar spine, unspecified scoliosis type  M41.9           Start Time: 1650  Stop Time: 1715  Total time in clinic (min): 25 minutes    Subjective: Pt reports that lumbar and sacral regions are most bothersome prior to start of tx session. Remains concerned about her balance as well. Objective: See treatment diary below      Assessment: Tolerated treatment well. Patient would benefit from continued PT. Tx session focused on lumbar dysfunction followed by balance deficit. Challenged with progression to single leg tasks such as SL STS and SL RDLs - intermittent HHA to maintain balance. Slight R knee discomfort with repeated FSU onto two elevated surfaces. 1:1 with Eddy Lugo DPT entirety of tx. Plan: Progress treatment as tolerated.        Precautions: osteoporosis, fibroyalgia, hx TKA,     Pertinent Findings and Outcome Measures:                                                                                                                                                                         Test / Measure  2023    FOTO 49 56 62    R knee ext MMT 3/5  4/5    R hip flex MMT 3/5  4/5    R hip ext MMT 3/5  4/5    FGA         Manuals  8 8 8 8/10 8/15 8/17   R knee PROM, stretching, assessment MM 8' MM 8'                                              Neuro Re-Ed            FGA     Assess       SLS   4x15" B 3x30" B Assess 3# DB around body 10x ea cw/ccw B Self ball toss 50x B     Tandem stance       3x30" B airex     [place] TKE            QS + SLR 2x10 R 2x10 R          Bridges 2x10 on PB        2x10 + clam mtb   Tandem gait - fwd/bwd   5 laps foam beam         LTRs 15x on PB        10x B LE extend   Open books            Hooklying clamshells            Lateral step over objects            Step taps      2x15 B 9" step 4# aw's 2x15 B 9" step 4# aw's     Mini squats on bosu  2x10 no bosu 2x10 2x10        Romberg stance on bosu   3x30"         Semi-tandem stance on bosu    2x30" B        Bosu fwd lunges            PB 3 way roll outs            Reverse hypers            Pallof press   2x10 B 7# 2x10 B 8#        R active heel slides 2x15 20x          PB HS curl 15x           Carioca      50ft ea      Uneven surface gait      8 laps (2 gray, 2 blue, 2 green)      TB sidestepping        4 laps gtb    TB monster walk        4 laps gtb    SL RDL         10x B   Ther Ex            HEP review            NuStep   8' L6 8' L8 8' L8 8' L8 8' L8 8' L8 8' L8   Rec bike 8' L2 8' L1          Leg press - DL  10x 25#  10x 40#  10x 55# 3x8 85# 3x10 85#  2x10 115# 2x10 115#     Leg press - SL   2x8 B 40# 2x10 B 40#        Prone quad S            LAQ  2x10 B 3#          R piriformis S            Unilat rows   2x10 B 8# 2x10 B 9#        R hip abd, ext, add on table 2x10 ea           Stand hip abd - no UE     2x10 B 2x10 B 2x10 B     [place] hip 4 way        15x ea B 4#    Ther Activity            STS    2x10 5# feet on airex Assess 5xSTS    SL - 2 airex 2x5 B + SLS hold   TUG     Assess       DL / rack pull            FSU  R 10x 4"  R 10x 6"    Runner's 2x10 B 6" step  Runner's 2x10 B 6" step 6" to 12" 10x B   Split squats   2x8 B onto bosu 2x8 B onto bosu        Suitcase carry      2x50ft B 15# + march 2x50ft B 15# + march     High windmill       2x5 B 2# 2x5 B 2#                Gait Training                                    Modalities

## 2023-08-22 ENCOUNTER — OFFICE VISIT (OUTPATIENT)
Dept: PHYSICAL THERAPY | Facility: REHABILITATION | Age: 68
End: 2023-08-22
Payer: COMMERCIAL

## 2023-08-22 DIAGNOSIS — M41.9 SCOLIOSIS OF THORACOLUMBAR SPINE, UNSPECIFIED SCOLIOSIS TYPE: ICD-10-CM

## 2023-08-22 DIAGNOSIS — M79.604 LEG PAIN, BILATERAL: Primary | ICD-10-CM

## 2023-08-22 DIAGNOSIS — M79.605 LEG PAIN, BILATERAL: Primary | ICD-10-CM

## 2023-08-22 DIAGNOSIS — R26.89 BALANCE DISORDER: ICD-10-CM

## 2023-08-22 DIAGNOSIS — M51.36 LUMBAR DEGENERATIVE DISC DISEASE: ICD-10-CM

## 2023-08-22 PROCEDURE — 97112 NEUROMUSCULAR REEDUCATION: CPT

## 2023-08-22 PROCEDURE — 97530 THERAPEUTIC ACTIVITIES: CPT

## 2023-08-22 PROCEDURE — 97110 THERAPEUTIC EXERCISES: CPT

## 2023-08-22 NOTE — PROGRESS NOTES
Daily Note     Today's date: 2023  Patient name: Emeli Rater  : 1955  MRN: [id number]  Referring provider: Kimani Warren MD  Dx:   Encounter Diagnosis     ICD-10-CM    1. Leg pain, bilateral  M79.604     M79.605       2. Balance disorder  R26.89       3. Lumbar degenerative disc disease  M51.36       4. Scoliosis of thoracolumbar spine, unspecified scoliosis type  M41.9           Start Time: 1113  Stop Time: 1157  Total time in clinic (min): 44 minutes    Subjective: Pt reports slight discomfort overall due to gastro-intestinal concerns which started about 30 minutes prior to tx session. No change in knee pain recently. Back pain is improved. Balance is biggest concern at this time. Objective: See treatment diary below      Assessment: Tolerated treatment well. Patient would benefit from continued PT. Slight posterior L hip discomfort during tx session likely due to muscular deficit when completing SL RDLs. Close supervision provided during balance tasks - LOBs during slider star drill exercise. Pt required ModAx1 on one occasion, able to independently regain balance on other LOBs. 1:1 with Anderson Leonardo DPT entirety of tx. Plan: Progress treatment as tolerated.        Precautions: osteoporosis, fibroyalgia, hx TKA,     Pertinent Findings and Outcome Measures:                                                                                                                                                                         Test / Measure  2023    FOTO 49 56 62    R knee ext MMT 3/5  4/5    R hip flex MMT 3/5  4/5    R hip ext MMT 3/5  4/5    FGA         Manuals 7/25 7/28 8/1 8/4 8/8 8/10 8/15 8/17 8/22   R knee PROM, stretching, assessment MM 8'                                               Neuro Re-Ed            FGA    Assess        SLS  4x15" B 3x30" B Assess 3# DB around body 10x ea cw/ccw B Self ball toss 50x B      Tandem stance 3x30" B airex      [place] TKE            QS + SLR 2x10 R           Bridges        2x10 + clam mtb    Tandem gait - fwd/bwd  5 laps foam beam          LTRs        10x B LE extend    Open books            Hooklying clamshells            Lateral step over objects            Step taps     2x15 B 9" step 4# aw's 2x15 B 9" step 4# aw's      Mini squats on bosu 2x10 no bosu 2x10 2x10         Romberg stance on bosu  3x30"          Semi-tandem stance on bosu   2x30" B         Bosu fwd lunges            PB 3 way roll outs            Reverse hypers            Pallof press  2x10 B 7# 2x10 B 8#         R active heel slides 20x           PB HS curl            Carioca     50ft ea       Uneven surface gait     8 laps (2 gray, 2 blue, 2 green)       TB sidestepping       4 laps gtb     TB monster walk       4 laps gtb     SL RDL        10x B 2x8 B   Slider star drill - 3 way         5x ea B   Ther Ex            HEP review            NuStep  8' L6 8' L8 8' L8 8' L8 8' L8 8' L8 8' L8 8' L8   Rec bike 8' L1           Leg press - DL 10x 25#  10x 40#  10x 55# 3x8 85# 3x10 85#  2x10 115# 2x10 115#   2x12 115#   Leg press - SL  2x8 B 40# 2x10 B 40#         Prone quad S            LAQ 2x10 B 3#           R piriformis S            Unilat rows  2x10 B 8# 2x10 B 9#         R hip abd, ext, add on table            Stand hip abd - no UE    2x10 B 2x10 B 2x10 B      [place] hip 4 way       15x ea B 4#     Ther Activity            STS   2x10 5# feet on airex Assess 5xSTS    SL - 2 airex 2x5 B + SLS hold SL - 2 airex 2x5 B + SLS hold   TUG    Assess        DL / rack pull            FSU R 10x 4"  R 10x 6"    Runner's 2x10 B 6" step  Runner's 2x10 B 6" step 6" to 12" 10x B 6" to 12" 10x B   Split squats  2x8 B onto bosu 2x8 B onto bosu         Suitcase carry     2x50ft B 15# + march 2x50ft B 15# + march      High windmill      2x5 B 2# 2x5 B 2#  2x5 B 2#               Gait Training                                    Modalities

## 2023-08-24 ENCOUNTER — APPOINTMENT (OUTPATIENT)
Dept: PHYSICAL THERAPY | Facility: REHABILITATION | Age: 68
End: 2023-08-24
Payer: COMMERCIAL

## 2023-08-29 ENCOUNTER — OFFICE VISIT (OUTPATIENT)
Dept: PHYSICAL THERAPY | Facility: REHABILITATION | Age: 68
End: 2023-08-29
Payer: COMMERCIAL

## 2023-08-29 DIAGNOSIS — M79.604 LEG PAIN, BILATERAL: Primary | ICD-10-CM

## 2023-08-29 DIAGNOSIS — M51.36 LUMBAR DEGENERATIVE DISC DISEASE: ICD-10-CM

## 2023-08-29 DIAGNOSIS — R26.89 BALANCE DISORDER: ICD-10-CM

## 2023-08-29 DIAGNOSIS — M79.605 LEG PAIN, BILATERAL: Primary | ICD-10-CM

## 2023-08-29 DIAGNOSIS — M41.9 SCOLIOSIS OF THORACOLUMBAR SPINE, UNSPECIFIED SCOLIOSIS TYPE: ICD-10-CM

## 2023-08-29 PROCEDURE — 97112 NEUROMUSCULAR REEDUCATION: CPT

## 2023-08-29 PROCEDURE — 97110 THERAPEUTIC EXERCISES: CPT

## 2023-08-29 NOTE — PROGRESS NOTES
Daily Note     Today's date: 2023  Patient name: Lucile Runner  : 1955  MRN: [id number]  Referring provider: Joe Gonzales MD  Dx:   Encounter Diagnosis     ICD-10-CM    1. Leg pain, bilateral  M79.604     M79.605       2. Balance disorder  R26.89       3. Lumbar degenerative disc disease  M51.36       4. Scoliosis of thoracolumbar spine, unspecified scoliosis type  M41.9           Start Time: 164  Stop Time: 171  Total time in clinic (min): 31 minutes    Subjective: Pt reports R knee pain and balance dysfunction are most limiting factors. States R knee pain is alice-medially located. Objective: See treatment diary below      Assessment: Tolerated treatment well. Patient would benefit from continued PT. Pt unable to complete slider star drill due to R knee pain. Slightly regressed therapeutic interventions this tx session due to acute exacerbation of R knee pain. Pt remains limited with single limb stance with dual tasking. Requires UE assistance when completing SL RDLs. Will attempt to progress exercises as pain decreases. 1:1 with Chiara Yanes DPT entirety of tx. Plan: Progress treatment as tolerated.        Precautions: osteoporosis, fibroyalgia, hx TKA,     Pertinent Findings and Outcome Measures:                                                                                                                                                                         Test / Measure  2023    FOTO 49 56 62    R knee ext MMT 3/5  4/5    R hip flex MMT 3/5  4/5    R hip ext MMT 3/5  4/5    FGA         Manuals  8/4 8/8 8/10 8/15 8/17 8/22 8/29   R knee PROM, stretching, assessment                                                Neuro Re-Ed            FGA   Assess         SLS 4x15" B 3x30" B Assess 3# DB around body 10x ea cw/ccw B Self ball toss 50x B    Self ball toss 50x B   Tandem stance     3x30" B airex       Bi TKE         2x15 B 10#   QS + SLR            Bridges       2x10 + clam mtb     Tandem gait - fwd/bwd 5 laps foam beam           LTRs       10x B LE extend     Open books            Hooklying clamshells            Lateral step over objects            Step taps    2x15 B 9" step 4# aw's 2x15 B 9" step 4# aw's       Mini squats on bosu 2x10 2x10          Romberg stance on bosu 3x30"           Semi-tandem stance on bosu  2x30" B          Bosu fwd lunges            PB 3 way roll outs            Reverse hypers            Pallof press 2x10 B 7# 2x10 B 8#          R active heel slides            PB HS curl            Carioca    50ft ea        Uneven surface gait    8 laps (2 gray, 2 blue, 2 green)        TB sidestepping      4 laps gtb   3 laps btb   TB monster walk      4 laps gtb   3 laps btb   SL RDL       10x B 2x8 B 2x10 B   Slider star drill - 3 way        5x ea B unable   Ther Ex            HEP review            NuStep 8' L6 8' L8 8' L8 8' L8 8' L8 8' L8 8' L8 8' L8 8' L8   Rec bike            Leg press - DL 3x8 85# 3x10 85#  2x10 115# 2x10 115#   2x12 115#    Leg press - SL 2x8 B 40# 2x10 B 40#          Prone quad S            LAQ         [place]  30x B 8#   R piriformis S            Unilat rows 2x10 B 8# 2x10 B 9#          R hip abd, ext, add on table            Stand hip abd - no UE   2x10 B 2x10 B 2x10 B       [place] hip 4 way      15x ea B 4#      Ther Activity            STS  2x10 5# feet on airex Assess 5xSTS    SL - 2 airex 2x5 B + SLS hold SL - 2 airex 2x5 B + SLS hold    TUG   Assess         DL / rack pull            FSU    Runner's 2x10 B 6" step  Runner's 2x10 B 6" step 6" to 12" 10x B 6" to 12" 10x B    Split squats 2x8 B onto bosu 2x8 B onto bosu          Suitcase carry    2x50ft B 15# + march 2x50ft B 15# + march       High windmill     2x5 B 2# 2x5 B 2#  2x5 B 2#                Gait Training                                    Modalities

## 2023-08-30 DIAGNOSIS — M79.7 FIBROMYALGIA: ICD-10-CM

## 2023-08-30 RX ORDER — GABAPENTIN 300 MG/1
CAPSULE ORAL
Qty: 270 CAPSULE | Refills: 1 | Status: SHIPPED | OUTPATIENT
Start: 2023-08-30 | End: 2023-08-31 | Stop reason: SDUPTHER

## 2023-08-31 ENCOUNTER — TELEPHONE (OUTPATIENT)
Dept: INTERNAL MEDICINE CLINIC | Facility: CLINIC | Age: 68
End: 2023-08-31

## 2023-08-31 ENCOUNTER — OFFICE VISIT (OUTPATIENT)
Dept: PHYSICAL THERAPY | Facility: REHABILITATION | Age: 68
End: 2023-08-31
Payer: COMMERCIAL

## 2023-08-31 DIAGNOSIS — M51.36 LUMBAR DEGENERATIVE DISC DISEASE: ICD-10-CM

## 2023-08-31 DIAGNOSIS — M41.9 SCOLIOSIS OF THORACOLUMBAR SPINE, UNSPECIFIED SCOLIOSIS TYPE: ICD-10-CM

## 2023-08-31 DIAGNOSIS — M79.605 LEG PAIN, BILATERAL: Primary | ICD-10-CM

## 2023-08-31 DIAGNOSIS — M79.604 LEG PAIN, BILATERAL: Primary | ICD-10-CM

## 2023-08-31 DIAGNOSIS — R26.89 BALANCE DISORDER: ICD-10-CM

## 2023-08-31 DIAGNOSIS — M79.7 FIBROMYALGIA: ICD-10-CM

## 2023-08-31 PROCEDURE — 97110 THERAPEUTIC EXERCISES: CPT

## 2023-08-31 PROCEDURE — 97530 THERAPEUTIC ACTIVITIES: CPT

## 2023-08-31 PROCEDURE — 97112 NEUROMUSCULAR REEDUCATION: CPT

## 2023-08-31 RX ORDER — GABAPENTIN 300 MG/1
CAPSULE ORAL
Qty: 450 CAPSULE | Refills: 0 | Status: SHIPPED | OUTPATIENT
Start: 2023-08-31

## 2023-08-31 NOTE — TELEPHONE ENCOUNTER
Pt. states pharmacy wouldn't give her refill of Gabapentin-not due until 9/8. Pt. states she has been taking 5 tabs qd instead of 4tabs. Pt. states that Dr. Sandhu Ask told her a couple mos. ago that she could take 5 but her script was only for 4, so she ran out early. Please advise.

## 2023-08-31 NOTE — PROGRESS NOTES
Daily Note     Today's date: 2023  Patient name: Heladio Espinoza  : 1955  MRN: [id number]  Referring provider: Ni Horner MD  Dx:   Encounter Diagnosis     ICD-10-CM    1. Leg pain, bilateral  M79.604     M79.605       2. Balance disorder  R26.89       3. Lumbar degenerative disc disease  M51.36       4. Scoliosis of thoracolumbar spine, unspecified scoliosis type  M41.9           Start Time: 1715  Stop Time: 1800  Total time in clinic (min): 45 minutes    Subjective: Pt reports improvement of R knee pain symptomology compared to start of last tx session. Balance dysfunction persists. Objective: See treatment diary below      Assessment: Tolerated treatment well. Patient would benefit from continued PT. Balance dysfunction persists - requires close supervision when completing static and dynamic balance tasks. R knee pain much improved since last tx session - able to tolerate knee strengthening tasks involving resisted extensions. 1:1 with Dana Covarrubias DPT for initial 16 minutes of tx session ([id number]) then with Aislinn Mauro DPT for remaining 29 minutes of tx session. Plan: Progress treatment as tolerated.        Precautions: osteoporosis, fibroyalgia, hx TKA,     Pertinent Findings and Outcome Measures:                                                                                                                                                                         Test / Measure  2023    FOTO 49 56 62    R knee ext MMT 3/5  4/5    R hip flex MMT 3/5  4/5    R hip ext MMT 3/5  4/5    FGA /30        Manuals  8/ 8/4 8/8 8/10 8/15 8/17 8/22 8/29 8/31   R knee PROM, stretching, assessment                                                    Neuro Re-Ed             FGA   Assess          SLS 4x15" B 3x30" B Assess 3# DB around body 10x ea cw/ccw B Self ball toss 50x B    Self ball toss 50x B Ball around body 10x ea B    Self ball toss 20x B Tandem stance     3x30" B airex        Bi TKE         2x15 B 10#    QS + SLR             Bridges       2x10 + clam mtb      Tandem gait - fwd/bwd 5 laps foam beam         4 laps foam beam   LTRs       10x B LE extend      Open books             Hooklying clamshells             Lateral step over objects          Foam beam 4 laps ea 3 hurdles   Step taps    2x15 B 9" step 4# aw's 2x15 B 9" step 4# aw's        Mini squats on bosu 2x10 2x10           Romberg stance on bosu 3x30"            Semi-tandem stance on bosu  2x30" B           Bosu lat lunges          10x B   PB 3 way roll outs             Reverse hypers             Pallof press 2x10 B 7# 2x10 B 8#           R active heel slides             PB HS curl             [place]    50ft ea         Uneven surface gait    8 laps (2 gray, 2 blue, 2 green)         TB sidestepping      4 laps gtb   3 laps btb    TB monster walk      4 laps gtb   3 laps btb    SL RDL       10x B 2x8 B 2x10 B    Slider star drill - 3 way        5x ea B unable    Ther Ex             HEP review             NuStep 8' L6 8' L8 8' L8 8' L8 8' L8 8' L8 8' L8 8' L8 8' L8 8' L8   Rec bike             Leg press - DL 3x8 85# 3x10 85#  2x10 115# 2x10 115#   2x12 115#  2x12 115#   Leg press - SL 2x8 B 40# 2x10 B 40#        2x12 B 40#   Prone quad S             LAQ         [place]  30x B 8#    R piriformis S             Unilat rows 2x10 B 8# 2x10 B 9#           R hip abd, ext, add on table             Stand hip abd - no UE   2x10 B 2x10 B 2x10 B        Bi hip 4 way      15x ea B 4#       Ther Activity             STS  2x10 5# feet on airex Assess 5xSTS    SL - 2 airex 2x5 B + SLS hold SL - 2 airex 2x5 B + SLS hold     TUG   Assess          DL / rack pull             FSU    Runner's 2x10 B 6" step  Runner's 2x10 B 6" step 6" to 12" 10x B 6" to 12" 10x B  6" to 12" 10x B   Split squats 2x8 B onto bosu 2x8 B onto bosu           Suitcase carry    2x50ft B 15# + march 2x50ft B 15# + march        High windmill     2x5 B 2# 2x5 B 2#  2x5 B 2#                  Gait Training                                       Modalities

## 2023-09-22 ENCOUNTER — OFFICE VISIT (OUTPATIENT)
Dept: PHYSICAL THERAPY | Facility: REHABILITATION | Age: 68
End: 2023-09-22
Payer: COMMERCIAL

## 2023-09-22 DIAGNOSIS — M79.604 LEG PAIN, BILATERAL: Primary | ICD-10-CM

## 2023-09-22 DIAGNOSIS — M41.9 SCOLIOSIS OF THORACOLUMBAR SPINE, UNSPECIFIED SCOLIOSIS TYPE: ICD-10-CM

## 2023-09-22 DIAGNOSIS — M79.605 LEG PAIN, BILATERAL: Primary | ICD-10-CM

## 2023-09-22 DIAGNOSIS — R26.89 BALANCE DISORDER: ICD-10-CM

## 2023-09-22 DIAGNOSIS — M51.36 LUMBAR DEGENERATIVE DISC DISEASE: ICD-10-CM

## 2023-09-22 PROCEDURE — 97110 THERAPEUTIC EXERCISES: CPT

## 2023-09-22 PROCEDURE — 97112 NEUROMUSCULAR REEDUCATION: CPT

## 2023-09-22 PROCEDURE — 97530 THERAPEUTIC ACTIVITIES: CPT

## 2023-09-22 NOTE — PROGRESS NOTES
Daily Note     Today's date: 2023  Patient name: Alcira Barcenas  : 1955  MRN: [id number]  Referring provider: Seun Zepeda MD  Dx:   Encounter Diagnosis     ICD-10-CM    1. Leg pain, bilateral  M79.604     M79.605       2. Balance disorder  R26.89       3. Lumbar degenerative disc disease  M51.36       4. Scoliosis of thoracolumbar spine, unspecified scoliosis type  M41.9           Start Time: 08  Stop Time: 930  Total time in clinic (min): 44 minutes    Subjective: Pt reports knee pain is much improved. She states that her balance remains a significant concern. She also continues to have lumbar pain with slight forward flexion. Objective: See treatment diary below      Assessment: Tolerated treatment well. Patient would benefit from continued PT. Pt continues to demonstrate static and dynamic balance deficits. Intermittent LOBs throughout tx session, but pt able to regain balance independently. Close supervision provided throughout tx session during balance tasks. Significant challenge with balance task and the introduction of lumbar/hip flexion. Mild lumbar discomfort with repeated flexion. Mild fatigue noted post-session. 1:1 with Scottie Esteves DPT entirety of tx. Plan: Progress treatment as tolerated. 1x per week - anticipate D/C in 3 weeks.      Precautions: osteoporosis, fibroyalgia, hx TKA,     Pertinent Findings and Outcome Measures:                                                                                                                                                                         Test / Measure  2023    FOTO 49 56 62    R knee ext MMT 3/5  4/5    R hip flex MMT 3/5  4/5    R hip ext MMT 3/5  4/5    FGA         Manuals 8/1 8/4 8/8 8/10 8/15 8/17 8/22 8/29 8/31 9/22   R knee PROM, stretching, assessment                                                    Neuro Re-Ed             FGA  Assess           SLS 3x30" B Assess 3# DB around body 10x ea cw/ccw B Self ball toss 50x B    Self ball toss 50x B Ball around body 10x ea B    Self ball toss 20x B Diag fwd reaches 10x ea B tap 44# kb on stool   Tandem stance    3x30" B airex      2x30" B airex   [place] TKE        2x15 B 10#     QS + SLR             Bridges      2x10 + clam mtb       Tandem gait - fwd/bwd         4 laps foam beam 4 laps foam beam   LTRs      10x B LE extend       Open books             Hooklying clamshells             Lateral step over objects         Foam beam 4 laps ea 3 hurdles Foam beam 4 laps ea 3 hurdles   Step taps   2x15 B 9" step 4# aw's 2x15 B 9" step 4# aw's         Mini squats on bosu 2x10            Romberg stance          airex EC 3x30"   Semi-tandem stance on bosu 2x30" B            Bosu lat lunges         10x B    PB 3 way roll outs             Reverse hypers             Pallof press 2x10 B 8#            R active heel slides             PB HS curl             [place]ari[place]   50ft ea          Uneven surface gait   8 laps (2 gray, 2 blue, 2 green)          TB sidestepping     4 laps gtb   3 laps btb     TB monster walk     4 laps gtb   3 laps btb     SL RDL      10x B 2x8 B 2x10 B     Slider star drill - 3 way       5x ea B unable     Ther Ex             HEP review             NuStep 8' L8 8' L8 8' L8 8' L8 8' L8 8' L8 8' L8 8' L8 8' L8 8' L8   Rec bike             Leg press - DL 3x10 85#  2x10 115# 2x10 115#   2x12 115#  2x12 115#    Leg press - SL 2x10 B 40#        2x12 B 40#    Prone quad S             LAQ        [place]  30x B 8#     R piriformis S             Unilat rows 2x10 B 9#            R hip abd, ext, add on table             Stand hip abd - no UE  2x10 B 2x10 B 2x10 B         Bi hip 4 way     15x ea B 4#        Ther Activity             STS 2x10 5# feet on airex Assess 5xSTS    SL - 2 airex 2x5 B + SLS hold SL - 2 airex 2x5 B + SLS hold      TUG  Assess           DL / rack pull          RDL 3x8 3#   Stand side crunch          2x10 B 5# FSU   Runner's 2x10 B 6" step  Runner's 2x10 B 6" step 6" to 12" 10x B 6" to 12" 10x B  6" to 12" 10x B    Split squats 2x8 B onto bosu            Suitcase carry   2x50ft B 15# + march 2x50ft B 15# + march         High windmill    2x5 B 2# 2x5 B 2#  2x5 B 2#                   Gait Training                                       Modalities

## 2023-09-28 DIAGNOSIS — R51.9 NONINTRACTABLE HEADACHE, UNSPECIFIED CHRONICITY PATTERN, UNSPECIFIED HEADACHE TYPE: ICD-10-CM

## 2023-09-29 ENCOUNTER — OFFICE VISIT (OUTPATIENT)
Dept: PHYSICAL THERAPY | Facility: REHABILITATION | Age: 68
End: 2023-09-29
Payer: COMMERCIAL

## 2023-09-29 DIAGNOSIS — M79.605 LEG PAIN, BILATERAL: Primary | ICD-10-CM

## 2023-09-29 DIAGNOSIS — M79.604 LEG PAIN, BILATERAL: Primary | ICD-10-CM

## 2023-09-29 DIAGNOSIS — R26.89 BALANCE DISORDER: ICD-10-CM

## 2023-09-29 DIAGNOSIS — M51.36 LUMBAR DEGENERATIVE DISC DISEASE: ICD-10-CM

## 2023-09-29 DIAGNOSIS — M41.9 SCOLIOSIS OF THORACOLUMBAR SPINE, UNSPECIFIED SCOLIOSIS TYPE: ICD-10-CM

## 2023-09-29 PROCEDURE — 97530 THERAPEUTIC ACTIVITIES: CPT

## 2023-09-29 PROCEDURE — 97112 NEUROMUSCULAR REEDUCATION: CPT

## 2023-09-29 PROCEDURE — 97110 THERAPEUTIC EXERCISES: CPT

## 2023-09-29 RX ORDER — CARBAMAZEPINE 100 MG/1
CAPSULE, EXTENDED RELEASE ORAL
Qty: 180 CAPSULE | Refills: 3 | Status: SHIPPED | OUTPATIENT
Start: 2023-09-29

## 2023-09-29 NOTE — PROGRESS NOTES
Daily Note     Today's date: 2023  Patient name: Minerva Weaver  : 1955  MRN: [id number]  Referring provider: Luiz Duckworth MD  Dx:   Encounter Diagnosis     ICD-10-CM    1. Leg pain, bilateral  M79.604     M79.605       2. Balance disorder  R26.89       3. Lumbar degenerative disc disease  M51.36       4. Scoliosis of thoracolumbar spine, unspecified scoliosis type  M41.9           Start Time: 1300  Stop Time: 1345  Total time in clinic (min): 45 minutes    Subjective: Pt reports balance feeling off today, 1/10 pain today in B/L legs and back. Objective: See treatment diary below      Assessment: Tolerated treatment well. Patient limited by today's tx session due to fatigue, increased effort with strengthening exercises. Patient able to complete balance exercises with close supervision. Tandem balance required contact guarding 2x each rep just to not allow lose of balance, improved as exercise went on. Patient would benefit from continued PT      Plan: Progress treatment as tolerated.        Precautions: osteoporosis, fibroyalgia, hx TKA,      Pertinent Findings and Outcome Measures:                                                                                                                                                                         Test / Measure  2023   FOTO 49 56 62 59   R knee ext MMT 3/5  4/5    R hip flex MMT 3/5  4/5    R hip ext MMT 3/5  4/5    FGA         Manuals 8/1 8/4 8/8 8/10 8/15 8/17 8/22 8/29 8/31 9/22 9/29   R knee PROM, stretching, assessment                                                        Neuro Re-Ed              FGA  Assess            SLS 3x30" B Assess 3# DB around body 10x ea cw/ccw B Self ball toss 50x B    Self ball toss 50x B Ball around body 10x ea B    Self ball toss 20x B Diag fwd reaches 10x ea B tap 44# kb on stool    Tandem stance    3x30" B airex      2x30" B airex 2x30" B airex   Bi TKE 2x15 B 10#      QS + SLR              Bridges      2x10 + clam mtb        Tandem gait - fwd/bwd         4 laps foam beam 4 laps foam beam    LTRs      10x B LE extend        Open books              Hooklying clamshells              Lateral step over objects         Foam beam 4 laps ea 3 hurdles Foam beam 4 laps ea 3 hurdles    Step taps   2x15 B 9" step 4# aw's 2x15 B 9" step 4# aw's          Mini squats on bosu 2x10             Romberg stance          airex EC 3x30" airex 2x30"   Semi-tandem stance on bosu 2x30" B             Bosu lat lunges         10x B     PB 3 way roll outs              Reverse hypers              Pallof press 2x10 B 8#             R active heel slides              PB HS curl              [place]   50ft ea           Uneven surface gait   8 laps (2 gray, 2 blue, 2 green)           TB sidestepping     4 laps gtb   3 laps btb   5 laps gtb foam pad   TB monster walk     4 laps gtb   3 laps btb      SL RDL      10x B 2x8 B 2x10 B      Slider star drill - 3 way       5x ea B unable   5x ea B   Ther Ex              HEP review              NuStep 8' L8 8' L8 8' L8 8' L8 8' L8 8' L8 8' L8 8' L8 8' L8 8' L8 8' L8   Rec bike              Leg press - DL 3x10 85#  2x10 115# 2x10 115#   2x12 115#  2x12 115#  2x10 130#   Leg press - SL 2x10 B 40#        2x12 B 40#     Prone quad S              LAQ        [place]  30x B 8#      R piriformis S              Unilat rows 2x10 B 9#             R hip abd, ext, add on table              Stand hip abd - no UE  2x10 B 2x10 B 2x10 B          Bi hip 4 way     15x ea B 4#         Ther Activity              STS 2x10 5# feet on airex Assess 5xSTS    SL - 2 airex 2x5 B + SLS hold SL - 2 airex 2x5 B + SLS hold       TUG  Assess            DL / rack pull          RDL 3x8 3# RDL 3x8 3#   Stand side crunch          2x10 B 5#    FSU   Runner's 2x10 B 6" step  Runner's 2x10 B 6" step 6" to 12" 10x B 6" to 12" 10x B  6" to 12" 10x B     Split squats 2x8 B onto bosu Suitcase carry   2x50ft B 15# + march 2x50ft B 15# + march       [id number] 15#+ march   High windmill    2x5 B 2# 2x5 B 2#  2x5 B 2#       Front carry            2x100 ft 10#   Gait Training                                          Modalities

## 2023-10-04 ENCOUNTER — OFFICE VISIT (OUTPATIENT)
Dept: NEUROLOGY | Facility: CLINIC | Age: 68
End: 2023-10-04
Payer: COMMERCIAL

## 2023-10-04 VITALS
HEIGHT: 63 IN | HEART RATE: 64 BPM | TEMPERATURE: 97.9 F | SYSTOLIC BLOOD PRESSURE: 115 MMHG | WEIGHT: 142.8 LBS | DIASTOLIC BLOOD PRESSURE: 66 MMHG | OXYGEN SATURATION: 97 % | BODY MASS INDEX: 25.3 KG/M2

## 2023-10-04 DIAGNOSIS — R41.3 MEMORY DIFFICULTY: ICD-10-CM

## 2023-10-04 DIAGNOSIS — R42 EPISODIC LIGHTHEADEDNESS: ICD-10-CM

## 2023-10-04 DIAGNOSIS — R47.89 WORD FINDING DIFFICULTY: Primary | ICD-10-CM

## 2023-10-04 PROCEDURE — 99214 OFFICE O/P EST MOD 30 MIN: CPT | Performed by: PSYCHIATRY & NEUROLOGY

## 2023-10-04 NOTE — PATIENT INSTRUCTIONS
Talk to Dr. Darlyn Stephen about the possibility of Holter monitor and/or checking blood glucose at time of "low blood sugar" events. Schedule visit with [facility] regarding word finding and memory problems. Return in about 6 months.

## 2023-10-04 NOTE — PROGRESS NOTES
Review of Systems   Constitutional: Negative for appetite change, fatigue and fever. HENT: Negative. Negative for hearing loss, tinnitus, trouble swallowing and voice change. Eyes: Negative. Negative for photophobia, pain and visual disturbance. Respiratory: Negative. Negative for shortness of breath. Cardiovascular: Negative. Negative for palpitations. Gastrointestinal: Negative. Negative for nausea and vomiting. Endocrine: Negative. Negative for cold intolerance. Genitourinary: Negative. Negative for dysuria, frequency and urgency. Musculoskeletal: Negative for back pain, gait problem, myalgias and neck pain. Skin: Negative. Negative for rash. Allergic/Immunologic: Negative. Neurological: Negative. Negative for dizziness, tremors, seizures, syncope, facial asymmetry, speech difficulty, weakness, light-headedness, numbness and headaches. Sudden head pains    Hematological: Negative. Does not bruise/bleed easily. Psychiatric/Behavioral: Negative. Negative for confusion, hallucinations and sleep disturbance. Word finding difficulty    All other systems reviewed and are negative.

## 2023-10-04 NOTE — PROGRESS NOTES
[facility] [address] [facility]  Follow Up Visit    Impression/Plan    Ms. Sullivan Kehr is a 79 y.o. female that I initially saw for and event involving alteration of consciousness with visual disturbance and confusion in 9/2019 of unclear etiology. No additional events. REEG and SD EEG were normal in 2019. She has moderate left PCA stenosis. Continue asa and statin.      Word finding details noted subjectively over the last few years, but worsened the last few months. 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 PET scan completed since the testing is essentially unrevealing. Last brain MRI completed October 2022 also unrevealing for cause. 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 MOCA 26/30 today (one-point off for clock drawing, one-point for serial sevens, 2 points for delayed recall). Consider neuropsychological testing again after next visit. PET scan could also be repeated at some point. Again placed a referral to [facility] neurology to see a memory disorders specialist.     For many years she has had episodes of lightheadedness along with a shaky feeling and feeling weak lasting minutes up to an hour at most.  No alteration of awareness, no passing out. These events have worsened over the last few months and are occurring multiple times per week. She can continue activities during them. She feels like sometimes symptoms will improve with eating. This sensation is not positional/postural.  She had the feeling that an event was coming on while in the office today and her exam was normal.  She does not describe vertigo. She notes intermittent palpitations that are more frequent lately, but not necessarily associated with her events.   She will talk to Dr. Hema Combs regarding the possibility of additional testing including monitoring blood sugar and the possibility of evaluating for cardiac cause with a Holter monitor. Patient Instructions   1. Talk to Dr. Glenn Jimenez about the possibility of Holter monitor and/or checking blood glucose at time of "low blood sugar" events. 2. Schedule visit with [facility] regarding word finding and memory problems. 3. Return in about 6 months. Diagnoses and all orders for this visit:    Word finding difficulty  -     Ambulatory Referral to Neurology; Future    Memory difficulty  -     Ambulatory Referral to Neurology; Future    Episodic lightheadedness        Subjective    Chon Ramos is returning to the [facility] for follow up. Interval Events:   Seizures since last visit: None  Hospitalizations: no    Feels like word finding problems are somewhat worse since this summer. Couldn't think of "Deanna Nia" or "Macbeth". 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 Referred, but did not try to schedule with [facility] neurology. TSH and B12 normal in August.  CMP unremarkable. Episodes of what she perceives to be low blood sugar. Lightheaded, fingers trembling feels week. Maybe 3 times per week. Food may help it improve. Lasts minutes, maybe up to an hour at most. No nausea. Some palpitations, but associated with the events. Does not change with position. Usually in the morning. Started many years ago, worse since Spring. Feels balance is off. Going to PT for last 12 weeks and feels balance is still off. Prior Evaluation:  REEG and SD EEG normal in 2019  MRI brain w/o 9/2019: Slight interval progression of the nonspecific cerebral white matter disease when comparing to 2011 examination. MRI brain with neuro quant 10/4/2022:  1.  No acute infarction, intracranial hemorrhage or mass effect. 2.  Mild, chronic microangiopathy.   3.  NeuroQuant analysis was performed: Normal hippocampal volume and enlarged ventricular system: Findings do not support hippocampal degeneration. Possible expansion of ventricular system without medial temporal lobe focused ex-vacuo process.     PET brain 3/8/2023:  There is only subtle diminished activity in the bilateral frontal, temporal and anterior parietal lobes.  Findings are nonspecific.  It is also uncertain at this time if these findings would remain stable or progress to a more advanced dementia, such   as fronto-temporal dementia.  12 month follow-up may be considered to evaluate for any change, if clinically warranted.      Neuropsychological testing completed by Dr. Ryan Kidd on February 10, 2023:  "Objective neuropsychological assessment revealed primary difficulties in confrontation naming, inductive reasoning, sustained speed and initial acquisition of unstructured verbal information and a large amount of visual information, with intact consolidation, retention and retrieval.  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 Although her above average intelligence and doctorate in [facility] may be helping to mask the effects of a PPA process, further testing may be warranted to best determine etiology of her difficulties."  (Scanned neuropsychological report attached to 3/22/2023 encounter)        History Reviewed:    The following were reviewed and updated as appropriate: allergies, current medications, past family history, past medical history, past social history, past surgical history and problem list  History of nephrolithiasis and hydronephrosis, fibromyalgia, depression with anxiety, osteoarthritis, trigeminal neuralgia, vitamin-D deficiency     Psychiatric History:  Anxiety  Depression     Social History:   Driving: Yes  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 Neurologic Exam  Mental Status:  Alert and oriented. No palmomental response. Jamaal Cognitive Assessment (MOCA) 10/4/2023: 26 / 30   Language: normal fluency and comprehension. Cranial Nerves:  VFFTC. EOMI, no nystagums. Face symmetric. No dysarthria. Motor:  No drift. Normal tone. Strength 5/5 throughout. Coordination: Finger to nose intact. Gait: Normal casual gait. Quick turn. I have spent a total time of 35 minutes on 10/04/23 in caring for this patient including Diagnostic results, Instructions for management, Patient and family education, Importance of tx compliance, Risk factor reductions, Impressions, Counseling / Coordination of care, Documenting in the medical record, Reviewing / ordering tests, medicine, procedures   and Obtaining or reviewing history  .

## 2023-10-06 ENCOUNTER — OFFICE VISIT (OUTPATIENT)
Dept: PHYSICAL THERAPY | Facility: REHABILITATION | Age: 68
End: 2023-10-06
Payer: COMMERCIAL

## 2023-10-06 DIAGNOSIS — M51.36 LUMBAR DEGENERATIVE DISC DISEASE: ICD-10-CM

## 2023-10-06 DIAGNOSIS — M41.9 SCOLIOSIS OF THORACOLUMBAR SPINE, UNSPECIFIED SCOLIOSIS TYPE: ICD-10-CM

## 2023-10-06 DIAGNOSIS — M79.605 LEG PAIN, BILATERAL: Primary | ICD-10-CM

## 2023-10-06 DIAGNOSIS — M79.604 LEG PAIN, BILATERAL: Primary | ICD-10-CM

## 2023-10-06 DIAGNOSIS — R26.89 BALANCE DISORDER: ICD-10-CM

## 2023-10-06 PROCEDURE — 97110 THERAPEUTIC EXERCISES: CPT

## 2023-10-06 PROCEDURE — 97112 NEUROMUSCULAR REEDUCATION: CPT

## 2023-10-06 PROCEDURE — 97530 THERAPEUTIC ACTIVITIES: CPT

## 2023-10-06 NOTE — PROGRESS NOTES
Daily Note     Today's date: 10/6/2023  Patient name: Ed Rodriguez  : 1955  MRN: [id number]  Referring provider: Cady Shukla MD  Dx:   Encounter Diagnosis     ICD-10-CM    1. Leg pain, bilateral  M79.604     M79.605       2. Balance disorder  R26.89       3. Lumbar degenerative disc disease  M51.36       4. Scoliosis of thoracolumbar spine, unspecified scoliosis type  M41.9           Start Time: 1334  Stop Time: 1415  Total time in clinic (min): 41 minutes    Subjective: Pt reports balance deficit remains most bothersome problem she is facing. Pain symptoms have been improved recently. Objective: See treatment diary below      Assessment: Tolerated treatment well. Patient would benefit from continued PT. Challenged with the performance of dynamic balance tasks - several instances of slight LOB - pt able to regain balance independently with stepping pattern or reaching to grab parallel bar. Balance deficit persists, but balance deficit is much improved compared to initial assessment. Narrow SHAYY, head turns, and single leg stance are most problematic positions. Close supervision provided throughout tx session. 1:1 with Rush Kwong DPT entirety of tx. Plan: Progress treatment as tolerated.        Precautions: osteoporosis, fibroyalgia, hx TKA,      Pertinent Findings and Outcome Measures:                                                                                                                                                                         Test / Measure  2023   FOTO 49 56 62 59   R knee ext MMT 3/5  4/5    R hip flex MMT 3/5  4/5    R hip ext MMT 3/5  4/5    FGA         Manuals  10/6   R knee PROM, stretching, assessment                                        Neuro Re-Ed          SLS   Self ball toss 50x B Ball around body 10x ea B    Self ball toss 20x B Diag fwd reaches 10x ea B tap 44# kb on stool  2x30" B tap cone call outs   Tandem stance     2x30" B airex 2x30" B airex    Bi TKE   2x15 B 10#       Bridges 2x10 + clam mtb         Tandem gait - fwd/bwd    4 laps foam beam 4 laps foam beam     LTRs 10x B LE extend         Open books          Hooklying clamshells          Lateral step over objects    Foam beam 4 laps ea 3 hurdles Foam beam 4 laps ea 3 hurdles     [place] - cones       4 laps - 6 cones   Mini squats on bosu          Romberg stance     airex EC 3x30" airex 2x30"    Semi-tandem stance on bosu          Bosu lat lunges    10x B      Bosu fwd lunges       15x B   [place]          Gait + diag head turns       [id number]   TB sidestepping   3 laps btb   5 laps gtb foam pad    TB monster walk   3 laps btb       SL RDL 10x B 2x8 B 2x10 B       Slider star drill - 3 way  5x ea B unable   5x ea B    Ther Ex          HEP review          NuStep 8' L8 8' L8 8' L8 8' L8 8' L8 8' L8 8' L8   Rec bike          Leg press - DL  2x12 115#  2x12 115#  2x10 130# 2x10 130#   Leg press - SL    2x12 B 40#      Prone quad S          LAQ   [place]  30x B 8#       Ther Activity          STS SL - 2 airex 2x5 B + SLS hold SL - 2 airex 2x5 B + SLS hold        DL / rack pull     RDL 3x8 3# RDL 3x8 3#    Stand side crunch     2x10 B 5#     FSU 6" to 12" 10x B 6" to 12" 10x B  6" to 12" 10x B      Split squats          Suitcase carry      [id number] 15#+ march + tandem gait  30ft B 15#   High windmill  2x5 B 2#        Front carry       2x100 ft 10#    Obstacle course       Uneven surface gait, high step over hurdles, turn and stop on foam 5x thru   Gait Training                              Modalities

## 2023-10-13 ENCOUNTER — OFFICE VISIT (OUTPATIENT)
Dept: PHYSICAL THERAPY | Facility: REHABILITATION | Age: 68
End: 2023-10-13
Payer: COMMERCIAL

## 2023-10-13 DIAGNOSIS — R26.89 BALANCE DISORDER: ICD-10-CM

## 2023-10-13 DIAGNOSIS — M79.604 LEG PAIN, BILATERAL: Primary | ICD-10-CM

## 2023-10-13 DIAGNOSIS — M41.9 SCOLIOSIS OF THORACOLUMBAR SPINE, UNSPECIFIED SCOLIOSIS TYPE: ICD-10-CM

## 2023-10-13 DIAGNOSIS — M79.605 LEG PAIN, BILATERAL: Primary | ICD-10-CM

## 2023-10-13 DIAGNOSIS — M51.36 LUMBAR DEGENERATIVE DISC DISEASE: ICD-10-CM

## 2023-10-13 PROCEDURE — 97530 THERAPEUTIC ACTIVITIES: CPT

## 2023-10-13 PROCEDURE — 97112 NEUROMUSCULAR REEDUCATION: CPT

## 2023-10-13 PROCEDURE — 97110 THERAPEUTIC EXERCISES: CPT

## 2023-10-13 NOTE — PROGRESS NOTES
Daily Note     Today's date: 10/13/2023  Patient name: Estela Bell  : 1955  MRN: [id number]  Referring provider: Qian Hicks MD  Dx:   Encounter Diagnosis     ICD-10-CM    1. Leg pain, bilateral  M79.604     M79.605       2. Balance disorder  R26.89       3. Lumbar degenerative disc disease  M51.36       4. Scoliosis of thoracolumbar spine, unspecified scoliosis type  M41.9           Start Time: 1339  Stop Time: 1418  Total time in clinic (min): 39 minutes    Subjective: Pt reports R knee pain aggravation over the past 4 days. She states that pain is very painful prior to start of tx session. L knee has been bothersome recently as well. Objective: See treatment diary below      Assessment: Tolerated treatment well. Patient would benefit from continued PT. Tx session focused on B knee pain. Moderate improvement of knee pain symptomology post-session. Most challenged with interventions addressing R knee/RLE compared to contralateral LE. Utilized hip strengthening and motor control exercises to decrease knee pain. 1:1 with Serg Sommer DPT entirety of tx. Plan: Continue per plan of care.  Re-evaluation next vii.     Precautions: osteoporosis, fibroyalgia, hx TKA,      Pertinent Findings and Outcome Measures:                                                                                                                                                                         Test / Measure  2023   FOTO 49 56 62 59   R knee ext MMT 3/5  4/5    R hip flex MMT 3/5  4/5    R hip ext MMT 3/5  4/5    FGA         Manuals 8/22 8/29 8/31 9/22 9/29 10/6 10/13   R knee PROM, stretching, assessment                                        Neuro Re-Ed          SLS  Self ball toss 50x B Ball around body 10x ea B    Self ball toss 20x B Diag fwd reaches 10x ea B tap 44# kb on stool  2x30" B tap cone call outs Step taps 10x B 6" step   Tandem stance 2x30" B airex 2x30" B airex     Bi TKE  2x15 B 10#     2x15 B 14#   Bridges          Tandem gait - fwd/bwd   4 laps foam beam 4 laps foam beam      Lateral step over objects   Foam beam 4 laps ea 3 hurdles Foam beam 4 laps ea 3 hurdles      Slalom - cones      4 laps - 6 cones    Mini squats on bosu          Romberg stance    airex EC 3x30" airex 2x30"     Bosu lat lunges   10x B       Bosu fwd lunges      15x B    Gait + diag head turns      [id number]    TB sidestepping  3 laps btb   5 laps gtb foam pad     TB monster walk  3 laps btb        SL RDL 2x8 B 2x10 B        Slider star drill - 3 way 5x ea B unable   5x ea B     QS + SLR       2x10 B 2# aw's   Active heel slides       20x B   Ther Ex          HEP review          NuStep 8' L8 8' L8 8' L8 8' L8 8' L8 8' L8 8' L8   Rec bike          Leg press - DL 2x12 115#  2x12 115#  2x10 130# 2x10 130#    Leg press - SL   2x12 B 40#       Prone quad S          LAQ  [place]  30x B 8#     2x10 B 2# aw's   Stand hip 3 way       10x ea B 2# aw's   [address]       10x B 2# aw's   Ther Activity          STS SL - 2 airex 2x5 B + SLS hold         DL / rack pull    RDL 3x8 3# RDL 3x8 3#     Stand side crunch    2x10 B 5#      FSU 6" to 12" 10x B  6" to 12" 10x B    10x B 6"   Split squats          Suitcase carry     [id number] B 15#+ march + tandem gait  30ft B 15#    High windmill 2x5 B 2#         Front carry      2x100 ft 10#     Obstacle course      Uneven surface gait, high step over hurdles, turn and stop on foam 5x thru    Gait Training                              Modalities

## 2023-10-16 DIAGNOSIS — K21.9 GASTROESOPHAGEAL REFLUX DISEASE: ICD-10-CM

## 2023-10-16 RX ORDER — PANTOPRAZOLE SODIUM 40 MG/1
TABLET, DELAYED RELEASE ORAL
Qty: 90 TABLET | Refills: 0 | Status: SHIPPED | OUTPATIENT
Start: 2023-10-16

## 2023-10-20 ENCOUNTER — EVALUATION (OUTPATIENT)
Dept: PHYSICAL THERAPY | Facility: REHABILITATION | Age: 68
End: 2023-10-20
Payer: COMMERCIAL

## 2023-10-20 DIAGNOSIS — M41.9 SCOLIOSIS OF THORACOLUMBAR SPINE, UNSPECIFIED SCOLIOSIS TYPE: ICD-10-CM

## 2023-10-20 DIAGNOSIS — M79.604 LEG PAIN, BILATERAL: ICD-10-CM

## 2023-10-20 DIAGNOSIS — M51.36 LUMBAR DEGENERATIVE DISC DISEASE: ICD-10-CM

## 2023-10-20 DIAGNOSIS — M79.605 LEG PAIN, BILATERAL: ICD-10-CM

## 2023-10-20 DIAGNOSIS — R26.89 BALANCE DISORDER: Primary | ICD-10-CM

## 2023-10-20 PROCEDURE — 97164 PT RE-EVAL EST PLAN CARE: CPT

## 2023-10-20 PROCEDURE — 97110 THERAPEUTIC EXERCISES: CPT

## 2023-10-20 PROCEDURE — 97112 NEUROMUSCULAR REEDUCATION: CPT

## 2023-10-20 PROCEDURE — 97530 THERAPEUTIC ACTIVITIES: CPT

## 2023-10-20 NOTE — PROGRESS NOTES
PT Re-Evaluation     Today's date: 10/20/2023  Patient name: Samantha Douglass  : 1955  MRN: [id number]  Referring provider: Aravind He MD  Dx:   Encounter Diagnosis     ICD-10-CM    1. Balance disorder  R26.89       2. Leg pain, bilateral  M79.604     M79.605       3. Lumbar degenerative disc disease  M51.36       4. Scoliosis of thoracolumbar spine, unspecified scoliosis type  M41.9           Start Time: 1230  Stop Time: 1305  Total time in clinic (min): 35 minutes    Subjective: Pt reports current R medial knee pain - intensity "at least a 5/10."  Prolonged knee flexion such as when sitting at desk doing work aggravates pain the most.  Prolonged walking, prolonged sitting with knee flexed, stair negotiation - especially descending, and squatting are all aggravating factors. Pt states that her balance is "about the same."  She has a "tendency to fall mostly to the back" but has never had a fall. Pt has LOBs mainly posteriorly but occasionally loses her balance anteriorly or laterally. Pt is worried about going for her normal walks because she is worried that she will get too far and will not be able to get back home due to pain. Objective: See treatment diary below    Range of Motion: Goniometric revealed the following findings (in degrees). Joint Motion Right:  Left:    Lumbar flexion 100%    Lumbar extension 25%*  LOB backwards    Lumbar lateral flexion 50% 50%   Lumbar rotation 75% 75%     Strength: MMT revealed the following findings. Joint Motion Right:  Left:    Hip Flexion 4-/5 4/5   Hip Abduction 4/5 4/5   Hip Adduction 4/5 4/5   Hip Extension 4/5 4/5   Knee Extension 4-/5* 4+/5   Knee Flexion 4/5 4+/5   Ankle Plantarflexion 4+/5 4+/5   Ankle Dorsiflexion 4+/5 4+/5        Balance:  SLS: L - 22.67s; R - 11.92s  TU.87s  5xSTS:  8.53s  FGA:      Outcome Measures: FOTO: 61    Assessment: Tolerated treatment well.  Patient would benefit from continued PT.  1:1 with Tessy Kingston Mumtaz Aguirre, DPT entirety of tx. R knee pain is most limiting factor recently. R SLS and R knee extension strength were impacted at today's re-evaluation due to the R knee pain. Pt continues to demonstrate fall risk behaviors - near fall twice when assessing lumbar extension. Pt able to regain balance but utilized UE assistance during LOB. Pt would benefit from skilled outpatient physical therapy to address functional deficits and improve fall risk status. Assessment details: Merced Severance is a 79 y.o. female presenting with worsening of chronic LBP and B knee pain (R knee pain worse) as well as balance and gait dysfunction which has been worsening. Primary impairments include R knee and lumbar pain with functional activities, RLE weakness, lumbar AROM dysfunction, paraspinal motor control dysfunction, static/dynamic balance and gait problems. Educated pt on anatomy and physiology of diagnosis. Will benefit from skilled PT interventions for community reintegration, ADL management/independence, return to work/sport/hobbies. Provided pt with written home exercise program to be completed independently. Impairments: abnormal coordination, abnormal gait, abnormal muscle firing, abnormal muscle tone, abnormal or restricted ROM, activity intolerance, impaired balance, impaired physical strength, lacks appropriate home exercise program, pain with function, safety issue, poor posture  and poor body mechanics  Functional limitations: lifting heavy objects, ADLs, stooping, prolonged walking  Symptom irritability: moderateBarriers to therapy: none  Understanding of Dx/Px/POC: good   Prognosis: good    Goals    Short Term Goals:  1. Improve R hip flexion and extension strength to at least 3+/5 MMT - MET  2. Improve R knee extension strength to at least 3+/5 MMT - MET  3. Supervision with HEP for self-care - MET    Long Term Goals:  1. Improve FGA score to at least 24/30 to decrease fall risk - ONGOING  2.  FOTO to greater than predicted value - MET  3. Independent with HEP for self-care - ONGOING  4. Improve B SLS to at least 25 seconds - NEW    Plan: Progress treatment as tolerated.       Patient would benefit from: skilled physical therapy  Planned modality interventions: manual electrical stimulation  Planned therapy interventions: abdominal trunk stabilization, activity modification, balance, balance/weight bearing training, behavior modification, body mechanics training, community reintegration, coordination, fine motor coordination training, flexibility, functional ROM exercises, gait training, graded activity, graded exercise, graded motor, home exercise program, work reintegration, therapeutic training, therapeutic exercise, therapeutic activities, stretching, strengthening, self care, postural training, patient education, neuromuscular re-education, motor coordination training, massage, manual therapy, joint mobilization and ADL training  Frequency: 1x week  Duration in weeks: 8  Plan of Care beginning date: 10/20/2023  Plan of Care expiration date: 12/15/2023  Treatment plan discussed with: patient     Precautions: osteoporosis, fibroyalgia, hx TKA,      Pertinent Findings and Outcome Measures:                                                                                                                                                                         Test / Measure  6/27/2023 7/14/2023 8/4/2023 9/29/2023 10/20/2023   FOTO 49 56 62 59 61   R knee ext MMT 3/5  4/5  4-/5   R hip flex MMT 3/5  4/5  4-/5   R hip ext MMT 3/5  4/5  4/5   FGA 19/30 23/30 25/30       Manuals 8/22 8/29 8/31 9/22 9/29 10/6 10/13 10/20   R knee PROM, stretching, assessment                                            Neuro Re-Ed           SLS  Self ball toss 50x B Ball around body 10x ea B    Self ball toss 20x B Diag fwd reaches 10x ea B tap 44# kb on stool  2x30" B tap cone call outs Step taps 10x B 6" step    Tandem stance    2x30" B airex 2x30" B airex      [place] TKE  2x15 B 10#     2x15 B 14#    Bridges           Tandem gait - fwd/bwd   4 laps foam beam 4 laps foam beam       Lateral step over objects   Foam beam 4 laps ea 3 hurdles Foam beam 4 laps ea 3 hurdles       [place] - cones      4 laps - 6 cones     Mini squats on bosu           Romberg stance    airex EC 3x30" airex 2x30"      Bosu lat lunges   10x B        Bosu fwd lunges      15x B     Gait + diag head turns      [id number]     TB sidestepping  3 laps btb   5 laps gtb foam pad   3 laps mirror gtb   TB monster walk  3 laps btb      3 laps mirror gtb   SL RDL 2x8 B 2x10 B         Slider star drill - 3 way 5x ea B unable   5x ea B      QS + SLR       2x10 B 2# aw's    Active heel slides       20x B    Bi good mornings        2x10 10#  10x 15#   Split squat        2x8 B foot on 9" step   Ther Ex           HEP review           NuStep 8' L8 8' L8 8' L8 8' L8 8' L8 8' L8 8' L8 8' L8   Rec bike           Leg press - DL 2x12 115#  2x12 115#  2x10 130# 2x10 130#     Leg press - SL   2x12 B 40#        Prone quad S           LAQ  Ib  30x B 8#     2x10 B 2# aw's    Stand hip 3 way       10x ea B 2# aw's    [address]       10x B 2# aw's    Ther Activity           STS SL - 2 airex 2x5 B + SLS hold          DL / rack pull    RDL 3x8 3# RDL 3x8 3#      Stand side crunch    2x10 B 5#       FSU 6" to 12" 10x B  6" to 12" 10x B    10x B 6" 12x R 9"   LSU        12x R 9"   X-LSU           Split squats           Suitcase carry     [id number] B 15#+ march + tandem gait  30ft B 15#     High windmill 2x5 B 2#          Front carry      2x100 ft 10#      Obstacle course      Uneven surface gait, high step over hurdles, turn and stop on foam 5x thru     Gait Training                                 Modalities

## 2023-10-27 ENCOUNTER — OFFICE VISIT (OUTPATIENT)
Dept: PHYSICAL THERAPY | Facility: REHABILITATION | Age: 68
End: 2023-10-27
Payer: COMMERCIAL

## 2023-10-27 DIAGNOSIS — M79.605 LEG PAIN, BILATERAL: Primary | ICD-10-CM

## 2023-10-27 DIAGNOSIS — M41.9 SCOLIOSIS OF THORACOLUMBAR SPINE, UNSPECIFIED SCOLIOSIS TYPE: ICD-10-CM

## 2023-10-27 DIAGNOSIS — R26.89 BALANCE DISORDER: ICD-10-CM

## 2023-10-27 DIAGNOSIS — M79.604 LEG PAIN, BILATERAL: Primary | ICD-10-CM

## 2023-10-27 DIAGNOSIS — M51.36 LUMBAR DEGENERATIVE DISC DISEASE: ICD-10-CM

## 2023-10-27 PROCEDURE — 97110 THERAPEUTIC EXERCISES: CPT

## 2023-10-27 PROCEDURE — 97530 THERAPEUTIC ACTIVITIES: CPT

## 2023-10-27 PROCEDURE — 97112 NEUROMUSCULAR REEDUCATION: CPT

## 2023-10-27 NOTE — PROGRESS NOTES
Daily Note     Today's date: 10/27/2023  Patient name: Mckay Vyas  : 1955  MRN: [id number]  Referring provider: Analilia Gongora MD  Dx:   Encounter Diagnosis     ICD-10-CM    1. Leg pain, bilateral  M79.604     M79.605       2. Balance disorder  R26.89       3. Lumbar degenerative disc disease  M51.36       4. Scoliosis of thoracolumbar spine, unspecified scoliosis type  M41.9           Start Time: 1338  Stop Time: 1425  Total time in clinic (min): 47 minutes    Subjective: Pt reports knee status has improved slightly since last visit. States that her balance deficit is most limiting factor. Objective: See treatment diary below      Assessment: Tolerated treatment well. Patient would benefit from continued PT. Pt balance deficit persists - unable to perform single leg RDLs this visit due to lumbar discomfort, lightheadedness, and balance impairment. Requires intermittent UE assistance on the parallel bar when completing more advance balance tasks. 1:1 with Chris Pressley DPT entirety of tx. Plan: Continue per plan of care.       Precautions: osteoporosis, fibroyalgia, hx TKA,      Pertinent Findings and Outcome Measures:                                                                                                                                                                         Test / Measure  2023 2023 2023 2023 10/20/2023   FOTO 49 56 62 59 61   R knee ext MMT 3/5  4/5  4-/5   R hip flex MMT 3/5  4/5  4-/5   R hip ext MMT 3/5  4/5  4/5   FGA        Auth Tracking    Auth Expires on 2023   Total Auth Visits 8         Visit # 7 [phone] 10 11 12 1   Manuals 8/31 9/22 9/29 10/6 10/13 10/20 10/27   R knee PROM, stretching, assessment                                        Neuro Re-Ed          SLS Ball around body 10x ea B    Self ball toss 20x B Diag fwd reaches 10x ea B tap 44# kb on stool  2x30" B tap cone call outs Step taps 10x B 6" step 3x30" B   Tandem stance  2x30" B airex 2x30" B airex    Airex + DB around body 15x ea B 3#   [place] TKE     2x15 B 14#     Bridges          Tandem gait - fwd/bwd 4 laps foam beam 4 laps foam beam     8 laps + marching   Lateral step over objects Foam beam 4 laps ea 3 hurdles Foam beam 4 laps ea 3 hurdles        [place] - cones    4 laps - 6 cones      Mini squats on bosu          Romberg stance  airex EC 3x30" airex 2x30"       Bosu lat lunges 10x B         Bosu fwd lunges    15x B      Gait + diag head turns    [id number]      TB sidestepping   5 laps gtb foam pad   3 laps mirror gtb 3 laps window gtb   TB monster walk      3 laps mirror gtb 3 laps gtb window   SL RDL       unable   [employer] drill - 3 way   5x ea B       QS + SLR     2x10 B 2# aw's     Active heel slides     20x B     Bi good mornings      2x10 10#  10x 15#    Split squat      2x8 B foot on 9" step    Uneven stepping       20x ea fwd/side blue/green pad   Toe/Heel walking       3 laps ea window   Ther Ex          HEP review          NuStep 8' L8 8' L8 8' L8 8' L8 8' L8 8' L8 8' L9   Rec bike          Leg press - DL 2x12 115#  2x10 130# 2x10 130#      Leg press - SL 2x12 B 40#         Prone quad S          LAQ     2x10 B 2# aw's     Stand hip 3 way     10x ea B 2# aw's     [address]     10x B 2# aw's     Ther Activity          STS          DL / rack pull  RDL 3x8 3# RDL 3x8 3#       Stand side crunch  2x10 B 5#        FSU 6" to 12" 10x B    10x B 6" 12x R 9" 10x B 9" + airex   LSU      12x R 9"    X-LSU          Split squats          Suitcase carry   [id number] B 15#+ march + tandem gait  30ft B 15#      High windmill          Front carry    2x100 ft 10#       Obstacle course    Uneven surface gait, high step over hurdles, turn and stop on foam 5x thru      Gait Training                              Modalities

## 2023-11-03 ENCOUNTER — OFFICE VISIT (OUTPATIENT)
Dept: PHYSICAL THERAPY | Facility: REHABILITATION | Age: 68
End: 2023-11-03
Payer: COMMERCIAL

## 2023-11-03 DIAGNOSIS — R26.89 BALANCE DISORDER: ICD-10-CM

## 2023-11-03 DIAGNOSIS — M79.605 LEG PAIN, BILATERAL: Primary | ICD-10-CM

## 2023-11-03 DIAGNOSIS — M51.36 LUMBAR DEGENERATIVE DISC DISEASE: ICD-10-CM

## 2023-11-03 DIAGNOSIS — M79.604 LEG PAIN, BILATERAL: Primary | ICD-10-CM

## 2023-11-03 DIAGNOSIS — M41.9 SCOLIOSIS OF THORACOLUMBAR SPINE, UNSPECIFIED SCOLIOSIS TYPE: ICD-10-CM

## 2023-11-03 PROCEDURE — 97112 NEUROMUSCULAR REEDUCATION: CPT

## 2023-11-03 PROCEDURE — 97110 THERAPEUTIC EXERCISES: CPT

## 2023-11-03 PROCEDURE — 97140 MANUAL THERAPY 1/> REGIONS: CPT

## 2023-11-03 NOTE — PROGRESS NOTES
Daily Note     Today's date: 11/3/2023  Patient name: Shala Jimenez  : 1955  MRN: [id number]  Referring provider: Joao Mendez MD  Dx:   Encounter Diagnosis     ICD-10-CM    1. Leg pain, bilateral  M79.604     M79.605       2. Balance disorder  R26.89       3. Lumbar degenerative disc disease  M51.36       4. Scoliosis of thoracolumbar spine, unspecified scoliosis type  M41.9           Start Time: 1345  Stop Time: 1430  Total time in clinic (min): 45 minutes    Subjective: Pt reports injuring midback just prior to start of tx session after trying to bend over to grab a towel. Objective: See treatment diary below      Assessment: Tolerated treatment well. Patient would benefit from continued PT. Tx session focused on thoracic pain which was aggravated earlier in the day. Partial pain alleviation post-session. Manual therapy decreased pain symptomology and was able to complete therapeutic interventions. 1:1 with Shelly Keita DPT entirety of tx. Plan: Continue per plan of care.       Precautions: osteoporosis, fibroyalgia, hx TKA,      Pertinent Findings and Outcome Measures:                                                                                                                                                                         Test / Measure  2023 2023 2023 2023 10/20/2023   FOTO 49 56 62 59 61   R knee ext MMT 3/5  4/5  4-/5   R hip flex MMT 3/5  4/5  4-/5   R hip ext MMT 3/5  4/5  4/5   FGA        POC expires Unit limit Auth Expiration date PT/OT + Visit Limit?   23 N/A 23 8     Visit/Unit Tracking  AUTH Status:  Date 10/27 11/3        Used 1 2        Remaining  7 6              Manuals 9/22 9/29 10/6 10/13 10/20 10/27 11/3   T/s STM,        MM 10' gr I/II + theragun soft head                                 Neuro Re-Ed          SLS Diag fwd reaches 10x ea B tap 44# kb on stool  2x30" B tap cone call outs Step taps 10x B 6" step  3x30" B    Tandem stance 2x30" B airex 2x30" B airex    Airex + DB around body 15x ea B 3#    Bi TKE    2x15 B 14#      Bridges          Tandem gait - fwd/bwd 4 laps foam beam     8 laps + marching    Lateral step over objects Foam beam 4 laps ea 3 hurdles         [place] - cones   4 laps - 6 cones       Mini squats on bosu          Romberg stance airex EC 3x30" airex 2x30"        Bosu lat lunges          Bosu fwd lunges   15x B       Gait + diag head turns   [id number]       TB sidestepping  5 laps gtb foam pad   3 laps mirror gtb 3 laps window gtb    TB monster walk     3 laps mirror gtb 3 laps gtb window    SL RDL      unable    [employer] drill - 3 way  5x ea B        QS + SLR    2x10 B 2# aw's      Active heel slides    20x B      Bi good mornings     2x10 10#  10x 15#     Split squat     2x8 B foot on 9" step     Uneven stepping      20x ea fwd/side blue/green pad    Toe/Heel walking      3 laps ea window    Seated T/s ext mobs       15x5"   Open books       Unable on table p!     Stand 2x10 B   [place]irag curl       10x 0#   Ther Ex          HEP review          NuStep 8' L8 8' L8 8' L8 8' L8 8' L8 8' L9 10' L6   Rec bike          Leg press - DL  2x10 130# 2x10 130#       Leg press - SL          Prone quad S          LAQ    2x10 B 2# aw's      Stand hip 3 way    10x ea B 2# aw's      [address]    10x B 2# aw's      TB rows       2x10 gtb   Ther Activity          STS          DL / rack pull RDL 3x8 3# RDL 3x8 3#        Stand side crunch 2x10 B 5#         FSU    10x B 6" 12x R 9" 10x B 9" + airex    LSU     12x R 9"     X-LSU          Split squats          Suitcase carry  [id number] B 15#+ march + tandem gait  30ft B 15#       High windmill          Front carry   2x100 ft 10#        Obstacle course   Uneven surface gait, high step over hurdles, turn and stop on foam 5x thru       Gait Training                              Modalities

## 2023-11-10 ENCOUNTER — OFFICE VISIT (OUTPATIENT)
Dept: PHYSICAL THERAPY | Facility: REHABILITATION | Age: 68
End: 2023-11-10
Payer: COMMERCIAL

## 2023-11-10 DIAGNOSIS — M79.605 LEG PAIN, BILATERAL: Primary | ICD-10-CM

## 2023-11-10 DIAGNOSIS — M79.604 LEG PAIN, BILATERAL: Primary | ICD-10-CM

## 2023-11-10 DIAGNOSIS — M41.9 SCOLIOSIS OF THORACOLUMBAR SPINE, UNSPECIFIED SCOLIOSIS TYPE: ICD-10-CM

## 2023-11-10 DIAGNOSIS — M51.36 LUMBAR DEGENERATIVE DISC DISEASE: ICD-10-CM

## 2023-11-10 DIAGNOSIS — R26.89 BALANCE DISORDER: ICD-10-CM

## 2023-11-10 PROCEDURE — 97530 THERAPEUTIC ACTIVITIES: CPT

## 2023-11-10 PROCEDURE — 97110 THERAPEUTIC EXERCISES: CPT

## 2023-11-10 PROCEDURE — 97112 NEUROMUSCULAR REEDUCATION: CPT

## 2023-11-10 NOTE — PROGRESS NOTES
Daily Note     Today's date: 11/10/2023  Patient name: Evangelista Og  : 1955  MRN: [id number]  Referring provider: Ally Bobo MD  Dx:   Encounter Diagnosis     ICD-10-CM    1. Leg pain, bilateral  M79.604     M79.605       2. Balance disorder  R26.89       3. Lumbar degenerative disc disease  M51.36       4. Scoliosis of thoracolumbar spine, unspecified scoliosis type  M41.9           Start Time: 1300  Stop Time: 1339  Total time in clinic (min): 39 minutes    Subjective: Pt reports continued back pain - most pain is lower thoracic R sided. B knee pain has persisted. Objective: See treatment diary below      Assessment: Tolerated treatment well. Patient would benefit from continued PT. Mild thoracic discomfort throughout tx session. Challenged with STS training with chest press. Tolerated introduction of multi-planar resisted movements to address lumbar and thoracic dysfunctions. 1:1 with Vickie Chan DPT entirety of tx. Plan: Continue per plan of care.       Precautions: osteoporosis, fibroyalgia, hx TKA,      Pertinent Findings and Outcome Measures:                                                                                                                                                                         Test / Measure  2023 2023 2023 2023 10/20/2023   FOTO 49 56 62 59 61   R knee ext MMT 3/5  4/5  4-/5   R hip flex MMT 3/5  4/5  4-/5   R hip ext MMT 3/5  4/5  4/5   FGA        POC expires Unit limit Auth Expiration date PT/OT + Visit Limit   23 N/A 23 8     Visit/Unit Tracking  AUTH Status:  Date 10/27 11/3 11/10       Used 1 2 3       Remaining  7 6 5             Manuals 9/29 10/6 10/13 10/20 10/27 11/3 11/10   T/s STM,       MM 10' gr I/II + theragun soft head                                  Neuro Re-Ed          SLS  2x30" B tap cone call outs Step taps 10x B 6" step  3x30" B     Tandem stance 2x30" B airex Airex + DB around body 15x ea B 3#     Bi TKE   2x15 B 14#       Bridges          Tandem gait - fwd/bwd     8 laps + marching     Slalom - cones  4 laps - 6 cones        Romberg stance airex 2x30"         Bosu fwd lunges  15x B        Gait + diag head turns  [id number]        TB sidestepping 5 laps gtb foam pad   3 laps mirror gtb 3 laps window gtb     TB monster walk    3 laps mirror gtb 3 laps gtb window     SL RDL     unable     servtag drill - 3 way 5x ea B         QS + SLR   2x10 B 2# aw's       Active heel slides   20x B       Bi good mornings    2x10 10#  10x 15#      Split squat    2x8 B foot on 9" step      Uneven stepping     20x ea fwd/side blue/green pad     Toe/Heel walking     3 laps ea window     Seated T/s ext mobs      15x5" 15x5"   Open books      Unable on table p!     Stand 2x10 B    Chirag curl      10x 0# 10x 3#   Diag chops       2x8 B dbl gtb   Diag lifts       2x8 B dbl otb   Ther Ex          NuStep 8' L8 8' L8 8' L8 8' L8 8' L9 10' L6 8' L7   Rec bike          Leg press - DL 2x10 130# 2x10 130#        Leg press - SL          LAQ   2x10 B 2# aw's       Stand hip 3 way   10x ea B 2# aw's       [address]   10x B 2# aw's       TB rows      2x10 gtb Unilat 2x10 B btb   Ther Activity          STS       + DB chest press  2x6 5#  2x10 half foam roll   DL / rack pull RDL 3x8 3#         FSU   10x B 6" 12x R 9" 10x B 9" + airex  Runner's step up + Sh ext 2x10 B gtb 6"   LSU    12x R 9"      X-LSU          Split squats          Suitcase carry [id number] 15#+ march + tandem gait  30ft B 15#        High windmill          Front carry  2x100 ft 10#         Obstacle course  Uneven surface gait, high step over hurdles, turn and stop on foam 5x thru        Gait Training                              Modalities

## 2023-11-17 ENCOUNTER — OFFICE VISIT (OUTPATIENT)
Dept: PHYSICAL THERAPY | Facility: REHABILITATION | Age: 68
End: 2023-11-17
Payer: COMMERCIAL

## 2023-11-17 DIAGNOSIS — R26.89 BALANCE DISORDER: ICD-10-CM

## 2023-11-17 DIAGNOSIS — M51.36 LUMBAR DEGENERATIVE DISC DISEASE: ICD-10-CM

## 2023-11-17 DIAGNOSIS — M79.604 LEG PAIN, BILATERAL: Primary | ICD-10-CM

## 2023-11-17 DIAGNOSIS — M41.9 SCOLIOSIS OF THORACOLUMBAR SPINE, UNSPECIFIED SCOLIOSIS TYPE: ICD-10-CM

## 2023-11-17 DIAGNOSIS — M79.605 LEG PAIN, BILATERAL: Primary | ICD-10-CM

## 2023-11-17 PROCEDURE — 97112 NEUROMUSCULAR REEDUCATION: CPT

## 2023-11-17 PROCEDURE — 97110 THERAPEUTIC EXERCISES: CPT

## 2023-11-17 PROCEDURE — 97530 THERAPEUTIC ACTIVITIES: CPT

## 2023-11-17 NOTE — PROGRESS NOTES
Daily Note     Today's date: 2023  Patient name: Ana Johns  : 1955  MRN: [id number]  Referring provider: Mo Solano MD  Dx:   Encounter Diagnosis     ICD-10-CM    1. Leg pain, bilateral  M79.604     M79.605       2. Balance disorder  R26.89       3. Lumbar degenerative disc disease  M51.36       4. Scoliosis of thoracolumbar spine, unspecified scoliosis type  M41.9           Start Time: 1300  Stop Time: 1339  Total time in clinic (min): 39 minutes    Subjective: Pt reports thoracic region continues to be bothersome. States persistent chronic LBP as well. "I think my knees have gotten a little better."  Pt also noted that she believes she has had some balance improvement too. Objective: See treatment diary below      Assessment: Tolerated treatment well. Patient would benefit from continued PT. Pt able to tolerate continued gradual progression of POC this visit. Limited thoracic motor control - slight improvement following cat-cow stretch. No pain aggravation during visit. Mild balance deficit persists - slight backwards rocking when completing suitcase hold and marching on the uneven surface. Pt able to regain balance independently. 1:1 with Ute Gregory DPT entirety of tx. Plan: Continue per plan of care.       Precautions: osteoporosis, fibroyalgia, hx TKA,      Pertinent Findings and Outcome Measures:                                                                                                                                                                         Test / Measure  2023 2023 2023 2023 10/20/2023   FOTO 49 56 62 59 61   R knee ext MMT 3/5  4/5  4-/5   R hip flex MMT 3/5  4/5  4-/5   R hip ext MMT 3/5  4/5  4/5   FGA        POC expires Unit limit Auth Expiration date PT/OT + Visit Limit   23 N/A 23 8     Visit/Unit Tracking  AUTH Status:  Date 10/27 11/3 11/10 11/17      Used 1 2 3 4      Remaining  7 6 5 4            Manuals 10/6 10/13 10/20 10/27 11/3 11/10 11/17   T/s STM,      MM 10' gr I/II + theragun soft head                                   Neuro Re-Ed          SLS 2x30" B tap cone call outs Step taps 10x B 6" step  3x30" B      Tandem stance    Airex + DB around body 15x ea B 3#      [place] TKE  2x15 B 14#        Bridges          Tandem gait - fwd/bwd    8 laps + marching      Slalom - cones 4 laps - 6 cones         Romberg stance          Bosu fwd lunges 15x B         Gait + diag head turns [id number]         TB sidestepping   3 laps mirror gtb 3 laps window gtb      TB monster walk   3 laps mirror gtb 3 laps gtb window      SL RDL    unable      Circl drill - 3 way          QS + SLR  2x10 B 2# aw's        Active heel slides  20x B        Bi good mornings   2x10 10#  10x 15#       Split squat   2x8 B foot on 9" step       Uneven stepping    20x ea fwd/side blue/green pad      Toe/Heel walking    3 laps ea window      Seated T/s ext mobs     15x5" 15x5" 15x5"   Open books     Unable on table p!     Stand 2x10 B     Chirag curl     10x 0# 10x 3#    Diag chops      2x8 B dbl gtb    Diag lifts      2x8 B dbl otb Seated DB 2x8 B 8#   Cat-Cow       15x5" ea   Ther Ex          NuStep 8' L8 8' L8 8' L8 8' L9 10' L6 8' L7 8' L8   Rec bike          Leg press - DL 2x10 130#         Leg press - SL          LAQ  2x10 B 2# aw's        Stand hip 3 way  10x ea B 2# aw's        [address]  10x B 2# aw's        TB rows     2x10 gtb Unilat 2x10 B btb + squat 2x10 15#   Ther Activity          STS      + DB chest press  2x6 5#  2x10 half foam roll + DB chest press  2x15 half foam roll   DL / rack pull          FSU  10x B 6" 12x R 9" 10x B 9" + airex  Runner's step up + Sh ext 2x10 B gtb 6" Runner's step up + Sh ext 2x10 B gtb 6"   LSU   12x R 9"       X-LSU          Split squats          Suitcase carry + tandem gait  30ft B 15#      + standing march on airex 15x ea B 8#   High windmill          Front carry Obstacle course Uneven surface gait, high step over hurdles, turn and stop on foam 5x thru         Gait Training                              Modalities

## 2023-11-22 ENCOUNTER — OFFICE VISIT (OUTPATIENT)
Dept: PHYSICAL THERAPY | Facility: REHABILITATION | Age: 68
End: 2023-11-22
Payer: COMMERCIAL

## 2023-11-22 DIAGNOSIS — M79.605 LEG PAIN, BILATERAL: Primary | ICD-10-CM

## 2023-11-22 DIAGNOSIS — M51.36 LUMBAR DEGENERATIVE DISC DISEASE: ICD-10-CM

## 2023-11-22 DIAGNOSIS — R26.89 BALANCE DISORDER: ICD-10-CM

## 2023-11-22 DIAGNOSIS — M79.604 LEG PAIN, BILATERAL: Primary | ICD-10-CM

## 2023-11-22 DIAGNOSIS — M41.9 SCOLIOSIS OF THORACOLUMBAR SPINE, UNSPECIFIED SCOLIOSIS TYPE: ICD-10-CM

## 2023-11-22 PROCEDURE — 97530 THERAPEUTIC ACTIVITIES: CPT

## 2023-11-22 PROCEDURE — 97110 THERAPEUTIC EXERCISES: CPT

## 2023-11-22 PROCEDURE — 97140 MANUAL THERAPY 1/> REGIONS: CPT

## 2023-11-22 NOTE — PROGRESS NOTES
Daily Note     Today's date: 2023  Patient name: Jermaine Mcneil  : 1955  MRN: [id number]  Referring provider: Carlos Shaw MD  Dx:   Encounter Diagnosis     ICD-10-CM    1. Leg pain, bilateral  M79.604     M79.605       2. Balance disorder  R26.89       3. Lumbar degenerative disc disease  M51.36       4. Scoliosis of thoracolumbar spine, unspecified scoliosis type  M41.9           Start Time: 1045  Stop Time: 1124  Total time in clinic (min): 39 minutes    Subjective: Pt reports R Sh severe pain recently - mostly posteriorly and periscapularly. Has gotten worse over the past several days. Unknown cause of pain. 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 Objective: See treatment diary below    Pain-free scapular MMT - mid trap, low trap, rhomboids, lats  Pain-free and full R Sh AROM  TTP throughout R sided UT, LS, mid trap, low trap, all periscapularly    FOTO = 58    Assessment: Tolerated treatment well. Patient would benefit from continued PT. Pt appears to have trapezius dysfunction at this time due to tenderness location and signs with testing. No significant weakness compared bilaterally with scapular mms. Scapular motor control dysfunction observed. Improvement of symptoms post-session - better able to complete bed mobility without pain aggravation. Discussed HEP update by incorporating UT/LS stretching and shrugs at this time - will potentially further adjust next visit in 2 days. 1:1 with Misbah Kumar DPT entirety of tx. Plan: Continue per plan of care.       Precautions: osteoporosis, fibroyalgia, hx TKA,      Pertinent Findings and Outcome Measures:                                                                                                                                                                         Test / Measure  2023 2023 2023 2023 10/20/2023 2023   FOTO 49 56 62 59 61 58   R knee ext MMT 3/5  4/5  4-/5    R hip flex MMT 3/5 4/5  4-/5    R hip ext MMT 3/5  4/5  4/5    FGA 19/30 23/30 25/30        POC expires Unit limit Auth Expiration date PT/OT + Visit Limit   12/22/23 N/A 12/22/23 8     Visit/Unit Tracking  AUTH Status:  Date 10/27 11/3 11/10 11/17 11/22     Used 1 2 3 4 5     Remaining  7 6 5 4 3           Manuals 10/13 10/20 10/27 11/3 11/10 11/17 11/22   T/s STM,     MM 10' gr I/II + theragun soft head      C/s SOR, STM, testing       MM 8'                       Neuro Re-Ed          SLS Step taps 10x B 6" step  3x30" B       Tandem stance   Airex + DB around body 15x ea B 3#       Bi TKE 2x15 B 14#         Tandem gait - fwd/bwd   8 laps + marching       Slalom - cones          TB sidestepping  3 laps mirror gtb 3 laps window gtb       TB monster walk  3 laps mirror gtb 3 laps gtb window       SL RDL   unable       QS + SLR 2x10 B 2# aw's         Active heel slides 20x B         Bi good mornings  2x10 10#  10x 15#        Split squat  2x8 B foot on 9" step        Uneven stepping   20x ea fwd/side blue/green pad       Toe/Heel walking   3 laps ea window       Seated T/s ext mobs    15x5" 15x5" 15x5"    Open books    Unable on table p!     Stand 2x10 B      Chirag curl    10x 0# 10x 3#     Diag chops     2x8 B dbl gtb     Diag lifts     2x8 B dbl otb Seated DB 2x8 B 8#    Cat-Cow      15x5" ea    Ther Ex          NuStep 8' L8 8' L8 8' L9 10' L6 8' L7 8' L8    Rec bike          Leg press - DL          Leg press - SL          LAQ 2x10 B 2# aw's         Stand hip 3 way 10x ea B 2# aw's         [address] 10x B 2# aw's         TB rows    2x10 gtb Unilat 2x10 B btb + squat 2x10 15# 2x20 gtb   TB Sh ext       20x gtb  20x otb   SL Sh flex       5x + MT scapula  5x 0#   SL Sh abd       5x + MT scapula  5x 0#   Ther Activity          STS     + DB chest press  2x6 5#  2x10 half foam roll + DB chest press  2x15 half foam roll    DL / rack pull          FSU 10x B 6" 12x R 9" 10x B 9" + airex  Runner's step up + Sh ext 2x10 B gtb 6" Runner's step up + Sh ext 2x10 B gtb 6"    LSU  12x R 9"        X-LSU          Split squats          Suitcase carry      + standing march on airex 15x ea B 8#    High windmill          Front carry           Obstacle course          R UT/LS S       3x30" ea   Mid trap S       10x10"             Gait Training                              Modalities

## 2023-11-24 ENCOUNTER — OFFICE VISIT (OUTPATIENT)
Dept: PHYSICAL THERAPY | Facility: REHABILITATION | Age: 68
End: 2023-11-24
Payer: COMMERCIAL

## 2023-11-24 DIAGNOSIS — M79.605 LEG PAIN, BILATERAL: Primary | ICD-10-CM

## 2023-11-24 DIAGNOSIS — M79.604 LEG PAIN, BILATERAL: Primary | ICD-10-CM

## 2023-11-24 DIAGNOSIS — R26.89 BALANCE DISORDER: ICD-10-CM

## 2023-11-24 DIAGNOSIS — M41.9 SCOLIOSIS OF THORACOLUMBAR SPINE, UNSPECIFIED SCOLIOSIS TYPE: ICD-10-CM

## 2023-11-24 DIAGNOSIS — M51.36 LUMBAR DEGENERATIVE DISC DISEASE: ICD-10-CM

## 2023-11-24 PROCEDURE — 97530 THERAPEUTIC ACTIVITIES: CPT

## 2023-11-24 PROCEDURE — 97110 THERAPEUTIC EXERCISES: CPT

## 2023-11-24 PROCEDURE — 97112 NEUROMUSCULAR REEDUCATION: CPT

## 2023-11-29 DIAGNOSIS — M79.7 FIBROMYALGIA: ICD-10-CM

## 2023-11-29 RX ORDER — GABAPENTIN 300 MG/1
CAPSULE ORAL
Qty: 450 CAPSULE | Refills: 0 | Status: SHIPPED | OUTPATIENT
Start: 2023-11-29

## 2023-11-29 NOTE — TELEPHONE ENCOUNTER
Reason for call:   [x] Refill   [] Prior Auth  [] Other:     Office:   [x] PCP/Provider - Dr Jose Mills  [] Specialty/Provider -     Medication: Gabapentin 300mg x5 daily # 450      Pharmacy: [address] [place] [address]    Does the patient have enough for 3 days?    [x] Yes   [] No - Send as HP to POD

## 2023-12-01 ENCOUNTER — OFFICE VISIT (OUTPATIENT)
Dept: PHYSICAL THERAPY | Facility: REHABILITATION | Age: 68
End: 2023-12-01
Payer: COMMERCIAL

## 2023-12-01 DIAGNOSIS — R26.89 BALANCE DISORDER: ICD-10-CM

## 2023-12-01 DIAGNOSIS — M51.36 LUMBAR DEGENERATIVE DISC DISEASE: ICD-10-CM

## 2023-12-01 DIAGNOSIS — M41.9 SCOLIOSIS OF THORACOLUMBAR SPINE, UNSPECIFIED SCOLIOSIS TYPE: ICD-10-CM

## 2023-12-01 DIAGNOSIS — M79.605 LEG PAIN, BILATERAL: Primary | ICD-10-CM

## 2023-12-01 DIAGNOSIS — M79.604 LEG PAIN, BILATERAL: Primary | ICD-10-CM

## 2023-12-01 PROCEDURE — 97112 NEUROMUSCULAR REEDUCATION: CPT

## 2023-12-01 PROCEDURE — 97530 THERAPEUTIC ACTIVITIES: CPT

## 2023-12-01 PROCEDURE — 97110 THERAPEUTIC EXERCISES: CPT

## 2023-12-01 NOTE — PROGRESS NOTES
Daily Note     Today's date: 2023  Patient name: Shawn Velasquez  : 1955  MRN: [id number]  Referring provider: Aida Gao MD  Dx:   Encounter Diagnosis     ICD-10-CM    1. Leg pain, bilateral  M79.604     M79.605       2. Balance disorder  R26.89       3. Lumbar degenerative disc disease  M51.36       4. Scoliosis of thoracolumbar spine, unspecified scoliosis type  M41.9           Start Time: 1308  Stop Time: 1347  Total time in clinic (min): 39 minutes    Subjective: Pt reports slight improvement of thoracic back pain. Is more concerned about balance. Objective: See treatment diary below      Assessment: Tolerated treatment well. Patient would benefit from continued PT. Tx session focused on both balance deficits, thoracic/scapular motor control, and BLE strengthening. Challenged with compound movements / total body interventions resulting in moderate fatigue post-session. Balance dysfunction persists - required intermittent hand held assist to prevent LOB - close supervision provided with balance interventions. 1:1 with Jay Cook DPT entirety of tx. Plan: Continue per plan of care. Re-Eval / potential discharge next visit.        Precautions: osteoporosis, fibroyalgia, hx TKA,      Pertinent Findings and Outcome Measures:                                                                                                                                                                         Test / Measure  2023 2023 2023 2023 10/20/2023 2023   FOTO 49 56 62 59 61 58   R knee ext MMT 3/5  4/5  4-/5    R hip flex MMT 3/5  4/5  4-/5    R hip ext MMT 3/5  4/5  4/5    FGA         POC expires Unit limit Auth Expiration date PT/OT + Visit Limit   23 N/A 23 8     Visit/Unit Tracking  AUTH Status:  Date 10/27 11/3 11/10 11/17 11/22 11/24 12/1    Used 1 2 3 4 5 6 7    Remaining  7 6 5 4 3 2 1          Manuals 10/27 11/3 11/10 11/17 11/22 11/24 12/1   T/s STM,   MM 10' gr I/II + theragun soft head        C/s SOR, STM, testing     MM 8'                         Neuro Re-Ed          SLS 3x30" B      + pallof press 15x ea B otb   Tandem stance Airex + DB around body 15x ea B 3#         Tandem gait - fwd/bwd 8 laps + marching         TB sidestepping 3 laps window gtb         TB monster walk 3 laps gtb window         SL RDL unable         QS + SLR          Uneven stepping 20x ea fwd/side blue/green pad         Toe/Heel walking 3 laps ea window         Seated T/s ext mobs  15x5" 15x5" 15x5"      Open books  Unable on table p!     Stand 2x10 B        Chirag curl  10x 0# 10x 3#       Diag chops   2x8 B dbl gtb       Diag lifts   2x8 B dbl otb Seated DB 2x8 B 8#      Cat-Cow    15x5" ea      TB trunk rot      2x10 B dbl gtb    Shrugs + cerv rot      2x10 8#    Stand Y + retro walk       8x otb   Bird dogs       10x ea   Ther Ex          NuStep 8' L9 10' L6 8' L7 8' L8      UBE      4'/4' 4'/4'    Stand hip 3 way          TB rows  2x10 gtb Unilat 2x10 B btb + squat 2x10 15# 2x20 gtb 2x20 gtb + squat 2x10 15#   TB Sh ext     20x gtb  20x otb 2x20 gtb    SL Sh flex     5x + MT scapula  5x 0#     SL Sh abd     5x + MT scapula  5x 0#     R UT/LS S     3x30" ea 3x30" ea B    Mid trap S     10x10" 10x10"    Stand pec S      10x10" B    Ther Activity          STS   + DB chest press  2x6 5#  2x10 half foam roll + DB chest press  2x15 half foam roll   SL Plyo  10x B from 24" box   DL / rack pull          FSU 10x B 9" + airex  Runner's step up + Sh ext 2x10 B gtb 6" Runner's step up + Sh ext 2x10 B gtb 6"   Runner's step up + Sh ext 2x10 B btb 6"   LSU          Suitcase carry    + standing march on airex 15x ea B 8#      High windmill          Front carry       2x1' 8#    Obstacle course          Stand TB punch      15x B gtb    Gait Training                              Modalities

## 2023-12-07 ENCOUNTER — EVALUATION (OUTPATIENT)
Dept: PHYSICAL THERAPY | Facility: REHABILITATION | Age: 68
End: 2023-12-07
Payer: COMMERCIAL

## 2023-12-07 DIAGNOSIS — M51.36 LUMBAR DEGENERATIVE DISC DISEASE: ICD-10-CM

## 2023-12-07 DIAGNOSIS — M79.604 LEG PAIN, BILATERAL: Primary | ICD-10-CM

## 2023-12-07 DIAGNOSIS — M41.9 SCOLIOSIS OF THORACOLUMBAR SPINE, UNSPECIFIED SCOLIOSIS TYPE: ICD-10-CM

## 2023-12-07 DIAGNOSIS — M79.605 LEG PAIN, BILATERAL: Primary | ICD-10-CM

## 2023-12-07 DIAGNOSIS — R26.89 BALANCE DISORDER: ICD-10-CM

## 2023-12-07 PROCEDURE — 97164 PT RE-EVAL EST PLAN CARE: CPT

## 2023-12-07 PROCEDURE — 97110 THERAPEUTIC EXERCISES: CPT

## 2023-12-07 PROCEDURE — 97530 THERAPEUTIC ACTIVITIES: CPT

## 2023-12-07 NOTE — PROGRESS NOTES
Discharge Summary     Today's date: 2023  Patient name: Estela Bell  : 1955  MRN: [id number]  Referring provider: Qian Hicks MD  Dx:   Encounter Diagnosis     ICD-10-CM    1. Leg pain, bilateral  M79.604     M79.605       2. Balance disorder  R26.89       3. Lumbar degenerative disc disease  M51.36       4. Scoliosis of thoracolumbar spine, unspecified scoliosis type  M41.9           Start Time: 1715  Stop Time: 1750  Total time in clinic (min): 35 minutes    Subjective: Pt reports current rehabilitation status is at 70%. Pt states that she unable to hike for prolonged periods of time currently. Wants to get better at walking farther and longer. Only able to walk about 1 mile, gets winded easily. Unable to play pickleball. Pain at B knees and lumbar region - 2-3/10 pain intensity. Balance remains a significant concern. States that she mainly loses balance backwards and occasionally sideways; although, she has not fallen. Remains worried about falling due to osteoporosis. Objective: See treatment diary below    Range of Motion: Goniometric revealed the following findings (in degrees). Joint Motion Right:  Left:    Lumbar flexion 100%    Lumbar extension 50%    Lumbar lateral flexion 50% 50%   Lumbar rotation 75% 75%     Strength: MMT revealed the following findings. Joint Motion Right:  Left:    Hip Flexion 4+/5 4+/5   Hip Abduction 4+/5 4+/5   Hip Adduction 4+/5 4+/5   Hip Extension 4+/5 4+/5   Knee Extension 4+/5 4+/5   Knee Flexion 4+/5 4+/5   Ankle Plantarflexion 4+/5 4+/5   Ankle Dorsiflexion 4+/5 4+/5        Balance:  SLS: L - 25.19s; R - 16.12s  TU.08s  5xSTS:  8.39s  FGA:      Outcome Measures: FOTO: 62    Assessment: All short-term goals have been met. Partially met all long-term goals. Balance remains a concern but fall risk is much improved. BLE strength is improving as well - mild deficit persists.   1:1 with Serg Sommer DPT entirety of tx.    Assessment details: Chon Ramos is a 79 y.o. female presenting with worsening of chronic LBP and B knee pain (R knee pain worse) as well as balance and gait dysfunction which has been worsening. Primary impairments include R knee and lumbar pain with functional activities, RLE weakness, lumbar AROM dysfunction, paraspinal motor control dysfunction, static/dynamic balance and gait problems. Educated pt on anatomy and physiology of diagnosis. Will benefit from skilled PT interventions for community reintegration, ADL management/independence, return to work/sport/hobbies. Provided pt with written home exercise program to be completed independently. Impairments: abnormal coordination, abnormal gait, abnormal muscle firing, abnormal muscle tone, abnormal or restricted ROM, activity intolerance, impaired balance, impaired physical strength, lacks appropriate home exercise program, pain with function, safety issue, poor posture  and poor body mechanics  Functional limitations: lifting heavy objects, ADLs, stooping, prolonged walking  Symptom irritability: moderateBarriers to therapy: none  Understanding of Dx/Px/POC: good   Prognosis: good    Goals    Short Term Goals:  1. Improve R hip flexion and extension strength to at least 3+/5 MMT - MET  2. Improve R knee extension strength to at least 3+/5 MMT - MET  3. Supervision with HEP for self-care - MET    Long Term Goals:  1. Improve FGA score to at least 24/30 to decrease fall risk - MET  2. FOTO to greater than predicted value - MET  3. Independent with HEP for self-care - NOT MET  4. Improve B SLS to at least 25 seconds - PARTIAL    Plan: Discharge from skilled outpatient physical therapy services at this time.   Pt would benefit from fitness program.         Precautions: osteoporosis, fibroyalgia, hx TKA,      Pertinent Findings and Outcome Measures: Test / Measure  6/27/2023 7/14/2023 8/4/2023 9/29/2023 10/20/2023 11/22/2023 12/7/2023   FOTO 49 56 62 59 61 58 62   R knee ext MMT 3/5  4/5  4-/5  4+/5   R hip flex MMT 3/5  4/5  4-/5  4+/5   R hip ext MMT 3/5  4/5  4/5  4+/5   FGA 19/30 23/30 25/30 26/30       POC expires Unit limit Auth Expiration date PT/OT + Visit Limit   12/22/23 N/A 12/22/23 8     Visit/Unit Tracking  AUTH Status:  Date 10/27 11/3 11/10 11/17 11/22 11/24 12/1 12/7    Used 1 2 3 4 5 6 7 8    Remaining  7 6 5 4 3 2 1 0          Manuals 11/10 11/17 11/22 11/24 12/1 12/7   T/s STM,          C/s SOR, STM, testing   MM 8'                        Neuro Re-Ed         SLS     + pallof press 15x ea B otb    Tandem stance         Tandem gait - fwd/bwd         TB sidestepping         TB monster walk         SL RDL         QS + SLR         Uneven stepping         Toe/Heel walking         Seated T/s ext mobs 15x5" 15x5"       Open books         Chirag curl 10x 3#        Diag chops 2x8 B dbl gtb        Diag lifts 2x8 B dbl otb Seated DB 2x8 B 8#       Cat-Cow  15x5" ea       TB trunk rot    2x10 B dbl gtb     Shrugs + cerv rot    2x10 8#     Stand Y + retro walk     8x otb    Bird dogs     10x ea    Ther Ex         NuStep 8' L7 8' L8       UBE    4'/4' 4'/4'  4'/4'   Stand hip 3 way         TB rows Unilat 2x10 B btb + squat 2x10 15# 2x20 gtb 2x20 gtb + squat 2x10 15#    TB Sh ext   20x gtb  20x otb 2x20 gtb     SL Sh flex   5x + MT scapula  5x 0#      SL Sh abd   5x + MT scapula  5x 0#      R UT/LS S   3x30" ea 3x30" ea B     Mid trap S   10x10" 10x10"     Stand pec S    10x10" B     Ther Activity         HEP update      10'   STS + DB chest press  2x6 5#  2x10 half foam roll + DB chest press  2x15 half foam roll   SL Plyo  10x B from 24" box    DL / rack pull         FSU Runner's step up + Sh ext 2x10 B gtb 6" Runner's step up + Sh ext 2x10 B gtb 6"   Runner's step up + Sh ext 2x10 B btb 6"    LSU         Suitcase carry  + standing march on airex 15x ea B 8#       High windmill         Front carry     2x1' 8#     Obstacle course         Stand TB punch    15x B gtb     Gait Training                           Modalities

## 2023-12-11 ENCOUNTER — RA CDI HCC (OUTPATIENT)
Dept: OTHER | Facility: HOSPITAL | Age: 68
End: 2023-12-11

## 2023-12-11 DIAGNOSIS — F41.8 DEPRESSION WITH ANXIETY: ICD-10-CM

## 2023-12-11 DIAGNOSIS — M79.7 FIBROMYALGIA: ICD-10-CM

## 2023-12-11 RX ORDER — VENLAFAXINE HYDROCHLORIDE 75 MG/1
75 CAPSULE, EXTENDED RELEASE ORAL DAILY
Qty: 90 CAPSULE | Refills: 1 | Status: SHIPPED | OUTPATIENT
Start: 2023-12-11

## 2023-12-11 NOTE — TELEPHONE ENCOUNTER
Reason for call:   [x] Refill   [] Prior Auth  [] Other:     Office:   [x] PCP/Provider -  Analilia Gongora / [address] [place] [address] internal med  [] Specialty/Provider -     Medication: venlafaxine (EFFEXOR-XR) 75 mg 24 hr capsule     Dose/Frequency: TAKE 1 CAPSULE(75 MG) BY MOUTH DAILY     Quantity: 90    Pharmacy: [address] [place] [address]     Does the patient have enough for 3 days?    [x] Yes   [] No - Send as HP to POD

## 2023-12-11 NOTE — PROGRESS NOTES
[address] [facility] coding opportunities       Chart reviewed, no opportunity found: CHART REVIEWED, NO OPPORTUNITY FOUND        Patients Insurance        Commercial Insurance: Pola Kidd

## 2023-12-14 ENCOUNTER — APPOINTMENT (OUTPATIENT)
Dept: PHYSICAL THERAPY | Facility: REHABILITATION | Age: 68
End: 2023-12-14
Payer: COMMERCIAL

## 2023-12-18 ENCOUNTER — OFFICE VISIT (OUTPATIENT)
Dept: INTERNAL MEDICINE CLINIC | Facility: CLINIC | Age: 68
End: 2023-12-18
Payer: COMMERCIAL

## 2023-12-18 VITALS
TEMPERATURE: 98 F | BODY MASS INDEX: 24.8 KG/M2 | WEIGHT: 140 LBS | SYSTOLIC BLOOD PRESSURE: 122 MMHG | HEIGHT: 63 IN | HEART RATE: 54 BPM | OXYGEN SATURATION: 96 % | DIASTOLIC BLOOD PRESSURE: 72 MMHG

## 2023-12-18 DIAGNOSIS — E55.9 VITAMIN D DEFICIENCY: ICD-10-CM

## 2023-12-18 DIAGNOSIS — G50.0 TRIGEMINAL NEURALGIA: ICD-10-CM

## 2023-12-18 DIAGNOSIS — Z00.00 HEALTH MAINTENANCE EXAMINATION: ICD-10-CM

## 2023-12-18 DIAGNOSIS — G25.81 RESTLESS LEGS SYNDROME: ICD-10-CM

## 2023-12-18 DIAGNOSIS — E78.49 OTHER HYPERLIPIDEMIA: ICD-10-CM

## 2023-12-18 DIAGNOSIS — J30.89 ALLERGIC RHINITIS DUE TO OTHER ALLERGIC TRIGGER, UNSPECIFIED SEASONALITY: ICD-10-CM

## 2023-12-18 DIAGNOSIS — J00 ACUTE RHINITIS: ICD-10-CM

## 2023-12-18 DIAGNOSIS — R47.89 WORD FINDING DIFFICULTY: ICD-10-CM

## 2023-12-18 DIAGNOSIS — R00.2 PALPITATIONS: ICD-10-CM

## 2023-12-18 DIAGNOSIS — R26.89 BALANCE DISORDER: Primary | ICD-10-CM

## 2023-12-18 DIAGNOSIS — M81.0 AGE-RELATED OSTEOPOROSIS WITHOUT CURRENT PATHOLOGICAL FRACTURE: ICD-10-CM

## 2023-12-18 DIAGNOSIS — K59.04 CHRONIC IDIOPATHIC CONSTIPATION: ICD-10-CM

## 2023-12-18 DIAGNOSIS — Z79.899 ENCOUNTER FOR LONG-TERM CURRENT USE OF MEDICATION: ICD-10-CM

## 2023-12-18 DIAGNOSIS — F41.8 DEPRESSION WITH ANXIETY: ICD-10-CM

## 2023-12-18 PROBLEM — M19.041 PRIMARY OSTEOARTHRITIS OF BOTH HANDS: Status: ACTIVE | Noted: 2023-12-18

## 2023-12-18 PROBLEM — M19.042 PRIMARY OSTEOARTHRITIS OF BOTH HANDS: Status: ACTIVE | Noted: 2023-12-18

## 2023-12-18 PROCEDURE — 99214 OFFICE O/P EST MOD 30 MIN: CPT | Performed by: INTERNAL MEDICINE

## 2023-12-18 PROCEDURE — 99397 PER PM REEVAL EST PAT 65+ YR: CPT | Performed by: INTERNAL MEDICINE

## 2023-12-18 RX ORDER — FLUTICASONE PROPIONATE 50 MCG
1 SPRAY, SUSPENSION (ML) NASAL DAILY PRN
Qty: 16 G | Refills: 1 | Status: SHIPPED | OUTPATIENT
Start: 2023-12-18

## 2023-12-18 RX ORDER — ALENDRONATE SODIUM 70 MG/1
70 TABLET ORAL
Qty: 12 TABLET | Refills: 1 | Status: SHIPPED | OUTPATIENT
Start: 2023-12-18

## 2023-12-18 NOTE — PROGRESS NOTES
Assessment/Plan:    Word finding difficulty  Intermittent.  Saw neurology, has not been to [facility].    SHAWN (obstructive sleep apnea)  ? Using CPAP.    Depression with anxiety  Stable, on venlafaxine.    Other hyperlipidemia  Lipids at goal, on atorvastatin 40 mg.    Trigeminal neuralgia  On carbamazepine.    Restless legs syndrome  On gabapentin, daily iron.    Age-related osteoporosis without current pathological fracture  Restart alendronate.  Continue daily walks and supplements.    Constipation  Reviewed high fiber diet.  Restarted Colace bid.    GERD (gastroesophageal reflux disease)  Taking PPI most days of the week.    Lumbar degenerative disc disease  Went to physical therapy.  Leg pain improved.    Primary osteoarthritis of both hands  Declined imaging.     Diagnoses and all orders for this visit:    Balance disorder    Age-related osteoporosis without current pathological fracture  -     DXA bone density spine hip and pelvis; Future  -     alendronate (FOSAMAX) 70 mg tablet; Take 1 tablet (70 mg total) by mouth every 7 days    Acute rhinitis    Chronic idiopathic constipation    Depression with anxiety    Other hyperlipidemia  -     Comprehensive metabolic panel; Future  -     Lipid panel; Future  -     TSH, 3rd generation with Free T4 reflex; Future    Restless legs syndrome    Trigeminal neuralgia  -     CBC and differential; Future    Word finding difficulty    Vitamin D deficiency  -     Vitamin D 25 hydroxy; Future    Allergic rhinitis due to other allergic trigger, unspecified seasonality  -     fluticasone (FLONASE) 50 mcg/act nasal spray; 1 spray into each nostril daily as needed for rhinitis    Palpitations  Comments:  Intermittent. Holter recommended by neurology.  Orders:  -     Holter monitor; Future    Encounter for long-term current use of medication  -     Vitamin B12; Future    Health maintenance examination  Comments:  Flu vaccine due, received COVID vaccine recently.      Follow up in 6  "months or as needed.      Subjective:      Patient ID: Oriana Beverly is a 68 y.o. female here for a follow up.    She complains of intermittent pain in her left pinky finger. No known injury. She reports no swelling.    She reports physical therapy has helped but feels her balance is still bad. She is considering restarting this next sammi.    She started taking Colace again, this has helped. She does not eat a lot of fruits or vegetables regularly.    She reports occasional dizziness, which she forgets to eat. She does not eat regular meals.  She has occasional palpitations, no shortness of breath.    She still has issues with memory, word finding difficulties. No known personality changes.      The following portions of the patient's history were reviewed and updated as appropriate: allergies, current medications, past medical history, past social history, and problem list.    Review of Systems   Constitutional:  Negative for activity change, appetite change and fatigue.   HENT:  Negative for congestion and ear pain.    Eyes:  Negative for visual disturbance.   Respiratory:  Negative for cough and shortness of breath.    Cardiovascular:  Negative for chest pain and leg swelling.   Gastrointestinal:  Positive for constipation. Negative for abdominal pain and diarrhea.   Genitourinary:  Negative for dysuria and frequency.   Musculoskeletal:  Negative for arthralgias and myalgias.   Skin:  Negative for rash and wound.   Neurological:  Negative for dizziness, numbness and headaches.   Psychiatric/Behavioral:  Positive for decreased concentration. Negative for confusion. The patient is not nervous/anxious.          Objective:      /72   Pulse (!) 54   Temp 98 °F (36.7 °C)   Ht 5' 3\" (1.6 m)   Wt 63.5 kg (140 lb)   SpO2 96%   BMI 24.80 kg/m²          Physical Exam  Vitals and nursing note reviewed.   Constitutional:       General: She is not in acute distress.     Appearance: She is well-developed.   HENT:    "   Head: Normocephalic and atraumatic.      Mouth/Throat:      Mouth: Mucous membranes are moist.   Eyes:      Pupils: Pupils are equal, round, and reactive to light.   Cardiovascular:      Rate and Rhythm: Normal rate and regular rhythm.      Heart sounds: Normal heart sounds.   Pulmonary:      Effort: Pulmonary effort is normal.      Breath sounds: Normal breath sounds. No wheezing.   Abdominal:      General: Bowel sounds are normal.      Palpations: Abdomen is soft.   Musculoskeletal:         General: No swelling.      Right hand: Deformity present. No swelling, tenderness or bony tenderness.      Left hand: Deformity present. No swelling, tenderness or bony tenderness. Normal range of motion.      Right lower leg: No edema.      Left lower leg: No edema.   Skin:     General: Skin is warm.      Findings: No rash.   Neurological:      General: No focal deficit present.      Mental Status: She is alert and oriented to person, place, and time.   Psychiatric:         Mood and Affect: Mood and affect normal. Mood is not anxious or depressed.         Behavior: Behavior normal.             Labs & imaging results reviewed with patient.

## 2024-01-14 DIAGNOSIS — G45.9 TIA (TRANSIENT ISCHEMIC ATTACK): ICD-10-CM

## 2024-01-15 RX ORDER — ATORVASTATIN CALCIUM 40 MG/1
TABLET, FILM COATED ORAL
Qty: 90 TABLET | Refills: 1 | Status: SHIPPED | OUTPATIENT
Start: 2024-01-15

## 2024-02-27 DIAGNOSIS — K21.9 GASTROESOPHAGEAL REFLUX DISEASE: ICD-10-CM

## 2024-02-27 DIAGNOSIS — M79.7 FIBROMYALGIA: ICD-10-CM

## 2024-02-27 RX ORDER — GABAPENTIN 300 MG/1
CAPSULE ORAL
Qty: 450 CAPSULE | Refills: 1 | Status: SHIPPED | OUTPATIENT
Start: 2024-02-27

## 2024-02-27 RX ORDER — PANTOPRAZOLE SODIUM 40 MG/1
40 TABLET, DELAYED RELEASE ORAL DAILY
Qty: 90 TABLET | Refills: 1 | Status: SHIPPED | OUTPATIENT
Start: 2024-02-27

## 2024-02-27 NOTE — TELEPHONE ENCOUNTER
Reason for call:   [x] Refill   [] Prior Auth  [] Other:     Office:   [x] PCP/Provider - Etta Staples MD  [] Specialty/Provider -     pantoprazole (PROTONIX) 40 mg tablet   Dose, Route, Frequency: As Directed  Dispense Quantity: 90 tablet  Sig: TAKE 1 TABLET(40 MG) BY MOUTH DAILY    gabapentin (NEURONTIN) 300 mg capsule  Dose, Route, Frequency: As Directed  Dispense Quantity: 450 capsule  Sig: TAKE ONE CAPSULE BY MOUTH EVERY MORNING AND NOON AND 3 CAPSULES BY MOUTH EVERY EVENING        Pharmacy  [id number]  [place] - [id number]     Does the patient have enough for 3 days?   [] Yes   [x] No - Send as HP to POD

## 2024-03-11 NOTE — ASSESSMENT & PLAN NOTE
Room #: 2    Chief Complaint   Patient presents with    Follow-up     H&P prior to ICD 3/18/24    Congestive Heart Failure    Cardiomyopathy       Vitals:    03/11/24 1313   BP: 90/60   Site: Right Upper Arm   Position: Sitting   Cuff Size: Medium Adult   Pulse: 94   Resp: 18   SpO2: 95%   Weight: 60.3 kg (133 lb)   Height: 1.6 m (5' 3\")         Chest pain:  NO    Have you been to the ER, urgent care, or hospitalized outside of [facility] since your last visit?   YES - [facility] advanced heart failure.    Refills:  NO  
· Non specific chest tightness noted this morning  Patient reports she has this frequently and has already had cardiac workup    Stress test on file noted to be normal  · Would monitor on telemetry, check troponin x3 and do echocardiogram; obtain EKG
· Patient presented with approximately 10 minute episode of visual distortion (seems to be consistent with "Amie Graven in [place]" phenomenon, slurred speech, lightheadedness and sense of being disoriented)  Also with aura of nausea  The symptoms have now resolved  Patient reports she had a similar but less severe event approximately 1 year ago  · Differential diagnosis to include TIA verses seizure  · Patient remains on Stroke pathway  · CTA head and neck= 60-80% focal stenosis  · MRI brain moderate, progressive microangiopathy  No acute CVA  · echo pending  · tele unremarkable  · Appreciate neuro eval, will discuss with their team if there are any concerns regarding ongoing driving  · For now will ASA/statin regimen  · Check fasting lipid profile, A1c in a m    · Await results of EEG
· Patient presented with approximately 10 minute episode of visual distortion, slurred speech, lightheadedness and sense of being disoriented  The symptoms have now resolved  Patient reports she had a similar but less severe event approximately 1 year ago  · Differential diagnosis to include TIA verses seizure  · Will place patient on Stroke pathway; check CTA head and neck, MRI brain, echo, monitor on tele   · Requested neuro eval, will discuss with their team if there are any concerns regarding ongoing driving  · Will initiate ASA/statin regimen  · Check fasting lipid profile, A1c in a m    · Will get EEG
· Patient with underlying fibromyalgia and chronic arthritic pain    She has been stable on Lyrica and Effexor at the same doses for many years
procedures specified.  [unfilled]          Thank you for involving me in this patient's care and please call with further concerns or questions.      ________________________________________    KARINE Robbins CNP  Cardiac Electrophysiology   [facility]  [address] [facility] [address]  [place] [address] [phone]

## 2024-04-08 ENCOUNTER — OFFICE VISIT (OUTPATIENT)
Dept: NEUROLOGY | Facility: CLINIC | Age: 69
End: 2024-04-08
Payer: COMMERCIAL

## 2024-04-08 VITALS
OXYGEN SATURATION: 97 % | HEIGHT: 62 IN | BODY MASS INDEX: 26.33 KG/M2 | TEMPERATURE: 98 F | SYSTOLIC BLOOD PRESSURE: 119 MMHG | HEART RATE: 72 BPM | WEIGHT: 143.1 LBS | DIASTOLIC BLOOD PRESSURE: 75 MMHG

## 2024-04-08 DIAGNOSIS — R47.89 WORD FINDING DIFFICULTY: Primary | ICD-10-CM

## 2024-04-08 DIAGNOSIS — R41.3 MEMORY DIFFICULTY: ICD-10-CM

## 2024-04-08 PROCEDURE — 99214 OFFICE O/P EST MOD 30 MIN: CPT | Performed by: PSYCHIATRY & NEUROLOGY

## 2024-04-08 NOTE — PROGRESS NOTES
Monitor. [facility]  Follow Up Visit    Impression/Plan    Ms. Beverly is a 68 y.o. female   that I initially saw for and event involving alteration of consciousness with visual disturbance and confusion in 9/2019 of unclear etiology. No additional events. REEG and SD EEG were normal in 2019. She has moderate left PCA stenosis. Continue asa and statin.      Word finding details noted subjectively over the last few years. Feels there is some progression since last visit, but not impacting performance at work. 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 Neurological exam is otherwise normal. She reports some unsteadiness (tends to drift back), but no problems with balance/gait on exam other than slight difficulty with tandem. PET scan unrevealing.  Last brain MRI completed October 2022 also unrevealing for cause (personally reviewed on 4/8/2023).  Her cognitive baseline is high, has a PhD in English.  MOCA 26/30 10/4/2023 (one-point off for clock drawing, one-point for serial sevens, 2 points for delayed recall). She has considered consultation with a memory specialist at [facility], but has not set this up. Discussed this again today and she plans to schedule something this summer.        Patient Instructions   Schedule visit with [facility].     Diagnoses and all orders for this visit:    Word finding difficulty    Memory difficulty        Subjective    Oriana Beverly is returning to the [facility] for follow up.     Interval Events:   Feels that word finding may be a little worse. No performance problems at work. 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 A colleague did fill in some her words when she had word finding difficulty during a conversation.     Occasional episodes of brief head pain, lasting just a second, can radiate to the eyes. Either side. Usually  "not severe. Can happen a few times in a day and then none for a few weeks. No other associated symptoms, no nausea, no vision change.     Lightheadedness has improved some with better hydration and more consistent food in the morning.     Prior Evaluation:  REEG and SD EEG normal in 2019  MRI brain w/o 9/2019: Slight interval progression of the nonspecific cerebral white matter disease when comparing to 2011 examination.  MRI brain with neuro quant 10/4/2022:  1.  No acute infarction, intracranial hemorrhage or mass effect.  2.  Mild, chronic microangiopathy.  3.  NeuroQuant analysis was performed: Normal hippocampal volume and enlarged ventricular system: Findings do not support hippocampal degeneration. Possible expansion of ventricular system without medial temporal lobe focused ex-vacuo process.     PET brain 3/8/2023:  There is only subtle diminished activity in the bilateral frontal, temporal and anterior parietal lobes.  Findings are nonspecific.  It is also uncertain at this time if these findings would remain stable or progress to a more advanced dementia, such   as fronto-temporal dementia.  12 month follow-up may be considered to evaluate for any change, if clinically warranted.      Neuropsychological testing completed by Dr. Miguel on February 10, 2023:  \"Objective neuropsychological assessment revealed primary difficulties in confrontation naming, inductive reasoning, sustained speed and initial acquisition of unstructured verbal information and a large amount of visual information, with intact consolidation, retention and retrieval.  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  Although her above average intelligence and doctorate in English may be helping to mask the effects of a PPA process, further testing may be warranted to best determine etiology of her difficulties.\"  (Scanned neuropsychological report attached to 3/22/2023 encounter)        History Reviewed:   The following were reviewed and updated as appropriate: allergies, current medications, past family history, past medical history, past social history, past surgical history and problem list  History of nephrolithiasis and hydronephrosis, fibromyalgia, depression with anxiety, osteoarthritis, trigeminal neuralgia, vitamin-D deficiency     Psychiatric History:  Anxiety  Depression     Social History:   Driving: Yes  Occupation: teaches english at [employer]      Objective    /75 (BP Location: Left arm, Patient Position: Sitting, Cuff Size: Adult)   Pulse 72   Temp 98 °F (36.7 °C) (Temporal)   Ht 5' 2\" (1.575 m)   Wt 64.9 kg (143 lb 1.6 oz)   SpO2 97%   BMI 26.17 kg/m²      General Exam  No acute distress.    Neurologic Exam  Mental Status:  Alert and oriented x 3. Spells world in reverse correctly.   Language: normal fluency and comprehension. Normal naming.   Cranial Nerves:  PERRL. EOMI, no nystagums. Face symmetric. No dysarthria.  Motor:  No tremor.   DTRs: Normal and symmetric (biceps, patella, achilles).  Gait: Normal casual gait. Quick turn. Slight difficulty with tandem, steps out of line after 3 steps.     I have spent a total time of 30 minutes on 04/09/24 in caring for this patient including Diagnostic results, Patient and family education, Impressions, Counseling / Coordination of care, Documenting in the medical record, Reviewing / ordering tests, medicine, procedures  , and Obtaining or reviewing history  .    "

## 2024-04-11 ENCOUNTER — VBI (OUTPATIENT)
Dept: ADMINISTRATIVE | Facility: OTHER | Age: 69
End: 2024-04-11

## 2024-04-15 ENCOUNTER — TELEPHONE (OUTPATIENT)
Age: 69
End: 2024-04-15

## 2024-04-15 DIAGNOSIS — M17.11 PRIMARY OSTEOARTHRITIS OF RIGHT KNEE: ICD-10-CM

## 2024-04-15 DIAGNOSIS — R26.89 BALANCE DISORDER: Primary | ICD-10-CM

## 2024-04-15 NOTE — TELEPHONE ENCOUNTER
Patient calling for Ambulatory order for PT from Dr. Staples.. There is one in there from 10/20/23 but the order does not open.  Patient will be going to PT for balance and right knee.    Please call patient once placed in chart so she can call and schedule

## 2024-04-25 NOTE — PROGRESS NOTES
PT Evaluation     Today's date: 2024  Patient name: Oriana Beverly  : 1955  MRN: [id number]  Referring provider: Etta Staples MD  Dx:   Encounter Diagnosis     ICD-10-CM    1. Balance disorder  R26.89 Ambulatory Referral to Physical Therapy      2. Primary osteoarthritis of right knee  M17.11 Ambulatory Referral to Physical Therapy          Start Time: 1343  Stop Time: 1430  Total time in clinic (min): 47 minutes    Assessment  Assessment details: Oriana Beverly is a 68 y.o. female referred to physical therapy for balance disorder and primary OA of R knee. Primary impairments include increased pain with functional activities, decreased BLE strength, R knee AROM dysfunction, and impaired static/dynamic balance which is limiting her ability to perform ADLs and recreational activities without pain or functional restrictions. Pt scored a 9/12 on modified DGI which suggest she is at a risk for falls. During testing, she was most challenged with horizontal head turns as well as walking with dual cognition tasks. Pt was provided with a basic HEP which will be reviewed in the upcoming session. Pt was educated on anatomy and physiology of diagnosis and demonstrated verbal understanding. Pt would benefit from skilled PT interventions to increase functional lower extremity strength, increase pain free ROM, improve static/dynamic balance, and facilitate return to recreational activities and ADL management/independence with less limitations and pain. 1:1 with Bernardo Gill DPT entirety of tx.  Impairments: abnormal gait, abnormal movement, activity intolerance, impaired balance, impaired physical strength, lacks appropriate home exercise program, pain with function, poor posture  and poor body mechanics    Symptom irritability: lowBarriers to therapy: None  Understanding of Dx/Px/POC: excellent   Prognosis: good    Goals  Short Term Goals:  1. Improve BLE strength by half grade or more to improve functional  "LE strength.  2. Improve modified DGI by 1 point or more to decrease risk for falls.   3. Improve tandem balance by 5 seconds or more to improve QoL.   4. Supervision with HEP for self-care.     Long Term Goals:  1. Improve FGA score to at least 24/30 to decrease fall risk.   2. FOTO to greater than predicted value.   3. Independent with HEP for self-care.   4. Improve B SLS to at least 25 seconds.     Plan  Patient would benefit from: skilled physical therapy and PT eval  Referral necessary: No  Planned therapy interventions: abdominal trunk stabilization, balance, balance/weight bearing training, body mechanics training, functional ROM exercises, home exercise program, neuromuscular re-education, patient education, postural training, therapeutic activities, therapeutic exercise, self care, stretching, graded activity, gait training, graded exercise, graded motor and strengthening  Frequency: 1x week  Duration in weeks: 12  Plan of Care beginning date: 4/26/2024  Plan of Care expiration date: 7/19/2024  Treatment plan discussed with: patient        Subjective  HPI: Pt referred to physical therapy for balance disorder and primary OA of R knee. Pt mentioned that she feels her balance is still a concern as she feels she is falling backwards at times but has never fallen. She also mentioned that her R knee continues to bother her due to pain. She mentioned that her balance problems do not affect her in a particular pattern but can get worse when she is more tired. She mentioned that she has \"head pains\" at times but is following up with Neurology about this. Pt mentioned that she cannot drive for more than 30 min due to the pain in her R knee. 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   Pain Location: R medial knee  Pain Intensity: Current: 1/10, Worst: 5/10, Best: 1/10  AMADA: Insidious   DOI: Chronic  Aggravating Factors: Prolonged walking, standing, sitting  Alleviating Factors: Rest, and pain medication  Living Situation: " Full flight to bedroom and into basement  Goals: To improve her balance to decrease her fear of falling.   PLOF: Walking a few times a week    Objective    Vital Signs: BP:124/84               Standing Posture & Lower Extremity Alignment:  Structure/Joint         Pelvis (Tilt)  Anterior X Neutral  Posterior   Iliac Crests  Right Elevated X Neutral  Left Elevated   Knee - Frontal   Genuvalgum X Neutral  Genuvarum   Knee - Sagittal  Genurecurvatum X Neutral     Rearfoot  Valgus X Neutral  Varus   Forefoot  Abducted X Neutral     Arch  Pes Planus X Neutral  Pes Cavus     Range of Motion: Goniometric revealed the following findings (in degrees).  Joint Motion Right: 4/26/2024 Left: 4/26/2024   Hip Extension WFL WFL   Hip External Rotation 50 50   Hip Internal Rotation 20 20   Knee Extension 0 0   Knee Flexion 130 120   Ankle Dorsiflexion WFL WFL   Ankle Plantarflexion WFL WFL     Strength: MMT revealed the following findings.  Joint Motion Right: 4/26/2024 Left: 4/26/2024   Hip Flexion 4/5 4/5   Hip Abduction 4/5 4/5   Hip Adduction 4/5 4/5   Hip Extension 4/5 4/5   Knee Extension 4/5 4/5   Knee Flexion 4/5 4/5   Ankle Plantarflexion 4/5 4/5   Ankle Dorsiflexion 4/5 4/5     Special Tests:    Knee Tests (Structure evaluated) Date: 4/26/2024  ( P / N )   Anterior (ACL) Neg   Valgus Stress (MCL) Neg   Varus Stress (LCL) Neg   Linda (Menisci) Neg     Additional Assessments:  Palpation: Slight pain with palpation at the medial and lateral jt lines  Observation: slight increase in swelling at the R knee  Gait Pattern: WFL  Balance: Decreased dynamic balance with tandem walking and marching. Pt was most challenged with tandem walking with dual cognition tasks as she demonstrated LoB following    Visual:  Saccades:  Horizontal - Vertical - WFL  Smooth Pursuits: WFL    Balance:  Modified CTSIB:    Firm EO/EC: difficulty with EC will be timed NV  Foam EO/EC: difficulty with EC will be timed NV  SLS: 10s ea leg  Tandem Balance:  5s BL    TU2024 = 6.5s    5xSTS:   2024 = 9.1 s    DGI/FGA:  4 Item Dynamic Gait Index  3/3 Gait level surface  3/3 Change in gait speed  1/3 Gait with horizontal head turns  2/3 Gait with vertical head turns  9/12 total score (<10/12 indicates increased risk of fall)         Precautions: PMH includes arthralgia, chest pain, balance disorder, depression, fatigue, fibromyalgia, leukopenia, restless leg syndrome      POC expires Unit limit Auth Expiration date PT/OT + Visit Limit?   24 BOMN TBD BOMN         Visit/Unit Tracking  AUTH Status:  Date         TBD Used 1         Remaining             Pertinent Findings:      POC End Date: 24                                                                                          Test / Measure  2024   FOTO (Predicted 56) 54   Modified    Tandem Balance  5s BL     Visit Number:  1        Manuals                           Neuro Re-Ed         SLS 3x, 10s hold        Tandem stance         Tandem gait - fwd/bwd w/ Dual cog 2 laps        Standing Y-Balance         TB sidestepping         TB monster walk         Uni RDL         Toe/Heel walking         Diag chops         Diag lifts         Balance Reactions w/ Perturbations or Tband / ball toss                           Ther Ex         HEP Review + Pt Edu 10 min        NS / TM         Sitting Knee Ext 10x        Single Leg Bridge 10x        STS         Leg Press         Steps with Marching                           Ther Activity         FSU         LSU         Suitcase Carry Marching         Obstacle course                  Gait Training                           Modalities

## 2024-04-26 ENCOUNTER — EVALUATION (OUTPATIENT)
Dept: PHYSICAL THERAPY | Facility: REHABILITATION | Age: 69
End: 2024-04-26
Payer: COMMERCIAL

## 2024-04-26 DIAGNOSIS — R26.89 BALANCE DISORDER: Primary | ICD-10-CM

## 2024-04-26 DIAGNOSIS — M17.11 PRIMARY OSTEOARTHRITIS OF RIGHT KNEE: ICD-10-CM

## 2024-04-26 PROCEDURE — 97161 PT EVAL LOW COMPLEX 20 MIN: CPT

## 2024-04-26 PROCEDURE — 97112 NEUROMUSCULAR REEDUCATION: CPT

## 2024-04-26 PROCEDURE — 97110 THERAPEUTIC EXERCISES: CPT

## 2024-05-02 ENCOUNTER — OFFICE VISIT (OUTPATIENT)
Dept: PHYSICAL THERAPY | Facility: REHABILITATION | Age: 69
End: 2024-05-02
Payer: COMMERCIAL

## 2024-05-02 DIAGNOSIS — R26.89 BALANCE DISORDER: Primary | ICD-10-CM

## 2024-05-02 DIAGNOSIS — M17.11 PRIMARY OSTEOARTHRITIS OF RIGHT KNEE: ICD-10-CM

## 2024-05-02 PROCEDURE — 97110 THERAPEUTIC EXERCISES: CPT

## 2024-05-02 PROCEDURE — 97112 NEUROMUSCULAR REEDUCATION: CPT

## 2024-05-02 PROCEDURE — 97530 THERAPEUTIC ACTIVITIES: CPT

## 2024-05-02 NOTE — PROGRESS NOTES
Daily Note     Today's date: 2024  Patient name: Oriana Beverly  : 1955  MRN: [id number]  Referring provider: Etta Staples MD  Dx:   Encounter Diagnosis     ICD-10-CM    1. Balance disorder  R26.89       2. Primary osteoarthritis of right knee  M17.11           Start Time: 1640  Stop Time: 1719  Total time in clinic (min): 39 minutes    Subjective: Pt reports that balance deficit is her biggest concern at the moment.  She states that she loses balance to either side and backwards.        Objective: See treatment diary below      Assessment: Tolerated treatment well. Patient would benefit from continued PT.  Pt with mild balance deficits with dual tasking therapeutic interventions.  Close supervision provided when performing balance tasks.  No pain aggravation throughout visit.  Moderate fatigue noted post-session.  1:1 with Dharmesh Beverly DPT entirety of tx.        Plan: Continue per plan of care.  Progress treatment as tolerated.       Precautions: PMH includes arthralgia, chest pain, balance disorder, depression, fatigue, fibromyalgia, leukopenia, restless leg syndrome      POC expires Unit limit Auth Expiration date PT/OT + Visit Limit?   24 BOMN TBD BOMN         Visit/Unit Tracking  AUTH Status:  Date        TBD Used 1 2        Remaining             Pertinent Findings:      POC End Date: 24                                                                                          Test / Measure  2024   FOTO (Predicted 56) 54   Modified DGI    Tandem Balance  5s BL     Visit Number:  1 2       Manuals                          Neuro Re-Ed         SLS 3x, 10s hold        Tandem stance         Tandem gait - fwd/bwd w/ Dual cog 2 laps        Standing Y-Balance         TB sidestepping         TB monster walk         Uni RDL         Toe/Heel walking         Diag chops         Diag lifts         Balance Reactions w/ Perturbations or Tband / ball toss         Gait + head  "turns  5 laps mirror ea horiz, vert       Romberg stance  On airex + diag head turns 20x ea       Gait + contra UE/LE toe tap  3 laps mirror       Ther Ex         HEP Review + Pt Edu 10 min        NS / TM  5' L7       Sitting Knee Ext 10x        Single Leg Bridge 10x        STS  Feet on airex 2x10       Leg Press  2x10 55#  10x 70#       Steps with Marching                           Ther Activity         Runner's step ups  20x alt LE 6\" step       LSU  15x B 6\" step       Suitcase Carry Marching  15x ea B 20#       Obstacle course                  Gait Training                           Modalities                                    "

## 2024-05-09 ENCOUNTER — APPOINTMENT (OUTPATIENT)
Dept: PHYSICAL THERAPY | Facility: REHABILITATION | Age: 69
End: 2024-05-09
Payer: COMMERCIAL

## 2024-05-16 ENCOUNTER — OFFICE VISIT (OUTPATIENT)
Dept: PHYSICAL THERAPY | Facility: REHABILITATION | Age: 69
End: 2024-05-16
Payer: COMMERCIAL

## 2024-05-16 DIAGNOSIS — R26.89 BALANCE DISORDER: Primary | ICD-10-CM

## 2024-05-16 DIAGNOSIS — M17.11 PRIMARY OSTEOARTHRITIS OF RIGHT KNEE: ICD-10-CM

## 2024-05-16 PROCEDURE — 97112 NEUROMUSCULAR REEDUCATION: CPT

## 2024-05-16 PROCEDURE — 97110 THERAPEUTIC EXERCISES: CPT

## 2024-05-16 NOTE — PROGRESS NOTES
Daily Note     Today's date: 2024  Patient name: rOiana Beverly  : 1955  MRN: [id number]  Referring provider: Etta Staples MD  Dx:   Encounter Diagnosis     ICD-10-CM    1. Balance disorder  R26.89       2. Primary osteoarthritis of right knee  M17.11           Start Time: 1517  Stop Time: 1555  Total time in clinic (min): 38 minutes    Subjective: Pt notes that she continues to have problems with her balance at times. She mentioned that she has been unable to do many of her exercises at home due to being busy.       Objective: See treatment diary below      Assessment: Tolerated treatment well. Patient demonstrated fatigue post treatment and would benefit from continued PT. Pt continues to be challenged by Gait + contra UE/LE toe tap due to decreased balance with dual task demands. Pt was introduced to biodex to improve weight shifting management and static/dynamic balance and responded well but was challenged. She continues to respond well to Vcs for weight shifting control to improve base of support and responded well as she showed improved technique with cueing. 1:1 with Bernardo Gill DPT entirety of tx.      Plan: Continue per plan of care.      Precautions: PMH includes arthralgia, chest pain, balance disorder, depression, fatigue, fibromyalgia, leukopenia, restless leg syndrome      POC expires Unit limit Auth Expiration date PT/OT + Visit Limit?   24 BOMN TBD BOMN         Visit/Unit Tracking  AUTH Status:  Date       Approved Used 1 2 3       Remaining             Pertinent Findings:      POC End Date: 24                                                                                          Test / Measure  2024   FOTO (Predicted 56) 54   Modified DGI    Tandem Balance  5s BL     Visit Number:  1 2 3      Manuals                         Neuro Re-Ed         SLS 3x, 10s hold  3x, 10s hold      Tandem stance         Tandem gait - fwd/bwd w/  "Dual cog 2 laps        Standing Y-Balance         TB sidestepping         TB monster walk         Uni RDL         Toe/Heel walking         Diag chops         Diag lifts         Balance Reactions w/ Perturbations or Tband / ball toss         Gait + head turns  5 laps mirror ea horiz, vert 5 laps mirror ea horiz, vert      Romberg stance  On airex + diag head turns 20x ea On airex + diag head turns 20x ea      Gait + contra UE/LE toe tap  3 laps mirror 3 laps mirror      Biodex Balance   5 bouts random control, 1 min ea      Uni RDL   2x5 w/ HHA               Ther Ex         HEP Review + Pt Edu 10 min        NS / TM  5' L7 6', TM      Sitting Knee Ext 10x        Single Leg Bridge 10x        STS  Feet on airex 2x10 Feet on airex 2x10      Leg Press  2x10 55#  10x 70# 3x8, 70#      Steps with Marching                           Ther Activity         Runner's step ups  20x alt LE 6\" step       LSU  15x B 6\" step       Suitcase Carry Marching  15x ea B 20#       Obstacle course                  Gait Training                           Modalities                                      "

## 2024-05-23 ENCOUNTER — OFFICE VISIT (OUTPATIENT)
Dept: PHYSICAL THERAPY | Facility: REHABILITATION | Age: 69
End: 2024-05-23
Payer: COMMERCIAL

## 2024-05-23 DIAGNOSIS — R26.89 BALANCE DISORDER: Primary | ICD-10-CM

## 2024-05-23 DIAGNOSIS — M17.11 PRIMARY OSTEOARTHRITIS OF RIGHT KNEE: ICD-10-CM

## 2024-05-23 PROCEDURE — 97112 NEUROMUSCULAR REEDUCATION: CPT

## 2024-05-23 PROCEDURE — 97110 THERAPEUTIC EXERCISES: CPT

## 2024-05-23 NOTE — PROGRESS NOTES
Daily Note     Today's date: 2024  Patient name: Oriana Beverly  : 1955  MRN: [id number]  Referring provider: Etta Staples MD  Dx:   Encounter Diagnosis     ICD-10-CM    1. Balance disorder  R26.89       2. Primary osteoarthritis of right knee  M17.11           Start Time: 1520  Stop Time: 1555  Total time in clinic (min): 35 minutes    Subjective: Pt notes that she is feeling okay today but is tired. She mentioned that she has been unable to do her exercises at home due to being busy.       Objective: See treatment diary below      Assessment: Tolerated treatment well. Patient demonstrated fatigue post treatment and would benefit from continued PT. Pt was showed improved weight shifting and static balance with [employer] 28 balance this visit which displays improving neuromuscular control of the foot intrinsics. She was able to tolerate slight increase in volume with leg press but continues to be challenged due to increased fatigue. She noted slight increase in dizziness with SLS with head turns but this went away with rest. She continues to respond well to Vcs for foot placement and supervision with dynamic balance exercises. Continue to progress as tolerated, 1:1 with Bernardo Gill DPT entirety of tx.      Plan: Continue per plan of care.      Precautions: PMH includes arthralgia, chest pain, balance disorder, depression, fatigue, fibromyalgia, leukopenia, restless leg syndrome      POC expires Unit limit Auth Expiration date PT/OT + Visit Limit?   24 BOMN TBD BOMN         Visit/Unit Tracking  AUTH Status:  Date      Approved Used 1 2 3 4      Remaining             Pertinent Findings:      POC End Date: 24                                                                                          Test / Measure  2024   FOTO (Predicted 56) 54   Modified DGI    Tandem Balance  5s BL     Visit Number:  1 2 3 4     Manuals                        Neuro  "Re-Ed         SLS 3x, 10s hold  3x, 10s hold      Tandem stance         Tandem gait - fwd/bwd w/ Dual cog 2 laps        Standing Y-Balance         TB sidestepping         TB monster walk         Uni RDL         Toe/Heel walking         Diag chops         Diag lifts         Balance Reactions w/ Perturbations or Tband / ball toss         Gait + head turns  5 laps mirror ea horiz, vert 5 laps mirror ea horiz, vert 5 laps mirror ea horiz, vert     Romberg stance  On airex + diag head turns 20x ea On airex + diag head turns 20x ea On airex + diag head turns 20x ea     Gait + contra UE/LE toe tap  3 laps mirror 3 laps mirror 3 laps mirror     Biodex Balance   5 bouts random control, 1 min ea 5 bouts random control and weight shifting, 1 min ea     Uni RDL   2x5 w/ HHA 2x8 w/ HHA              Ther Ex         HEP Review + Pt Edu 10 min        NS / TM  5' L7 6', TM 6', TM     Sitting Knee Ext 10x        Single Leg Bridge 10x        STS  Feet on airex 2x10 Feet on airex 2x10      Leg Press  2x10 55#  10x 70# 3x8, 70# 3x10, 70#     Steps with Marching                           Ther Activity         Runner's step ups  20x alt LE 6\" step       LSU  15x B 6\" step       Suitcase Carry Marching  15x ea B 20#  15x ea B 20#     Obstacle course                  Gait Training                           Modalities                                        "

## 2024-05-30 ENCOUNTER — OFFICE VISIT (OUTPATIENT)
Dept: PHYSICAL THERAPY | Facility: REHABILITATION | Age: 69
End: 2024-05-30
Payer: COMMERCIAL

## 2024-05-30 DIAGNOSIS — R26.89 BALANCE DISORDER: Primary | ICD-10-CM

## 2024-05-30 DIAGNOSIS — M17.11 PRIMARY OSTEOARTHRITIS OF RIGHT KNEE: ICD-10-CM

## 2024-05-30 PROCEDURE — 97112 NEUROMUSCULAR REEDUCATION: CPT

## 2024-05-30 PROCEDURE — 97110 THERAPEUTIC EXERCISES: CPT

## 2024-05-30 NOTE — PROGRESS NOTES
Daily Note     Today's date: 2024  Patient name: Oriana Beverly  : 1955  MRN: [id number]  Referring provider: Etta Staples MD  Dx:   Encounter Diagnosis     ICD-10-CM    1. Balance disorder  R26.89       2. Primary osteoarthritis of right knee  M17.11           Start Time: 1505  Stop Time: 1540  Total time in clinic (min): 35 minutes    Subjective: Pt notes that she was a little bit sore after last tx session but this went away with rest. She reports no significant changes since last visit.       Objective: See treatment diary below      Assessment: Tolerated treatment well. Patient would benefit from continued PT. Pt was able to tolerate increased resistance with leg press and STS which shows improving functional LE strength. She was challenged with introduction to suitcase carries with marching and head turns as she continues to have difficulty with dual cognitive balance tasks with head motions. She continues to be challenged with biodex balance but shows improving neuromuscular control with weight bearing and balance reactions. Continue to progress as tolerated, 1:1 with Bernardo Gill DPT entirety of tx.    Plan: Continue per plan of care.      Precautions: PMH includes arthralgia, chest pain, balance disorder, depression, fatigue, fibromyalgia, leukopenia, restless leg syndrome      POC expires Unit limit Auth Expiration date PT/OT + Visit Limit?   24 BOMN TBD BOMN         Visit/Unit Tracking  AUTH Status:  Date     Approved Used 1 2 3 4 5     Remaining             Pertinent Findings:      POC End Date: 24                                                                                          Test / Measure  2024   FOTO (Predicted 56) 54   Modified DGI    Tandem Balance  5s BL     Visit Number:  1 2 3 4 5    Manuals                       Neuro Re-Ed         SLS 3x, 10s hold  3x, 10s hold      Tandem stance         Tandem  "gait - fwd/bwd w/ Dual cog 2 laps    Foam, 3 laps    Standing Y-Balance         TB sidestepping         TB monster walk         Uni RDL         Toe/Heel walking         Diag chops         Diag lifts         Balance Reactions w/ Perturbations or Tband / ball toss         Gait + head turns  5 laps mirror ea horiz, vert 5 laps mirror ea horiz, vert 5 laps mirror ea horiz, vert Marching Suitcase Carries w/ head turns 2 laps mirror 15#    Romberg stance  On airex + diag head turns 20x ea On airex + diag head turns 20x ea On airex + diag head turns 20x ea     Gait + contra UE/LE toe tap  3 laps mirror 3 laps mirror 3 laps mirror 3 laps mirror    Biodex Balance   5 bouts random control, 1 min ea 5 bouts random control and weight shifting, 1 min ea 5 bouts random control and weight shifting, 1 min ea    Uni RDL   2x5 w/ HHA 2x8 w/ HHA 2x8 w/ HHA             Ther Ex         HEP Review + Pt Edu 10 min        NS / TM  5' L7 6', TM 6', TM 6', TM    Sitting Knee Ext 10x        Single Leg Bridge 10x        STS  Feet on airex 2x10 Feet on airex 2x10      Leg Press  2x10 55#  10x 70# 3x8, 70# 3x10, 70# 3x10, 85#    Fwd Step Up     6\" step, 15# KB front rack carry, 2x8 ea                      Ther Activity         Runner's step ups  20x alt LE 6\" step       LSU  15x B 6\" step       Suitcase Carry Marching  15x ea B 20#  15x ea B 20# 15x ea B 15#    Obstacle course                  Gait Training                           Modalities                                          "

## 2024-06-05 DIAGNOSIS — M81.0 AGE-RELATED OSTEOPOROSIS WITHOUT CURRENT PATHOLOGICAL FRACTURE: ICD-10-CM

## 2024-06-06 RX ORDER — ALENDRONATE SODIUM 70 MG/1
TABLET ORAL
Qty: 12 TABLET | Refills: 1 | Status: SHIPPED | OUTPATIENT
Start: 2024-06-06

## 2024-06-09 DIAGNOSIS — M79.7 FIBROMYALGIA: ICD-10-CM

## 2024-06-09 DIAGNOSIS — F41.8 DEPRESSION WITH ANXIETY: ICD-10-CM

## 2024-06-09 RX ORDER — VENLAFAXINE HYDROCHLORIDE 75 MG/1
CAPSULE, EXTENDED RELEASE ORAL
Qty: 90 CAPSULE | Refills: 1 | Status: SHIPPED | OUTPATIENT
Start: 2024-06-09

## 2024-06-12 DIAGNOSIS — F41.8 DEPRESSION WITH ANXIETY: ICD-10-CM

## 2024-06-12 DIAGNOSIS — M79.7 FIBROMYALGIA: ICD-10-CM

## 2024-06-12 RX ORDER — VENLAFAXINE HYDROCHLORIDE 75 MG/1
75 CAPSULE, EXTENDED RELEASE ORAL DAILY
Qty: 90 CAPSULE | Refills: 0 | OUTPATIENT
Start: 2024-06-12

## 2024-06-12 NOTE — TELEPHONE ENCOUNTER
Reason for call:   [x] Refill   [] Prior Auth  [] Other:     Office:   [x] PCP/Provider -   [] Specialty/Provider -                       Does the patient have enough for 3 days?   [] Yes   [x] No - Send as HP to POD

## 2024-06-20 ENCOUNTER — RA CDI HCC (OUTPATIENT)
Dept: OTHER | Facility: HOSPITAL | Age: 69
End: 2024-06-20

## 2024-06-27 ENCOUNTER — OFFICE VISIT (OUTPATIENT)
Dept: INTERNAL MEDICINE CLINIC | Facility: CLINIC | Age: 69
End: 2024-06-27
Payer: COMMERCIAL

## 2024-06-27 VITALS
DIASTOLIC BLOOD PRESSURE: 72 MMHG | HEART RATE: 58 BPM | HEIGHT: 62 IN | OXYGEN SATURATION: 98 % | SYSTOLIC BLOOD PRESSURE: 114 MMHG | WEIGHT: 137.4 LBS | TEMPERATURE: 96.8 F | BODY MASS INDEX: 25.28 KG/M2

## 2024-06-27 DIAGNOSIS — M81.0 AGE-RELATED OSTEOPOROSIS WITHOUT CURRENT PATHOLOGICAL FRACTURE: ICD-10-CM

## 2024-06-27 DIAGNOSIS — K59.04 CHRONIC IDIOPATHIC CONSTIPATION: ICD-10-CM

## 2024-06-27 DIAGNOSIS — R47.89 WORD FINDING DIFFICULTY: Primary | ICD-10-CM

## 2024-06-27 DIAGNOSIS — Z12.31 ENCOUNTER FOR SCREENING MAMMOGRAM FOR BREAST CANCER: ICD-10-CM

## 2024-06-27 DIAGNOSIS — K21.9 GASTROESOPHAGEAL REFLUX DISEASE, UNSPECIFIED WHETHER ESOPHAGITIS PRESENT: ICD-10-CM

## 2024-06-27 DIAGNOSIS — M17.0 PRIMARY OSTEOARTHRITIS OF BOTH KNEES: ICD-10-CM

## 2024-06-27 DIAGNOSIS — M51.36 LUMBAR DEGENERATIVE DISC DISEASE: ICD-10-CM

## 2024-06-27 DIAGNOSIS — G50.0 TRIGEMINAL NEURALGIA: ICD-10-CM

## 2024-06-27 DIAGNOSIS — G31.84 MCI (MILD COGNITIVE IMPAIRMENT): ICD-10-CM

## 2024-06-27 DIAGNOSIS — F41.8 DEPRESSION WITH ANXIETY: ICD-10-CM

## 2024-06-27 DIAGNOSIS — G25.81 RESTLESS LEGS SYNDROME: ICD-10-CM

## 2024-06-27 DIAGNOSIS — E78.49 OTHER HYPERLIPIDEMIA: ICD-10-CM

## 2024-06-27 DIAGNOSIS — G47.33 OSA (OBSTRUCTIVE SLEEP APNEA): ICD-10-CM

## 2024-06-27 DIAGNOSIS — Z00.00 HEALTH MAINTENANCE EXAMINATION: ICD-10-CM

## 2024-06-27 PROCEDURE — 99214 OFFICE O/P EST MOD 30 MIN: CPT | Performed by: INTERNAL MEDICINE

## 2024-06-27 PROCEDURE — 99397 PER PM REEVAL EST PAT 65+ YR: CPT | Performed by: INTERNAL MEDICINE

## 2024-06-27 NOTE — PROGRESS NOTES
Ambulatory Visit  Name: Oriana Beverly      : 1955      MRN: [id number]  Encounter Provider: Etta Staples MD  Encounter Date: 2024   Encounter department: [facility]    Assessment & Plan   1. Word finding difficulty  Assessment & Plan:  Intermittent symptoms.  Saw neurology recently.  2. MCI (mild cognitive impairment)  Assessment & Plan:  As above.  3. Depression with anxiety  Assessment & Plan:  Stable, on venlafaxine.  4. Other hyperlipidemia  Assessment & Plan:  Repeat lipids due, on atorvastatin 40 mg.  5. Trigeminal neuralgia  Assessment & Plan:  On carbamazepine.  6. Restless legs syndrome  Assessment & Plan:  Taking iron daily, on gabapentin.  7. Age-related osteoporosis without current pathological fracture  Assessment & Plan:  On alendronate.  Bone density scheduled.  8. Gastroesophageal reflux disease, unspecified whether esophagitis present  Assessment & Plan:  Taking PPI every other day.  9. Primary osteoarthritis of both knees  Assessment & Plan:  Went to physical therapy.  10. Lumbar degenerative disc disease  Assessment & Plan:  Stable.  Taking gabapentin 1500 mg a day.  11. Chronic idiopathic constipation  Assessment & Plan:  Restart Colace.  Instructed to take Miralax again today.  12. Encounter for screening mammogram for breast cancer  -     Mammo screening bilateral w 3d & cad; Future; Expected date: 2024  13. SHAWN (obstructive sleep apnea)  Assessment & Plan:  Not using CPAP.  14. Health maintenance examination    Follow up in 6 months or as needed.       History of Present Illness     She fell at home 3 weeks ago.  She was going down the stairs and thinks she missed the last step.  No head injury, fell in her kitchen.    She went to physical therapy for balance.  She is thinking of returning back, might have a few sessions left.  She continues to experience frequent bilateral leg pain.  She takes her gabapentin regularly.    She ran out of  "Colace about a week ago.  She has not moved her bowels since.  She took Miralax last night, still with no bowel movement.    She reports intermittent headaches.  She would experience a sharp pain behind her right eye or by her left scalp area.  It varies, would last a few seconds at a time only.  She had 1 episode during the visit.        Review of Systems   Constitutional:  Negative for appetite change and fatigue.   HENT:  Negative for congestion, ear pain and postnasal drip.    Eyes:  Negative for visual disturbance.   Respiratory:  Negative for cough and shortness of breath.    Cardiovascular:  Negative for chest pain and leg swelling.   Gastrointestinal:  Positive for constipation. Negative for abdominal pain and diarrhea.   Genitourinary:  Negative for dysuria, frequency and urgency.   Musculoskeletal:  Negative for arthralgias, back pain and myalgias.   Skin:  Negative for rash and wound.   Neurological:  Negative for dizziness, weakness, numbness and headaches.   Hematological:  Does not bruise/bleed easily.   Psychiatric/Behavioral:  Negative for confusion. The patient is not nervous/anxious.        Objective     /72   Pulse 58   Temp (!) 96.8 °F (36 °C)   Ht 5' 2\" (1.575 m)   Wt 62.3 kg (137 lb 6.4 oz)   SpO2 98%   BMI 25.13 kg/m²     Physical Exam  Vitals and nursing note reviewed.   Constitutional:       General: She is not in acute distress.     Appearance: She is well-developed.   HENT:      Head: Normocephalic and atraumatic.      Mouth/Throat:      Mouth: Mucous membranes are moist.   Eyes:      Pupils: Pupils are equal, round, and reactive to light.   Cardiovascular:      Rate and Rhythm: Normal rate and regular rhythm.      Heart sounds: Normal heart sounds.   Pulmonary:      Effort: Pulmonary effort is normal.      Breath sounds: Normal breath sounds. No wheezing.   Abdominal:      General: Bowel sounds are normal.      Palpations: Abdomen is soft.   Musculoskeletal:         General: " No swelling.      Right knee: No bony tenderness. No tenderness.      Left knee: No bony tenderness. No tenderness.      Right lower leg: No edema.      Left lower leg: No edema.   Skin:     General: Skin is warm.      Findings: No rash.   Neurological:      General: No focal deficit present.      Mental Status: She is alert and oriented to person, place, and time.   Psychiatric:         Mood and Affect: Mood and affect normal. Mood is not anxious or depressed.         Behavior: Behavior normal.       Administrative Statements       Labs & imaging results reviewed with patient.

## 2024-07-15 ENCOUNTER — TELEPHONE (OUTPATIENT)
Age: 69
End: 2024-07-15

## 2024-07-15 DIAGNOSIS — R26.89 BALANCE DISORDER: Primary | ICD-10-CM

## 2024-07-15 DIAGNOSIS — M17.11 PRIMARY OSTEOARTHRITIS OF RIGHT KNEE: ICD-10-CM

## 2024-07-15 NOTE — TELEPHONE ENCOUNTER
Pt saw Dr bernal a few weeks ago and she suggested for her to resume physical therapy and they let her make an appt for Thursday at 8:45 but they do need another PT referral for   Diagnosis   R26.89 (ICD-10-CM) - Balance disorder   M17.11 (ICD-10-CM) - Primary osteoarthritis of right knee   Can we please put that in the system. Thank you

## 2024-07-17 NOTE — PROGRESS NOTES
PT Evaluation     Today's date: 2024  Patient name: Oriana Beverly  : 1955  MRN: [id number]  Referring provider: Etta Staples MD  Dx:   Encounter Diagnosis     ICD-10-CM    1. Balance disorder  R26.89 Ambulatory Referral to Physical Therapy      2. Primary osteoarthritis of right knee  M17.11 Ambulatory Referral to Physical Therapy          Start Time: 845  Stop Time: 935  Total time in clinic (min): 50 minutes    Assessment  Impairments: abnormal gait, abnormal movement, activity intolerance, impaired balance, impaired physical strength, lacks appropriate home exercise program, pain with function, safety issue, poor posture , poor body mechanics, unable to perform ADL, participation limitations, activity limitations and endurance  Symptom irritability: low    Assessment details: Oriana Beverly is a 68 y.o. female referred to physical therapy for balance disorder and primary OA of R knee. Primary impairments include increased pain with functional activities, decreased BLE strength, R knee AROM dysfunction, and impaired static/dynamic balance which is limiting her ability to perform ADLs and recreational activities without pain or functional restrictions. Pt scored a 9/12 on modified DGI which suggest she is at a risk for falls. During testing, she was most challenged with horizontal head turns as well as walking with dual cognition tasks.She also displayed difficulty with eccentric control on single leg step up as eccentric quad strength may be a limitation. Pt was provided with a basic HEP which will be reviewed in the upcoming session. Pt was educated on anatomy and physiology of diagnosis and demonstrated verbal understanding. Pt would benefit from skilled PT interventions to increase functional lower extremity strength, increase pain free ROM, improve static/dynamic balance, and facilitate return to recreational activities and ADL management/independence with less limitations and pain. 1:1  with Bernardo Gill DPT entirety of tx.  Understanding of Dx/Px/POC: excellent     Prognosis: good    Goals  Short Term Goals:  1. Improve BLE strength by half grade or more to improve functional LE strength.  2. Improve modified DGI by 1 point or more to decrease risk for falls.   3. Improve tandem balance w/ head turns by 5 seconds or more to improve QoL.   4. Supervision with HEP for self-care.     Long Term Goals:  1. Improve FGA score to at least 24/30 to decrease fall risk.   2. FOTO to greater than predicted value.   3. Independent with HEP for self-care.   4. Improve B SLS to at least 25 seconds.     Plan  Patient would benefit from: skilled physical therapy and PT eval  Referral necessary: No    Planned therapy interventions: abdominal trunk stabilization, balance, balance/weight bearing training, body mechanics training, functional ROM exercises, home exercise program, neuromuscular re-education, patient education, postural training, therapeutic activities, therapeutic exercise, graded motor, graded exercise, graded activity, gait training and self care    Frequency: 1x week  Duration in weeks: 10  Plan of Care beginning date: 7/18/2024  Plan of Care expiration date: 9/26/2024  Treatment plan discussed with: patient        Subjective  HPI: Pt referred to physical therapy for balance disorder and primary OA of R knee. Pt mentioned that she continues to have difficulty with her balance and pain in her knees. She mentioned that she fell a few weeks ago on the steps as she was going down with something in each hand. She reports that she continues to have the sensation of falling backwards and to the side at times with no specific onset. She mentioned that her knee pain can depend on the angle of her knee.   Pain Location: R Knee  Pain Intensity: Current: 1/10, Worst: 4/10, Best: 0/10  AMADA: Insidious  DOI: Chronic  Aggravating Factors: Walking for long periods of time, steps, standing  Alleviating Factors:  Rest  Goals: To improve her balance and have less pain in the knees.  PLOF: Walking about 0.5 hours 3-4x/week    Objective  Vital Signs: BP:124/84                Standing Posture & Lower Extremity Alignment:  Structure/Joint               Pelvis (Tilt)   Anterior X Neutral   Posterior   Iliac Crests   Right Elevated X Neutral   Left Elevated   Knee - Frontal    Genuvalgum X Neutral   Genuvarum   Knee - Sagittal   Genurecurvatum X Neutral       Rearfoot   Valgus X Neutral   Varus   Forefoot   Abducted X Neutral       Arch   Pes Planus X Neutral   Pes Cavus      Range of Motion: Goniometric revealed the following findings (in degrees).  Joint Motion Right: 7/18/2024 Left: 7/18/2024   Hip Extension WFL WFL   Hip External Rotation 50 50   Hip Internal Rotation 20 20   Knee Extension 0 0   Knee Flexion 130 120   Ankle Dorsiflexion WFL WFL   Ankle Plantarflexion WFL WFL      Strength: MMT revealed the following findings.  Joint Motion Right: 7/18/2024 Left: 7/18/2024   Hip Flexion 4/5 4/5   Hip Abduction 4-/5 4/-5   Hip Adduction 4/5 4/5   Hip Extension 4/5 4/5   Knee Extension 4/5 4/5   Knee Flexion 4/5 4/5   Ankle Plantarflexion 4/5 4/5   Ankle Dorsiflexion 4/5 4/5      Special Tests:     Knee Tests (Structure evaluated) Date: 7/18/2024  ( P / N )   Anterior (ACL) Neg   Valgus Stress (MCL) Neg   Varus Stress (LCL) Neg   Linda (Menisci) Neg      Additional Assessments:  Palpation: Slight pain with palpation at the medial and lateral jt lines  Observation: slight increase in swelling at the R knee  Gait Pattern: WFL  Balance: Decreased dynamic balance with tandem walking and marching. Pt was most challenged with tandem walking with dual cognition tasks as she demonstrated LoB following  Single Leg Step Up/Down: Decreased eccentric control with descent especially at R knee     Visual:  Saccades:  Horizontal - Vertical - WFL  Smooth Pursuits: WFL     Balance:  Modified CTSIB:    Firm EO/EC: EO=30s / EC= 25s  Foam EO/EC:  EO = 30s / EC = 15s  SLS: 10s ea leg  Tandem Balance: 5s BL  Tandem Balance w/ head turns: minimal LoB       DGI/FGA:  4 Item Dynamic Gait Index  3/3 Gait level surface  3/3 Change in gait speed  1/3 Gait with horizontal head turns  2/3 Gait with vertical head turns  9/12 total score (<10/12 indicates increased risk of fall)    Access Code: [email or web address]  URL: [email or web address]  Date: 07/18/2024  Prepared by: Bernardo Gill    Exercises  - Sidelying Hip Abduction  - 1 x daily - 4 x weekly - 2 sets - 10 reps  - Single Leg Bridge  - 1 x daily - 4 x weekly - 2 sets - 6-8 reps  - Single Leg Balance in March Position  - 1 x daily - 4 x weekly - 2 sets - 10 reps - 2 seconds hold  - Single Leg Balance with Alternating Floor Reaches  - 1 x daily - 4 x weekly - 2 sets - 5 reps       Precautions: PMH includes arthralgia, chest pain, balance disorder, depression, fatigue, fibromyalgia, leukopenia, restless leg syndrome      POC expires Unit limit Auth Expiration date PT/OT + Visit Limit?   9/26/24 BOMN TBD BOMN         Visit/Unit Tracking  AUTH Status:  Date 7/18         Used 1         Remaining             Pertinent Findings:      POC End Date: 9/26/24                                                                                          Test / Measure  7/18/2024   FOTO (Predicted 56)    Modified DGI    Tandem Balance       Visit Number:  1        Manuals 7/18                          Neuro Re-Ed         SLS SLS Marching 2x5        Tandem stance         Tandem gait - fwd/bwd w/ Dual cog         Standing Y-Balance         TB sidestepping         TB monster walk         Uni RDL 5x        Toe/Heel walking         Diag chops         Diag lifts         Balance Reactions w/ Perturbations or Tband / ball toss         Gait + head turns         Romberg stance         Gait + contra UE/LE toe tap         Biodex Balance                           Ther Ex         HEP Review + Pt Edu 10 min        NS / TM         SL Hip Abd  2x8        Single Leg Bridge 5x        STS         Leg Press         Fwd Step Up                           Ther Activity         Runner's step ups         LSU         Suitcase Carry Marching         Obstacle course                  Gait Training                           Modalities

## 2024-07-18 ENCOUNTER — EVALUATION (OUTPATIENT)
Dept: PHYSICAL THERAPY | Facility: REHABILITATION | Age: 69
End: 2024-07-18
Payer: COMMERCIAL

## 2024-07-18 DIAGNOSIS — M17.11 PRIMARY OSTEOARTHRITIS OF RIGHT KNEE: ICD-10-CM

## 2024-07-18 DIAGNOSIS — R26.89 BALANCE DISORDER: Primary | ICD-10-CM

## 2024-07-18 PROCEDURE — 97112 NEUROMUSCULAR REEDUCATION: CPT

## 2024-07-18 PROCEDURE — 97161 PT EVAL LOW COMPLEX 20 MIN: CPT

## 2024-07-18 PROCEDURE — 97110 THERAPEUTIC EXERCISES: CPT

## 2024-07-25 ENCOUNTER — HOSPITAL ENCOUNTER (OUTPATIENT)
Dept: RADIOLOGY | Facility: IMAGING CENTER | Age: 69
End: 2024-07-25
Payer: COMMERCIAL

## 2024-07-25 ENCOUNTER — OFFICE VISIT (OUTPATIENT)
Dept: PHYSICAL THERAPY | Facility: REHABILITATION | Age: 69
End: 2024-07-25
Payer: COMMERCIAL

## 2024-07-25 DIAGNOSIS — M17.11 PRIMARY OSTEOARTHRITIS OF RIGHT KNEE: ICD-10-CM

## 2024-07-25 DIAGNOSIS — M81.0 AGE-RELATED OSTEOPOROSIS WITHOUT CURRENT PATHOLOGICAL FRACTURE: ICD-10-CM

## 2024-07-25 DIAGNOSIS — R26.89 BALANCE DISORDER: Primary | ICD-10-CM

## 2024-07-25 PROCEDURE — 97110 THERAPEUTIC EXERCISES: CPT

## 2024-07-25 PROCEDURE — 97530 THERAPEUTIC ACTIVITIES: CPT

## 2024-07-25 PROCEDURE — 77080 DXA BONE DENSITY AXIAL: CPT

## 2024-07-25 PROCEDURE — 97112 NEUROMUSCULAR REEDUCATION: CPT

## 2024-07-25 NOTE — PROGRESS NOTES
Daily Note     Today's date: 2024  Patient name: Oriana Beevrly  : 1955  MRN: [id number]  Referring provider: Etta Staples MD  Dx:   Encounter Diagnosis     ICD-10-CM    1. Balance disorder  R26.89       2. Primary osteoarthritis of right knee  M17.11           Start Time: 1455  Stop Time: 1540  Total time in clinic (min): 45 minutes    Subjective: Pt mentioned that she has been unable to do her exercises at home due to forgetting and being tired.       Objective: See treatment diary below      Assessment: Tolerated treatment well. Patient demonstrated fatigue post treatment and would benefit from continued PT. Pt was introduced to further functional LE strength and dynamic balance exercises and responded well without excessive increase in pain or soreness. She was most challenged with tandem steps with dual cognitive task as she displayed LoB and continues to benefit from supervision during dynamic balance movements. She noted fatigue following STS and showed improved technique with verbal cues for quad and posterior chain recruitment. Continue to progress as tolerated, 1:1 with Bernardo Gill DPT entirety of tx.      Plan: Continue per plan of care.      Precautions: PMH includes arthralgia, chest pain, balance disorder, depression, fatigue, fibromyalgia, leukopenia, restless leg syndrome      POC expires Unit limit Auth Expiration date PT/OT + Visit Limit?   24 BOMN TBD BOMN         Visit/Unit Tracking  AUTH Status:  Date          Used 1         Remaining             Pertinent Findings:      POC End Date: 24                                                                                          Test / Measure  2024   FOTO (Predicted 56)    Modified DGI    Tandem Balance       Visit Number:  1 2       Manuals                          Neuro Re-Ed         SLS SLS Marching 2x5 10x ea on foam       Tandem stance  On airex foam, 2 laps       Tandem gait - fwd/bwd w/  "Dual cog  On Ground w/ knee slider + tennis ball, 2 laps mirror       Standing Y-Balance         TB sidestepping         TB monster walk         Uni RDL 5x        Toe/Heel walking         Diag chops         Diag lifts         Balance Reactions w/ Perturbations or Tband / ball toss         Gait + head turns         Romberg stance         Gait + contra UE/LE toe tap         Biodex Balance                           Ther Ex         HEP Review + Pt Edu 10 min        NS / TM  TM, 5'       SL Hip Abd 2x8 2x8       Single Leg Bridge 5x 2x6       STS  15#, 2x8       Leg Press  85#, 3x8                                  Ther Activity         Runner's step ups  6\" step eccentric 10x ea       LSU  NV       Suitcase Carry Marching         Obstacle course                  Gait Training                           Modalities                                      "

## 2024-07-31 ENCOUNTER — OFFICE VISIT (OUTPATIENT)
Dept: PHYSICAL THERAPY | Facility: REHABILITATION | Age: 69
End: 2024-07-31
Payer: COMMERCIAL

## 2024-07-31 DIAGNOSIS — R26.89 BALANCE DISORDER: Primary | ICD-10-CM

## 2024-07-31 DIAGNOSIS — M17.11 PRIMARY OSTEOARTHRITIS OF RIGHT KNEE: ICD-10-CM

## 2024-07-31 PROCEDURE — 97112 NEUROMUSCULAR REEDUCATION: CPT

## 2024-07-31 PROCEDURE — 97110 THERAPEUTIC EXERCISES: CPT

## 2024-07-31 PROCEDURE — 97530 THERAPEUTIC ACTIVITIES: CPT

## 2024-07-31 NOTE — RESULT ENCOUNTER NOTE
Bone density has significantly improved. Please continue taking alendronate weekly, continue your daily calcium and D supplements.

## 2024-07-31 NOTE — PROGRESS NOTES
Daily Note     Today's date: 2024  Patient name: Oriana Beverly  : 1955  MRN: [id number]  Referring provider: Etta Staples MD  Dx:   Encounter Diagnosis     ICD-10-CM    1. Balance disorder  R26.89       2. Primary osteoarthritis of right knee  M17.11           Start Time: 1355  Stop Time: 1440  Total time in clinic (min): 45 minutes    Subjective: Pt notes no significant changes since last visit but reports that she has been able to be more consistent with her HEP this past week.       Objective: See treatment diary below      Assessment: Tolerated treatment well. Patient demonstrated fatigue post treatment and would benefit from continued PT. Pt was able to tolerate increased volume with STS and leg press as LE functional strength is slowly improving. She was challenged with increased resistance with SL abd as hip strength continues to be limited. She was also challenged by marching on foam with front rack carry as dynamic balance continues to be decreased. She responded well to Vcs for balance reactions during balance exercises as this helped with episodes of LoB. Continue to progress as tolerated, 1:1 with Bernardo Gill DPT entirety of tx.      Plan: Continue per plan of care.      Precautions: PMH includes arthralgia, chest pain, balance disorder, depression, fatigue, fibromyalgia, leukopenia, restless leg syndrome      POC expires Unit limit Auth Expiration date PT/OT + Visit Limit?   24 BOMN 24 BOMN         Visit/Unit Tracking  AUTH Status:  Date       Approved Used 1 2 3       Remaining             Pertinent Findings:      POC End Date: 24                                                                                          Test / Measure  2024   FOTO (Predicted 56)    Modified DGI    Tandem Balance       Visit Number:  1 2 3      Manuals                         Neuro Re-Ed         SLS SLS Marching 2x5 10x ea on foam 3x, 15s hold     "  Tandem stance  On airex foam, 2 laps Airex Marching, w/ 10# DB, 2x10      Tandem gait - fwd/bwd w/ Dual cog  On Ground w/ knee slider + tennis ball, 2 laps mirror On Ground w/ knee slider + tennis ball, 2 laps mirror      Standing Y-Balance         TB sidestepping         TB monster walk         Uni RDL 5x  8x ea side      Toe/Heel walking         Diag chops         Diag lifts         Balance Reactions w/ Perturbations or Tband / ball toss         Gait + head turns         Romberg stance         Gait + contra UE/LE toe tap         Biodex Balance                           Ther Ex         HEP Review + Pt Edu 10 min        NS / TM  TM, 5' TM, 5'      SL Hip Abd 2x8 2x8 2x8, 2 # AW      Single Leg Bridge 5x 2x6 2x6      STS  15#, 2x8 15#, 3x7      Leg Press  85#, 3x8 100#, 3x6                                 Ther Activity         Runner's step ups  6\" step eccentric 10x ea 9\" step eccentric 2x8 ea      LSU  NV       Suitcase Carry Marching         Obstacle course                  Gait Training                           Modalities                                        "

## 2024-08-07 ENCOUNTER — OFFICE VISIT (OUTPATIENT)
Dept: PHYSICAL THERAPY | Facility: REHABILITATION | Age: 69
End: 2024-08-07
Payer: COMMERCIAL

## 2024-08-07 DIAGNOSIS — R26.89 BALANCE DISORDER: Primary | ICD-10-CM

## 2024-08-07 DIAGNOSIS — M17.11 PRIMARY OSTEOARTHRITIS OF RIGHT KNEE: ICD-10-CM

## 2024-08-07 PROCEDURE — 97530 THERAPEUTIC ACTIVITIES: CPT

## 2024-08-07 PROCEDURE — 97110 THERAPEUTIC EXERCISES: CPT

## 2024-08-07 PROCEDURE — 97112 NEUROMUSCULAR REEDUCATION: CPT

## 2024-08-07 NOTE — PROGRESS NOTES
Daily Note     Today's date: 2024  Patient name: Oriana Beverly  : 1955  MRN: [id number]  Referring provider: Etta Staples MD  Dx:   Encounter Diagnosis     ICD-10-CM    1. Balance disorder  R26.89       2. Primary osteoarthritis of right knee  M17.11           Start Time: 1355  Stop Time: 1436  Total time in clinic (min): 41 minutes    Subjective: Pt notes that she has been trying to do her exercises at home but has been having trouble fitting it into her routine. She reports that balance continues to be challenging at times.       Objective: See treatment diary below      Assessment: Tolerated treatment well. Patient would benefit from continued PT. Pt continues to note fatigue following STS due to decreased endurance. She was introduced to standing Y balance to improve dynamic balance and LLE strength and responded well but was challenged with LLE SLS. She was able to tolerate increased volume with leg press as LE strength is slowly improving. She continues to respond well to supervision during higher level dynamic balance exercises as she displayed LoB at times with SLS exercises. Continue to progress as tolerated, 1:1 with Bernardo Gill DPT entirety of tx.      Plan: Continue per plan of care.      Precautions: PMH includes arthralgia, chest pain, balance disorder, depression, fatigue, fibromyalgia, leukopenia, restless leg syndrome      POC expires Unit limit Auth Expiration date PT/OT + Visit Limit?   24 BOMN 24 BOMN         Visit/Unit Tracking  AUTH Status:  Date      Approved Used 1 2 3 4      Remaining             Pertinent Findings:      POC End Date: 24                                                                                          Test / Measure  2024   FOTO (Predicted 56)    Modified DGI    Tandem Balance       Visit Number:  1 2 3 4     Manuals                        Neuro Re-Ed         SLS SLS Marching 2x5 10x ea  "on foam 3x, 15s hold 3x, 15s hold     Tandem stance  On airex foam, 2 laps Airex Marching, w/ 10# DB, 2x10 Airex Marching, w/ 10# DB, 2x10     Tandem gait - fwd/bwd w/ Dual cog  On Ground w/ knee slider + tennis ball, 2 laps mirror On Ground w/ knee slider + tennis ball, 2 laps mirror On Ground w/ knee slider + tennis ball, 2 laps mirror     Standing Y-Balance    10x ea side     Standing lunges to cones    2x5 ea, 3 cones     Uni RDL 5x  8x ea side NV     Toe/Heel walking         Diag chops         Diag lifts         Balance Reactions w/ Perturbations or Tband / ball toss         Gait + head turns         Romberg stance         Gait + contra UE/LE toe tap         Biodex Balance                           Ther Ex         HEP Review + Pt Edu 10 min        NS / TM  TM, 5' TM, 5' TM, 5'     SL Hip Abd 2x8 2x8 2x8, 2 # AW 2x8, 2 # AW     Single Leg Bridge 5x 2x6 2x6 2x6     STS  15#, 2x8 15#, 3x7 15#, 3x8       Leg Press  85#, 3x8 100#, 3x6 100#, 3x8                                Ther Activity         Runner's step ups  6\" step eccentric 10x ea 9\" step eccentric 2x8 ea 9\" step eccentric 2x8 ea     LSU  NV       Suitcase Carry Marching         Obstacle course                  Gait Training                           Modalities                                          "

## 2024-08-14 ENCOUNTER — OFFICE VISIT (OUTPATIENT)
Dept: PHYSICAL THERAPY | Facility: REHABILITATION | Age: 69
End: 2024-08-14
Payer: COMMERCIAL

## 2024-08-14 DIAGNOSIS — M17.11 PRIMARY OSTEOARTHRITIS OF RIGHT KNEE: ICD-10-CM

## 2024-08-14 DIAGNOSIS — R26.89 BALANCE DISORDER: Primary | ICD-10-CM

## 2024-08-14 PROCEDURE — 97112 NEUROMUSCULAR REEDUCATION: CPT

## 2024-08-14 PROCEDURE — 97110 THERAPEUTIC EXERCISES: CPT

## 2024-08-14 PROCEDURE — 97530 THERAPEUTIC ACTIVITIES: CPT

## 2024-08-14 NOTE — PROGRESS NOTES
Daily Note     Today's date: 2024  Patient name: Oriana Beverly  : 1955  MRN: [id number]  Referring provider: Etta Staples MD  Dx:   Encounter Diagnosis     ICD-10-CM    1. Balance disorder  R26.89       2. Primary osteoarthritis of right knee  M17.11           Start Time: 1400  Stop Time: 1455  Total time in clinic (min): 55 minutes    Subjective: Pt mentioned that she was walking over the weekend in the [facility] and that she had a difficult time with descending down ramps as she felt as though she was going to fall.      Objective: See treatment diary below      Assessment: Tolerated treatment well. Patient would benefit from continued PT. Pt was introduced to heel/toe walking as decreased ankle and knee strength may be contributing to ongoing difficulty with ramps. She was most challenged with heel walking backwards as she demonstrated LoB and continues to benefit from supervision during higher level dynamic balance exercises. She continues to have difficulty with standing lunges and tandem walking as she demonstrates improved balance following Vcs to widen Jr. Continue to progress as tolerated, 1:1 with Bernardo Gill DPT entirety of tx.      Plan: Continue per plan of care.      Precautions: PMH includes arthralgia, chest pain, balance disorder, depression, fatigue, fibromyalgia, leukopenia, restless leg syndrome      POC expires Unit limit Auth Expiration date PT/OT + Visit Limit?   24 BOMN 24 BOMN         Visit/Unit Tracking  AUTH Status:  Date     Approved Used 1 2 3 4 5     Remaining             Pertinent Findings:      POC End Date: 24                                                                                          Test / Measure  2024   FOTO (Predicted 56)    Modified DGI    Tandem Balance       Visit Number:  1 2 3 4 5    Manuals                       Neuro Re-Ed         SLS SLS Marching 2x5 10x ea  "on foam 3x, 15s hold 3x, 15s hold 3x, 15s hold    Tandem stance  On airex foam, 2 laps Airex Marching, w/ 10# DB, 2x10 Airex Marching, w/ 10# DB, 2x10 On ground, 2 laps    Tandem gait - fwd/bwd w/ Dual cog  On Ground w/ knee slider + tennis ball, 2 laps mirror On Ground w/ knee slider + tennis ball, 2 laps mirror On Ground w/ knee slider + tennis ball, 2 laps mirror NV    Standing Y-Balance    10x ea side 10x ea side    Standing lunges to cones    2x5 ea, 3 cones 2x5 ea, 3 cones (start in tandem stance)    Uni RDL 5x  8x ea side NV     Toe/Heel walking     Toe+ heel walking 3 laps windows    Diag chops         Diag lifts         Balance Reactions w/ Perturbations or Tband / ball toss         Gait + head turns         Romberg stance         Gait + contra UE/LE toe tap         Biodex Balance                           Ther Ex         HEP Review + Pt Edu 10 min        NS / TM  TM, 5' TM, 5' TM, 5' TM, 5'    SL Hip Abd 2x8 2x8 2x8, 2 # AW 2x8, 2 # AW 2x8, 3# AW    Single Leg Bridge 5x 2x6 2x6 2x6 2x6    STS  15#, 2x8 15#, 3x7 15#, 3x8   15#, 3x8    Leg Press  85#, 3x8 100#, 3x6 100#, 3x8 100#, 3x8                               Ther Activity         Runner's step ups  6\" step eccentric 10x ea 9\" step eccentric 2x8 ea 9\" step eccentric 2x8 ea 9\" step eccentric 2x8 ea    LSU  NV       Suitcase Carry Marching         Obstacle course                  Gait Training                           Modalities                                            "

## 2024-08-21 DIAGNOSIS — G45.9 TIA (TRANSIENT ISCHEMIC ATTACK): ICD-10-CM

## 2024-08-21 RX ORDER — ATORVASTATIN CALCIUM 40 MG/1
TABLET, FILM COATED ORAL
Qty: 90 TABLET | Refills: 0 | Status: SHIPPED | OUTPATIENT
Start: 2024-08-21

## 2024-08-23 ENCOUNTER — OFFICE VISIT (OUTPATIENT)
Dept: PHYSICAL THERAPY | Facility: REHABILITATION | Age: 69
End: 2024-08-23
Payer: COMMERCIAL

## 2024-08-23 DIAGNOSIS — R26.89 BALANCE DISORDER: Primary | ICD-10-CM

## 2024-08-23 DIAGNOSIS — M17.11 PRIMARY OSTEOARTHRITIS OF RIGHT KNEE: ICD-10-CM

## 2024-08-23 PROCEDURE — 97110 THERAPEUTIC EXERCISES: CPT

## 2024-08-23 PROCEDURE — 97112 NEUROMUSCULAR REEDUCATION: CPT

## 2024-08-23 PROCEDURE — 97530 THERAPEUTIC ACTIVITIES: CPT

## 2024-08-23 NOTE — PROGRESS NOTES
Daily Note     Today's date: 2024  Patient name: Oriana Beverly  : 1955  MRN: [id number]  Referring provider: Etta Staples MD  Dx:   Encounter Diagnosis     ICD-10-CM    1. Balance disorder  R26.89       2. Primary osteoarthritis of right knee  M17.11           Start Time: 1350  Stop Time: 1440  Total time in clinic (min): 50 minutes    Subjective: Pt notes that she is fatigued today and has been trying to do her exercises at home but has trouble with consistency.       Objective: See treatment diary below      Assessment: Tolerated treatment well. Patient would benefit from continued PT. Pt noted increased fatigue today so some exercises were held or resistance/volume was decreased. She showed improved tolerance to standing lunges to cones this visit but continues to show limitations with tandem balance and marching due to decreased dynamic balance. She continues to benefit from supervision during dynamic balance exercises due to fall risk. Continue to progress as tolerated, 1:1 with Bernardo Gill DPT entirety of tx.      Plan: Continue per plan of care.      Precautions: PMH includes arthralgia, chest pain, balance disorder, depression, fatigue, fibromyalgia, leukopenia, restless leg syndrome      POC expires Unit limit Auth Expiration date PT/OT + Visit Limit?   24 BOMN 24 BOMN         Visit/Unit Tracking  AUTH Status:  Date    Approved Used 1 2 3 4 5 6    Remaining             Pertinent Findings:      POC End Date: 24                                                                                          Test / Measure  2024   FOTO (Predicted 56)    Modified DGI    Tandem Balance       Visit Number:  1 2 3 4 5 6   Manuals                      Neuro Re-Ed         SLS SLS Marching 2x5 10x ea on foam 3x, 15s hold 3x, 15s hold 3x, 15s hold 3x, 15s hold   Tandem stance  On airex foam, 2 laps Airex Marching, w/ 10# DB,  "2x10 Airex Marching, w/ 10# DB, 2x10 On ground, 2 laps On ground, 2 laps   Tandem gait - fwd/bwd w/ Dual cog  On Ground w/ knee slider + tennis ball, 2 laps mirror On Ground w/ knee slider + tennis ball, 2 laps mirror On Ground w/ knee slider + tennis ball, 2 laps mirror NV    Standing Y-Balance    10x ea side 10x ea side 10x ea side   Standing lunges to cones    2x5 ea, 3 cones 2x5 ea, 3 cones (start in tandem stance) 2x5 ea, 3 cones (start in tandem stance)   Uni RDL 5x  8x ea side NV     Toe/Heel walking     Toe+ heel walking 3 laps windows Toe+ heel walking 3 laps windows   Diag chops         Diag lifts         Balance Reactions w/ Perturbations or Tband / ball toss         Gait + head turns         Romberg stance         Gait + contra UE/LE toe tap         Biodex Balance                           Ther Ex         HEP Review + Pt Edu 10 min        NS / TM  TM, 5' TM, 5' TM, 5' TM, 5' TM, 5'   SL Hip Abd 2x8 2x8 2x8, 2 # AW 2x8, 2 # AW 2x8, 3# AW 2x8, 3# AW   Single Leg Bridge 5x 2x6 2x6 2x6 2x6 2x6   STS  15#, 2x8 15#, 3x7 15#, 3x8   15#, 3x8 15#, 3x8   Leg Press  85#, 3x8 100#, 3x6 100#, 3x8 100#, 3x8 100#, 3x8                              Ther Activity         Runner's step ups  6\" step eccentric 10x ea 9\" step eccentric 2x8 ea 9\" step eccentric 2x8 ea 9\" step eccentric 2x8 ea 9\" step eccentric 2x8 ea   LSU  NV       Suitcase Carry Marching         Obstacle course                  Gait Training                           Modalities                                              "

## 2024-08-27 DIAGNOSIS — M79.7 FIBROMYALGIA: ICD-10-CM

## 2024-08-28 RX ORDER — GABAPENTIN 300 MG/1
CAPSULE ORAL
Qty: 450 CAPSULE | Refills: 1 | Status: SHIPPED | OUTPATIENT
Start: 2024-08-28

## 2024-08-28 NOTE — TELEPHONE ENCOUNTER
Patient called Rx Refill Line inquiring status on Gabapentin, patient informed request sent yesterday and to allow 24 to 72 hours for completion. Patient states she is completely without her medication and experiences a lot of pain without it. Please send to [facility] #[id number] as a HP.

## 2024-08-30 ENCOUNTER — APPOINTMENT (OUTPATIENT)
Dept: PHYSICAL THERAPY | Facility: REHABILITATION | Age: 69
End: 2024-08-30
Payer: COMMERCIAL

## 2024-09-13 ENCOUNTER — APPOINTMENT (OUTPATIENT)
Dept: PHYSICAL THERAPY | Facility: REHABILITATION | Age: 69
End: 2024-09-13
Payer: COMMERCIAL

## 2024-09-18 DIAGNOSIS — R51.9 NONINTRACTABLE HEADACHE, UNSPECIFIED CHRONICITY PATTERN, UNSPECIFIED HEADACHE TYPE: ICD-10-CM

## 2024-09-18 RX ORDER — CARBAMAZEPINE 100 MG/1
CAPSULE, EXTENDED RELEASE ORAL
Qty: 180 CAPSULE | Refills: 3 | Status: SHIPPED | OUTPATIENT
Start: 2024-09-18

## 2024-09-20 ENCOUNTER — OFFICE VISIT (OUTPATIENT)
Dept: PHYSICAL THERAPY | Facility: REHABILITATION | Age: 69
End: 2024-09-20
Payer: COMMERCIAL

## 2024-09-20 DIAGNOSIS — R26.89 BALANCE DISORDER: Primary | ICD-10-CM

## 2024-09-20 DIAGNOSIS — M17.11 PRIMARY OSTEOARTHRITIS OF RIGHT KNEE: ICD-10-CM

## 2024-09-20 PROCEDURE — 97530 THERAPEUTIC ACTIVITIES: CPT

## 2024-09-20 PROCEDURE — 97110 THERAPEUTIC EXERCISES: CPT

## 2024-09-20 PROCEDURE — 97112 NEUROMUSCULAR REEDUCATION: CPT

## 2024-09-20 NOTE — PROGRESS NOTES
Daily Note     Today's date: 2024  Patient name: Oriana Beverly  : 1955  MRN: [id number]  Referring provider: Etta Staples MD  Dx:   Encounter Diagnosis     ICD-10-CM    1. Balance disorder  R26.89       2. Primary osteoarthritis of right knee  M17.11           Start Time: 1416  Stop Time: 1500  Total time in clinic (min): 44 minutes    Subjective: Pt notes that her R knee has bothering her recently and she has been unable to come to therapy due to being busy at work. She mentioned that she has been unable to do many of her exercises at home due to not being motivated.       Objective: See treatment diary below      Assessment: Tolerated treatment well. Patient would benefit from continued PT. Pt continues to be challenged by current exercise plan so no new exercises were added. Volume and/or resistance was decreased during some exercises due to fatigue. She continues to respond well to supervision during dynamic balance exercises as she displayed loss of balance especially with fwd lunges. Continue to progress as tolerated, 1:1 with Bernardo Gill DPT entirety of tx.    Plan: Continue per plan of care.      Precautions: PMH includes arthralgia, chest pain, balance disorder, depression, fatigue, fibromyalgia, leukopenia, restless leg syndrome      POC expires Unit limit Auth Expiration date PT/OT + Visit Limit?   24 BOMN 24 BOMN         Visit/Unit Tracking  AUTH Status:  Date    Approved Used 1 2 3 4 5 6    Remaining             Pertinent Findings:      POC End Date: 24                                                                                          Test / Measure  2024   FOTO (Predicted 56)    Modified DGI    Tandem Balance       Visit Number:  1 2 3 4 5 6 7   Manuals  8                       Neuro Re-Ed          SLS SLS Marching 2x5 10x ea on foam 3x, 15s hold 3x, 15s hold 3x, 15s hold 3x, 15s hold 3x, 15s  "hold   Tandem stance  On airex foam, 2 laps Airex Marching, w/ 10# DB, 2x10 Airex Marching, w/ 10# DB, 2x10 On ground, 2 laps On ground, 2 laps UE together On ground, 2 laps UE together   Tandem gait - fwd/bwd w/ Dual cog  On Ground w/ knee slider + tennis ball, 2 laps mirror On Ground w/ knee slider + tennis ball, 2 laps mirror On Ground w/ knee slider + tennis ball, 2 laps mirror NV     Standing Y-Balance    10x ea side 10x ea side 10x ea side NV   Standing lunges to cones    2x5 ea, 3 cones 2x5 ea, 3 cones (start in tandem stance) 2x5 ea, 3 cones (start in tandem stance) 2x5 ea, 3 cones (start in tandem stance)   Uni RDL 5x  8x ea side NV      Toe/Heel walking     Toe+ heel walking 3 laps windows Toe+ heel walking 3 laps windows Toe+ heel walking 3 laps windows   Diag chops          Diag lifts          Balance Reactions w/ Perturbations or Tband / ball toss          Gait + head turns          Romberg stance          Gait + contra UE/LE toe tap          Biodex Balance                              Ther Ex          HEP Review + Pt Edu 10 min         NS / TM  TM, 5' TM, 5' TM, 5' TM, 5' TM, 5' TM, 5'   SL Hip Abd 2x8 2x8 2x8, 2 # AW 2x8, 2 # AW 2x8, 3# AW 2x8, 3# AW 2x10 no wt   Single Leg Bridge 5x 2x6 2x6 2x6 2x6 2x6 2x6   STS  15#, 2x8 15#, 3x7 15#, 3x8   15#, 3x8 15#, 3x8 3x10 no wt   Leg Press  85#, 3x8 100#, 3x6 100#, 3x8 100#, 3x8 100#, 3x8 100#, 3x8                                 Ther Activity          Runner's step ups  6\" step eccentric 10x ea 9\" step eccentric 2x8 ea 9\" step eccentric 2x8 ea 9\" step eccentric 2x8 ea 9\" step eccentric 2x8 ea 9\" step eccentric 2x8 ea   LSU  NV        Suitcase Carry Marching          Obstacle course                    Gait Training                              Modalities                                                   "

## 2024-09-26 ENCOUNTER — OFFICE VISIT (OUTPATIENT)
Dept: PHYSICAL THERAPY | Facility: REHABILITATION | Age: 69
End: 2024-09-26
Payer: COMMERCIAL

## 2024-09-26 DIAGNOSIS — M17.11 PRIMARY OSTEOARTHRITIS OF RIGHT KNEE: ICD-10-CM

## 2024-09-26 DIAGNOSIS — R26.89 BALANCE DISORDER: Primary | ICD-10-CM

## 2024-09-26 PROCEDURE — 97112 NEUROMUSCULAR REEDUCATION: CPT

## 2024-09-26 PROCEDURE — 97110 THERAPEUTIC EXERCISES: CPT

## 2024-09-26 NOTE — PROGRESS NOTES
Daily Note     Today's date: 2024  Patient name: Oriana Beverly  : 1955  MRN: [id number]  Referring provider: Etta Staples MD  Dx:   Encounter Diagnosis     ICD-10-CM    1. Balance disorder  R26.89       2. Primary osteoarthritis of right knee  M17.11                      Subjective: Pt notes that she was lifting some books on Tuesday and injured her lower back. She mentioned that her pain is across the tailbone and hurts most with sitting for longer periods of time as well as bending over to lift. She notes that standing and walking has not been bothering her too much.       Objective: See treatment diary below      Assessment: Tolerated treatment fair. Patient would benefit from continued PT. Some exercises were held due to increased pain at the lower back. She was introduced to standing Tball press with marching to improve core stability and dynamic balance and responded well but was challenged. She noted decreased pain at the lower back following Vcs for core stability during STS. Pt was educated on staying active and to perform stretches at home. Assess response to tx session next visit, continue to progress as tolerated. 1:1 with Bernardo Gill DPT entirety of tx.      Plan: Continue per plan of care.      Precautions: PMH includes arthralgia, chest pain, balance disorder, depression, fatigue, fibromyalgia, leukopenia, restless leg syndrome      POC expires Unit limit Auth Expiration date PT/OT + Visit Limit?   24 BOMN 24 BOMN         Visit/Unit Tracking  AUTH Status:  Date  8   Approved Used 1 2 3 4 5 6 7 8    Remaining               Pertinent Findings:      POC End Date: 24                                                                                          Test / Measure  2024   FOTO (Predicted 56)    Modified DGI    Tandem Balance       Visit Number:  1 2 3 4 5 6 7 8   Manuals  8             "             Neuro Re-Ed           SLS SLS Marching 2x5 10x ea on foam 3x, 15s hold 3x, 15s hold 3x, 15s hold 3x, 15s hold 3x, 15s hold 3x, 15s hold   Tandem stance  On airex foam, 2 laps Airex Marching, w/ 10# DB, 2x10 Airex Marching, w/ 10# DB, 2x10 On ground, 2 laps On ground, 2 laps UE together On ground, 2 laps UE together    Tandem gait - fwd/bwd w/ Dual cog  On Ground w/ knee slider + tennis ball, 2 laps mirror On Ground w/ knee slider + tennis ball, 2 laps mirror On Ground w/ knee slider + tennis ball, 2 laps mirror NV      Standing Y-Balance    10x ea side 10x ea side 10x ea side NV    Standing lunges to cones    2x5 ea, 3 cones 2x5 ea, 3 cones (start in tandem stance) 2x5 ea, 3 cones (start in tandem stance) 2x5 ea, 3 cones (start in tandem stance)    Uni RDL 5x  8x ea side NV       Toe/Heel walking     Toe+ heel walking 3 laps windows Toe+ heel walking 3 laps windows Toe+ heel walking 3 laps windows    Standing Tball Press        2x10   Supine Tball Press        10x, 5s hold   Diag chops           Diag lifts           Balance Reactions w/ Perturbations or Tband / ball toss           Gait + head turns           Gait + contra UE/LE toe tap           Biodex Balance                                 Ther Ex           HEP Review + Pt Edu 10 min          NS / TM  TM, 5' TM, 5' TM, 5' TM, 5' TM, 5' TM, 5' NS 6'   SL Hip Abd 2x8 2x8 2x8, 2 # AW 2x8, 2 # AW 2x8, 3# AW 2x8, 3# AW 2x10 no wt    Single Leg Bridge 5x 2x6 2x6 2x6 2x6 2x6 2x6 BL, 2x10   STS  15#, 2x8 15#, 3x7 15#, 3x8   15#, 3x8 15#, 3x8 3x10 no wt 3x10 no wt   Leg Press  85#, 3x8 100#, 3x6 100#, 3x8 100#, 3x8 100#, 3x8 100#, 3x8    Standing Marching        2x10 ea                         Ther Activity           Runner's step ups  6\" step eccentric 10x ea 9\" step eccentric 2x8 ea 9\" step eccentric 2x8 ea 9\" step eccentric 2x8 ea 9\" step eccentric 2x8 ea 9\" step eccentric 2x8 ea    LSU  NV         Suitcase Carry Marching           Obstacle course         "              Gait Training                                 Modalities

## 2024-10-04 ENCOUNTER — OFFICE VISIT (OUTPATIENT)
Dept: PHYSICAL THERAPY | Facility: REHABILITATION | Age: 69
End: 2024-10-04
Payer: COMMERCIAL

## 2024-10-04 DIAGNOSIS — M17.11 PRIMARY OSTEOARTHRITIS OF RIGHT KNEE: ICD-10-CM

## 2024-10-04 DIAGNOSIS — R26.89 BALANCE DISORDER: Primary | ICD-10-CM

## 2024-10-04 PROCEDURE — 97110 THERAPEUTIC EXERCISES: CPT

## 2024-10-04 PROCEDURE — 97112 NEUROMUSCULAR REEDUCATION: CPT

## 2024-10-04 NOTE — PROGRESS NOTES
Daily Note     Today's date: 10/4/2024  Patient name: Oriana Beverly  : 1955  MRN: [id number]  Referring provider: Etta Staples MD  Dx:   Encounter Diagnosis     ICD-10-CM    1. Balance disorder  R26.89       2. Primary osteoarthritis of right knee  M17.11           Start Time: 1500  Stop Time: 1531  Total time in clinic (min): 31 minutes    Subjective: Pt notes that her back has been feeling okay but she continues to have pain with supine to sit as well as transfers. She mentioned that she continues       Objective: See treatment diary below      Assessment: Tolerated treatment well. Patient would benefit from continued PT. Pt was introduced to palloff press with marching to improve core stability and dynamic balance and responded well but was challenged. She was re-introduced to leg press and responded well as she noted no increase in soreness at the lower back. She was unable to perform single leg bridge due to increased L lower back pain. Continue to progress as tolerated, 1:1 with Bernardo Gill DPT entirety of tx.      Plan: Continue per plan of care.      Precautions: PMH includes arthralgia, chest pain, balance disorder, depression, fatigue, fibromyalgia, leukopenia, restless leg syndrome      POC expires Unit limit Auth Expiration date PT/OT + Visit Limit?   24 BOMN 24 BOMN         Visit/Unit Tracking  AUTH Status:  Date 7/18 7/25 7/31 8/7 8/14 8/23 9/20 9/26 10/4   Approved Used 1 2 3 4 5 6 7 8 9    Remaining                Pertinent Findings:      POC End Date: 24                                                                                          Test / Measure  2024   FOTO (Predicted 56)    Modified DGI    Tandem Balance       Visit Number:  5 6 7 8 9     Manuals  10                         Neuro Re-Ed          SLS 3x, 15s hold 3x, 15s hold 3x, 15s hold 3x, 15s hold 3x, 15s hold     Tandem stance On ground, 2 laps On ground, 2 laps UE  "together On ground, 2 laps UE together       Tandem gait - fwd/bwd w/ Dual cog NV         Standing Y-Balance 10x ea side 10x ea side NV       Standing lunges to cones 2x5 ea, 3 cones (start in tandem stance) 2x5 ea, 3 cones (start in tandem stance) 2x5 ea, 3 cones (start in tandem stance)       Uni RDL          Toe/Heel walking Toe+ heel walking 3 laps windows Toe+ heel walking 3 laps windows Toe+ heel walking 3 laps windows       Standing Tball Press    2x10 2x10, March + abd     Supine Tball Press    10x, 5s hold 10x, 5s hold     Diag chops          Diag lifts          Palloff Press + March     2x10 MTB               Ther Ex          HEP Review + Pt Edu          NS / TM TM, 5' TM, 5' TM, 5' NS 6' TM, 5'     SL Hip Abd 2x8, 3# AW 2x8, 3# AW 2x10 no wt       Single Leg Bridge 2x6 2x6 2x6 BL, 2x10 BL, 2x10     STS 15#, 3x8 15#, 3x8 3x10 no wt 3x10 no wt 3x10 no wt     Leg Press 100#, 3x8 100#, 3x8 100#, 3x8  85#, 3x8     Standing Marching    2x10 ea                          Ther Activity          Runner's step ups 9\" step eccentric 2x8 ea 9\" step eccentric 2x8 ea 9\" step eccentric 2x8 ea       LSU          Suitcase Carry Marching          Obstacle course                    Gait Training                              Modalities                                                       "

## 2024-10-07 ENCOUNTER — OFFICE VISIT (OUTPATIENT)
Dept: NEUROLOGY | Facility: CLINIC | Age: 69
End: 2024-10-07
Payer: COMMERCIAL

## 2024-10-07 VITALS
SYSTOLIC BLOOD PRESSURE: 121 MMHG | OXYGEN SATURATION: 98 % | WEIGHT: 140 LBS | HEIGHT: 62 IN | DIASTOLIC BLOOD PRESSURE: 71 MMHG | HEART RATE: 63 BPM | BODY MASS INDEX: 25.76 KG/M2 | TEMPERATURE: 98 F

## 2024-10-07 DIAGNOSIS — R26.89 BALANCE PROBLEM: ICD-10-CM

## 2024-10-07 DIAGNOSIS — R13.10 SWALLOWING DIFFICULTY: ICD-10-CM

## 2024-10-07 DIAGNOSIS — R53.83 FATIGUE: ICD-10-CM

## 2024-10-07 DIAGNOSIS — R47.89 WORD FINDING DIFFICULTY: Primary | ICD-10-CM

## 2024-10-07 DIAGNOSIS — R41.3 MEMORY DIFFICULTY: ICD-10-CM

## 2024-10-07 PROCEDURE — 99215 OFFICE O/P EST HI 40 MIN: CPT | Performed by: PSYCHIATRY & NEUROLOGY

## 2024-10-07 NOTE — PROGRESS NOTES
[facility]  Follow Up Visit    Impression/Plan    Ms. Beverly is a 69 y.o. female   that I initially saw for and event involving alteration of consciousness with visual disturbance and confusion in 9/2019 of unclear etiology. No additional events. REEG and SD EEG were normal in 2019. She has moderate left PCA stenosis. Continue asa and statin. Follows closely with PCP. Discussed stroke risk factors today.      Word finding details noted subjectively over the last few years. She also is noting memory problems. Cognitive symptoms are now impacting work. 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 Neurological exam is otherwise normal. She reports some unsteadiness (tends to drift back), but no problems with balance/gait on exam other than mild difficulty with tandem. PET scan unrevealing.  Last brain MRI completed October 2022 also unrevealing for cause (personally reviewed on 4/8/2023).  Her cognitive baseline is high, has a PhD in English.  MOCA 26/30 10/4/2023 (one-point off for clock drawing, /one-point for serial sevens, 2 points for delayed recall). MOCA repeated 10/7/2024 also 26/30. Referring again to [facility] memory specialist - she is not interested in scheduling this. Also will repeat PET scan to evaluate for evidence of PPA. Referring to .     She notes continued balance problems. No falls. Tandem gait may be worse than last year. Sensory exam is normal. Good strength and coordination. Normal tone. No tremor. She has chronic leg pain that has worsened and has been considered manifestation of fibromyalgia. Checking B12/folate and SPEP.  In the past there were apparent swallowing problems, but she denies problems currently.     Patient Instructions   Schedule PET scan.   Have lab work done.   Schedule visit with [facility] memory disorder center.    Schedule Speech therapy.   Return in about 4 months.     Diagnoses and all orders for this visit:    Word finding difficulty  -     Ambulatory Referral to Neurology; Future  -     Vitamin B12/Folate, Serum Panel; Future  -     NM PET brain metabolic evaluation; Future  -     Ambulatory Referral to Speech Therapy; Future    Swallowing difficulty  -     Ambulatory Referral to Neurology; Future  -     NM PET brain metabolic evaluation; Future    Memory difficulty  -     Vitamin B12/Folate, Serum Panel; Future  -     NM PET brain metabolic evaluation; Future  -     Ambulatory Referral to Speech Therapy; Future    Fatigue  -     Vitamin B12/Folate, Serum Panel; Future    Balance problem  -     Vitamin B12/Folate, Serum Panel; Future  -     Protein electrophoresis, serum; Future          Subjective    Oriana Beverly is returning to the [facility] for follow up.     Interval Events:     Word retrieval likely stable. Still having problems. Cognitive symptoms impacting work. 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     Some intermittent head pain on the left. Unchanged frequency.     Saw speech therapy spring 2023. Initial evaluation did not show significant deficits so no therapy was recommended.     She has a diagnosis of fibromyalgia, made years ago. Currently this manifests as bilateral lower extremity pain, achy, surface and deep. No numbness or tingling. Balance is a problem, but no falls. Has been doing PT.     Prior Evaluation:  REEG and SD EEG normal in 2019  MRI brain w/o 9/2019: Slight interval progression of the nonspecific cerebral white matter disease when comparing to 2011 examination.  MRI brain with neuro quant 10/4/2022:  1.  No acute infarction, intracranial hemorrhage or mass effect.  2.  Mild, chronic microangiopathy.  3.  NeuroQuant analysis was performed: Normal  "hippocampal volume and enlarged ventricular system: Findings do not support hippocampal degeneration. Possible expansion of ventricular system without medial temporal lobe focused ex-vacuo process.     PET brain 3/8/2023:  There is only subtle diminished activity in the bilateral frontal, temporal and anterior parietal lobes.  Findings are nonspecific.  It is also uncertain at this time if these findings would remain stable or progress to a more advanced dementia, such   as fronto-temporal dementia.  12 month follow-up may be considered to evaluate for any change, if clinically warranted.      Neuropsychological testing completed by Dr. Miguel on February 10, 2023:  \"Objective neuropsychological assessment revealed primary difficulties in confrontation naming, inductive reasoning, sustained speed and initial acquisition of unstructured verbal information and a large amount of visual information, with intact consolidation, retention and retrieval.  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  Although her above average intelligence and doctorate in English may be helping to mask the effects of a PPA process, further testing may be warranted to best determine etiology of her difficulties.\"  (Scanned neuropsychological report attached to 3/22/2023 encounter)        History Reviewed:   The following were reviewed and updated as appropriate: allergies, current medications, past family history, past medical history, past social history, past surgical history and problem list  History of nephrolithiasis and " "hydronephrosis, fibromyalgia, depression with anxiety, osteoarthritis, trigeminal neuralgia, vitamin-D deficiency     Psychiatric History:  Anxiety  Depression     Social History:   Driving: Yes  Occupation: teaches english at [employer]      Objective    /71 (BP Location: Right arm, Patient Position: Sitting, Cuff Size: Adult)   Pulse 63   Temp 98 °F (36.7 °C) (Temporal)   Ht 5' 2\" (1.575 m)   Wt 63.5 kg (140 lb)   SpO2 98%   BMI 25.61 kg/m²      General Exam  No acute distress.    Neurologic Exam  Mental Status:  Alert and oriented x 3.   Ronald Cognitive Assessment (MOCA) 10/7/2024: 26 / 30 (see scanned report)  Language: normal fluency and comprehension. Intermittent word finding problems during interview.   Cranial Nerves:   EOMI, no nystagums. Face symmetric. No dysarthria. Tongue midline.   Motor: Normal tone. 5/5 throughout. No tremor.   Sensation: normal LT and proprioception distally in the lowers. Mild decrease in vibration sensation in RLE, intact LLE.   DTRs: Normal and symmetric (biceps, patella, achilles).  Gait: Normal casual gait. Quick turn. Mild difficulty with tandem, steps out of line after 2 steps.     I have spent a total time of 40 minutes on 10/07/24 in caring for this patient including Diagnostic results, Patient and family education, Impressions, Counseling / Coordination of care, Documenting in the medical record, Reviewing / ordering tests, medicine, procedures  , and Obtaining or reviewing history  .    "

## 2024-10-07 NOTE — PATIENT INSTRUCTIONS
Schedule PET scan.   Have lab work done.   Schedule visit with [facility] memory disorder center.   Schedule Speech therapy.   Return in about 4 months.

## 2024-10-18 ENCOUNTER — OFFICE VISIT (OUTPATIENT)
Dept: PHYSICAL THERAPY | Facility: REHABILITATION | Age: 69
End: 2024-10-18
Payer: COMMERCIAL

## 2024-10-18 DIAGNOSIS — R26.89 BALANCE DISORDER: Primary | ICD-10-CM

## 2024-10-18 DIAGNOSIS — M17.11 PRIMARY OSTEOARTHRITIS OF RIGHT KNEE: ICD-10-CM

## 2024-10-18 PROCEDURE — 97530 THERAPEUTIC ACTIVITIES: CPT

## 2024-10-18 PROCEDURE — 97112 NEUROMUSCULAR REEDUCATION: CPT

## 2024-10-18 PROCEDURE — 97110 THERAPEUTIC EXERCISES: CPT

## 2024-10-18 NOTE — PROGRESS NOTES
Daily Note     Today's date: 10/18/2024  Patient name: Oriana Beverly  : 1955  MRN: [id number]  Referring provider: Etta Staples MD  Dx:   Encounter Diagnosis     ICD-10-CM    1. Balance disorder  R26.89       2. Primary osteoarthritis of right knee  M17.11           Start Time: 1415  Stop Time: 1500  Total time in clinic (min): 45 minutes    Subjective: Pt notes that her back has been feeling better as she no longer has as much pain with ADLs and during work. She mentioned that she continues to have limitations with balance at times as she runs into things sometimes. She mentioned that she has been unable to return to doing her exercises.       Objective: See treatment diary below      Assessment: Tolerated treatment well. Patient would benefit from continued PT. Pt was re-introduced to further dynamic balance exercises and responded well. She continues to be most challenged with tandem balance and marching on unstable surfaces. She noted slight increase in soreness at the lower back following tball press with hip abd so volume was decreased. She continues to respond well to supervision during dynamic balance exercises due to increased fall risk. Continue to progress as tolerated, 1:1 with Bernardo Gill DPT entirety of tx.      Plan: Continue per plan of care.      Precautions: PMH includes arthralgia, chest pain, balance disorder, depression, fatigue, fibromyalgia, leukopenia, restless leg syndrome      POC expires Unit limit Auth Expiration date PT/OT + Visit Limit?   24 BOMN 24 BOMN         Visit/Unit Tracking  AUTH Status:  Date 7/18 7/25 7/31 8/7 8/14 8/23 9/20 9/26 10/4 10/18   Approved Used 1 2 3 4 5 6 7 8 9 10    Remaining                 Pertinent Findings:      POC End Date: 24                                                                                          Test / Measure  2024   FOTO (Predicted 56)    Modified DGI    Tandem Balance       Visit Number:  5 6 7  "8 9 10    Manuals 8/14 8/23 9/20 9/26 10/4 10/18                        Neuro Re-Ed          SLS 3x, 15s hold 3x, 15s hold 3x, 15s hold 3x, 15s hold 3x, 15s hold 3x, 15s hold    Tandem stance On ground, 2 laps On ground, 2 laps UE together On ground, 2 laps UE together   Airex, 5 laps    Tandem gait - fwd/bwd w/ Dual cog NV         Standing Y-Balance 10x ea side 10x ea side NV   NV    Standing lunges to cones 2x5 ea, 3 cones (start in tandem stance) 2x5 ea, 3 cones (start in tandem stance) 2x5 ea, 3 cones (start in tandem stance)   NV    Uni RDL          Toe/Heel walking Toe+ heel walking 3 laps windows Toe+ heel walking 3 laps windows Toe+ heel walking 3 laps windows       Standing Tball Press    2x10 2x10, March + abd 2x10, March + abd    Supine Tball Press    10x, 5s hold 10x, 5s hold 10x, 5s hold    Diag chops          Diag lifts          Palloff Press + March     2x10 MTB 2x10 MTB              Ther Ex          HEP Review + Pt Edu          NS / TM TM, 5' TM, 5' TM, 5' NS 6' TM, 5' TM, 5'    SL Hip Abd 2x8, 3# AW 2x8, 3# AW 2x10 no wt       Single Leg Bridge 2x6 2x6 2x6 BL, 2x10 BL, 2x10 BL, 2x6    STS 15#, 3x8 15#, 3x8 3x10 no wt 3x10 no wt 3x10 no wt 3x10 no wt    Leg Press 100#, 3x8 100#, 3x8 100#, 3x8  85#, 3x8 85#, 3x10    Standing Marching    2x10 ea  On airex, 10x ea                        Ther Activity          Runner's step ups 9\" step eccentric 2x8 ea 9\" step eccentric 2x8 ea 9\" step eccentric 2x8 ea   6\" step, 8x ea side    LSU          Suitcase Carry Marching          Obstacle course      NV              Gait Training                              Modalities                                                         "

## 2024-10-25 ENCOUNTER — OFFICE VISIT (OUTPATIENT)
Dept: PHYSICAL THERAPY | Facility: REHABILITATION | Age: 69
End: 2024-10-25
Payer: COMMERCIAL

## 2024-10-25 DIAGNOSIS — R26.89 BALANCE DISORDER: Primary | ICD-10-CM

## 2024-10-25 DIAGNOSIS — M17.11 PRIMARY OSTEOARTHRITIS OF RIGHT KNEE: ICD-10-CM

## 2024-10-25 PROCEDURE — 97110 THERAPEUTIC EXERCISES: CPT

## 2024-10-25 PROCEDURE — 97112 NEUROMUSCULAR REEDUCATION: CPT

## 2024-10-25 NOTE — PROGRESS NOTES
Daily Note     Today's date: 10/25/2024  Patient name: Oriana Beverly  : 1955  MRN: [id number]  Referring provider: Etta Staples MD  Dx:   Encounter Diagnosis     ICD-10-CM    1. Balance disorder  R26.89       2. Primary osteoarthritis of right knee  M17.11           Start Time: 1526  Stop Time: 1605  Total time in clinic (min): 39 minutes    Subjective: Pt notes that her balance has been doing okay and her back has been feeling better. She notes she is tired today.       Objective: See treatment diary below      Assessment: Tolerated treatment well. Patient demonstrated fatigue post treatment and would benefit from continued PT. Pt continues to be challenged by current exercise plan so no new exercises were added. She continues to show poor dynamic balance with standing exercises on unstable surfaces. Continue to progress as tolerated, 1:1 with Bernardo Gill DPT entirety of tx.      Plan: Continue per plan of care.      Precautions: PMH includes arthralgia, chest pain, balance disorder, depression, fatigue, fibromyalgia, leukopenia, restless leg syndrome      POC expires Unit limit Auth Expiration date PT/OT + Visit Limit?   24 BOMN 24 BOMN         Visit/Unit Tracking  AUTH Status:  Date 8/14 8/23 9/20 9/26 10/4 10/18 10/25   Approved Used 5 6 7 8 9 10 11    Remaining              Pertinent Findings:      POC End Date: 24                                                                                          Test / Measure  2024   FOTO (Predicted 56)    Modified DGI    Tandem Balance       Visit Number:  5 6 7 8 9 10 11   Manuals 8/14 8/23 9/20 9/26 10/4 10/18 10/25                       Neuro Re-Ed          SLS 3x, 15s hold 3x, 15s hold 3x, 15s hold 3x, 15s hold 3x, 15s hold 3x, 15s hold 3x, 15s hold   Tandem stance On ground, 2 laps On ground, 2 laps UE together On ground, 2 laps UE together   Airex, 5 laps Airex, 5 laps   Tandem gait - fwd/bwd w/ Dual cog NV        "  Standing Y-Balance 10x ea side 10x ea side NV   NV    Standing lunges to cones 2x5 ea, 3 cones (start in tandem stance) 2x5 ea, 3 cones (start in tandem stance) 2x5 ea, 3 cones (start in tandem stance)    NV   Uni RDL          Toe/Heel walking Toe+ heel walking 3 laps windows Toe+ heel walking 3 laps windows Toe+ heel walking 3 laps windows       Standing Tball Press    2x10 2x10, March + abd 2x10, March + abd 2x10, March + abd   Supine Tball Press    10x, 5s hold 10x, 5s hold 10x, 5s hold    Diag chops          Diag lifts          Palloff Press + March     2x10 MTB 2x10 MTB              Ther Ex          HEP Review + Pt Edu          NS / TM TM, 5' TM, 5' TM, 5' NS 6' TM, 5' TM, 5' TM, 5'   SL Hip Abd 2x8, 3# AW 2x8, 3# AW 2x10 no wt       Single Leg Bridge 2x6 2x6 2x6 BL, 2x10 BL, 2x10 BL, 2x6 BL, 2x10   STS 15#, 3x8 15#, 3x8 3x10 no wt 3x10 no wt 3x10 no wt 3x10 no wt 3x10 no wt   Leg Press 100#, 3x8 100#, 3x8 100#, 3x8  85#, 3x8 85#, 3x10 85#, 3x10   Standing Marching    2x10 ea  On airex, 10x ea On airex, 10x ea                       Ther Activity          Runner's step ups 9\" step eccentric 2x8 ea 9\" step eccentric 2x8 ea 9\" step eccentric 2x8 ea   6\" step, 8x ea side NV   LSU          Suitcase Carry Marching          Obstacle course      NV              Gait Training                              Modalities                                                           "

## 2024-11-01 ENCOUNTER — HOSPITAL ENCOUNTER (OUTPATIENT)
Dept: RADIOLOGY | Age: 69
Discharge: HOME | End: 2024-11-01
Payer: COMMERCIAL

## 2024-11-01 ENCOUNTER — OFFICE VISIT (OUTPATIENT)
Dept: PHYSICAL THERAPY | Facility: REHABILITATION | Age: 69
End: 2024-11-01
Payer: COMMERCIAL

## 2024-11-01 DIAGNOSIS — M17.11 PRIMARY OSTEOARTHRITIS OF RIGHT KNEE: ICD-10-CM

## 2024-11-01 DIAGNOSIS — R41.3 MEMORY DIFFICULTY: ICD-10-CM

## 2024-11-01 DIAGNOSIS — R13.10 SWALLOWING DIFFICULTY: ICD-10-CM

## 2024-11-01 DIAGNOSIS — R47.89 WORD FINDING DIFFICULTY: ICD-10-CM

## 2024-11-01 DIAGNOSIS — R26.89 BALANCE DISORDER: Primary | ICD-10-CM

## 2024-11-01 LAB — GLUCOSE SERPL-MCNC: 79 MG/DL (ref 65–140)

## 2024-11-01 PROCEDURE — 97110 THERAPEUTIC EXERCISES: CPT

## 2024-11-01 PROCEDURE — 78608 BRAIN IMAGING (PET): CPT

## 2024-11-01 PROCEDURE — 82948 REAGENT STRIP/BLOOD GLUCOSE: CPT

## 2024-11-01 PROCEDURE — A9552 F18 FDG: HCPCS

## 2024-11-01 PROCEDURE — 97112 NEUROMUSCULAR REEDUCATION: CPT

## 2024-11-01 NOTE — PROGRESS NOTES
Daily Note     Today's date: 2024  Patient name: Oriana Beverly  : 1955  MRN: [id number]  Referring provider: Etta Staples MD  Dx:   Encounter Diagnosis     ICD-10-CM    1. Balance disorder  R26.89       2. Primary osteoarthritis of right knee  M17.11           Start Time: 1515  Stop Time: 1605  Total time in clinic (min): 50 minutes    Subjective: Pt notes that she is tired today but is overall feeling okay. She mentioned that she continues to have back pain when she has been working all day. She reports no significant changes with her balance recently.       Objective: See treatment diary below      Assessment: Tolerated treatment well. Patient would benefit from continued PT. Pt was introduced to further dynamic balance exercises with ball toss and responded well but was challenged as she continues to show limitations with balance. She continues to respond well to supervision during dynamic balance exercises as she continues to display LoB at times especially when fatigued. Continue to progress as tolerated, 1:1 with Bernardo Gill DPT entirety of tx.      Plan: Continue per plan of care.      Precautions: PMH includes arthralgia, chest pain, balance disorder, depression, fatigue, fibromyalgia, leukopenia, restless leg syndrome      POC expires Unit limit Auth Expiration date PT/OT + Visit Limit?   24 BOMN 24 BOMN         Visit/Unit Tracking  AUTH Status:  Date 8/14 8/23 9/20 9/26 10/4 10/18 10/25 11/1   Approved Used 5 6 7 8 9 10 11 12    Remaining               Pertinent Findings:      POC End Date: 24                                                                                          Test / Measure  2024   FOTO (Predicted 56)    Modified DGI    Tandem Balance       Visit Number:  5 6 7 8 9 10 11 12   Manuals 8/14 8/23 9/20 9/26 10/4 10/18 10/25 11/1                         Neuro Re-Ed           SLS 3x, 15s hold 3x, 15s hold 3x, 15s hold 3x, 15s hold 3x, 15s hold  "3x, 15s hold 3x, 15s hold 3x, 15s hold   Tandem stance On ground, 2 laps On ground, 2 laps UE together On ground, 2 laps UE together   Airex, 5 laps Airex, 5 laps Airex, 5 laps   Tandem gait - fwd/bwd w/ Dual cog NV       W/ ball toss, 3 laps   Standing Y-Balance 10x ea side 10x ea side NV   NV     Standing lunges to cones 2x5 ea, 3 cones (start in tandem stance) 2x5 ea, 3 cones (start in tandem stance) 2x5 ea, 3 cones (start in tandem stance)    NV 2x6 reps ea   Uni RDL           Toe/Heel walking Toe+ heel walking 3 laps windows Toe+ heel walking 3 laps windows Toe+ heel walking 3 laps windows        Standing Tball Press    2x10 2x10, March + abd 2x10, March + abd 2x10, March + abd    Supine Tball Press    10x, 5s hold 10x, 5s hold 10x, 5s hold     Diag chops           Diag lifts           Palloff Press + March     2x10 MTB 2x10 MTB     Ball Toss Reaction Timing        2x10              Ther Ex           HEP Review + Pt Edu           NS / TM TM, 5' TM, 5' TM, 5' NS 6' TM, 5' TM, 5' TM, 5' TM, 5'   SL Hip Abd 2x8, 3# AW 2x8, 3# AW 2x10 no wt        Single Leg Bridge 2x6 2x6 2x6 BL, 2x10 BL, 2x10 BL, 2x6 BL, 2x10 BL, 2x10   STS 15#, 3x8 15#, 3x8 3x10 no wt 3x10 no wt 3x10 no wt 3x10 no wt 3x10 no wt 3x10 no wt   Leg Press 100#, 3x8 100#, 3x8 100#, 3x8  85#, 3x8 85#, 3x10 85#, 3x10    Standing Marching    2x10 ea  On airex, 10x ea On airex, 10x ea On airex, 10x ea                         Ther Activity           Runner's step ups 9\" step eccentric 2x8 ea 9\" step eccentric 2x8 ea 9\" step eccentric 2x8 ea   6\" step, 8x ea side NV    LSU           Suitcase Carry Marching           Obstacle course      NV                Gait Training                                 Modalities                                                                "

## 2024-11-06 ENCOUNTER — TELEPHONE (OUTPATIENT)
Age: 69
End: 2024-11-06

## 2024-11-08 ENCOUNTER — OFFICE VISIT (OUTPATIENT)
Dept: PHYSICAL THERAPY | Facility: REHABILITATION | Age: 69
End: 2024-11-08
Payer: COMMERCIAL

## 2024-11-08 DIAGNOSIS — M17.11 PRIMARY OSTEOARTHRITIS OF RIGHT KNEE: ICD-10-CM

## 2024-11-08 DIAGNOSIS — R26.89 BALANCE DISORDER: Primary | ICD-10-CM

## 2024-11-08 PROCEDURE — 97112 NEUROMUSCULAR REEDUCATION: CPT

## 2024-11-08 PROCEDURE — 97110 THERAPEUTIC EXERCISES: CPT

## 2024-11-08 NOTE — PROGRESS NOTES
Daily Note     Today's date: 2024  Patient name: Oriana Beverly  : 1955  MRN: [id number]  Referring provider: Etta Staples MD  Dx:   Encounter Diagnosis     ICD-10-CM    1. Balance disorder  R26.89       2. Primary osteoarthritis of right knee  M17.11           Start Time: 1515  Stop Time: 1605  Total time in clinic (min): 50 minutes    Subjective: Pt notes that she has been feeling okay and reports no significant changes since last visit. She reports that she is feeling tired today.       Objective: See treatment diary below      Assessment: Tolerated treatment well. Patient would benefit from continued PT. Pt continues to be challenged by dynamic balance exercises as she demonstrates LOB at times when fatigued. She shows improving tolerance to STS as functional LE strength is improving. Assess further dynamic balance exercises next visit for potential discharge. 1:1 with Bernardo Gill DPT entirety of tx.      Plan: Continue per plan of care.      Precautions: PMH includes arthralgia, chest pain, balance disorder, depression, fatigue, fibromyalgia, leukopenia, restless leg syndrome      POC expires Unit limit Auth Expiration date PT/OT + Visit Limit?   24 BOMN 24 BOMN         Visit/Unit Tracking  AUTH Status:  Date 8/14 8/23 9/20 9/26 10/4 10/18 10/25 11/1 11/8   Approved Used 5 6 7 8 9 10 11 12 13    Remaining                Pertinent Findings:      POC End Date: 24                                                                                          Test / Measure  2024   FOTO (Predicted 56)    Modified DGI    Tandem Balance       Visit Number:  7 8 9 10 11 12 13    Manuals 9/20 9/26 10/4 10/18 10/25 11/1 11/8                          Neuro Re-Ed           SLS 3x, 15s hold 3x, 15s hold 3x, 15s hold 3x, 15s hold 3x, 15s hold 3x, 15s hold 3x, 15s hold    Tandem stance On ground, 2 laps UE together   Airex, 5 laps Airex, 5 laps Airex, 5 laps Airex, 5 laps    Tandem gait  "- fwd/bwd w/ Dual cog      W/ ball toss, 3 laps W/ ball toss, 3 laps    Standing Y-Balance NV   NV       Standing lunges to cones 2x5 ea, 3 cones (start in tandem stance)    NV 2x6 reps ea 2x6 reps ea    Uni RDL           Toe/Heel walking Toe+ heel walking 3 laps windows          Standing Tball Press  2x10 2x10, March + abd 2x10, March + abd 2x10, March + abd      Supine Tball Press  10x, 5s hold 10x, 5s hold 10x, 5s hold       Diag chops           Diag lifts           Palloff Press + March   2x10 MTB 2x10 MTB       Ball Toss Reaction Timing      2x10 2x10               Ther Ex           HEP Review + Pt Edu           NS / TM TM, 5' NS 6' TM, 5' TM, 5' TM, 5' TM, 5' TM, 5'    SL Hip Abd 2x10 no wt          Single Leg Bridge 2x6 BL, 2x10 BL, 2x10 BL, 2x6 BL, 2x10 BL, 2x10 BL, 2x10    STS 3x10 no wt 3x10 no wt 3x10 no wt 3x10 no wt 3x10 no wt 3x10 no wt 3x10 no wt    Leg Press 100#, 3x8  85#, 3x8 85#, 3x10 85#, 3x10      Standing Marching  2x10 ea  On airex, 10x ea On airex, 10x ea On airex, 10x ea On airex, 10x ea                          Ther Activity           Runner's step ups 9\" step eccentric 2x8 ea   6\" step, 8x ea side NV      LSU           Suitcase Carry Marching           Obstacle course    NV                  Gait Training                                 Modalities                                                                  "

## 2024-11-11 NOTE — PROGRESS NOTES
Speech-Language Pathology Initial Evaluation    Today's date: 2024   Patient’s name: Oriana Beverly  : 1955  MRN: [id number]  Safety measures: Anxiety/Depression  Referring provider: Ad Galvan, *    Encounter Diagnosis     ICD-10-CM    1. Aphasia  R47.01       2. Word finding difficulty  R47.89 Ambulatory Referral to Speech Therapy      3. Memory difficulty  R41.3 Ambulatory Referral to Speech Therapy      4. MCI (mild cognitive impairment)  G31.84           Assessment:  Patient presents with mild cognitive-linguistic deficits characterized by decreased word finding and confrontational naming skills secondary to MCI.  It should be noted on Patient's most recent PET, there were areas of diminished FDG activity in the bilateral frontal, temporal, and anterior parietal lobes with L being worse than R hemisphere.  Past neuropsych testing had thought Patient's symptoms could be indicative of PPA.  There has not been a formal diagnosis of this at this time, but should not be ruled out.  During testing today, Patient was noted to achieve a standard score of 83 which corresponds to a slightly below average score compared to age matched peers.  Due to time constraints, Patient was only administered confrontational naming test.  It is recommended that Patient be further assessed in coming sessions to better understand her symptoms.      At this time, it is recommended that Patient be seen 1-2X a week to target the goals mentioned below to focus on word finding strategies and vocabulary building.  Prognosis is good based on Patient motivation.       Short Term Goals  1. Patient will utilize word finding strategies during semantic feature analysis treatment activity for improved naming and verbal expression skills.    2. Patient will name an average of 15+ items in a category in 60 seconds over 5 trials using compensatory strategies and min cues to facilitate improved word retrieval skills, to be  achieved in 4-6 weeks.    3. Patient will name an average of 10+ words that begin with a specific letter in 60 seconds over 5 trials using compensatory strategies and min cues to facilitate improved word retrieval skills, to be achieved in 4-6 weeks.    4. Patient will name an appropriate synonym/antonym for a given word with 80% accuracy to build expressive vocabulary for conversation, to be achieved in 4-6 weeks.    5. Patient will complete word generation tasks (e.g., analogies, category matrices, etc.) with 80% accuracy using word finding strategies to facilitate improved word retrieval skills, to be achieved in 4-6 weeks.      Long Term Goals  1. Patient will demonstrate cognitive-communication skills consistent with age and education given use of compensatory strategies when needed to resume baseline activities and responsibilities in home, community, and work/school settings by discharge.     2. Patient will complete higher-level expressive language tasks (e.g., word definitions, idioms, synonym/antonyms, etc) with 80% accuracy to improve functional communication skills by discharge.     3. Patient will demonstrate adequate verbal expression during conversation without breakdowns or word finding deficits by discharge.         Plan:  Patient would benefit from outpatient skilled Speech Therapy services: Speech-language therapy    Frequency: 1-2x weekly  Duration:  8-10 Weeks    Intervention certification from: 11/18/2024  Intervention certification to: 01/27/2025      Subjective:  History of present illness: Patient is a 69 y.o. female who was referred to outpatient skilled Speech Therapy services for a cognitive-linguistic evaluation. Patient with a significant PMH of Intracranial vascular stenosis, SHAWN, GERD, trigeminal neuralgia, arthritis, depression, anxiety, fibromyalgia, restless leg syndrome, balance disorder, word finding difficulty, MCI, vitamin D deficiency, HLD, two possible concussions, one  "episode of alteration of consciousness (see chart for full list). Patient is known to this outpatient skilled Speech Therapy department. Patient received evaluations in March 2023. No significant declines were noted; therefore, therapy was not warranted at that time  Patient was last seen by Neurology on 10/07/2024. Per chart review,   \"...Ms. Beverly is a 69 y.o. female   that I initially saw for and event involving alteration of consciousness with visual disturbance and confusion in 9/2019 of unclear etiology. No additional events. REEG and SD EEG were normal in 2019. She has moderate left PCA stenosis. Continue asa and statin. Follows closely with PCP. Discussed stroke risk factors today.      Word finding details noted subjectively over the last few years. She also is noting memory problems. Cognitive symptoms are now impacting work. 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 Neurological exam is otherwise normal. She reports some unsteadiness (tends to drift back), but no problems with balance/gait on exam other than mild difficulty with tandem. PET scan unrevealing.  Last brain MRI completed October 2022 also unrevealing for cause (personally reviewed on 4/8/2023).  Her cognitive baseline is high, has a PhD in English.  MOCA 26/30 10/4/2023 (one-point off for clock drawing, /one-point for serial sevens, 2 points for delayed recall). MOCA repeated 10/7/2024 also 26/30. Referring again to [facility] memory specialist - she is not interested in scheduling this. Also will repeat PET scan to evaluate for evidence of PPA. Referring to .      She notes continued balance problems. No falls. Tandem gait may be worse than last year. Sensory exam is normal. Good strength and coordination. Normal tone. No tremor. She has chronic leg pain that has worsened and has been considered manifestation of fibromyalgia. " "Checking B12/folate and SPEP.  In the past there were apparent swallowing problems, but she denies problems currently.      Patient Instructions  1. Schedule PET scan.   2. Have lab work done.   3. Schedule visit with [facility] memory disorder center.   4. Schedule Speech therapy.   5. Return in about 4 months...\"    MRI brain NeuroQuant wo contrast (10/04/2022):  \"...IMPRESSION:  1.  No acute infarction, intracranial hemorrhage or mass effect.  2.  Mild, chronic microangiopathy.  3.  NeuroQuant analysis was performed: Normal hippocampal volume and enlarged ventricular system: Findings do not support hippocampal degeneration. Possible expansion of ventricular system without medial temporal lobe focused ex-vacuo process...\"    Neuropsychological testing (02/10/2023):  \"Objective neuropsychological assessment revealed primary difficulties in confrontation naming, inductive reasoning, sustained speed and initial acquisition of unstructured verbal information and a large amount of visual information, with intact consolidation, retention and retrieval.  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  Although her above average intelligence and doctorate in English may be helping to mask the effects of a PPA process, further testing may be warranted to best determine etiology of her difficulties.\"    NM pet brain metabolic evaluation (11/01/2024):  \"...IMPRESSION:  1. Mildly diminished FDG activity in the bilateral frontal, temporal and anterior parietal lobes, left slightly worse than right, " "appearing similar to the prior PET/CT...\"      Today, patient reported difficulties with verbal expression (e.g., naming common words or familiar authors' names, sometimes able to recall only the first letter), problems thinking clearly, and concentration changes. Patient noted that word finding difficulties have been present over the past few years and have been gradually worsening. 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 More recent changes with thinking clearly and concentration. Patient noted that she occasionally forgets about her appointments, rarely forgets to take her medications, and denied issues with paying bills on time. Patient reported forgetting foods on the stove a few times. No reported difficulties with ADLs. 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 It is standardized on English-speaking individuals ages 2-80+ years residing in the [place]. Standard scores between 85 and 115 fall within 1 standard deviation of the norm and, therefore, will be considered \"average.\" The following results " were obtained during the administration of the assessment.    Raw Score: Percentile Rank: Standard Score: Status:   140 13%ile 83 Below Average         Treatment:  -No treatment was performed on this date of service.        Visit Tracking:  POC   Expires Auth Expiration Date ST Visit Limit   01/27/2025 12/31/2024 BOMN          Visit/Unit Tracking:  Auth Status Date 11/18/2024   BOMN Used 1    Remaining BOMN       Intervention Comments:  40 minutes test administration

## 2024-11-12 NOTE — TELEPHONE ENCOUNTER
The PET scan is similar to the prior. The findings are nonspecific and do not support a specific diagnosis. It is reassuring that there has not been significant change from the prior study to suggest a quickly progressing neurodegenerative process.

## 2024-11-18 ENCOUNTER — EVALUATION (OUTPATIENT)
Dept: SPEECH THERAPY | Facility: CLINIC | Age: 69
End: 2024-11-18
Payer: COMMERCIAL

## 2024-11-18 DIAGNOSIS — R47.01 APHASIA: Primary | ICD-10-CM

## 2024-11-18 DIAGNOSIS — R41.3 MEMORY DIFFICULTY: ICD-10-CM

## 2024-11-18 DIAGNOSIS — R47.89 WORD FINDING DIFFICULTY: ICD-10-CM

## 2024-11-18 DIAGNOSIS — G31.84 MCI (MILD COGNITIVE IMPAIRMENT): ICD-10-CM

## 2024-11-18 PROCEDURE — 92523 SPEECH SOUND LANG COMPREHEN: CPT

## 2024-11-19 DIAGNOSIS — G45.9 TIA (TRANSIENT ISCHEMIC ATTACK): ICD-10-CM

## 2024-11-22 ENCOUNTER — OFFICE VISIT (OUTPATIENT)
Dept: PHYSICAL THERAPY | Facility: REHABILITATION | Age: 69
End: 2024-11-22
Payer: COMMERCIAL

## 2024-11-22 DIAGNOSIS — R26.89 BALANCE DISORDER: Primary | ICD-10-CM

## 2024-11-22 DIAGNOSIS — M17.11 PRIMARY OSTEOARTHRITIS OF RIGHT KNEE: ICD-10-CM

## 2024-11-22 PROCEDURE — 97164 PT RE-EVAL EST PLAN CARE: CPT

## 2024-11-22 PROCEDURE — 97110 THERAPEUTIC EXERCISES: CPT

## 2024-11-22 PROCEDURE — 97112 NEUROMUSCULAR REEDUCATION: CPT

## 2024-11-22 NOTE — PROGRESS NOTES
PT Discharge Summary    Today's date: 2024  Patient name: Oriana Beverly  : 1955  MRN: [id number]  Referring provider: Etta Staples MD  Dx:   Encounter Diagnosis     ICD-10-CM    1. Balance disorder  R26.89       2. Primary osteoarthritis of right knee  M17.11           Start Time: 1520  Stop Time: 1600  Total time in clinic (min): 40 minutes    Assessment    Assessment details: Oriana Beverly is a 68 y.o. female attending  physical therapy for balance disorder and primary OA of R knee.  Patient has been responding relatively well to physical therapy, but has met a plateau.  She shows improvements with dynamic balance but continues to note decreased balance at times throughout the day.  Her modified DGI score has improved since evaluation, which displays improving dynamic balance with walking.  She was given an updated HEP, and displayed good technique and verbal understanding of frequency.  Discharge from physical therapy as of 2024.      Prognosis: good    Goals  Short Term Goals:  1. Improve BLE strength by half grade or more to improve functional LE strength. (Met)  2. Improve modified DGI by 1 point or more to decrease risk for falls. (Met)  3. Improve tandem balance w/ head turns by 5 seconds or more to improve QoL. (Met)  4. Supervision with HEP for self-care. (Met)    Long Term Goals:  1. Improve FGA score to at least 24/30 to decrease fall risk. (Met)  2. FOTO to greater than predicted value. (Met)  3. Independent with HEP for self-care. (Met)  4. Improve B SLS to at least 25 seconds. (Met)    Plan  Discharged from physical therapy as of 2024.         Subjective  2024 - PT-Discharge: Pt believes physical therapy has been helping with her pain and functional limitations but feels she has met a plateau with PT as she continues to have limitations with balance at home and in the community.  She feels she is 50% improved, however, she continues to have problems with balance  at times and pain in her right knee as she believes she needs a right knee replacement. In terms of pain, her current pain is a 2/10 and the worst it has been in the last week was a 4/10.       7/18/24 - HPI: Pt referred to physical therapy for balance disorder and primary OA of R knee. Pt mentioned that she continues to have difficulty with her balance and pain in her knees. She mentioned that she fell a few weeks ago on the steps as she was going down with something in each hand. She reports that she continues to have the sensation of falling backwards and to the side at times with no specific onset. She mentioned that her knee pain can depend on the angle of her knee.   Pain Location: R Knee  Pain Intensity: Current: 1/10, Worst: 4/10, Best: 0/10  AMADA: Insidious  DOI: Chronic  Aggravating Factors: Walking for long periods of time, steps, standing  Alleviating Factors: Rest  Goals: To improve her balance and have less pain in the knees.  PLOF: Walking about 0.5 hours 3-4x/week    Objective            Standing Posture & Lower Extremity Alignment:  Structure/Joint               Pelvis (Tilt)   Anterior X Neutral   Posterior   Iliac Crests   Right Elevated X Neutral   Left Elevated   Knee - Frontal    Genuvalgum X Neutral   Genuvarum   Knee - Sagittal   Genurecurvatum X Neutral       Rearfoot   Valgus X Neutral   Varus   Forefoot   Abducted X Neutral       Arch   Pes Planus X Neutral   Pes Cavus      Range of Motion: Goniometric revealed the following findings (in degrees).  Joint Motion Right: 11/22/2024 Left: 11/22/2024   Hip Extension WFL WFL   Hip External Rotation 50 50   Hip Internal Rotation 20 20   Knee Extension 0 0   Knee Flexion 130 120   Ankle Dorsiflexion WFL WFL   Ankle Plantarflexion WFL WFL      Strength: MMT revealed the following findings.  Joint Motion Right: 11/22/2024 Left: 11/22/2024   Hip Flexion 4+/5 4+/5   Hip Abduction 4+/5 4+/5   Hip Adduction 4+/5 4+/5   Hip Extension 4/5 4/5   Knee  Extension 4/5 4+/5   Knee Flexion 4/5 4+/5   Ankle Plantarflexion 4/5 4/5   Ankle Dorsiflexion 5/5 5/5        Visual:  Saccades:  Horizontal - Vertical - WFL  Smooth Pursuits: WFL     Balance:  Modified CTSIB:    Firm EO/EC: EO=30s / EC= 25s  Foam EO/EC: EO = 30s / EC = 15s  SLS: 20s ea leg on airex  Tandem Balance: 5s BL  Tandem Balance w/ head turns: minimal LoB       DGI/FGA:  4 Item Dynamic Gait Index  3/3 Gait level surface  3/3 Change in gait speed  2/3 Gait with horizontal head turns  3/3 Gait with vertical head turns  11/12 total score (<10/12 indicates increased risk of fall)    Access Code: [email or web address]  URL: [email or web address]  Date: 11/22/2024  Prepared by: Bernardo Gill    Exercises  - Supine Bridge  - 1 x daily - 7 x weekly - 3 sets - 10 reps  - Single Leg Bridge  - 1 x daily - 7 x weekly - 3 sets - 6 reps  - Supine Active Straight Leg Raise  - 1 x daily - 7 x weekly - 3 sets - 10 reps  - Seated Knee Extension with Resistance  - 1 x daily - 7 x weekly - 3 sets - 10 reps - 2s hold  - Heel Walking with Counter Support  - 1 x daily - 7 x weekly - 3 sets - 10 reps  - Carioca with Counter Support  - 1 x daily - 7 x weekly - 3 sets - 10 reps  - Sit to Stand Without Arm Support  - 1 x daily - 7 x weekly - 3 sets - 10 reps  - Single Leg Stance with Support  - 1 x daily - 7 x weekly - 1 sets - 5 reps - 10-15s hold hold         Precautions: PMH includes arthralgia, chest pain, balance disorder, depression, fatigue, fibromyalgia, leukopenia, restless leg syndrome      POC expires Unit limit Auth Expiration date PT/OT + Visit Limit?   9/26/24 BOMN 12/31/24 BOMN         Visit/Unit Tracking  AUTH Status:  Date 8/14 8/23 9/20 9/26 10/4 10/18 10/25 11/1 11/8 11/22   Approved Used 5 6 7 8 9 10 11 12 13 14    Remaining                 Pertinent Findings:      POC End Date: 9/26/24                                                                                          Test / Measure  7/18/2024   FOTO  "(Predicted 56)    Modified DGI    Tandem Balance       Visit Number:  7 8 9 10 11 12 13 14 - RE   Manuals 9/20 9/26 10/4 10/18 10/25 11/1 11/8 11/22                         Neuro Re-Ed           SLS 3x, 15s hold 3x, 15s hold 3x, 15s hold 3x, 15s hold 3x, 15s hold 3x, 15s hold 3x, 15s hold 3x, 15s hold   Tandem stance On ground, 2 laps UE together   Airex, 5 laps Airex, 5 laps Airex, 5 laps Airex, 5 laps Airex, 5 laps   Tandem gait - fwd/bwd w/ Dual cog      W/ ball toss, 3 laps W/ ball toss, 3 laps W/ ball toss, 3 laps   Standing Y-Balance NV   NV       Standing lunges to cones 2x5 ea, 3 cones (start in tandem stance)    NV 2x6 reps ea 2x6 reps ea 2x6 reps ea   Uni RDL           Toe/Heel walking Toe+ heel walking 3 laps windows          Standing Tball Press  2x10 2x10, March + abd 2x10, March + abd 2x10, March + abd      Supine Tball Press  10x, 5s hold 10x, 5s hold 10x, 5s hold       Diag chops           Diag lifts           Palloff Press + March   2x10 MTB 2x10 MTB       Ball Toss Reaction Timing      2x10 2x10 2x10              Ther Ex           HEP Review + Pt Edu        RE + HEP Review   NS / TM TM, 5' NS 6' TM, 5' TM, 5' TM, 5' TM, 5' TM, 5' TM, 5'   SL Hip Abd 2x10 no wt          Single Leg Bridge 2x6 BL, 2x10 BL, 2x10 BL, 2x6 BL, 2x10 BL, 2x10 BL, 2x10 BL, 2x10   STS 3x10 no wt 3x10 no wt 3x10 no wt 3x10 no wt 3x10 no wt 3x10 no wt 3x10 no wt 3x10 no wt   Leg Press 100#, 3x8  85#, 3x8 85#, 3x10 85#, 3x10      Standing Marching  2x10 ea  On airex, 10x ea On airex, 10x ea On airex, 10x ea On airex, 10x ea                          Ther Activity           Runner's step ups 9\" step eccentric 2x8 ea   6\" step, 8x ea side NV      LSU           Suitcase Carry Marching           Obstacle course    NV                  Gait Training                                 Modalities                                                                    "

## 2024-11-26 RX ORDER — ATORVASTATIN CALCIUM 40 MG/1
TABLET, FILM COATED ORAL
Qty: 90 TABLET | Refills: 0 | OUTPATIENT
Start: 2024-11-26

## 2024-11-29 ENCOUNTER — OFFICE VISIT (OUTPATIENT)
Dept: SPEECH THERAPY | Facility: CLINIC | Age: 69
End: 2024-11-29
Payer: COMMERCIAL

## 2024-11-29 DIAGNOSIS — G31.84 MCI (MILD COGNITIVE IMPAIRMENT): ICD-10-CM

## 2024-11-29 DIAGNOSIS — R47.01 APHASIA: Primary | ICD-10-CM

## 2024-11-29 DIAGNOSIS — R47.89 WORD FINDING DIFFICULTY: ICD-10-CM

## 2024-11-29 DIAGNOSIS — R41.3 MEMORY DIFFICULTY: ICD-10-CM

## 2024-11-29 PROCEDURE — 92523 SPEECH SOUND LANG COMPREHEN: CPT

## 2024-11-29 PROCEDURE — 92507 TX SP LANG VOICE COMM INDIV: CPT

## 2024-11-29 NOTE — PROGRESS NOTES
Daily Speech Treatment Note    Today's date: 2024   Patient’s name: Oriana Beverly  : 1955  MRN: [id number]  Safety measures: Anxiety/Depression  Referring provider: Ad Galvan, *    Encounter Diagnosis     ICD-10-CM    1. Aphasia  R47.01       2. Word finding difficulty  R47.89       3. Memory difficulty  R41.3       4. MCI (mild cognitive impairment)  G31.84         Visit Tracking:  POC   Expires Auth Expiration Date ST Visit Limit   2025 BOMN          Visit/Unit Tracking:  Auth Status Date 2024   BOMN Used 1 2    Remaining BOMN BOMN       Subjective/Behavioral:  -Patient stated that she is doing okay.     Objective/Assessment:  -Reviewed testing results and goals in plan care with patient. Patient is in agreement at this time.    The Test of Adult Language - Fourth Edition (TOAL-4) is a standardized, norm-referenced assessment measuring important communicative abilities in spoken and written language. The test in normed on individuals 12:0-24:11. The TOAL-4 has 6 subtests; their results are reported as percentile ranks and scaled scores. The results of the TOAL-4 are generally used for three purposes; (a) to identify adolescents and adults who score significantly below their peers and therefore might need help improving their language proficiency, (b) to determine areas of relative strength and weakness among language abilities, and (c) to serve as a research tool in studies investigating language problems in adolescents and adults. Due to time constraints, only portions of the standardized assessment were administered on this date of service.    Subtest: Raw Score:   1. Word Opposites 28/34   3. Spoken Analogies    4. Word Similarities 34/40     **It should be noted that Patient was administered this same test approx. 2 years ago (2023) and obtained the following scores:    -Word Opposites: 33/34  -Spoken Analogies:   -Word Similarities:  "32/40     Short Term Goals  1. Patient will utilize word finding strategies during semantic feature analysis treatment activity for improved naming and verbal expression skills.    To target education and practice of the circumlocution strategy, patient was asked to provide verbal/semantic descriptions of different people/places/things using \"[employer]\" cards. Patient described words in 12/12 (100%) opportunities independently.  Patient guessed words in 11/12 (92%) opportunities independently, increasing to 12/12 (100%) opportunities given phonemic cues.    2. Patient will name an average of 15+ items in a category in 60 seconds over 5 trials using compensatory strategies and min cues to facilitate improved word retrieval skills, to be achieved in 4-6 weeks.    3. Patient will name an average of 10+ words that begin with a specific letter in 60 seconds over 5 trials using compensatory strategies and min cues to facilitate improved word retrieval skills, to be achieved in 4-6 weeks.    4. Patient will name an appropriate synonym/antonym for a given word with 80% accuracy to build expressive vocabulary for conversation, to be achieved in 4-6 weeks.    5. Patient will complete word generation tasks (e.g., analogies, category matrices, etc.) with 80% accuracy using word finding strategies to facilitate improved word retrieval skills, to be achieved in 4-6 weeks.      Plan:  -Patient was provided with home exercises/activities to target goals in plan of care at the end of today's session.  -Continue with current plan of care.  "

## 2024-12-06 ENCOUNTER — OFFICE VISIT (OUTPATIENT)
Dept: SPEECH THERAPY | Facility: CLINIC | Age: 69
End: 2024-12-06
Payer: COMMERCIAL

## 2024-12-06 DIAGNOSIS — M79.7 FIBROMYALGIA: ICD-10-CM

## 2024-12-06 DIAGNOSIS — G31.84 MCI (MILD COGNITIVE IMPAIRMENT): ICD-10-CM

## 2024-12-06 DIAGNOSIS — F41.8 DEPRESSION WITH ANXIETY: ICD-10-CM

## 2024-12-06 DIAGNOSIS — R41.3 MEMORY DIFFICULTY: ICD-10-CM

## 2024-12-06 DIAGNOSIS — R47.89 WORD FINDING DIFFICULTY: ICD-10-CM

## 2024-12-06 DIAGNOSIS — R47.01 APHASIA: Primary | ICD-10-CM

## 2024-12-06 PROCEDURE — 92507 TX SP LANG VOICE COMM INDIV: CPT

## 2024-12-06 RX ORDER — VENLAFAXINE HYDROCHLORIDE 75 MG/1
75 CAPSULE, EXTENDED RELEASE ORAL DAILY
Qty: 90 CAPSULE | Refills: 1 | OUTPATIENT
Start: 2024-12-06

## 2024-12-06 RX ORDER — VENLAFAXINE HYDROCHLORIDE 75 MG/1
CAPSULE, EXTENDED RELEASE ORAL
Qty: 90 CAPSULE | Refills: 1 | Status: SHIPPED | OUTPATIENT
Start: 2024-12-06

## 2024-12-06 NOTE — PROGRESS NOTES
"Daily Speech Treatment Note    Today's date: 2024   Patient’s name: Oriana Beverly  : 1955  MRN: [id number]  Safety measures: Anxiety/Depression  Referring provider: Ad Galvan, *    Encounter Diagnosis     ICD-10-CM    1. Aphasia  R47.01       2. Word finding difficulty  R47.89       3. Memory difficulty  R41.3       4. MCI (mild cognitive impairment)  G31.84         Visit Tracking:  POC   Expires Auth Expiration Date ST Visit Limit   2025 BOMN          Visit/Unit Tracking:  Auth Status Date 2024   BOMN Used 1 2 3    Remaining BOMN BOMN BOMN       Subjective/Behavioral:  -Patient stated that she had her last day of class yesterday and it now preparing for finals.     Objective/Assessment:  -Reviewed testing results and goals in plan care with patient. Patient is in agreement at this time.      Short Term Goals  1. Patient will utilize word finding strategies during semantic feature analysis treatment activity for improved naming and verbal expression skills.    2. Patient will name an average of 15+ items in a category in 60 seconds over 5 trials using compensatory strategies and min cues to facilitate improved word retrieval skills, to be achieved in 4-6 weeks.    3. Patient will name an average of 10+ words that begin with a specific letter in 60 seconds over 5 trials using compensatory strategies and min cues to facilitate improved word retrieval skills, to be achieved in 4-6 weeks.    4. Patient will name an appropriate synonym/antonym for a given word with 80% accuracy to build expressive vocabulary for conversation, to be achieved in 4-6 weeks.    5. Patient will complete word generation tasks (e.g., analogies, category matrices, etc.) with 80% accuracy using word finding strategies to facilitate improved word retrieval skills, to be achieved in 4-6 weeks.    To target word building and thought organization: Patient played \"Fishing for Words,\" " where they were instructed to roll 10 dice (each with different letters) and create words using the letters provided.  Once Patient created a word, Clinician then rolled the unused letters and built a word off of the existing.  This continued over 10 trials.  Patient created words in 8/10 (80%) opportunities independently, increasing to 10/10 (100%) opportunities given min verbal cues.  Patient scored a total of 100 points, beating the Clinician by 5!!!       Plan:  -Patient was provided with home exercises/activities to target goals in plan of care at the end of today's session.  -Continue with current plan of care.

## 2024-12-06 NOTE — TELEPHONE ENCOUNTER
Reason for call:   [x] Refill   [] Prior Auth  [] Other:     Office:   [x] PCP/Provider - [facility]  [] Specialty/Provider -     Medication: Effexor-XR    Dose/Frequency: 75 mg 24 hr capsule    Quantity: #90    Pharmacy: [facility] [id number] [facility]    Does the patient have enough for 3 days?   [] Yes   [x] No - Send as HP to POD

## 2024-12-09 DIAGNOSIS — G45.9 TIA (TRANSIENT ISCHEMIC ATTACK): ICD-10-CM

## 2024-12-09 RX ORDER — ATORVASTATIN CALCIUM 40 MG/1
40 TABLET, FILM COATED ORAL DAILY
Qty: 90 TABLET | Refills: 0 | Status: SHIPPED | OUTPATIENT
Start: 2024-12-09

## 2024-12-09 NOTE — TELEPHONE ENCOUNTER
Reason for call:   [x] Refill   [] Prior Auth  [] Other:     Office:   [x] PCP/Provider -   [] Specialty/Provider -     Medication: Atorvastatin     Dose/Frequency: 40 mg    Quantity: 90 tablet    Pharmacy: [id number] - [place], [place] - [address] [facility] [facility] [phone]     Does the patient have enough for 3 days?   [] Yes   [x] No - Send as HP to POD

## 2024-12-12 ENCOUNTER — RA CDI HCC (OUTPATIENT)
Dept: OTHER | Facility: HOSPITAL | Age: 69
End: 2024-12-12

## 2024-12-13 ENCOUNTER — OFFICE VISIT (OUTPATIENT)
Dept: SPEECH THERAPY | Facility: CLINIC | Age: 69
End: 2024-12-13
Payer: COMMERCIAL

## 2024-12-13 DIAGNOSIS — R41.3 MEMORY DIFFICULTY: ICD-10-CM

## 2024-12-13 DIAGNOSIS — R47.89 WORD FINDING DIFFICULTY: ICD-10-CM

## 2024-12-13 DIAGNOSIS — G31.84 MCI (MILD COGNITIVE IMPAIRMENT): ICD-10-CM

## 2024-12-13 DIAGNOSIS — R47.01 APHASIA: Primary | ICD-10-CM

## 2024-12-13 PROCEDURE — 92507 TX SP LANG VOICE COMM INDIV: CPT

## 2024-12-13 NOTE — PROGRESS NOTES
"Daily Speech Treatment Note    Today's date: 2024   Patient’s name: Oriana Beverly  : 1955  MRN: [id number]  Safety measures: Anxiety/Depression  Referring provider: Ad Galvan, *    Encounter Diagnosis     ICD-10-CM    1. Aphasia  R47.01       2. Word finding difficulty  R47.89       3. Memory difficulty  R41.3       4. MCI (mild cognitive impairment)  G31.84         Visit Tracking:  POC   Expires Auth Expiration Date ST Visit Limit   2025 BOMN          Visit/Unit Tracking:  Auth Status Date 2024   BOMN Used 1 2 3 4    Remaining BOMN BOMN BOMN BOMN       Subjective/Behavioral:  -Patient with no new complaints.     Objective/Assessment:  -Reviewed testing results and goals in plan care with patient. Patient is in agreement at this time.      Short Term Goals  1. Patient will utilize word finding strategies during semantic feature analysis treatment activity for improved naming and verbal expression skills.    2. Patient will name an average of 15+ items in a category in 60 seconds over 5 trials using compensatory strategies and min cues to facilitate improved word retrieval skills, to be achieved in 4-6 weeks.    To target word finding and attention; patient completed the card game \"Anomia\" where they are asked to name people/places/things based on category presented on card (i.e., artificial sweetener). Target of game was for speed of naming and processing.  Patient completed level 2 with average of 14.8 cards min (WNL; goal is 10+). Pt skipped cards x 0.    3. Patient will name an average of 10+ words that begin with a specific letter in 60 seconds over 5 trials using compensatory strategies and min cues to facilitate improved word retrieval skills, to be achieved in 4-6 weeks.    4. Patient will name an appropriate synonym/antonym for a given word with 80% accuracy to build expressive vocabulary for conversation, to be achieved in 4-6 " "weeks.    5. Patient will complete word generation tasks (e.g., analogies, category matrices, etc.) with 80% accuracy using word finding strategies to facilitate improved word retrieval skills, to be achieved in 4-6 weeks.    To target circumlocution and overall language skills; patient participated in the game \"taboo\" with clinician. Patient is presented with a word (i.e., yellow), and must provide verbal clues to the clinician, without using \"taboo\" or restricted words (i.e., color, bus, sun, etc.) Patient completed description of 16/16 words, and guessing of 16/16 words described by clinician. Patient noted to require additional time for execution of clues and word finding.      Plan:  -Patient was provided with home exercises/activities to target goals in plan of care at the end of today's session.  -Continue with current plan of care.  "

## 2024-12-20 ENCOUNTER — OFFICE VISIT (OUTPATIENT)
Dept: SPEECH THERAPY | Facility: CLINIC | Age: 69
End: 2024-12-20
Payer: COMMERCIAL

## 2024-12-20 DIAGNOSIS — R41.3 MEMORY DIFFICULTY: ICD-10-CM

## 2024-12-20 DIAGNOSIS — R47.01 APHASIA: Primary | ICD-10-CM

## 2024-12-20 DIAGNOSIS — R47.89 WORD FINDING DIFFICULTY: ICD-10-CM

## 2024-12-20 DIAGNOSIS — G31.84 MCI (MILD COGNITIVE IMPAIRMENT): ICD-10-CM

## 2024-12-20 PROCEDURE — 92507 TX SP LANG VOICE COMM INDIV: CPT

## 2024-12-20 NOTE — PROGRESS NOTES
Daily Speech Treatment Note    Today's date: 2024   Patient’s name: Oriana Beverly  : 1955  MRN: [id number]  Safety measures: Anxiety/Depression  Referring provider: Ad Galvan, *    Encounter Diagnosis     ICD-10-CM    1. Aphasia  R47.01       2. Word finding difficulty  R47.89       3. Memory difficulty  R41.3       4. MCI (mild cognitive impairment)  G31.84         Visit Tracking:  POC   Expires Auth Expiration Date ST Visit Limit   2025 BOMN          Visit/Unit Tracking:  Auth Status Date 2024   BOMN Used 1 2 3 4 5    Remaining BOMN BOMN BOMN BOMN BOMN       Subjective/Behavioral:  -Patient stated that she is officially done with the !     Objective/Assessment:  -Reviewed testing results and goals in plan care with patient. Patient is in agreement at this time.      Short Term Goals  1. Patient will utilize word finding strategies during semantic feature analysis treatment activity for improved naming and verbal expression skills.    2. Patient will name an average of 15+ items in a category in 60 seconds over 5 trials using compensatory strategies and min cues to facilitate improved word retrieval skills, to be achieved in 4-6 weeks.    3. Patient will name an average of 10+ words that begin with a specific letter in 60 seconds over 5 trials using compensatory strategies and min cues to facilitate improved word retrieval skills, to be achieved in 4-6 weeks.    4. Patient will name an appropriate synonym/antonym for a given word with 80% accuracy to build expressive vocabulary for conversation, to be achieved in 4-6 weeks.    5. Patient will complete word generation tasks (e.g., analogies, category matrices, etc.) with 80% accuracy using word finding strategies to facilitate improved word retrieval skills, to be achieved in 4-6 weeks.    To target word generation: Patient and Clinician were presented with 10 letter  "cards and were instructed to make as many words as they could using their ten letters (without using letters twice). Once each person completed this task, they then shuffled their cards and passed their words to the opponent. Opponents were instructed to unscramble the words that were made. Patient completed this activity over 4 rounds creating 9/9 (100%) words independently.  Patient unscrambled 8/8 (100%) words independently.     To target word generation: Patient was verbally presented with \"riddles\" (e.g. name something that grows, but is not planted) and was instructed to give at least three answers (e.g. child, hair, mold).  She completed this task over 3 riddles in 8/9 (89%) opportunities independently, increasing to 9/9 (100%) opportunities given min verbal cues.      Plan:  -Patient was provided with home exercises/activities to target goals in plan of care at the end of today's session.  -Continue with current plan of care.  "

## 2024-12-27 ENCOUNTER — OFFICE VISIT (OUTPATIENT)
Dept: SPEECH THERAPY | Facility: CLINIC | Age: 69
End: 2024-12-27
Payer: COMMERCIAL

## 2024-12-27 DIAGNOSIS — R41.3 MEMORY DIFFICULTY: ICD-10-CM

## 2024-12-27 DIAGNOSIS — R47.89 WORD FINDING DIFFICULTY: ICD-10-CM

## 2024-12-27 DIAGNOSIS — R47.01 APHASIA: Primary | ICD-10-CM

## 2024-12-27 DIAGNOSIS — G31.84 MCI (MILD COGNITIVE IMPAIRMENT): ICD-10-CM

## 2024-12-27 PROCEDURE — 92507 TX SP LANG VOICE COMM INDIV: CPT

## 2024-12-27 NOTE — PROGRESS NOTES
Daily Speech Treatment Note    Today's date: 2024   Patient’s name: Oriana Beverly  : 1955  MRN: [id number]  Safety measures: Anxiety/Depression  Referring provider: Ad Galvan, *    Encounter Diagnosis     ICD-10-CM    1. Aphasia  R47.01       2. Word finding difficulty  R47.89       3. Memory difficulty  R41.3       4. MCI (mild cognitive impairment)  G31.84         Visit Tracking:  POC   Expires Auth Expiration Date ST Visit Limit   2025 BOMN          Visit/Unit Tracking:  Auth Status Date 2024       BOMN Used 6        Remaining BOMN           Subjective/Behavioral:  -Patient with no new complaints.     Objective/Assessment:  -Reviewed testing results and goals in plan care with patient. Patient is in agreement at this time.      Short Term Goals  1. Patient will utilize word finding strategies during semantic feature analysis treatment activity for improved naming and verbal expression skills.    2. Patient will name an average of 15+ items in a category in 60 seconds over 5 trials using compensatory strategies and min cues to facilitate improved word retrieval skills, to be achieved in 4-6 weeks.    3. Patient will name an average of 10+ words that begin with a specific letter in 60 seconds over 5 trials using compensatory strategies and min cues to facilitate improved word retrieval skills, to be achieved in 4-6 weeks.    4. Patient will name an appropriate synonym/antonym for a given word with 80% accuracy to build expressive vocabulary for conversation, to be achieved in 4-6 weeks.    5. Patient will complete word generation tasks (e.g., analogies, category matrices, etc.) with 80% accuracy using word finding strategies to facilitate improved word retrieval skills, to be achieved in 4-6 weeks.    To target story generation: Patient was presented with story starter pictures and was instructed to create a story about each one.  She completed this task over 5  "trials.  The following are Patient's stories:     -\"Oh no.  Katharine Leach is being stopped.  The  pulled up behind.  Now wait.  In front of her.  No, behind her.  And um and is apporaching her car.  He looks nice enough but you never know.  And uh let's see.  The roads look clear.  She was speeding because she was on her way home and had gotten all involved in her shopping and you know her babysitting is going to be over time.  It is time to leave.  So she didn't want to pay any extra so that is why she was speeding.\"    -\"Oh man.  This was back in the day when you could actually change your own tires.  Here's Katharine Leach's little sister, John Ulloa.  This was 1975.  She is turning the lug nuts.  She has a bit of a smile.  She will be undoing the lugnuts.  She is going to take off that tire and put on a new tire and will be on her way.\"    -\"Aww.  Okay.  It's summer time and lavinia Franklin is in his litte bitty outdoor pool.  He has a his dog, Angelo, who is taking a drink from the pool which is fine with him.  His mother, Ami ISLAS, is watching him.  Rigoberto has his litte rubber ducky and it is a pleasant little scene.\"    -\"Oh boy.  So this is a  couple.  Boogie andKarthikeyanly.  He refused to get directions, but she has finally pulled the map out.  She is showing him the map and telling him, 'we are going to go to the right up here.'  He is smiling and thinking, ' she is smart.'\"     -\"Oh no.  She knew she didn't... this is richie.  She knew she didn't like science.  She didn't know she had done that badly.  She did A in English and Algebra and Yoruba and Music.  But an F in science. She is really struggling with how she is going to tell her parents about her F in science espeically since her father is a  and so is her mom.\"      To target sentence generation: Patient was given word tiles (approx. 6 for each part of speech) and was instructed to create sentences with the tiles she had.  She completed this task over 4 " trials.  The following are her sentences:    -My beautiful teacher always helps children learn because she loves them.     -The funny cat smiles to the sad boy which makes him laugh.     -Where was the funny woman going on her bicycle?    -Friday, my friend and I ate the best apple hot cakes.       Plan:  -Patient was provided with home exercises/activities to target goals in plan of care at the end of today's session.  -Continue with current plan of care.

## 2025-01-02 ENCOUNTER — OFFICE VISIT (OUTPATIENT)
Dept: SPEECH THERAPY | Facility: CLINIC | Age: 70
End: 2025-01-02
Payer: COMMERCIAL

## 2025-01-02 DIAGNOSIS — R47.89 WORD FINDING DIFFICULTY: ICD-10-CM

## 2025-01-02 DIAGNOSIS — G31.84 MCI (MILD COGNITIVE IMPAIRMENT): ICD-10-CM

## 2025-01-02 DIAGNOSIS — R41.3 MEMORY DIFFICULTY: ICD-10-CM

## 2025-01-02 DIAGNOSIS — R47.01 APHASIA: Primary | ICD-10-CM

## 2025-01-02 PROCEDURE — 92507 TX SP LANG VOICE COMM INDIV: CPT

## 2025-01-02 NOTE — PROGRESS NOTES
"Daily Speech Treatment Note    Today's date: 2025   Patient’s name: Oriana Beverly  : 1955  MRN: [id number]  Safety measures: Anxiety/Depression  Referring provider: Ad Galvan, *    Encounter Diagnosis     ICD-10-CM    1. Aphasia  R47.01       2. Word finding difficulty  R47.89       3. Memory difficulty  R41.3       4. MCI (mild cognitive impairment)  G31.84         Visit Tracking:  POC   Expires Auth Expiration Date ST Visit Limit   2025 BOMN          Visit/Unit Tracking:  Auth Status Date 2024       BOMN Used 6        Remaining BOMN           Subjective/Behavioral:  -Patient was engaged in all activities presented to her today.     Objective/Assessment:  -Reviewed testing results and goals in plan care with patient. Patient is in agreement at this time.      Short Term Goals  1. Patient will utilize word finding strategies during semantic feature analysis treatment activity for improved naming and verbal expression skills.    2. Patient will name an average of 15+ items in a category in 60 seconds over 5 trials using compensatory strategies and min cues to facilitate improved word retrieval skills, to be achieved in 4-6 weeks.    3. Patient will name an average of 10+ words that begin with a specific letter in 60 seconds over 5 trials using compensatory strategies and min cues to facilitate improved word retrieval skills, to be achieved in 4-6 weeks.    4. Patient will name an appropriate synonym/antonym for a given word with 80% accuracy to build expressive vocabulary for conversation, to be achieved in 4-6 weeks.    5. Patient will complete word generation tasks (e.g., analogies, category matrices, etc.) with 80% accuracy using word finding strategies to facilitate improved word retrieval skills, to be achieved in 4-6 weeks.    To target complex word finding: Patient was verbally presented with a \"riddle\" (e.g. name something that is sharp, but not used for cutting " -- mind, tongue, cheese) and was instructed to come up with at least two answers.  She completed this task over 10 trials in 19/20 (95%) opportunities independently, increasing to 20/20 (100%) opportunities given semantic cues.      To target complex word finding: Patient was verbally presented with a common idiom/proverb and was instructed to state the true meaning.  She completed this task in 22/25 (88%) opportunities independently, increasing to 25/25 (100%) opportunities given min verbal cues.     To target naming skills, patient was asked to provide a word that best fit the definition, with the initial letter provided (i.e., a liquid measure equaling four quarts__/g/___gallon). Patient completed naming in 17/20 (85%) opportunities independently, increasing to 19/20 (95%) opportunities given semantic cues, and increasing to 20/20 (100%) opportunities given phonemic cues.        Plan:  -Patient was provided with home exercises/activities to target goals in plan of care at the end of today's session.  -Continue with current plan of care.

## 2025-01-10 ENCOUNTER — OFFICE VISIT (OUTPATIENT)
Dept: SPEECH THERAPY | Facility: CLINIC | Age: 70
End: 2025-01-10
Payer: COMMERCIAL

## 2025-01-10 DIAGNOSIS — R47.89 WORD FINDING DIFFICULTY: ICD-10-CM

## 2025-01-10 DIAGNOSIS — R47.01 APHASIA: Primary | ICD-10-CM

## 2025-01-10 DIAGNOSIS — R41.3 MEMORY DIFFICULTY: ICD-10-CM

## 2025-01-10 DIAGNOSIS — G31.84 MCI (MILD COGNITIVE IMPAIRMENT): ICD-10-CM

## 2025-01-10 PROCEDURE — 92507 TX SP LANG VOICE COMM INDIV: CPT

## 2025-01-10 NOTE — PROGRESS NOTES
"Daily Speech Treatment Note    Today's date: 1/10/2025   Patient’s name: Oriana Beverly  : 1955  MRN: [id number]  Safety measures: Anxiety/Depression  Referring provider: Ad Galvan, *    Encounter Diagnosis     ICD-10-CM    1. Aphasia  R47.01       2. Word finding difficulty  R47.89       3. Memory difficulty  R41.3       4. MCI (mild cognitive impairment)  G31.84         Visit Tracking:  POC   Expires Auth Expiration Date ST Visit Limit   2025 BOMN          Visit/Unit Tracking:  Auth Status Date 2024 01/10/2025      BOMN Used 6 7       Remaining BOMN BOMN          Subjective/Behavioral:  -Patient stated that she does become frustrated with herself when she cannot think of answers.  Clinician explained that Patient does a great job of \"thinking out loud\" and uses circumlocution strategy very well.      Objective/Assessment:  -Reviewed testing results and goals in plan care with patient. Patient is in agreement at this time.      Short Term Goals  1. Patient will utilize word finding strategies during semantic feature analysis treatment activity for improved naming and verbal expression skills.    2. Patient will name an average of 15+ items in a category in 60 seconds over 5 trials using compensatory strategies and min cues to facilitate improved word retrieval skills, to be achieved in 4-6 weeks.    3. Patient will name an average of 10+ words that begin with a specific letter in 60 seconds over 5 trials using compensatory strategies and min cues to facilitate improved word retrieval skills, to be achieved in 4-6 weeks.    4. Patient will name an appropriate synonym/antonym for a given word with 80% accuracy to build expressive vocabulary for conversation, to be achieved in 4-6 weeks.    5. Patient will complete word generation tasks (e.g., analogies, category matrices, etc.) with 80% accuracy using word finding strategies to facilitate improved word retrieval skills, to be " "achieved in 4-6 weeks.    To target thought organization and word finding: Patient was presented with three words (e.g. bit, wall, globe) and was instructed to change one letter in each word to create a list of words that fit the same category (e.g. bat, ball, glove = baseball).  She completed this task over 15 trials in 36/45 (80%) opportunities independently, increasing to 45/45 (100%) opportunities given which letter to change.     To target expressive vocabulary: Patient played \"Buzzword\" where they were verbally presented with a common word (e.g. Pin), a clue (e.g. Use it to hang notices on a corkboard, stuffed receptacle for sewing needles, hand fastener for falling hems), and were instructed to answer using the buzzword given (e.g. pushpin, pin cushion, safety pin).  Patient completed this task over 4 buzzwords in 37/40 (93%) opportunities independently, increasing to 40/40 (100%) opportunities given semantic cues.      Plan:  -Patient was provided with home exercises/activities to target goals in plan of care at the end of today's session.  -Continue with current plan of care.  "

## 2025-01-17 ENCOUNTER — OFFICE VISIT (OUTPATIENT)
Dept: SPEECH THERAPY | Facility: CLINIC | Age: 70
End: 2025-01-17
Payer: COMMERCIAL

## 2025-01-17 DIAGNOSIS — G31.84 MCI (MILD COGNITIVE IMPAIRMENT): ICD-10-CM

## 2025-01-17 DIAGNOSIS — R41.3 MEMORY DIFFICULTY: ICD-10-CM

## 2025-01-17 DIAGNOSIS — R47.89 WORD FINDING DIFFICULTY: ICD-10-CM

## 2025-01-17 DIAGNOSIS — R47.01 APHASIA: Primary | ICD-10-CM

## 2025-01-17 PROCEDURE — 92507 TX SP LANG VOICE COMM INDIV: CPT

## 2025-01-17 NOTE — PROGRESS NOTES
"Daily Speech Treatment Note    Today's date: 2025   Patient’s name: Oriana Beverly  : 1955  MRN: [id number]  Safety measures: Anxiety/Depression  Referring provider: Ad Galvan, *    Encounter Diagnosis     ICD-10-CM    1. Aphasia  R47.01       2. Word finding difficulty  R47.89       3. Memory difficulty  R41.3       4. MCI (mild cognitive impairment)  G31.84         Visit Tracking:  POC   Expires Auth Expiration Date ST Visit Limit   2025 BOMN          Visit/Unit Tracking:  Auth Status Date 2024 01/10/2025 2025     BOMN Used 6 7 8      Remaining BOMN BOMN BOMN         Subjective/Behavioral:  -Patient's session was added last minute in cancellation spot.     Objective/Assessment:  -Reviewed testing results and goals in plan care with patient. Patient is in agreement at this time.      Short Term Goals  1. Patient will utilize word finding strategies during semantic feature analysis treatment activity for improved naming and verbal expression skills.    2. Patient will name an average of 15+ items in a category in 60 seconds over 5 trials using compensatory strategies and min cues to facilitate improved word retrieval skills, to be achieved in 4-6 weeks.    3. Patient will name an average of 10+ words that begin with a specific letter in 60 seconds over 5 trials using compensatory strategies and min cues to facilitate improved word retrieval skills, to be achieved in 4-6 weeks.    4. Patient will name an appropriate synonym/antonym for a given word with 80% accuracy to build expressive vocabulary for conversation, to be achieved in 4-6 weeks.    5. Patient will complete word generation tasks (e.g., analogies, category matrices, etc.) with 80% accuracy using word finding strategies to facilitate improved word retrieval skills, to be achieved in 4-6 weeks.    To target word finding: Patient played \"Tapple\" where they were given a category and an array of 20 letters " "and was instructed to state words that fit both the given category and letter combination.  Patient completed this task over 4 trials:  -Cities: 10/10 (100%) independently -- without timer  -Boy Names: 19/20 (95%) independently, 20/20 (100%) semantic cues -- with timer   -Colleges/[employer]: 11/20 (55%) independently given provided time, 20/20 (100%) without time constraints -- with timer  -Reasons to Celebrate: 8/20 (40%) independently given provided time, 18/20 (90%) without time constraints, 20/20 (100%) with semantic cues -- with timer    To target thought organization and word finding: Patient played \"OuiSi\" where they were presented with 10 abstract picture cards and were instructed to make matches with those pictures based on various criteria (e.g. shape, color, pattern, texture, etc) and explain why they made that match.  They completed this task in 12/12 (100%) opportunities independently.      Plan:  -Patient was provided with home exercises/activities to target goals in plan of care at the end of today's session.  -Continue with current plan of care.  "

## 2025-01-24 ENCOUNTER — OFFICE VISIT (OUTPATIENT)
Dept: SPEECH THERAPY | Facility: CLINIC | Age: 70
End: 2025-01-24
Payer: COMMERCIAL

## 2025-01-24 DIAGNOSIS — G31.84 MCI (MILD COGNITIVE IMPAIRMENT): ICD-10-CM

## 2025-01-24 DIAGNOSIS — R47.89 WORD FINDING DIFFICULTY: ICD-10-CM

## 2025-01-24 DIAGNOSIS — R47.01 APHASIA: Primary | ICD-10-CM

## 2025-01-24 DIAGNOSIS — R41.3 MEMORY DIFFICULTY: ICD-10-CM

## 2025-01-24 PROCEDURE — 92507 TX SP LANG VOICE COMM INDIV: CPT

## 2025-01-24 NOTE — PROGRESS NOTES
Daily Speech Treatment Note    Today's date: 2025   Patient’s name: Oriana Beverly  : 1955  MRN: [id number]  Safety measures: Anxiety/Depression  Referring provider: Ad Galvan, *    Encounter Diagnosis     ICD-10-CM    1. Aphasia  R47.01       2. Word finding difficulty  R47.89       3. Memory difficulty  R41.3       4. MCI (mild cognitive impairment)  G31.84         Visit Tracking:  POC   Expires Auth Expiration Date ST Visit Limit   2025 BOMN          Visit/Unit Tracking:  Auth Status Date 2024 01/10/2025 2025 2025    BOMN Used 6 7 8 9     Remaining BOMN BOMN BOMN BOMN        Subjective/Behavioral:  -Patient made it through her first week back to school!    Objective/Assessment:  -Reviewed testing results and goals in plan care with patient. Patient is in agreement at this time.      Short Term Goals  1. Patient will utilize word finding strategies during semantic feature analysis treatment activity for improved naming and verbal expression skills.    2. Patient will name an average of 15+ items in a category in 60 seconds over 5 trials using compensatory strategies and min cues to facilitate improved word retrieval skills, to be achieved in 4-6 weeks.    3. Patient will name an average of 10+ words that begin with a specific letter in 60 seconds over 5 trials using compensatory strategies and min cues to facilitate improved word retrieval skills, to be achieved in 4-6 weeks.    4. Patient will name an appropriate synonym/antonym for a given word with 80% accuracy to build expressive vocabulary for conversation, to be achieved in 4-6 weeks.    5. Patient will complete word generation tasks (e.g., analogies, category matrices, etc.) with 80% accuracy using word finding strategies to facilitate improved word retrieval skills, to be achieved in 4-6 weeks.    To target generative word finding: Patient was presented with a sound (e.g. buzz, pop, boom) and was  "instructed to name two things that make each sound.  She completed this task in 36/40 (90%) opportunities independently, increasing to 40/40 (100%) opportunities given min verbal cues.     To target conversation: Patient and Clinician engaged in conversation regarding \"hot topics\" in the world of education.  Patient was able to carry on conversation over 5 topics demonstrating 0 communication breakdowns.  Patient was noted to have pauses while thinking of next thought, but none that were distracting or interrupted the flow of conversation.       Plan:  -Patient was provided with home exercises/activities to target goals in plan of care at the end of today's session.  -Continue with current plan of care.  "

## 2025-01-31 ENCOUNTER — EVALUATION (OUTPATIENT)
Dept: SPEECH THERAPY | Facility: CLINIC | Age: 70
End: 2025-01-31
Payer: COMMERCIAL

## 2025-01-31 DIAGNOSIS — G31.84 MCI (MILD COGNITIVE IMPAIRMENT): ICD-10-CM

## 2025-01-31 DIAGNOSIS — R41.3 MEMORY DIFFICULTY: ICD-10-CM

## 2025-01-31 DIAGNOSIS — R47.89 WORD FINDING DIFFICULTY: ICD-10-CM

## 2025-01-31 DIAGNOSIS — R47.01 APHASIA: Primary | ICD-10-CM

## 2025-01-31 PROCEDURE — 92523 SPEECH SOUND LANG COMPREHEN: CPT

## 2025-01-31 PROCEDURE — 92507 TX SP LANG VOICE COMM INDIV: CPT

## 2025-01-31 NOTE — PROGRESS NOTES
Speech-Language Pathology Re-Evaluation    Today's date: 2025   Patient’s name: Oriana Beverly  : 1955  MRN: [id number]  Safety measures: Anxiety/Depression  Referring provider: Ad Galvan, *    Encounter Diagnosis     ICD-10-CM    1. Aphasia  R47.01       2. Word finding difficulty  R47.89       3. Memory difficulty  R41.3       4. MCI (mild cognitive impairment)  G31.84           Assessment:   Patient continues to present with mild, but stable, cognitive-linguistic deficits characterized by decreased word finding and confrontational naming skills secondary to MCI.  During testing today, Patient was noted to have strengths in naming antonyms without context and completing analogies.  It should be noted that the TOAL-4 test uses a 3 error ceiling rule.  Because of this rule, Patient's synonym score was significantly less than what it could have been (I.e. scoring 18/40 with ceiling compared to 33/40 without ceiling).  This was explained to Patient and she expressed verbal comprehension.  Without this ceiling rule, Patient did perform above average for naming synonyms.  It should also be noted that Patient was administered this test previously (2024) with similar scores, proving that Patient's ceiling was not accurate.  Some increased processing time was needed to complete this section.  Over the past certification period, Patient has been educated on word finding strategies and has focused on these strategies at the single word level.  In this next POC, focus should be on conversational fluency and the use of strategies in conversation.  The goals have been changed to reflect this new POC.  At this time, it is recommended that Patient continue to be seen 1X a week to target the goals mentioned below to focus on independent use of strategies in conversation.  Prognosis is good based on Patient motivation and progress made thus far.       Short Term Goals  1. Patient will utilize word  finding strategies during semantic feature analysis treatment activity for improved naming and verbal expression skills. --MET    2. Patient will name an average of 15+ items in a category in 60 seconds over 5 trials using compensatory strategies and min cues to facilitate improved word retrieval skills, to be achieved in 4-6 weeks. --MET    3. Patient will name an average of 10+ words that begin with a specific letter in 60 seconds over 5 trials using compensatory strategies and min cues to facilitate improved word retrieval skills, to be achieved in 4-6 weeks. --MET    4. Patient will name an appropriate synonym/antonym for a given word with 80% accuracy to build expressive vocabulary for conversation, to be achieved in 4-6 weeks. --MET    5. Patient will complete word generation tasks (e.g., analogies, category matrices, etc.) with 80% accuracy using word finding strategies to facilitate improved word retrieval skills, to be achieved in 4-6 weeks. --MET    NEW GOALS: (: 2025)  6. Patient will utilize circumlocution strategy during structured and unstructured tasks to facilitate word finding skills and verbal fluency with at least 90% accuracy.     7. Patient will participate in conversation about familiar and unfamiliar topics to facilitate word finding skills and verbal fluency with no more than 5 communication breakdowns.        Long Term Goals  1. Patient will demonstrate cognitive-communication skills consistent with age and education given use of compensatory strategies when needed to resume baseline activities and responsibilities in home, community, and work/school settings by discharge. --CONTINUE    2. Patient will complete higher-level expressive language tasks (e.g., word definitions, idioms, synonym/antonyms, etc) with 80% accuracy to improve functional communication skills by discharge. --CONTINUE    3. Patient will demonstrate adequate verbal expression during conversation without  "breakdowns or word finding deficits by discharge. --CONTINUE      Plan:  Patient would benefit from outpatient skilled Speech Therapy services: Cognitive-linguistic therapy    Frequency: 1x weekly  Duration: 2 months    Intervention certification from: 1/31/2025  Intervention certification to: 03/31/2025      Subjective:  -Patient reported the two words she had trouble with this week were parka and Post Partum Depression.    Patient's goal(s): \"Get closer to some diagnosis\"    Pain: Absent (scale:  N/A )      Objective (testing):  The Test of Adult Language - Fourth Edition (TOAL-4) is a standardized, norm-referenced assessment measuring important communicative abilities in spoken and written language. The test in normed on individuals 12:0-24:11. Due to these age restrictions, these standardized scores should not be compared to standard or percentile rank. Objective measures were taken to complete a diagnostic impression and prognosis for this patient. The TOAL-4 has 6 subtests; their results are reported as percentile ranks and scaled scores. The results of the TOAL-4 are generally used for three purposes; (a) to identify adolescents and adults who score significantly below their peers and therefore might need help improving their language proficiency, (b) to determine areas of relative strength and weakness among language abilities, and (c) to serve as a research tool in studies investigating language problems in adolescents and adults. Due to time constraints, only portions of the standardized assessment were administered on this date of service.    Subtest: Raw Score: Percentile:  Scaled Score: Descriptive Rating: IE Raw Score: Status:   1. Word Opposites 29/34 91%tile 14 Above Average 28/34 IMPROVEMENT   3. Spoken Analogies 17/26 37%tile 9 Average 17/26 NO CHANGE   4. Word Similarities 18/40 50%tile 10 Average 34/40 DECLINE     “IE” indicates the scores from the initial evaluation (11/29/2024).    "       Treatment:  -Speedy Words Card Game: To target generative naming using initial letter, patient was shown flashcards with categories and an initial letter (I.e., occupation, /m/). Patient instructed to provide an appropriate word (I.e., MAILMAN). Task completed over 32 trials with approx 94% accuracy, min semantic cues needed at times.       Visit Tracking:  POC   Expires Auth Expiration Date ST Visit Limit   01/27/2025 12/31/2024 BOMN   03/31/2025 12/31/2025 BOMN     Visit/Unit Tracking:  Auth Status Date 12/27/2024 01/10/2025 01/16/2025 01/24/2025 01/31/2025   BOMN Used 6 7 8 9 10    Remaining BOMN BOMN BOMN BOMN BOMN       Intervention comments:  30 minutes test administration; 15 minutes therapy tasks

## 2025-02-07 ENCOUNTER — OFFICE VISIT (OUTPATIENT)
Dept: SPEECH THERAPY | Facility: CLINIC | Age: 70
End: 2025-02-07
Payer: COMMERCIAL

## 2025-02-07 DIAGNOSIS — R47.89 WORD FINDING DIFFICULTY: ICD-10-CM

## 2025-02-07 DIAGNOSIS — G31.84 MCI (MILD COGNITIVE IMPAIRMENT): ICD-10-CM

## 2025-02-07 DIAGNOSIS — R41.3 MEMORY DIFFICULTY: ICD-10-CM

## 2025-02-07 DIAGNOSIS — R47.01 APHASIA: Primary | ICD-10-CM

## 2025-02-07 PROCEDURE — 92507 TX SP LANG VOICE COMM INDIV: CPT

## 2025-02-07 NOTE — PROGRESS NOTES
Daily Speech Treatment Note    Today's date: 2025   Patient’s name: Oriana Beverly  : 1955  MRN: [id number]  Safety measures: Anxiety/Depression  Referring provider: Ad Galvan, *    Encounter Diagnosis     ICD-10-CM    1. Aphasia  R47.01       2. Word finding difficulty  R47.89       3. Memory difficulty  R41.3       4. MCI (mild cognitive impairment)  G31.84         Visit Tracking:  POC   Expires Auth Expiration Date ST Visit Limit   2025 BOMN   2025 BOMN     Visit/Unit Tracking:  Auth Status Date 2024 01/10/2025 2025 2025 2025 2025   BOMN Used 6 7 8 9 10 11    Remaining BOMN BOMN BOMN BOMN BOMN BOMN       Subjective/Behavioral:  -Patient stated that she has been playing the games on [employer] each day.     Objective/Assessment:  -Reviewed testing results and goals in plan care with patient. Patient is in agreement at this time.      Short Term Goals (: 2025):  6. Patient will utilize circumlocution strategy during structured and unstructured tasks to facilitate word finding skills and verbal fluency with at least 90% accuracy.     To target word finding skills: Patient was verbally presented with a list of three items (e.g. [place] [employer], a hot air balloon, high school gymnasium) and was instructed to state what they have in common.  She completed this task in 16/20 (80%) opportunities independently, increasing to 20/20 (100%) opportunities given min semantic cues.     7. Patient will participate in conversation about familiar and unfamiliar topics to facilitate word finding skills and verbal fluency with no more than 5 communication breakdowns.      To target conversational skills: Patient was presented with various topics and was instructed to discuss.  She completed this task over 5 topics demonstrating 0 communication breakdowns.        Plan:  -Continue with current plan of care.

## 2025-02-14 ENCOUNTER — OFFICE VISIT (OUTPATIENT)
Dept: SPEECH THERAPY | Facility: CLINIC | Age: 70
End: 2025-02-14
Payer: COMMERCIAL

## 2025-02-14 DIAGNOSIS — R47.01 APHASIA: Primary | ICD-10-CM

## 2025-02-14 DIAGNOSIS — G31.84 MCI (MILD COGNITIVE IMPAIRMENT): ICD-10-CM

## 2025-02-14 DIAGNOSIS — R41.3 MEMORY DIFFICULTY: ICD-10-CM

## 2025-02-14 DIAGNOSIS — R47.89 WORD FINDING DIFFICULTY: ICD-10-CM

## 2025-02-14 PROCEDURE — 92507 TX SP LANG VOICE COMM INDIV: CPT

## 2025-02-14 NOTE — PROGRESS NOTES
"Daily Speech Treatment Note    Today's date: 2025   Patient’s name: Oriana Beverly  : 1955  MRN: [id number]  Safety measures: Anxiety/Depression  Referring provider: Ad Galvan, *    Encounter Diagnosis     ICD-10-CM    1. Aphasia  R47.01       2. Word finding difficulty  R47.89       3. Memory difficulty  R41.3       4. MCI (mild cognitive impairment)  G31.84         Visit Tracking:  POC   Expires Auth Expiration Date ST Visit Limit   2025 BOMN   2025 BOMN     Visit/Unit Tracking:  Auth Status Date 2025        BOMN Used 12         Remaining BOMN            Subjective/Behavioral:  -Patient participated in all tasks presented to her today.     Objective/Assessment:  -Reviewed testing results and goals in plan care with patient. Patient is in agreement at this time.      Short Term Goals (: 2025):  6. Patient will utilize circumlocution strategy during structured and unstructured tasks to facilitate word finding skills and verbal fluency with at least 90% accuracy.     To target expressive language: Patient was presented with a common expression (e.g. it's raining cats and dogs) and was instructed to state the true meaning.  She completed this task in 19/20 (95%) opportunities independently, increasing to 20/20 (100%) opportunities given min verbal cues.     7. Patient will participate in conversation about familiar and unfamiliar topics to facilitate word finding skills and verbal fluency with no more than 5 communication breakdowns.      To target verbal fluency: Patient was presented with \"unanswered  Questions\" (e.g. if you drop soap on the floor, is the soap dirty or clean?\") and was instructed to answer.  She completed this task over 8 questions with 0 communication breakdowns.        Plan:  -Continue with current plan of care.  "

## 2025-02-21 ENCOUNTER — APPOINTMENT (OUTPATIENT)
Dept: SPEECH THERAPY | Facility: CLINIC | Age: 70
End: 2025-02-21
Payer: COMMERCIAL

## 2025-02-22 DIAGNOSIS — G31.84 MCI (MILD COGNITIVE IMPAIRMENT): ICD-10-CM

## 2025-02-22 DIAGNOSIS — M79.7 FIBROMYALGIA: ICD-10-CM

## 2025-02-22 DIAGNOSIS — E55.9 VITAMIN D DEFICIENCY: Primary | ICD-10-CM

## 2025-02-22 DIAGNOSIS — E78.49 OTHER HYPERLIPIDEMIA: ICD-10-CM

## 2025-02-24 RX ORDER — GABAPENTIN 300 MG/1
CAPSULE ORAL
Qty: 450 CAPSULE | Refills: 0 | Status: SHIPPED | OUTPATIENT
Start: 2025-02-24

## 2025-02-28 ENCOUNTER — OFFICE VISIT (OUTPATIENT)
Dept: SPEECH THERAPY | Facility: CLINIC | Age: 70
End: 2025-02-28
Payer: COMMERCIAL

## 2025-02-28 DIAGNOSIS — R41.3 MEMORY DIFFICULTY: ICD-10-CM

## 2025-02-28 DIAGNOSIS — R47.89 WORD FINDING DIFFICULTY: ICD-10-CM

## 2025-02-28 DIAGNOSIS — R47.01 APHASIA: Primary | ICD-10-CM

## 2025-02-28 DIAGNOSIS — G31.84 MCI (MILD COGNITIVE IMPAIRMENT): ICD-10-CM

## 2025-02-28 PROCEDURE — 92507 TX SP LANG VOICE COMM INDIV: CPT

## 2025-02-28 NOTE — PROGRESS NOTES
"Daily Speech Treatment Note    Today's date: 2025   Patient’s name: Oriana Beverly  : 1955  MRN: [id number]  Safety measures: Anxiety/Depression  Referring provider: Ad Galvan, *    Encounter Diagnosis     ICD-10-CM    1. Aphasia  R47.01       2. Word finding difficulty  R47.89       3. Memory difficulty  R41.3       4. MCI (mild cognitive impairment)  G31.84         Visit Tracking:  POC   Expires Auth Expiration Date ST Visit Limit   2025 BOMN   2025 BOMN     Visit/Unit Tracking:  Auth Status Date 2025       BOMN Used 12 13        Remaining BOMN BOMN           Subjective/Behavioral:  -Patient stated she is having a \"fuzzy\" day and is dealing with a headache.     Objective/Assessment:  -Reviewed testing results and goals in plan care with patient. Patient is in agreement at this time.      Short Term Goals (: 2025):  6. Patient will utilize circumlocution strategy during structured and unstructured tasks to facilitate word finding skills and verbal fluency with at least 90% accuracy.     To target word finding: Patient was verbally presented with homophones (e.g. sent, cent, scent) and was instructed to state their spelling and definitions (e.g. putting something in the mail, a coin worth one, an aroma).  She spelled all words in 20/20 (100%) opportunities independently.  She defined words in 20/20 (100%) opportunities independently.     7. Patient will participate in conversation about familiar and unfamiliar topics to facilitate word finding skills and verbal fluency with no more than 5 communication breakdowns.      To target conversation: Patient engaged in a variety of topics where she had to discuss or debate.  She completed this over 8 topics demonstrating 1 communication breakdown and 2 instances of successful circumlocution!         Plan:  -Continue with current plan of care.  "

## 2025-03-07 ENCOUNTER — OFFICE VISIT (OUTPATIENT)
Dept: SPEECH THERAPY | Facility: CLINIC | Age: 70
End: 2025-03-07
Payer: COMMERCIAL

## 2025-03-07 DIAGNOSIS — R47.01 APHASIA: Primary | ICD-10-CM

## 2025-03-07 DIAGNOSIS — G31.84 MCI (MILD COGNITIVE IMPAIRMENT): ICD-10-CM

## 2025-03-07 DIAGNOSIS — R41.3 MEMORY DIFFICULTY: ICD-10-CM

## 2025-03-07 DIAGNOSIS — R47.89 WORD FINDING DIFFICULTY: ICD-10-CM

## 2025-03-07 PROCEDURE — 92507 TX SP LANG VOICE COMM INDIV: CPT

## 2025-03-07 NOTE — PROGRESS NOTES
Daily Speech Treatment Note    Today's date: 3/7/2025   Patient’s name: Oriana Beverly  : 1955  MRN: [id number]  Safety measures: Anxiety/Depression  Referring provider: Ad Galvan, *    Encounter Diagnosis     ICD-10-CM    1. Aphasia  R47.01       2. Word finding difficulty  R47.89       3. Memory difficulty  R41.3       4. MCI (mild cognitive impairment)  G31.84         Visit Tracking:  POC   Expires Auth Expiration Date ST Visit Limit   2025 BOMN   2025 BOMN     Visit/Unit Tracking:  Auth Status Date 2025      BOMN Used 12 13 14       Remaining BOMN BOMN BOMN          Subjective/Behavioral:  -Patient with no new complaints.     Objective/Assessment:  -Reviewed testing results and goals in plan care with patient. Patient is in agreement at this time.      Short Term Goals (: 2025):  6. Patient will utilize circumlocution strategy during structured and unstructured tasks to facilitate word finding skills and verbal fluency with at least 90% accuracy.     Patient was noted to successfully use circumlocution 1X during task mentioned below.      7. Patient will participate in conversation about familiar and unfamiliar topics to facilitate word finding skills and verbal fluency with no more than 5 communication breakdowns.      To target connected speech: Patient was given picture scenes and was instructed to tell stories based on the picture.  Over 10 stories, Patient demonstrated on 3 word finding difficulties.       Plan:  -Continue with current plan of care.

## 2025-03-11 ENCOUNTER — APPOINTMENT (OUTPATIENT)
Dept: LAB | Age: 70
End: 2025-03-11
Payer: COMMERCIAL

## 2025-03-11 DIAGNOSIS — E55.9 VITAMIN D DEFICIENCY: ICD-10-CM

## 2025-03-11 DIAGNOSIS — J30.89 ALLERGIC RHINITIS DUE TO OTHER ALLERGIC TRIGGER, UNSPECIFIED SEASONALITY: ICD-10-CM

## 2025-03-11 DIAGNOSIS — E78.49 OTHER HYPERLIPIDEMIA: ICD-10-CM

## 2025-03-11 DIAGNOSIS — K21.9 GASTROESOPHAGEAL REFLUX DISEASE: ICD-10-CM

## 2025-03-11 DIAGNOSIS — G31.84 MCI (MILD COGNITIVE IMPAIRMENT): ICD-10-CM

## 2025-03-11 LAB
25(OH)D3 SERPL-MCNC: 43 NG/ML (ref 30–100)
ALBUMIN SERPL BCG-MCNC: 4.5 G/DL (ref 3.5–5)
ALP SERPL-CCNC: 56 U/L (ref 34–104)
ALT SERPL W P-5'-P-CCNC: 44 U/L (ref 7–52)
ANION GAP SERPL CALCULATED.3IONS-SCNC: 8 MMOL/L (ref 4–13)
AST SERPL W P-5'-P-CCNC: 39 U/L (ref 13–39)
BASOPHILS # BLD AUTO: 0.04 THOUSANDS/ÂΜL (ref 0–0.1)
BASOPHILS NFR BLD AUTO: 1 % (ref 0–1)
BILIRUB SERPL-MCNC: 0.43 MG/DL (ref 0.2–1)
BUN SERPL-MCNC: 14 MG/DL (ref 5–25)
CALCIUM SERPL-MCNC: 9.5 MG/DL (ref 8.4–10.2)
CHLORIDE SERPL-SCNC: 105 MMOL/L (ref 96–108)
CHOLEST SERPL-MCNC: 134 MG/DL (ref ?–200)
CO2 SERPL-SCNC: 30 MMOL/L (ref 21–32)
CREAT SERPL-MCNC: 0.87 MG/DL (ref 0.6–1.3)
EOSINOPHIL # BLD AUTO: 0.14 THOUSAND/ÂΜL (ref 0–0.61)
EOSINOPHIL NFR BLD AUTO: 3 % (ref 0–6)
ERYTHROCYTE [DISTWIDTH] IN BLOOD BY AUTOMATED COUNT: 13.2 % (ref 11.6–15.1)
GFR SERPL CREATININE-BSD FRML MDRD: 68 ML/MIN/1.73SQ M
GLUCOSE P FAST SERPL-MCNC: 95 MG/DL (ref 65–99)
HCT VFR BLD AUTO: 47.8 % (ref 34.8–46.1)
HDLC SERPL-MCNC: 56 MG/DL
HGB BLD-MCNC: 15.1 G/DL (ref 11.5–15.4)
IMM GRANULOCYTES # BLD AUTO: 0 THOUSAND/UL (ref 0–0.2)
IMM GRANULOCYTES NFR BLD AUTO: 0 % (ref 0–2)
LDLC SERPL CALC-MCNC: 58 MG/DL (ref 0–100)
LYMPHOCYTES # BLD AUTO: 1.72 THOUSANDS/ÂΜL (ref 0.6–4.47)
LYMPHOCYTES NFR BLD AUTO: 38 % (ref 14–44)
MCH RBC QN AUTO: 29.5 PG (ref 26.8–34.3)
MCHC RBC AUTO-ENTMCNC: 31.6 G/DL (ref 31.4–37.4)
MCV RBC AUTO: 94 FL (ref 82–98)
MONOCYTES # BLD AUTO: 0.34 THOUSAND/ÂΜL (ref 0.17–1.22)
MONOCYTES NFR BLD AUTO: 8 % (ref 4–12)
NEUTROPHILS # BLD AUTO: 2.31 THOUSANDS/ÂΜL (ref 1.85–7.62)
NEUTS SEG NFR BLD AUTO: 50 % (ref 43–75)
NONHDLC SERPL-MCNC: 78 MG/DL
NRBC BLD AUTO-RTO: 0 /100 WBCS
PLATELET # BLD AUTO: 189 THOUSANDS/UL (ref 149–390)
PMV BLD AUTO: 11.8 FL (ref 8.9–12.7)
POTASSIUM SERPL-SCNC: 4.5 MMOL/L (ref 3.5–5.3)
PROT SERPL-MCNC: 7 G/DL (ref 6.4–8.4)
RBC # BLD AUTO: 5.11 MILLION/UL (ref 3.81–5.12)
SODIUM SERPL-SCNC: 143 MMOL/L (ref 135–147)
TRIGL SERPL-MCNC: 101 MG/DL (ref ?–150)
TSH SERPL DL<=0.05 MIU/L-ACNC: 3.24 UIU/ML (ref 0.45–4.5)
VIT B12 SERPL-MCNC: 605 PG/ML (ref 180–914)
WBC # BLD AUTO: 4.55 THOUSAND/UL (ref 4.31–10.16)

## 2025-03-11 PROCEDURE — 85025 COMPLETE CBC W/AUTO DIFF WBC: CPT

## 2025-03-11 PROCEDURE — 82607 VITAMIN B-12: CPT

## 2025-03-11 PROCEDURE — 80053 COMPREHEN METABOLIC PANEL: CPT

## 2025-03-11 PROCEDURE — 36415 COLL VENOUS BLD VENIPUNCTURE: CPT

## 2025-03-11 PROCEDURE — 82306 VITAMIN D 25 HYDROXY: CPT

## 2025-03-11 PROCEDURE — 84443 ASSAY THYROID STIM HORMONE: CPT

## 2025-03-11 PROCEDURE — 80061 LIPID PANEL: CPT

## 2025-03-12 ENCOUNTER — OFFICE VISIT (OUTPATIENT)
Dept: INTERNAL MEDICINE CLINIC | Facility: CLINIC | Age: 70
End: 2025-03-12
Payer: COMMERCIAL

## 2025-03-12 VITALS
DIASTOLIC BLOOD PRESSURE: 72 MMHG | HEIGHT: 62 IN | OXYGEN SATURATION: 98 % | SYSTOLIC BLOOD PRESSURE: 114 MMHG | TEMPERATURE: 96.1 F | HEART RATE: 69 BPM | BODY MASS INDEX: 25.21 KG/M2 | WEIGHT: 137 LBS

## 2025-03-12 DIAGNOSIS — R47.89 WORD FINDING DIFFICULTY: Primary | ICD-10-CM

## 2025-03-12 DIAGNOSIS — K59.04 CHRONIC IDIOPATHIC CONSTIPATION: ICD-10-CM

## 2025-03-12 DIAGNOSIS — K21.9 GASTROESOPHAGEAL REFLUX DISEASE, UNSPECIFIED WHETHER ESOPHAGITIS PRESENT: ICD-10-CM

## 2025-03-12 DIAGNOSIS — M79.7 FIBROMYALGIA: ICD-10-CM

## 2025-03-12 DIAGNOSIS — Z12.31 ENCOUNTER FOR SCREENING MAMMOGRAM FOR BREAST CANCER: ICD-10-CM

## 2025-03-12 DIAGNOSIS — M51.362 DEGENERATION OF INTERVERTEBRAL DISC OF LUMBAR REGION WITH DISCOGENIC BACK PAIN AND LOWER EXTREMITY PAIN: ICD-10-CM

## 2025-03-12 DIAGNOSIS — M17.0 PRIMARY OSTEOARTHRITIS OF BOTH KNEES: ICD-10-CM

## 2025-03-12 DIAGNOSIS — G31.84 MCI (MILD COGNITIVE IMPAIRMENT): ICD-10-CM

## 2025-03-12 DIAGNOSIS — R26.89 BALANCE DISORDER: ICD-10-CM

## 2025-03-12 DIAGNOSIS — Z23 ENCOUNTER FOR IMMUNIZATION: ICD-10-CM

## 2025-03-12 DIAGNOSIS — G50.0 TRIGEMINAL NEURALGIA: ICD-10-CM

## 2025-03-12 DIAGNOSIS — M81.0 AGE-RELATED OSTEOPOROSIS WITHOUT CURRENT PATHOLOGICAL FRACTURE: ICD-10-CM

## 2025-03-12 DIAGNOSIS — E78.49 OTHER HYPERLIPIDEMIA: ICD-10-CM

## 2025-03-12 DIAGNOSIS — G47.33 OSA (OBSTRUCTIVE SLEEP APNEA): ICD-10-CM

## 2025-03-12 DIAGNOSIS — F41.8 DEPRESSION WITH ANXIETY: ICD-10-CM

## 2025-03-12 DIAGNOSIS — G25.81 RESTLESS LEGS SYNDROME: ICD-10-CM

## 2025-03-12 DIAGNOSIS — N18.2 CHRONIC KIDNEY DISEASE (CKD), STAGE II (MILD): ICD-10-CM

## 2025-03-12 PROCEDURE — 99214 OFFICE O/P EST MOD 30 MIN: CPT | Performed by: INTERNAL MEDICINE

## 2025-03-12 PROCEDURE — 90471 IMMUNIZATION ADMIN: CPT

## 2025-03-12 PROCEDURE — 90750 HZV VACC RECOMBINANT IM: CPT

## 2025-03-12 RX ORDER — PANTOPRAZOLE SODIUM 40 MG/1
40 TABLET, DELAYED RELEASE ORAL DAILY
Qty: 90 TABLET | Refills: 1 | Status: SHIPPED | OUTPATIENT
Start: 2025-03-12

## 2025-03-12 RX ORDER — FLUTICASONE PROPIONATE 50 MCG
SPRAY, SUSPENSION (ML) NASAL
Qty: 16 G | Refills: 1 | Status: SHIPPED | OUTPATIENT
Start: 2025-03-12

## 2025-03-12 NOTE — PROGRESS NOTES
Name: Oriana Beverly      : 1955      MRN: [id number]  Encounter Provider: Etta Staples MD  Encounter Date: 3/12/2025   Encounter department: [facility] INTERNAL MEDICINE  :  Assessment & Plan  Word finding difficulty  Stable.  Went to speech therapy, reviewed PET scan.  Keep appointment with  neurology.       MCI (mild cognitive impairment)  As above.       Depression with anxiety  Stable, taking venlafaxine 75 mg daily.       Gastroesophageal reflux disease, unspecified whether esophagitis present  Takes PPI prn.       Fibromyalgia  Stable, on gabapentin tid.       Trigeminal neuralgia  Stable, on carbamazepine.  Per neurology.       Restless legs syndrome  Stable.  Continue daily iron, also on gabapentin.       Age-related osteoporosis without current pathological fracture  Bone density has improved.  Continue alendronate.  Continue daily walks and supplements.       Other hyperlipidemia  Lipids at goal, on atorvastatin 40 mg.       Chronic kidney disease (CKD), stage II (mild)  Lab Results   Component Value Date    EGFR 68 2025    EGFR 65 2023    EGFR 68 2022    CREATININE 0.87 2025    CREATININE 0.91 2023    CREATININE 0.88 2022     Stable.  Avoid NSAIDs.       Chronic idiopathic constipation  No change. Encouraged to take Colace daily.       Primary osteoarthritis of both knees  No change, planning for left knee surgery in the near future.       Degeneration of intervertebral disc of lumbar region with discogenic back pain and lower extremity pain  No recent issues.       SHAWN (obstructive sleep apnea)  Not using CPAP.       Encounter for screening mammogram for breast cancer         Balance disorder  Finished physical therapy.       Encounter for immunization    Orders:  •  Zoster Vaccine Recombinant IM    Follow up in 6 months or as needed.       History of Present Illness   She feels well, has no new complaints.  She is considering right knee  "surgery in the near future, not sure if she will do it if she has Medicare insurance.    She reports occasional left lower quadrant discomfort.  This would usually occur if she has not had a good bowel movement for several days.  She does not take Colace in a regular basis because she might have a loose stool when she is at work.  She reports no nausea or vomiting.    She reports having a cold about a month ago, lasts for about a week.  She missed work for 2 days but had a few more days of recovery.  She felt very tired at that time.  No fevers or chills.  She did not check for COVID.    She had been to therapy for her word finding difficulty.  She is planning to stop it by the end of the month, has not seen a significant difference.  She also went to therapy for her balance, no recent falls.      Review of Systems   Constitutional:  Negative for appetite change and fatigue.   HENT:  Negative for congestion, ear pain and postnasal drip.    Eyes:  Negative for visual disturbance.   Respiratory:  Negative for cough and shortness of breath.    Cardiovascular:  Negative for chest pain and leg swelling.   Gastrointestinal:  Positive for constipation. Negative for abdominal pain and diarrhea.   Genitourinary:  Negative for dysuria, frequency and urgency.   Musculoskeletal:  Negative for arthralgias and myalgias.   Skin:  Negative for rash and wound.   Neurological:  Negative for dizziness, numbness and headaches.   Hematological:  Does not bruise/bleed easily.   Psychiatric/Behavioral:  Negative for confusion. The patient is not nervous/anxious.        Objective   /72   Pulse 69   Temp (!) 96.1 °F (35.6 °C)   Ht 5' 2\" (1.575 m)   Wt 62.1 kg (137 lb)   SpO2 98%   BMI 25.06 kg/m²      Physical Exam  Vitals and nursing note reviewed.   Constitutional:       General: She is not in acute distress.     Appearance: She is well-developed.   HENT:      Head: Normocephalic and atraumatic.      Right Ear: External ear " normal.      Left Ear: External ear normal.   Eyes:      Pupils: Pupils are equal, round, and reactive to light.   Cardiovascular:      Rate and Rhythm: Normal rate and regular rhythm.      Heart sounds: Normal heart sounds.   Pulmonary:      Effort: Pulmonary effort is normal.      Breath sounds: Normal breath sounds. No wheezing.   Abdominal:      General: Bowel sounds are normal.      Palpations: Abdomen is soft.   Musculoskeletal:         General: No swelling.      Right lower leg: No edema.      Left lower leg: No edema.   Skin:     General: Skin is warm.      Findings: No rash.   Neurological:      General: No focal deficit present.      Mental Status: She is alert and oriented to person, place, and time.   Psychiatric:         Mood and Affect: Mood and affect normal. Mood is not anxious or depressed.         Behavior: Behavior normal.           Labs & imaging results reviewed with patient.

## 2025-03-12 NOTE — ASSESSMENT & PLAN NOTE
Lab Results   Component Value Date    EGFR 68 03/11/2025    EGFR 65 08/04/2023    EGFR 68 05/26/2022    CREATININE 0.87 03/11/2025    CREATININE 0.91 08/04/2023    CREATININE 0.88 05/26/2022     Stable.  Avoid NSAIDs.

## 2025-03-14 ENCOUNTER — OFFICE VISIT (OUTPATIENT)
Dept: SPEECH THERAPY | Facility: CLINIC | Age: 70
End: 2025-03-14
Payer: COMMERCIAL

## 2025-03-14 DIAGNOSIS — R47.89 WORD FINDING DIFFICULTY: ICD-10-CM

## 2025-03-14 DIAGNOSIS — R41.3 MEMORY DIFFICULTY: ICD-10-CM

## 2025-03-14 DIAGNOSIS — R47.01 APHASIA: Primary | ICD-10-CM

## 2025-03-14 DIAGNOSIS — G31.84 MCI (MILD COGNITIVE IMPAIRMENT): ICD-10-CM

## 2025-03-14 PROCEDURE — 92507 TX SP LANG VOICE COMM INDIV: CPT

## 2025-03-14 NOTE — PROGRESS NOTES
"Daily Speech Treatment Note    Today's date: 3/14/2025   Patient’s name: Oriana Beverly  : 1955  MRN: [id number]  Safety measures: Anxiety/Depression  Referring provider: Ad Galvan, *    Encounter Diagnosis     ICD-10-CM    1. Aphasia  R47.01       2. Word finding difficulty  R47.89       3. Memory difficulty  R41.3       4. MCI (mild cognitive impairment)  G31.84         Visit Tracking:  POC   Expires Auth Expiration Date ST Visit Limit   2025 BOMN   2025 BOMN     Visit/Unit Tracking:  Auth Status Date 2025     BOMN Used 12 13 14 15      Remaining BOMN BOMN BOMN BOMN         Subjective/Behavioral:  -Patient is currently on her spring break from classes.     Objective/Assessment:  -Reviewed testing results and goals in plan care with patient. Patient is in agreement at this time.      Short Term Goals (: 2025):  6. Patient will utilize circumlocution strategy during structured and unstructured tasks to facilitate word finding skills and verbal fluency with at least 90% accuracy.     To target circumlocution and overall language skills; patient participated in the game \"taboo\" with clinician. Patient is presented with a word (i.e., yellow), and must provide verbal clues to the clinician, without using \"taboo\" or restricted words (i.e., color, bus, sun, etc.) Patient completed description of 12/12 words, and guessing of 12/12 words described by clinician. Patient noted to require additional time for execution of clues and word finding.    7. Patient will participate in conversation about familiar and unfamiliar topics to facilitate word finding skills and verbal fluency with no more than 5 communication breakdowns.        To target conversation: Patient engaged in a variety of topics where she had to discuss or debate.  She completed this over 10 topics demonstrating 2 moments where she had to think of the word she " wanted to use, but corrected quickly without help!       Plan:  -Continue with current plan of care.

## 2025-03-20 DIAGNOSIS — G45.9 TIA (TRANSIENT ISCHEMIC ATTACK): ICD-10-CM

## 2025-03-20 RX ORDER — ATORVASTATIN CALCIUM 40 MG/1
40 TABLET, FILM COATED ORAL DAILY
Qty: 90 TABLET | Refills: 1 | Status: SHIPPED | OUTPATIENT
Start: 2025-03-20

## 2025-03-20 NOTE — TELEPHONE ENCOUNTER
Reason for call:   [x] Refill   [] Prior Auth  [] Other:     Office:   [x] PCP/Provider -  Etta Staples MD   [] Specialty/Provider -     Medication: atorvastatin (LIPITOR) 40 mg tablet     Dose/Frequency:     Take 1 tablet (40 mg total) by mouth daily       Quantity: 90    Pharmacy: [id number] - [place], [place] - [address] [phone]     Local Pharmacy   Does the patient have enough for 3 days?   [] Yes   [x] No - Send as HP to POD    Mail Away Pharmacy   Does the patient have enough for 10 days?   [] Yes   [] No - Send as HP to POD

## 2025-03-21 ENCOUNTER — OFFICE VISIT (OUTPATIENT)
Dept: SPEECH THERAPY | Facility: CLINIC | Age: 70
End: 2025-03-21
Payer: COMMERCIAL

## 2025-03-21 DIAGNOSIS — R47.89 WORD FINDING DIFFICULTY: ICD-10-CM

## 2025-03-21 DIAGNOSIS — G31.84 MCI (MILD COGNITIVE IMPAIRMENT): ICD-10-CM

## 2025-03-21 DIAGNOSIS — R47.01 APHASIA: Primary | ICD-10-CM

## 2025-03-21 DIAGNOSIS — R41.3 MEMORY DIFFICULTY: ICD-10-CM

## 2025-03-21 PROCEDURE — 92507 TX SP LANG VOICE COMM INDIV: CPT

## 2025-03-21 NOTE — PROGRESS NOTES
Daily Speech Treatment Note    Today's date: 3/21/2025   Patient’s name: Oriana Beverly  : 1955  MRN: [id number]  Safety measures: Anxiety/Depression  Referring provider: Ad Galvan, *    Encounter Diagnosis     ICD-10-CM    1. Aphasia  R47.01       2. Word finding difficulty  R47.89       3. Memory difficulty  R41.3       4. MCI (mild cognitive impairment)  G31.84         Visit Tracking:  POC   Expires Auth Expiration Date ST Visit Limit   2025 BOMN   2025 BOMN     Visit/Unit Tracking:  Auth Status Date 2025    BOMN Used 12 13 14 15 16     Remaining BOMN BOMN BOMN BOMN BOMN        Subjective/Behavioral:  -Patient is agreeable to discharge next session.     Objective/Assessment:  -Reviewed testing results and goals in plan care with patient. Patient is in agreement at this time.      Short Term Goals (: 2025):  6. Patient will utilize circumlocution strategy during structured and unstructured tasks to facilitate word finding skills and verbal fluency with at least 90% accuracy.     7. Patient will participate in conversation about familiar and unfamiliar topics to facilitate word finding skills and verbal fluency with no more than 5 communication breakdowns.    To target conversational skills: Patient was presented with complex topics and was instructed to state pros and cons for each.  She completed this task over 10+ topics with only 1 moment of word finding difficultly.       Plan:  -Continue with current plan of care.

## 2025-03-28 ENCOUNTER — EVALUATION (OUTPATIENT)
Dept: SPEECH THERAPY | Facility: CLINIC | Age: 70
End: 2025-03-28
Payer: COMMERCIAL

## 2025-03-28 DIAGNOSIS — R47.01 APHASIA: Primary | ICD-10-CM

## 2025-03-28 DIAGNOSIS — R41.3 MEMORY DIFFICULTY: ICD-10-CM

## 2025-03-28 DIAGNOSIS — R47.89 WORD FINDING DIFFICULTY: ICD-10-CM

## 2025-03-28 DIAGNOSIS — G31.84 MCI (MILD COGNITIVE IMPAIRMENT): ICD-10-CM

## 2025-03-28 PROCEDURE — 92523 SPEECH SOUND LANG COMPREHEN: CPT

## 2025-03-28 NOTE — PROGRESS NOTES
Speech-Language Pathology - Discharge    Today's date: 3/28/2025   Patient’s name: Oriana Beverly  : 1955  MRN: [id number]  Safety measures: MCI  Referring provider: Ad Galvan, *    Encounter Diagnosis     ICD-10-CM    1. Aphasia  R47.01       2. Word finding difficulty  R47.89       3. Memory difficulty  R41.3       4. MCI (mild cognitive impairment)  G31.84           Assessment:   Patient presents with very mild cognitive-linguistic deficits characterized by decreased word finding and confrontational naming skills secondary to MCI.  During testing today, Patient was noted to make improvements with her confrontational naming skills.  When getting stuck on a word, Patient was able to describe the word or talk about it.  There were some occasions where she was then able to say the target.  She used this circumlocution strategy throughout testing.  Test scores today not only improved from IE, but also demonstrated skills to be WFL compared to age matched peers.  Over the past certification period, Patient's therapy has been focussed on use of strategies in conversation.  Patient used strategies consistently and organized her thoughts when answering questions or talking about different debate topics.  It appears as though Patient has learned strategies that work well for her and should continue to use them in her everyday conversation.  At this time, it is recommended that Patient be discharged from skilled outpatient Speech Therapy as she has met all goals in her POC.  Should Patient need to return to therapy in the future, a new script would be warranted at that time.       Short Term Goals  NEW GOALS: (: 2025)  6. Patient will utilize circumlocution strategy during structured and unstructured tasks to facilitate word finding skills and verbal fluency with at least 90% accuracy. --MET    7. Patient will participate in conversation about familiar and unfamiliar topics to facilitate word  "finding skills and verbal fluency with no more than 5 communication breakdowns. --MET      Long Term Goals  1. Patient will demonstrate cognitive-communication skills consistent with age and education given use of compensatory strategies when needed to resume baseline activities and responsibilities in home, community, and work/school settings by discharge. --MET    2. Patient will complete higher-level expressive language tasks (e.g., word definitions, idioms, synonym/antonyms, etc) with 80% accuracy to improve functional communication skills by discharge. --MET    3. Patient will demonstrate adequate verbal expression during conversation without breakdowns or word finding deficits by discharge. --MET      Plan  Patient met all goals in her POC and will be discharged at this time.     Frequency: No treatment is warranted at this time.  Duration: N/A    Intervention certification from: 3/28/2025  Intervention certification to: N/A      Subjective  Patient's goal(s): \"Get closer to some diagnosis\"    Pain: Absent (scale: N/A)      Objective (testing)  The Expressive One Word Picture Vocabulary Test, Fourth Edition (EOWPVT-4) is an individually administered, norm referenced assessment of an individual's ability to name objects, actions, and concepts when presented with color illustrations. It is standardized on English-speaking individuals ages 2-80+ years residing in the [place]. Standard scores between 85 and 115 fall within 1 standard deviation of the norm and, therefore, will be considered \"average.\" The following results were obtained during the administration of the assessment.    Raw Score: Percentile Rank: Standard Score: IE Standard Score: Status:   147 23%ile 89 83 IMPROVEMENT         Treatment  -No treatment was performed on this date of service.       Visit Tracking:  POC   Expires Auth Expiration Date ST Visit Limit   01/27/2025 12/31/2024 BOMN   03/31/2025 12/31/2025 BOMN     Visit/Unit " Tracking:  Auth Status Date 02/14/2025 02/28/2025 03/07/2025 03/14/2025 03/21/2025 03/28/2025   BOMN Used 12 13 14 15 16 17    Remaining BOMN BOMN BOMN BOMN BOMN BOMN       Intervention comments:  35 minutes test administration

## 2025-05-22 ENCOUNTER — VBI (OUTPATIENT)
Dept: ADMINISTRATIVE | Facility: OTHER | Age: 70
End: 2025-05-22

## 2025-05-22 NOTE — TELEPHONE ENCOUNTER
05/22/25 1:05 PM     Chart reviewed for Mammogram ; nothing is submitted to the patient's insurance at this time.     Марина Pinto MA   [facility]

## 2025-05-24 DIAGNOSIS — M79.7 FIBROMYALGIA: ICD-10-CM

## 2025-05-25 RX ORDER — GABAPENTIN 300 MG/1
CAPSULE ORAL
Qty: 450 CAPSULE | Refills: 1 | Status: SHIPPED | OUTPATIENT
Start: 2025-05-25

## 2025-06-03 ENCOUNTER — TELEPHONE (OUTPATIENT)
Dept: INTERNAL MEDICINE CLINIC | Facility: CLINIC | Age: 70
End: 2025-06-03

## 2025-06-03 DIAGNOSIS — M79.7 FIBROMYALGIA: ICD-10-CM

## 2025-06-03 DIAGNOSIS — F41.8 DEPRESSION WITH ANXIETY: ICD-10-CM

## 2025-06-03 NOTE — TELEPHONE ENCOUNTER
Reason for call:   [x] Refill   [] Prior Auth  [] Other:     Office:   [x] PCP/Provider - Etta Staples  [] Specialty/Provider -     Medication: Venlafaxine XR     Dose/Frequency: 75 mg Daily     Quantity: 90    Pharmacy: [facility] [place]   Does the patient have enough for 3 days?   [x] Yes   [] No - Send as HP to POD    Mail Away Pharmacy   Does the patient have enough for 10 days?   [] Yes   [] No - Send as HP to POD

## 2025-06-03 NOTE — TELEPHONE ENCOUNTER
Oriana said she has a sore throat, non-productive cough, post nasal drip and aches all over.  She did not test for covid and says she doesn't think she has it.    She took Dayquil that has 325 mg of Tylenol in in so she has not taken extra Tylenol.  She wants to know if it's OK to take Tylenol with Dayquil or what your recommendation would be.

## 2025-06-03 NOTE — TELEPHONE ENCOUNTER
Okay to take Dayquil and Tylenol. Take Dayquil/ Nyquil for 3 to 4 days only.  You can take Mucinex if you have a lot of phlegm.  You can use Flonase or saline nasal spray to help with the post nasal drip.    Ask when symptoms started. Recommend to check for COVID.

## 2025-06-03 NOTE — TELEPHONE ENCOUNTER
Patient called the RX Refill Line. Message is being forwarded to the office.     Patient is requesting call back, has a cold and getting worse has [facility] brand of DayQuil & extra strength tylenol can this be taking together , Dayquil has acetaminophen in it  also    Please contact patient at [phone]

## 2025-06-04 DIAGNOSIS — F41.8 DEPRESSION WITH ANXIETY: ICD-10-CM

## 2025-06-04 DIAGNOSIS — M79.7 FIBROMYALGIA: ICD-10-CM

## 2025-06-04 RX ORDER — VENLAFAXINE HYDROCHLORIDE 75 MG/1
75 CAPSULE, EXTENDED RELEASE ORAL DAILY
Qty: 90 CAPSULE | Refills: 1 | Status: SHIPPED | OUTPATIENT
Start: 2025-06-04

## 2025-06-05 RX ORDER — VENLAFAXINE HYDROCHLORIDE 75 MG/1
75 CAPSULE, EXTENDED RELEASE ORAL DAILY
Qty: 90 CAPSULE | Refills: 1 | OUTPATIENT
Start: 2025-06-05

## 2025-06-11 DIAGNOSIS — M81.0 AGE-RELATED OSTEOPOROSIS WITHOUT CURRENT PATHOLOGICAL FRACTURE: ICD-10-CM

## 2025-06-12 ENCOUNTER — CLINICAL SUPPORT (OUTPATIENT)
Dept: INTERNAL MEDICINE CLINIC | Facility: CLINIC | Age: 70
End: 2025-06-12
Payer: COMMERCIAL

## 2025-06-12 DIAGNOSIS — Z23 ENCOUNTER FOR IMMUNIZATION: Primary | ICD-10-CM

## 2025-06-12 PROCEDURE — 90471 IMMUNIZATION ADMIN: CPT

## 2025-06-12 PROCEDURE — 90750 HZV VACC RECOMBINANT IM: CPT

## 2025-06-12 RX ORDER — ALENDRONATE SODIUM 70 MG/1
TABLET ORAL
Qty: 12 TABLET | Refills: 1 | Status: SHIPPED | OUTPATIENT
Start: 2025-06-12

## 2025-07-17 ENCOUNTER — TELEPHONE (OUTPATIENT)
Dept: NEUROLOGY | Facility: CLINIC | Age: 70
End: 2025-07-17

## 2025-07-17 NOTE — TELEPHONE ENCOUNTER
LMOM for pt to confirm appointment with Dr. Galvan on 7/28 in the [place] office. Advised pt to arrive 15 mins early.

## 2025-07-28 ENCOUNTER — OFFICE VISIT (OUTPATIENT)
Dept: NEUROLOGY | Facility: CLINIC | Age: 70
End: 2025-07-28
Payer: COMMERCIAL

## 2025-07-28 VITALS
BODY MASS INDEX: 25.19 KG/M2 | WEIGHT: 136.9 LBS | SYSTOLIC BLOOD PRESSURE: 118 MMHG | OXYGEN SATURATION: 98 % | DIASTOLIC BLOOD PRESSURE: 78 MMHG | TEMPERATURE: 98.1 F | HEART RATE: 67 BPM | HEIGHT: 62 IN

## 2025-07-28 DIAGNOSIS — R47.89 WORD FINDING DIFFICULTY: Primary | ICD-10-CM

## 2025-07-28 DIAGNOSIS — R51.9 NONINTRACTABLE HEADACHE, UNSPECIFIED CHRONICITY PATTERN, UNSPECIFIED HEADACHE TYPE: ICD-10-CM

## 2025-07-28 DIAGNOSIS — R26.89 BALANCE PROBLEM: ICD-10-CM

## 2025-07-28 DIAGNOSIS — R41.3 MEMORY DIFFICULTY: ICD-10-CM

## 2025-07-28 DIAGNOSIS — M79.7 FIBROMYALGIA: ICD-10-CM

## 2025-07-28 PROCEDURE — 99214 OFFICE O/P EST MOD 30 MIN: CPT | Performed by: PSYCHIATRY & NEUROLOGY

## 2025-07-28 RX ORDER — CARBAMAZEPINE 100 MG/1
100 CAPSULE, EXTENDED RELEASE ORAL 2 TIMES DAILY
Qty: 180 CAPSULE | Refills: 3 | Status: SHIPPED | OUTPATIENT
Start: 2025-07-28

## (undated) DEVICE — SILVER-COATED ANTIMICROBIAL BARRIER DRESSING: Brand: ACTICOAT   4" X 8"

## (undated) DEVICE — GLOVE INDICATOR PI UNDERGLOVE SZ 8.5 BLUE

## (undated) DEVICE — COOL TEMP PAD

## (undated) DEVICE — STOCKINETTE REGULAR

## (undated) DEVICE — SPINNING CEMENT MIXING BOWL

## (undated) DEVICE — SUT VICRYL PLUS 1 CTB-1 36 IN VCPB947H

## (undated) DEVICE — TRAY FOLEY 16FR URIMETER SURESTEP

## (undated) DEVICE — DUAL CUT SAGITTAL BLADE

## (undated) DEVICE — THE SIMPULSE SOLO SYSTEM WITH ULTREX RETRACTABLE SPLASH SHIELD TIP: Brand: SIMPULSE SOLO

## (undated) DEVICE — PACK TUR

## (undated) DEVICE — SINGLE-USE POLYPECTOMY SNARE: Brand: ROTATABLE SNARE

## (undated) DEVICE — DRAPE SHEET X-LG

## (undated) DEVICE — 3M™ IOBAN™ 2 ANTIMICROBIAL INCISE DRAPE 6650EZ: Brand: IOBAN™ 2

## (undated) DEVICE — PAD GROUNDING ADULT

## (undated) DEVICE — DRAPE EQUIPMENT RF WAND

## (undated) DEVICE — SUT VICRYL PLUS 2-0 CTB-1 27 IN VCPB259H

## (undated) DEVICE — GLOVE SRG BIOGEL ORTHOPEDIC 7.5

## (undated) DEVICE — BETHLEHEM UNIV TOTAL KNEE, KIT: Brand: CARDINAL HEALTH

## (undated) DEVICE — BASKET STONE RTRVL PLTN CL 3FR 115CM

## (undated) DEVICE — CAPIT KNEE ATTUNE FB CEMENT - DEPUY

## (undated) DEVICE — GAUZE SPONGES,16 PLY: Brand: CURITY

## (undated) DEVICE — PADDING CAST 4 IN  COTTON STRL

## (undated) DEVICE — GLOVE SRG BIOGEL 8

## (undated) DEVICE — PLUMEPEN PRO 10FT

## (undated) DEVICE — HOOD: Brand: FLYTE, SURGICOOL

## (undated) DEVICE — ABDOMINAL PAD: Brand: DERMACEA

## (undated) DEVICE — JP 3-SPRING RES W/10FR PVC DRAIN/TR: Brand: CARDINAL HEALTH

## (undated) DEVICE — SPECIMEN CONTAINER STERILE PEEL PACK

## (undated) DEVICE — ACE WRAP 6 IN UNSTERILE

## (undated) DEVICE — NO-SCRATCH ™ SMALL WHITNEY CURETTE ™ IS A SINGLE-USE, PLASTIC CURETTE FOR QUICKLY APPLYING, MANIPULATING AND REMOVING BONE CEMENT DURING HIP AND KNEE REPLACEMENT SURGERY. THE PLASTIC IS SOFTER THAN STEEL INSTRUMENTS, REDUCING THE RISK OF DAMAGING THE PROSTHESIS WITH METAL INSTRUMENTS.  THE CURETTE’S 6MM TIP REMOVES EXCESS CEMENT FROM REPLACEMENT HIPS AND KNEES. EASY-TO-MANEUVER, THE SMALL BLUE CURETTE LETS YOU REMOVE CEMENT FROM ALL EDGES OF THE PROSTHESIS.NO-SCRATCH WHITNEY SMALL CURETTE FEATURES:SAFER THAN STEEL- MADE OF PLASTIC - STURDY YET SOFTER THAN SURGICAL STEEL.HANDIER- EACH TOOL HAS A MOLDED-IN THUMB INDENTATION INSTANTLY ORIENTING THE TOOL.- EASIER TO MANEUVER IN HARD TO SEE PLACES.- COLOR-CODED FOR EASY IDENTIFICATION.FASTER- COMES INDIVIDUALLY PACKAGED IN STERILE, PEEL OPEN POUCH, READY TO GO.- APPLIES, MANIPULATES, OR REMOVES CEMENT WITH FINGERTIP PRECISION.ECONOMICAL- THE COST OF A SINGLE REVISION DWARFS THE COST OF A SINGLE-USE CURETTE. - DISPOSABLE – THERE’S NO NEED TO WASTE TIME REMOVING HARDENED CEMENT OR RE-STERILIZING TOOLS.- LESS EXPENSIVE TO BUY AND INVENTORY - ORDER ONLY THE TOOL YOU USE.- PACKAGED 25 INDIVIDUALLY WRAPPED TOOLS TO A CARTON FOR CONVENIENT SHELF STORAGE.: Brand: WHITNEY NO-SCRATCH CURETTE (SMALL)

## (undated) DEVICE — GUIDEWIRE STRGHT TIP 0.035 IN  SOLO PLUS

## (undated) DEVICE — ACE WRAP 6 IN STERILE

## (undated) DEVICE — SPONGE PVP SCRUB WING STERILE

## (undated) DEVICE — CATH URETERAL 5FR X 70 CM FLEX TIP POLYUR BARD

## (undated) DEVICE — SYRINGE 10ML LL

## (undated) DEVICE — RECIPROCATING BLADE, DOUBLE SIDED, OFFSET  (70.0 X 0.64 X 12.6MM)